# Patient Record
Sex: MALE | Race: WHITE | NOT HISPANIC OR LATINO | Employment: OTHER | URBAN - METROPOLITAN AREA
[De-identification: names, ages, dates, MRNs, and addresses within clinical notes are randomized per-mention and may not be internally consistent; named-entity substitution may affect disease eponyms.]

---

## 2019-02-06 ENCOUNTER — HOSPITAL ENCOUNTER (OUTPATIENT)
Dept: OTHER | Facility: HOSPITAL | Age: 49
Discharge: HOME | End: 2019-02-06

## 2019-02-06 LAB
ALBUMIN SERPL-MCNC: 4.2 G/DL (ref 3.5–5)
ALBUMIN/GLOB SERPL: 1.4 {RATIO} (ref 1.4–2.6)
ALP SERPL-CCNC: 70 U/L (ref 53–128)
ALT SERPL-CCNC: 34 U/L (ref 10–40)
ANION GAP SERPL CALC-SCNC: 18 MMOL/L (ref 8–19)
AST SERPL-CCNC: 22 U/L (ref 15–50)
BASOPHILS # BLD AUTO: 0.03 10*3/UL (ref 0–0.2)
BASOPHILS NFR BLD AUTO: 0.37 % (ref 0–3)
BILIRUB SERPL-MCNC: 0.63 MG/DL (ref 0.2–1.3)
BUN SERPL-MCNC: 11 MG/DL (ref 5–25)
BUN/CREAT SERPL: 10 {RATIO} (ref 6–20)
CALCIUM SERPL-MCNC: 9.5 MG/DL (ref 8.7–10.4)
CHLORIDE SERPL-SCNC: 100 MMOL/L (ref 99–111)
CHOLEST SERPL-MCNC: 139 MG/DL (ref 107–200)
CHOLEST/HDLC SERPL: 3.2 {RATIO} (ref 3–6)
CONV CO2: 23 MMOL/L (ref 22–32)
CONV TOTAL PROTEIN: 7.1 G/DL (ref 6.3–8.2)
CREAT UR-MCNC: 1.08 MG/DL (ref 0.7–1.2)
EOSINOPHIL # BLD AUTO: 0.31 10*3/UL (ref 0–0.7)
EOSINOPHIL # BLD AUTO: 3.85 % (ref 0–7)
ERYTHROCYTE [DISTWIDTH] IN BLOOD BY AUTOMATED COUNT: 12.4 % (ref 11.5–14.5)
EST. AVERAGE GLUCOSE BLD GHB EST-MCNC: 209 MG/DL
GFR SERPLBLD BASED ON 1.73 SQ M-ARVRAT: >60 ML/MIN/{1.73_M2}
GLOBULIN UR ELPH-MCNC: 2.9 G/DL (ref 2–3.5)
GLUCOSE SERPL-MCNC: 172 MG/DL (ref 70–99)
HBA1C MFR BLD: 15.7 G/DL (ref 14–18)
HBA1C MFR BLD: 8.9 % (ref 3.5–5.7)
HCT VFR BLD AUTO: 43.8 % (ref 42–52)
HDLC SERPL-MCNC: 44 MG/DL (ref 40–60)
LDLC SERPL CALC-MCNC: 59 MG/DL (ref 70–100)
LYMPHOCYTES # BLD AUTO: 2.45 10*3/UL (ref 1–5)
MCH RBC QN AUTO: 30.7 PG (ref 27–31)
MCHC RBC AUTO-ENTMCNC: 35.8 G/DL (ref 33–37)
MCV RBC AUTO: 85.8 FL (ref 80–96)
MONOCYTES # BLD AUTO: 0.81 10*3/UL (ref 0.2–1.2)
MONOCYTES NFR BLD AUTO: 10.2 % (ref 3–10)
NEUTROPHILS # BLD AUTO: 4.34 10*3/UL (ref 2–8)
NEUTROPHILS NFR BLD AUTO: 54.7 % (ref 30–85)
NRBC BLD AUTO-RTO: 0 % (ref 0–0.01)
OSMOLALITY SERPL CALC.SUM OF ELEC: 287 MOSM/KG (ref 273–304)
PLATELET # BLD AUTO: 297 10*3/UL (ref 130–400)
PMV BLD AUTO: 8.1 FL (ref 7.4–10.4)
POTASSIUM SERPL-SCNC: 4 MMOL/L (ref 3.5–5.3)
RBC # BLD AUTO: 5.1 10*6/UL (ref 4.7–6.1)
SODIUM SERPL-SCNC: 137 MMOL/L (ref 135–147)
TRIGL SERPL-MCNC: 178 MG/DL (ref 40–150)
VARIANT LYMPHS NFR BLD MANUAL: 30.9 % (ref 20–45)
VLDLC SERPL-MCNC: 36 MG/DL (ref 5–37)
WBC # BLD AUTO: 7.94 10*3/UL (ref 4.8–10.8)

## 2019-02-07 LAB
CONV CREATININE URINE, RANDOM: 52.7 MG/DL (ref 10–300)
CONV MICROALBUM.,U,RANDOM: 14.9 MG/L (ref 0–20)
MICROALBUMIN/CREAT UR: 28.3 MG/G{CRE} (ref 0–25)

## 2020-07-30 ENCOUNTER — CONVERSION ENCOUNTER (OUTPATIENT)
Dept: FAMILY MEDICINE CLINIC | Facility: CLINIC | Age: 50
End: 2020-07-30

## 2020-07-30 ENCOUNTER — TELEPHONE CONVERTED (OUTPATIENT)
Dept: FAMILY MEDICINE CLINIC | Facility: CLINIC | Age: 50
End: 2020-07-30
Attending: FAMILY MEDICINE

## 2020-10-22 ENCOUNTER — TELEPHONE CONVERTED (OUTPATIENT)
Dept: FAMILY MEDICINE CLINIC | Facility: CLINIC | Age: 50
End: 2020-10-22
Attending: FAMILY MEDICINE

## 2021-02-09 ENCOUNTER — CONVERSION ENCOUNTER (OUTPATIENT)
Dept: FAMILY MEDICINE CLINIC | Facility: CLINIC | Age: 51
End: 2021-02-09

## 2021-02-09 ENCOUNTER — HOSPITAL ENCOUNTER (OUTPATIENT)
Dept: GENERAL RADIOLOGY | Facility: HOSPITAL | Age: 51
Discharge: HOME | End: 2021-02-09
Attending: FAMILY MEDICINE

## 2021-02-09 ENCOUNTER — OFFICE VISIT CONVERTED (OUTPATIENT)
Dept: FAMILY MEDICINE CLINIC | Facility: CLINIC | Age: 51
End: 2021-02-09
Attending: FAMILY MEDICINE

## 2021-02-09 LAB
25(OH)D3 SERPL-MCNC: 36.5 NG/ML (ref 30–100)
CHOLEST SERPL-MCNC: 134 MG/DL (ref 107–200)
CHOLEST/HDLC SERPL: 3.5 {RATIO} (ref 3–6)
EST. AVERAGE GLUCOSE BLD GHB EST-MCNC: 223 MG/DL
HBA1C MFR BLD: 9.4 % (ref 3.5–5.7)
HDLC SERPL-MCNC: 38 MG/DL (ref 40–60)
LDLC SERPL CALC-MCNC: 55 MG/DL (ref 70–100)
TRIGL SERPL-MCNC: 203 MG/DL (ref 40–150)
VLDLC SERPL-MCNC: 41 MG/DL (ref 5–37)

## 2021-02-10 LAB
ALBUMIN SERPL-MCNC: 4.3 G/DL (ref 3.5–5)
ALBUMIN/GLOB SERPL: 1.5 {RATIO} (ref 1.4–2.6)
ALP SERPL-CCNC: 122 U/L (ref 53–128)
ALT SERPL-CCNC: 62 U/L (ref 10–40)
ANION GAP SERPL CALC-SCNC: 12 MMOL/L (ref 8–19)
AST SERPL-CCNC: 56 U/L (ref 15–50)
BASOPHILS # BLD AUTO: 0.11 10*3/UL (ref 0–0.2)
BASOPHILS NFR BLD AUTO: 1.1 % (ref 0–3)
BILIRUB SERPL-MCNC: 0.89 MG/DL (ref 0.2–1.3)
BUN SERPL-MCNC: 6 MG/DL (ref 5–25)
BUN/CREAT SERPL: 6 {RATIO} (ref 6–20)
CALCIUM SERPL-MCNC: 9.2 MG/DL (ref 8.7–10.4)
CHLORIDE SERPL-SCNC: 101 MMOL/L (ref 99–111)
CONV ABS IMM GRAN: 0.05 10*3/UL (ref 0–0.2)
CONV CO2: 30 MMOL/L (ref 22–32)
CONV IMMATURE GRAN: 0.5 % (ref 0–1.8)
CONV TOTAL PROTEIN: 7.1 G/DL (ref 6.3–8.2)
CREAT UR-MCNC: 1.07 MG/DL (ref 0.7–1.2)
DEPRECATED RDW RBC AUTO: 42.9 FL (ref 35.1–43.9)
EOSINOPHIL # BLD AUTO: 0.32 10*3/UL (ref 0–0.7)
EOSINOPHIL # BLD AUTO: 3.1 % (ref 0–7)
ERYTHROCYTE [DISTWIDTH] IN BLOOD BY AUTOMATED COUNT: 13.7 % (ref 11.6–14.4)
GFR SERPLBLD BASED ON 1.73 SQ M-ARVRAT: >60 ML/MIN/{1.73_M2}
GLOBULIN UR ELPH-MCNC: 2.8 G/DL (ref 2–3.5)
GLUCOSE SERPL-MCNC: 69 MG/DL (ref 70–99)
HCT VFR BLD AUTO: 46.2 % (ref 42–52)
HGB BLD-MCNC: 15.6 G/DL (ref 14–18)
LYMPHOCYTES # BLD AUTO: 2.77 10*3/UL (ref 1–5)
LYMPHOCYTES NFR BLD AUTO: 26.9 % (ref 20–45)
MCH RBC QN AUTO: 29.3 PG (ref 27–31)
MCHC RBC AUTO-ENTMCNC: 33.8 G/DL (ref 33–37)
MCV RBC AUTO: 86.7 FL (ref 80–96)
MONOCYTES # BLD AUTO: 0.97 10*3/UL (ref 0.2–1.2)
MONOCYTES NFR BLD AUTO: 9.4 % (ref 3–10)
NEUTROPHILS # BLD AUTO: 6.09 10*3/UL (ref 2–8)
NEUTROPHILS NFR BLD AUTO: 59 % (ref 30–85)
NRBC CBCN: 0 % (ref 0–0.7)
OSMOLALITY SERPL CALC.SUM OF ELEC: 286 MOSM/KG (ref 273–304)
PLATELET # BLD AUTO: 308 10*3/UL (ref 130–400)
PMV BLD AUTO: 11.3 FL (ref 9.4–12.4)
POTASSIUM SERPL-SCNC: 3.4 MMOL/L (ref 3.5–5.3)
RBC # BLD AUTO: 5.33 10*6/UL (ref 4.7–6.1)
SODIUM SERPL-SCNC: 140 MMOL/L (ref 135–147)
TESTOST SERPL-MCNC: 285 NG/DL (ref 193–740)
TSH SERPL-ACNC: 5.82 M[IU]/L (ref 0.27–4.2)
VIT B12 SERPL-MCNC: 704 PG/ML (ref 211–911)
WBC # BLD AUTO: 10.31 10*3/UL (ref 4.8–10.8)

## 2021-02-19 ENCOUNTER — HOSPITAL ENCOUNTER (OUTPATIENT)
Dept: GENERAL RADIOLOGY | Facility: HOSPITAL | Age: 51
Discharge: HOME | End: 2021-02-19
Attending: FAMILY MEDICINE

## 2021-02-19 LAB
CONV CREATININE URINE, RANDOM: 19 MG/DL (ref 10–300)
CONV MICROALBUM.,U,RANDOM: 28.9 MG/L (ref 0–20)
MICROALBUMIN/CREAT UR: 152.1 MG/G{CRE} (ref 0–25)

## 2021-03-02 ENCOUNTER — OFFICE VISIT CONVERTED (OUTPATIENT)
Dept: FAMILY MEDICINE CLINIC | Facility: CLINIC | Age: 51
End: 2021-03-02
Attending: FAMILY MEDICINE

## 2021-04-16 ENCOUNTER — OFFICE VISIT CONVERTED (OUTPATIENT)
Dept: FAMILY MEDICINE CLINIC | Facility: CLINIC | Age: 51
End: 2021-04-16
Attending: FAMILY MEDICINE

## 2021-05-10 NOTE — H&P
History and Physical      Patient Name: Ilya Colon   Patient ID: [id number]   Sex: Male   YOB: 1970    Primary Care Provider: Edgard Dickey DO   Referring Provider: Edgard Dickey DO    Visit Date: July 30, 2020    Provider: Edgard Dickey DO   Location: [facility]   Location Address: [address] [place] [address]  [address] [place] [address]  [id number]   Location Phone: [phone]          Chief Complaint  · establish care      History Of Present Illness  TELEHEALTH TELEPHONE VISIT  Chief Complaint: establish care, need med refills   Ilya Colon is a 49 year old /White male who is presenting for evaluation via telehealth telephone visit. Verbal consent obtained before beginning visit.   Provider spent 12 minutes with patient during telehealth visit.   The following staff were present during this visit: Jane Stroud DO, LPN   Past Medical History/Overview of Patient Symptoms  Ilya Colon is a 49 year old /White male who presents for evaluation and treatment of:        Patient presents to establish care or reestablish care from [facility] in [place] in the past charted.  Last note from 7/2018, PMH significant for HLD CVA CAD chronic pain T2DM on insulin osteoarthritis GERD allergic rhinitis depression insomnia.  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     As stated above he has diabetes he takes insulin on Lantus and NovoLog 36 units twice a day of Lantus and 30 units of NovoLog twice a day with meals.  He says his blood sugars are probably well controlled and will sign release records from previous PCP.  Additionally takes Lipitor 10 mg for cholesterol metoprolol 25 mg for CAD.  Denies any chest pain palpitations headache vision changes syncope or loss of consciousness fever fatigue or other.    He has Claritin takes for allergies has a pain  45 mg for sleep breast insomnia and is stable no SI HI.  He was taking Nexium for heartburn but has had better symptom improvement with Protonix if available or able would like to switch has been on before.    He follows with pain management and is on gabapentin 600 mg 4 times a day as well as Norco through pain management filled earlier this month.  Overall he has no concerns other than needing medication refills and advised if he was able to fill out his medical forms and provide his insurance information we could make sure he has his medications so long as he follows up and agrees       Past Medical History  Disease Name Date Onset Notes   Allergic rhinitis --  --    CAD (coronary artery disease) --  --    Depression --  --    GERD (gastroesophageal reflux disease) --  --    HLD (hyperlipidemia) --  --    Hypertension, essential --  --    Insomnia --  --    Insulin dependent type 2 diabetes mellitus --  --    Obesity (BMI 30-39.9) --  --    Pain, Cervical Spine --  --    Pain, Joint --  --    Scoliosis --  --    T2DM (type 2 diabetes mellitus) --  --    Tobacco use disorder 07/10/2014 --          Past Surgical History  Procedure Name Date Notes   Hand surgery, right --  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         Medication List  Name Date Started Instructions   Apidra 100 unit/mL subcutaneous solution  inject by subcutaneous route as per insulin sliding scale protocol   cyclobenzaprine 10 mg oral tablet  take 1 tablet (10 mg) by oral route 3 times per day   Lantus Insulin Subcutaneous 16-18 units  2 times daily   Lyrica 50 mg oral capsule  take 1 capsule (50 mg) by oral route 3 times per day   meloxicam 15 mg oral tablet  take 1 tablet (15 mg) by oral route twice daily   Nexium 20 mg oral capsule,delayed release(DR/EC)  take 1 capsule (20 mg) by oral route once daily at least 1 hour before a meal swallowing whole. Do not crush or chew granules.   tramadol 50 mg oral tablet  take 1 tablet (50  "mg) by oral route every 8 hours as needed         Allergy List  Allergen Name Date Reaction Notes   NO KNOWN DRUG ALLERGIES --  --  --          Social History  Finding Status Start/Stop Quantity Notes   Tobacco Current every day --/-- 1 ppw --          Review of Systems  · Constitutional  o Denies  o : fever, fatigue, weight loss, weight gain  · HENT  o Denies  o : sinus pain, nasal congestion, nasal discharge, postnasal drip  · Cardiovascular  o Denies  o : lower extremity edema, claudication, chest pressure, palpitations  · Respiratory  o Denies  o : shortness of breath, wheezing, cough, hemoptysis, dyspnea on exertion  · Gastrointestinal  o Denies  o : nausea, vomiting, diarrhea, constipation, abdominal pain  · Integument  o Denies  o : rash, itching  · Musculoskeletal  o Denies  o : joint pain, joint swelling, muscle pain, limitation of motion  · Psychiatric  o Denies  o : anxiety, depression      Vitals  Date Time BP Position Site L\R Cuff Size HR RR TEMP (F) WT  HT  BMI kg/m2 BSA m2 O2 Sat HC       07/30/2020 02:56 /80 Sitting    76 - R 16   5'  6\"                     Assessment  · Tobacco abuse counseling       Tobacco abuse counseling     V65.42/Z71.6  · Tobacco use disorder     305.1/F17.200  · Hypertension, essential     401.9/I10  · Depression     296.31  · HLD (hyperlipidemia)     272.4/E78.5  · T2DM (type 2 diabetes mellitus)     250.00/E11.9  · Insulin dependent type 2 diabetes mellitus       Type 2 diabetes mellitus without complications     250.00/E11.9  Long term (current) use of insulin     250.00/Z79.4  · Obesity (BMI 30-39.9)     278.00/E66.9  · Allergic rhinitis     477.9/J30.9  · Insomnia     780.52/G47.00  · CAD (coronary artery disease)     414.00/I25.10         T2DM  Insulin-dependent diabetes  *Stable  We will refill insulin including Lantus and NovoLog, 36 units twice daily and 30 units twice daily respectively with syringes and vials as appropriate  Monitor glucose recheck A1c " request records and release review and update chart appropriately for screenings related to long-term diabetes care including a vision check foot check labs here microalbumin vaccinations    HTN  CAD  HLD  *Stable  Blood pressure controlled continue metoprolol consider lisinopril as appropriate  Additionally continue Lipitor 10 mg daily recheck lipid profile with next labs    GERD  *Continue Protonix will switch to this over Nexium per patient    Insomnia depression  *Controlled  Continue mirtazapine consider different medicine and appropriateness if gaining weight or health risk changes    Will refill medicines for up to 6 months advised to bring in new patient paperwork and follow-up abruptly in agreement with continuing care and optimizing as well.  Will release records order labs as appropriate and follow-up in the next month if not sooner for annual wellness and other needed medical care  *Tobacco cessation advised and will  on continued as appropriate throughout care     **Chronic pain, continue with pain management Rhiannon reviewed will add gabapentin and Norco as prescribed per other specialists to only be prescribed outside clinic     Problems Reconciled  Plan  · Orders  o Smoking cessation counseling, 3-10 minutes [facility] (01804) - V65.42/Z71.6 - 07/30/2020  o ACO-17: Screened for tobacco use AND received tobacco cessation intervention (4004F) - V65.42/Z71.6 - 07/30/2020  o RHIANNON Report (KASPR) - 296.31, 272.4/E78.5, 250.00/E11.9 - 07/30/2020  o ACO-39: Current medications updated and reviewed () - 305.1/F17.200, 272.4/E78.5, 250.00/E11.9 - 07/30/2020  o Physician Telephone Evaluation, 11-20 minutes (61903) - 296.31, 272.4/E78.5, 250.00/E11.9, 250.00/Z79.4 - 07/30/2020  o Diabetes 2 Panel (Urine Microalbumin, CMP, Lipid, A1c, ) [facility] (30886, 73183, 44913, 20268) - 250.00/E11.9 - 07/30/2020   if not in past 3-6 mo  · Instructions  o Tobacco and smoking cessation counseling for more than 3 minutes was  completed.  o Plan Of Care:   o Chronic conditions reviewed and taken into consideration for today's treatment plan.  o Patient instructed to seek medical attention urgently for new or worsening symptoms.  o Call the office with any concerns or questions.  o Risks, benefits, and alternatives were discussed with the patient. The patient is aware of risks associated with:  o Trusted Web sites were provided.  o Bring all medicines with their bottles to each office visit.  · Disposition  o Call or Return if symptoms worsen or persist.  o annual wellness at follow up  o Labs before follow up ordered  o Return Visit Request in/on 1 month +/- 2 days ([id number]).     **medicines updated in chart but not note**             Electronically Signed by: Edgard Dickey, DO -Author on July 30, 2020 03:12:09 PM

## 2021-05-13 NOTE — PROGRESS NOTES
Progress Note      Patient Name: Ilya Colon   Patient ID: [id number]   Sex: Male   YOB: 1970    Primary Care Provider: Edgard Dickey DO   Referring Provider: Edgard Dickey DO    Visit Date: October 22, 2020    Provider: Edgard Dickey DO   Location: [facility]   Location Address: [address] [place] [address]  [id number]   Location Phone: [phone]          Chief Complaint  · f/u and med refills      History Of Present Illness  TELEHEALTH TELEPHONE VISIT  Ilya Colon is a 49 year old /White male who is presenting for evaluation via telehealth telephone visit. Verbal consent obtained before beginning visit.   Provider spent 8 minutes with patient during telehealth visit.   The following staff were present during this visit: Edgard Dickey DO   Past Medical History/Overview of Patient Symptoms  Ilya Colon is a 49 year old /White male who presents for evaluation and treatment of:        Patient calls refills on medications otherwise denies any complaints.  Says blood sugars are doing quite well now low sugars high sugars vision change loss headache confusion chest pain palpitations headache or other       Past Medical History  Disease Name Date Onset Notes   Allergic rhinitis --  --    CAD (coronary artery disease) --  --    Depression --  --    GERD (gastroesophageal reflux disease) --  --    HLD (hyperlipidemia) --  --    Hypertension, essential --  --    Insomnia --  --    Insulin dependent type 2 diabetes mellitus --  --    Obesity (BMI 30-39.9) --  --    Pain, Cervical Spine --  --    Pain, Joint --  --    Scoliosis --  --    T2DM (type 2 diabetes mellitus) --  --    Tobacco use disorder 07/10/2014 --          Past Surgical History  Procedure Name Date Notes   Hand surgery, right --  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         Medication List  Name Date Started Instructions   Accu-Chek Kirsten Plus test  "strp miscellaneous strip 10/22/2020 check blood sugar 2 times daily   Claritin 10 mg oral tablet 07/30/2020 take 1 tablet (10 mg) by oral route once daily for 90 days   Insulin Syringe 0.5 mL 29 gauge x 1/2\" miscellaneous syringe 08/13/2020 use as directed to inject insulin QID QD   Lantus U-100 Insulin 100 unit/mL subcutaneous solution 07/30/2020 inject by subcutaneous route 36 units 2 times daily   Lipitor 10 mg oral tablet 10/22/2020 take 1 tablet (10 mg) by oral route once daily at bedtime for 90 days   metoprolol tartrate 25 mg oral tablet 10/22/2020 take 1 tablet (25 mg) by oral route once daily for 90 days   mirtazapine 45 mg oral tablet 10/22/2020 take 1 tablet (45 mg) by oral route once daily before bedtime for 90 days   Novolog U-100 Insulin aspart 100 unit/mL subcutaneous solution 08/10/2020 inject 30 units of insulin 2 times daily with meals   Protonix 40 mg oral tablet,delayed release (DR/EC) 10/22/2020 take 1 tablet (40 mg) by oral route once daily for 90 days         Allergy List  Allergen Name Date Reaction Notes   NO KNOWN DRUG ALLERGIES --  --  --        Allergies Reconciled  Social History  Finding Status Start/Stop Quantity Notes   Tobacco Current every day --/-- 1 ppw --          Review of Systems  · Constitutional  o Denies  o : fever, fatigue, weight loss, weight gain  · HENT  o Denies  o : sinus pain, nasal congestion, nasal discharge, postnasal drip  · Cardiovascular  o Denies  o : lower extremity edema, claudication, chest pressure, palpitations  · Respiratory  o Denies  o : shortness of breath, wheezing, cough, hemoptysis, dyspnea on exertion  · Gastrointestinal  o Denies  o : nausea, vomiting, diarrhea, constipation, abdominal pain  · Integument  o Denies  o : rash, itching  · Psychiatric  o Denies  o : anxiety, depression          Assessment  · Hypertension, essential     401.9/I10  · HLD (hyperlipidemia)     272.4/E78.5  · T2DM (type 2 diabetes mellitus)     250.00/E11.9  · Insulin " dependent type 2 diabetes mellitus       Type 2 diabetes mellitus without complications     250.00/E11.9  Long term (current) use of insulin     250.00/Z79.4  · Obesity (BMI 30-39.9)     278.00/E66.9         Will refill medicines advised patient needs to have his labs done for next follow-up continue to take Lantus and NovoLog blood sugars at home running 1 20-1 80 per patient  A1c CMP lipid before follow-up ordered today     Problems Reconciled  Plan  · Orders  o ACO-39: Current medications updated and reviewed (1159F, ) - - 10/22/2020  o Physican Telephone evaluation, 5-10 min (37284) - 272.4/E78.5, 250.00/E11.9 - 10/22/2020  o Diabetes 2 Panel (Urine Microalbumin, CMP, Lipid, A1c, ) [facility] (41895, 81085, 89596, 23248) - 272.4/E78.5, 250.00/E11.9 - 10/22/2020  · Medications  o Medications have been Reconciled  · Instructions  o Plan Of Care:   o Call the office with any concerns or questions.  o Risks, benefits, and alternatives were discussed with the patient. The patient is aware of risks associated with:  o Trusted Web sites were provided.  o Bring all medicines with their bottles to each office visit.  · Disposition  o Call or Return if symptoms worsen or persist.  o annual wellness at follow up  o Labs before follow up ordered  o Return Visit Request in/on 3 months +/- 2 days ([id number]).            Electronically Signed by: Edgard Dickey DO -Author on October 22, 2020 04:22:18 PM

## 2021-05-14 VITALS
RESPIRATION RATE: 20 BRPM | DIASTOLIC BLOOD PRESSURE: 78 MMHG | HEIGHT: 66 IN | HEART RATE: 79 BPM | TEMPERATURE: 97.4 F | WEIGHT: 229.25 LBS | BODY MASS INDEX: 36.84 KG/M2 | SYSTOLIC BLOOD PRESSURE: 161 MMHG | OXYGEN SATURATION: 97 %

## 2021-05-14 VITALS
DIASTOLIC BLOOD PRESSURE: 73 MMHG | OXYGEN SATURATION: 97 % | HEIGHT: 66 IN | SYSTOLIC BLOOD PRESSURE: 143 MMHG | BODY MASS INDEX: 36.5 KG/M2 | RESPIRATION RATE: 18 BRPM | TEMPERATURE: 98.4 F | HEART RATE: 81 BPM | WEIGHT: 227.12 LBS

## 2021-05-14 VITALS
WEIGHT: 220.5 LBS | HEIGHT: 66 IN | HEART RATE: 85 BPM | BODY MASS INDEX: 35.44 KG/M2 | OXYGEN SATURATION: 98 % | SYSTOLIC BLOOD PRESSURE: 165 MMHG | TEMPERATURE: 97.8 F | RESPIRATION RATE: 18 BRPM | DIASTOLIC BLOOD PRESSURE: 91 MMHG

## 2021-05-14 NOTE — PROGRESS NOTES
Progress Note      Patient Name: Ilya Colon   Patient ID: [id number]   Sex: Male   YOB: 1970    Primary Care Provider: Edgard Dickey DO   Referring Provider: Edgard Dickey DO    Visit Date: February 9, 2021    Provider: Edgard Dickey DO   Location: [facility]   Location Address: [address] [place] [address]  [id number]   Location Phone: [phone]          Chief Complaint  · Requesting refills on all of his medications including the Xanax.   · Medications go to [employer] except for Mirtazapine and that goes to [facility]  · Have fatigue and wants to do labs to check his testosterone, potassium, zinc and iron      History Of Present Illness  Ilya Colon is a 50 year old /White male who presents for evaluation and treatment of:        Last seen 10/2020, refill his medicines at that appointment it was via telephone he was advised that he needs to have his labs done before next appointment A1c CMP lipid was ordered and blood sugars are to be maintained on Lantus and NovoLog if he is supposed to be taking.    Last labs last in the chart were 7/2018 were done the rest of been done somewhere besides are chronic  CBC 2018 Hb was 16   on 2018 labs  A1c no A1c  LDL 2018+ LDL of 81    Presents today follow-up chronic conditions patient needs to have his labs done we have ordered these repeatedly and not had done or is going somewhere else.  He takes Lantus and NovoLog his several comorbidities prescribe Lipitor to 10 mg and will his metoprolol and mirtazapine for sleep anxiety depression or bipolar BMI greater than 30 advised weight loss and manage diet.    No chest pain palpitations headache or fever    Review of he had an annual on his visit last year depression negative needs a foot not examined also needs colonoscopy prescribe losartan or lisinopril for diabetic protection even though blood pressure is fairly well controlled         Past  "Medical History  Disease Name Date Onset Notes   Allergic rhinitis --  --    CAD (coronary artery disease) --  --    Depression --  --    GERD (gastroesophageal reflux disease) --  --    HLD (hyperlipidemia) --  --    Hypertension, essential --  --    Insomnia --  --    Insulin dependent type 2 diabetes mellitus --  --    Obesity (BMI 30-39.9) --  --    Pain, Cervical Spine --  --    Pain, Joint --  --    Scoliosis --  --    T2DM (type 2 diabetes mellitus) --  --    Tobacco use disorder 07/10/2014 --          Past Surgical History  Procedure Name Date Notes   Hand surgery, right --  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         Medication List  Name Date Started Instructions   Accu-Chek Kirsten Plus test strp miscellaneous strip 02/23/2021 check blood sugar 2 times daily Dx E11.9   hydroxyzine HCl 25 mg oral tablet 02/09/2021 take 1 tablet (25 mg) by oral route 3 times per day as needed for 30 days   Insulin Syringe 0.5 mL 29 gauge x 1/2\" miscellaneous syringe 08/13/2020 use as directed to inject insulin QID QD   Lantus U-100 Insulin 100 unit/mL subcutaneous solution 02/09/2021 inject by subcutaneous route 40 units 2 times daily   Lipitor 10 mg oral tablet 02/19/2021 take 1 tablet (10 mg) by oral route once daily at bedtime for 90 days   lisinopril 20 mg oral tablet 02/25/2021 take 1 tablet (20 mg) by oral route once daily for 90 days   metoprolol tartrate 25 mg oral tablet 02/23/2021 take 1 tablet (25 mg) by oral route once daily for 90 days   mirtazapine 45 mg oral tablet 02/09/2021 take 1 tablet (45 mg) by oral route once daily before bedtime for 90 days   Novolog U-100 Insulin aspart 100 unit/mL subcutaneous solution 02/09/2021 inject 35 units of insulin 2 times daily with meals, max 70 units daily   Protonix 40 mg oral tablet,delayed release (DR/EC) 02/23/2021 take 1 tablet (40 mg) by oral route once daily for 90 days   topiramate 50 mg oral tablet 02/09/2021 take 1 tablet (50 mg) by oral route " "2 times per day for 30 days   True Metrix Air Glucose Meter miscellaneous kit 02/09/2021 use to check glucose up to 5 times daily   Ventolin HFA 90 mcg/actuation inhalation HFA aerosol inhaler 02/19/2021 inhale 1 puff (90 mcg) by inhalation route every 4-6 hours as needed for 1 day   Xanax 0.5 mg oral tablet  take 1 tablet by oral route As needed         Allergy List  Allergen Name Date Reaction Notes   NO KNOWN DRUG ALLERGIES --  --  --        Allergies Reconciled  Social History  Finding Status Start/Stop Quantity Notes   Tobacco Current every day --/-- 1 ppw --          Review of Systems  · Constitutional  o Admits  o : fatigue  o Denies  o : fever, weight loss, weight gain  · HENT  o Denies  o : sinus pain, nasal congestion, nasal discharge, postnasal drip  · Cardiovascular  o Denies  o : lower extremity edema, claudication, chest pressure, palpitations  · Respiratory  o Denies  o : shortness of breath, wheezing, cough, hemoptysis, dyspnea on exertion  · Gastrointestinal  o Denies  o : nausea, vomiting, diarrhea, constipation, abdominal pain  · Integument  o Denies  o : rash, itching  · Neurologic  o Denies  o : altered mental status, muscular weakness  · Musculoskeletal  o Denies  o : joint pain, joint swelling  · Psychiatric  o Admits  o : anxiety  o Denies  o : depression      Vitals  Date Time BP Position Site L\R Cuff Size HR RR TEMP (F) WT  HT  BMI kg/m2 BSA m2 O2 Sat FR L/min FiO2 HC       02/09/2021 01:06 /78 Sitting    79 - R 20 97.4 229lbs 4oz 5'  6\" 37 2.2 97 %  21%    02/09/2021 01:07 /78 Sitting                       Physical Examination  · Constitutional  o Appearance  o : no acute distress, well-nourished  · Head and Face  o Head  o :   § Inspection  § : atraumatic, normocephalic  o Face  o :   § Inspection  § : no facial lesions  · Ears, Nose, Mouth and Throat  o Ears  o :   § External Ears  § : normal  o Nose  o :   § Intranasal Exam  § : nares patent  o Oral Cavity  o :   § Oral " Mucosa  § : moist mucous membranes  · Neck  o Thyroid  o : gland size normal, nontender, no nodules or masses present on palpation, symmetric  · Respiratory  o Respiratory Effort  o : breathing comfortably, symmetric chest rise  o Auscultation of Lungs  o : clear to asculatation bilaterally, no wheezes, rales, or rhonchii  · Cardiovascular  o Heart  o :   § Auscultation of Heart  § : regular rate and rhythm, no murmurs, rubs, or gallops  o Peripheral Vascular System  o :   § Extremities  § : no edema  · Skin and Subcutaneous Tissue  o General Inspection  o : no lesions present, no areas of discoloration, skin turgor normal  · Neurologic  o Mental Status Examination  o :   § Orientation  § : grossly oriented to person, place and time  o Gait and Station  o :   § Gait Screening  § : normal gait  · Psychiatric  o General  o : normal mood and affect          Assessment  · Fatigue     780.79/R53.83  · Hypertension, essential     401.9/I10  · HLD (hyperlipidemia)     272.4/E78.5  · Type 2 diabetes mellitus with left eye affected by severe nonproliferative retinopathy without macular edema, with long-term current use of insulin       Type 2 diabetes mellitus with severe nonproliferative diabetic retinopathy without macular edema, left eye     250.50/E11.3492  Long term (current) use of insulin     250.50/Z79.4  · Insulin dependent type 2 diabetes mellitus       Type 2 diabetes mellitus without complications     250.00/E11.9  Long term (current) use of insulin     250.00/Z79.4  · Obesity (BMI 30-39.9)     278.00/E66.9  · Insomnia     780.52/G47.00  · CAD (coronary artery disease)     414.00/I25.10         IDDM  Dyslipidemia  HTN   Diabetic nephropathy  *Managed  continue with insulin renewed today send certain ones to certain pharmacies mail or other ones go to local  Monitor blood sugars at home no A1c reported patient has been getting his refills and following up on the phone for his comorbidities and risk of Covid we  been trying to keep him out of the clinic but needs to get those done today see eye doctor for dilated eye exam podiatry for feet will get labs goal A1c would be less than 7-8 if uncontrolled will recommend again to go to endocrinology    Patient requests labs for testosterone refills on medicines as well, we will try to order these for him and if appropriate screening before the next follow-up rec awv     Problems Reconciled  Plan  · Orders  o Male Fatigue Panel (CBC, CMP, TSH, B12, Testosterone) [facility] (20811, 30181, 86231, 38392, 05332) - 780.79/R53.83 - 02/09/2021  o Hgb A1c [facility] (11441) - 250.50/E11.3492 - 02/09/2021  o Lipid Panel [facility] (97580) - 272.4/E78.5 - 02/09/2021  o Vitamin D (25-Hydroxy) Level (93857) - 780.79/R53.83 - 02/09/2021  o ACO-39: Current medications updated and reviewed (, 1159F) - 780.52/G47.00, 780.79/R53.83, 250.50/E11.3492, 250.50/Z79.4 - 02/09/2021  o Urine microalbumin (66529) - 250.50/E11.3492, 250.50/Z79.4 - 02/09/2021  · Medications  o Medications have been Reconciled  o Transition of Care or Provider Policy  · Instructions  o Handouts were given to patient:   o Patient is taking medications as prescribed and doing well.   o Take all medications as prescribed/directed.  o Patient was educated/instructed on their diagnosis, treatment and medications prior to discharge from the clinic today.  o Patient instructed to seek medical attention urgently for new or worsening symptoms.  o Trusted Web sites were provided.  o Call the office with any concerns or questions.  o Risks, benefits, and alternatives were discussed with the patient. The patient is aware of risks associated with:  o Chronic conditions reviewed and taken into consideration for today's treatment plan.  o Electronically Identified Patient Education Materials Provided Electronically  · Disposition  o Call or Return if symptoms worsen or persist.  o annual wellness at follow up  o Labs before follow up ordered  o Reviewed  chart labs and imaging prior to and during encounter, updated  o Return Visit Request in/on 3 months +/- 7 days (22708).            Electronically Signed by: Edgard Dickey DO -Author on February 25, 2021 03:41:31 PM

## 2021-05-14 NOTE — PROGRESS NOTES
Progress Note      Patient Name: Ilya Colon   Patient ID: [id number]   Sex: Male   YOB: 1970    Primary Care Provider: Edgard Dickey DO   Referring Provider: Edgard Dickey DO    Visit Date: March 2, 2021    Provider: Edgard Dickey DO   Location: [facility]   Location Address: [address] [place] [address]  [id number]   Location Phone: [phone]          Chief Complaint  · Pain in hands, seems to be getting worse.   · Pain in knees, feet, and ankles  · Wants to discuss anti-inflammatory.   · Wants to start excerising soon  · Hasn't been taking the lisinopril yet, just got it delievered yesterday.      History Of Present Illness  Ilya Colon is a 50 year old /White male who presents for evaluation and treatment of:        Patient presents with multiple complaints when to get his blood sugars under better control was also complaining about his review of meth use when he was a  and would like to really get his teeth better managed.    Is also complaining about swelling in his hands he would like to have something to help treat this with have talked about doing diclofenac gel on his hands or even Celebrex or anti-inflammatory such as Mobic to treat which we will do today       Past Medical History  Disease Name Date Onset Notes   Allergic rhinitis --  --    CAD (coronary artery disease) --  --    Depression --  --    GERD (gastroesophageal reflux disease) --  --    HLD (hyperlipidemia) --  --    Hypertension, essential --  --    Insomnia --  --    Insulin dependent type 2 diabetes mellitus --  --    Obesity (BMI 30-39.9) --  --    Pain, Cervical Spine --  --    Pain, Joint --  --    Scoliosis --  --    T2DM (type 2 diabetes mellitus) --  --    Tobacco use disorder 07/10/2014 --          Past Surgical History  Procedure Name Date Notes   Hand surgery, right --  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         Medication  "List  Name Date Started Instructions   Accu-Chek Kirsten Plus test strp miscellaneous strip 02/23/2021 check blood sugar 2 times daily Dx E11.9   Flomax 0.4 mg oral capsule 03/02/2021 take 1 capsule (0.4 mg) by oral route once daily 1/2 hour following the same meal each day for 30 days   hydroxyzine HCl 25 mg oral tablet 02/09/2021 take 1 tablet (25 mg) by oral route 3 times per day as needed for 30 days   Insulin Syringe 0.5 mL 29 gauge x 1/2\" miscellaneous syringe 08/13/2020 use as directed to inject insulin QID QD   Lantus U-100 Insulin 100 unit/mL subcutaneous solution 02/09/2021 inject by subcutaneous route 40 units 2 times daily   Lipitor 10 mg oral tablet 02/19/2021 take 1 tablet (10 mg) by oral route once daily at bedtime for 90 days   lisinopril 20 mg oral tablet 02/25/2021 take 1 tablet (20 mg) by oral route once daily for 90 days   meloxicam 7.5 mg oral tablet 03/02/2021 take 1 tablet (7.5 mg) by oral route up to 2 times daily with food as needed for arthritis   metoprolol tartrate 25 mg oral tablet 02/23/2021 take 1 tablet (25 mg) by oral route once daily for 90 days   mirtazapine 45 mg oral tablet 02/09/2021 take 1 tablet (45 mg) by oral route once daily before bedtime for 90 days   Novolog U-100 Insulin aspart 100 unit/mL subcutaneous solution 02/09/2021 inject 35 units of insulin 2 times daily with meals, max 70 units daily   Protonix 40 mg oral tablet,delayed release (DR/EC) 02/23/2021 take 1 tablet (40 mg) by oral route once daily for 90 days   topiramate 50 mg oral tablet 02/09/2021 take 1 tablet (50 mg) by oral route 2 times per day for 30 days   True Metrix Air Glucose Meter miscellaneous kit 02/09/2021 use to check glucose up to 5 times daily   Ventolin HFA 90 mcg/actuation inhalation HFA aerosol inhaler 02/19/2021 inhale 1 puff (90 mcg) by inhalation route every 4-6 hours as needed for 1 day   Xanax 0.5 mg oral tablet  take 1 tablet by oral route As needed         Allergy List  Allergen Name Date " "Reaction Notes   NO KNOWN DRUG ALLERGIES --  --  --        Allergies Reconciled  Social History  Finding Status Start/Stop Quantity Notes   Alcohol Never --/-- --  --    Recreational Drug Use Former --/-- --  --    Tobacco Current every day --/-- 1 ppw --          Review of Systems  · Constitutional  o Denies  o : fever, fatigue, weight loss, weight gain  · HENT  o Denies  o : sinus pain, nasal congestion, nasal discharge, postnasal drip  · Cardiovascular  o Denies  o : lower extremity edema, claudication, chest pressure, palpitations  · Respiratory  o Denies  o : shortness of breath, wheezing, cough, hemoptysis, dyspnea on exertion  · Gastrointestinal  o Denies  o : nausea, vomiting, diarrhea, constipation, abdominal pain  · Integument  o Denies  o : rash, itching  · Musculoskeletal  o Admits  o : joint pain, muscle pain  · Psychiatric  o Denies  o : anxiety, depression      Vitals  Date Time BP Position Site L\R Cuff Size HR RR TEMP (F) WT  HT  BMI kg/m2 BSA m2 O2 Sat FR L/min FiO2 HC       03/02/2021 01:34 /91 Sitting    85 - R 18 97.8 220lbs 8oz 5'  6\" 35.59 2.16 98 %  21%    03/02/2021 01:35 /86 Sitting                       Physical Examination  · Constitutional  o Appearance  o : no acute distress, well-nourished  · Head and Face  o Head  o :   § Inspection  § : atraumatic, normocephalic  · Ears, Nose, Mouth and Throat  o Ears  o :   § External Ears  § : normal  o Nose  o :   § Intranasal Exam  § : nares patent  o Oral Cavity  o :   § Oral Mucosa  § : moist mucous membranes  · Respiratory  o Respiratory Effort  o : breathing comfortably, symmetric chest rise  o Auscultation of Lungs  o : clear to asculatation bilaterally, no wheezes, rales, or rhonchii  · Cardiovascular  o Heart  o :   § Auscultation of Heart  § : regular rate and rhythm, no murmurs, rubs, or gallops  o Peripheral Vascular System  o :   § Extremities  § : no edema  · Neurologic  o Mental Status Examination  o : "   § Orientation  § : grossly oriented to person, place and time  o Gait and Station  o :   § Gait Screening  § : normal gait  · Psychiatric  o General  o : normal mood and affect          Assessment  · Hypertension, essential     401.9/I10  · HLD (hyperlipidemia)     272.4/E78.5  · T2DM (type 2 diabetes mellitus)     250.00/E11.9  · Insulin dependent type 2 diabetes mellitus       Type 2 diabetes mellitus without complications     250.00/E11.9  Long term (current) use of insulin     250.00/Z79.4         Hand pain  Osteoarthritis  Take Mobic to use as needed  Can do diclofenac gel as well to help    HTN  *Uncontrolled  We recommend monitoring blood pressure at home and follow-up in a couple weeks to see if improved as well as his hand pain    T2DM  Continue to monitor blood sugars at home take 1 fasting and before bedtime and follow-up need to have goal <140 150 and see potentially endocrinology if continues to have uncontrolled    Poor dentition  Recommend to go to [place] we will give handout on paperwork to be filled out for potential new patient for him to reach out to them    Follow-up 2 to 4 weeks sooner if concerns annual eyes visit if abl>     Problems Reconciled  Plan  · Orders  o ACO-39: Current medications updated and reviewed (1159F, ) - - 03/02/2021  · Medications  o Medications have been Reconciled  o Transition of Care or Provider Policy  · Instructions  o Patient was educated/instructed on their diagnosis, treatment and medications prior to discharge from the clinic today.  o Patient instructed to seek medical attention urgently for new or worsening symptoms.  o Trusted Web sites were provided.  o Call the office with any concerns or questions.  o Bring all medicines with their bottles to each office visit.  o Risks, benefits, and alternatives were discussed with the patient. The patient is aware of risks associated with:  o Chronic conditions reviewed and taken into consideration for today's  treatment plan.  o Electronically Identified Patient Education Materials Provided Electronically  · Disposition  o Call or Return if symptoms worsen or persist.  o annual wellness at follow up  o Return Visit Request in/on 2 weeks +/- 7 days ([id number]).            Electronically Signed by: Edgard Dickey DO -Author on March 14, 2021 04:16:14 PM

## 2021-05-14 NOTE — PROGRESS NOTES
Progress Note      Patient Name: Ilya Colon   Patient ID: [id number]   Sex: Male   YOB: 1970    Primary Care Provider: Edgard Dickey DO   Referring Provider: Edgard Dickey DO    Visit Date: April 16, 2021    Provider: Edgard Dickey DO   Location: [facility]   Location Address: [address] [place] [address]  [id number]   Location Phone: [phone]          Chief Complaint  · arthritis in right hand  · medication adjustments      History Of Present Illness  Ilya Colon is a 50 year old /White male who presents for evaluation and treatment of:      Presents with complaint of pain in his hands has been ongoing no better since last visit which was just about a month and a half ago.  He has significant diabetes on NovoLog 35 units with meals and also 40 units twice daily, denies any history of gout has some significant arthritis or carpal tunnel with neuropathy probably from his longstanding diabetes.    A1c was 9.4 on 2/21 he is interested in improving his blood sugars and trying actively to adjust diet and insulin, he is averaging 223 that number, cholesterol was significant for triglycerides of 203 but his LDL was 55% good urine microalbumin was significantly elevated creatinine ratio was 152 stop taking lisinopril or ACE arm would help protect kidneys may need another medicine to help additionally.  Talked about seeing podiatrist and other specialist to help with long-term management of his diabetes    Worsening now we will just recommend changing his anti-inflammatory from Mobic to diclofenac when needed neuropathy medicine like gabapentin or amitriptyline nortriptyline will consider an EMG studies x-rays if worsens.  Denies any injury or trauma    Also he wanted to be on something different than the mirtazapine he asked someone about that medicine and they told him that that helps people in the nursing home gain weight, advised him that he  "was on that before and do appreciate being diabetic and gaining weight is not going to help us to we will consider increasing his Topamax to help with turning his weight and appetite meanwhile.  He is having trouble sleeping that is why he was on mirtazapine so we will give him trazodone to take at night 100mg    Denies any hypoglycemia vision change or loss falls headache syncope abdominal swelling diarrhea constipation or other       Past Medical History  Disease Name Date Onset Notes   Allergic rhinitis --  --    CAD (coronary artery disease) --  --    Depression --  --    GERD (gastroesophageal reflux disease) --  --    HLD (hyperlipidemia) --  --    Hypertension, essential --  --    Insomnia --  --    Insulin dependent type 2 diabetes mellitus --  --    Obesity (BMI 30-39.9) --  --    Pain, Cervical Spine --  --    Pain, Joint --  --    Scoliosis --  --    T2DM (type 2 diabetes mellitus) --  --    Tobacco use disorder 07/10/2014 --          Past Surgical History  Procedure Name Date Notes   Hand surgery, right --  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         Medication List  Name Date Started Instructions   Accu-Chek Kirstne Plus test strp miscellaneous strip 02/23/2021 check blood sugar 2 times daily Dx E11.9   diclofenac sodium 75 mg oral tablet,delayed release (DR/EC) 04/16/2021 take 1 tablet (75 mg) by oral route 2 times per day as needed with food   Flomax 0.4 mg oral capsule 03/02/2021 take 1 capsule (0.4 mg) by oral route once daily 1/2 hour following the same meal each day for 30 days   hydroxyzine HCl 25 mg oral tablet 02/09/2021 take 1 tablet (25 mg) by oral route 3 times per day as needed for 30 days   Insulin Syringe 0.5 mL 29 gauge x 1/2\" miscellaneous syringe 08/13/2020 use as directed to inject insulin QID QD   Lantus U-100 Insulin 100 unit/mL subcutaneous solution 02/09/2021 inject by subcutaneous route 40 units 2 times daily   Lipitor 10 mg oral tablet 02/19/2021 take 1 tablet " (10 mg) by oral route once daily at bedtime for 90 days   lisinopril 20 mg oral tablet 02/25/2021 take 1 tablet (20 mg) by oral route once daily for 90 days   metoprolol tartrate 25 mg oral tablet 02/23/2021 take 1 tablet (25 mg) by oral route once daily for 90 days   Novolog U-100 Insulin aspart 100 unit/mL subcutaneous solution 02/09/2021 inject 35 units of insulin 2 times daily with meals, max 70 units daily   Protonix 40 mg oral tablet,delayed release (DR/EC) 02/23/2021 take 1 tablet (40 mg) by oral route once daily for 90 days   topiramate 50 mg oral tablet 04/16/2021 take 1 tablet (50 mg) by oral route 2 times per day for 90 days   trazodone 100 mg oral tablet 04/16/2021 take 1 tablet (100 mg) by oral route once daily at bedtime for 30 days   True Metrix Air Glucose Meter miscellaneous kit 02/09/2021 use to check glucose up to 5 times daily   Ventolin HFA 90 mcg/actuation inhalation HFA aerosol inhaler 02/19/2021 inhale 1 puff (90 mcg) by inhalation route every 4-6 hours as needed for 1 day   Xanax 0.5 mg oral tablet  take 1 tablet by oral route As needed         Allergy List  Allergen Name Date Reaction Notes   NO KNOWN DRUG ALLERGIES --  --  --        Allergies Reconciled  Social History  Finding Status Start/Stop Quantity Notes   Alcohol Never --/-- --  --    Recreational Drug Use Former --/-- --  --    Tobacco Current every day --/-- 1 ppw --          Review of Systems  · Constitutional  o * See HPI  · Eyes  o * See HPI  · HENT  o * See HPI  · Breasts  o * See HPI  · Cardiovascular  o * See HPI  · Respiratory  o * See HPI  · Gastrointestinal  o * See HPI  · Genitourinary  o * See HPI  · Integument  o * See HPI  · Neurologic  o * See HPI  · Musculoskeletal  o * See HPI  · Endocrine  o * See HPI  · Psychiatric  o * See HPI  · Heme-Lymph  o * See HPI  · Allergic-Immunologic  o * See HPI      Vitals  Date Time BP Position Site L\R Cuff Size HR RR TEMP (F) WT  HT  BMI kg/m2 BSA m2 O2 Sat FR L/min FiO2 HC      "  04/16/2021 01:44 /73 Sitting    81 - R 18 98.4 227lbs 2oz 5'  6\" 36.66 2.19 97 %  21%          Physical Examination  · Constitutional  o Appearance  o : no acute distress, well-nourished  · Head and Face  o Head  o :   § Inspection  § : atraumatic, normocephalic  · Eyes  o Eyes  o : extraocular movements intact, no scleral icterus, no conjunctival injection  · Ears, Nose, Mouth and Throat  o Ears  o :   § External Ears  § : normal  o Nose  o :   § Intranasal Exam  § : nares patent  o Oral Cavity  o :   § Oral Mucosa  § : moist mucous membranes  · Respiratory  o Respiratory Effort  o : breathing comfortably, symmetric chest rise  o Auscultation of Lungs  o : clear to asculatation bilaterally, no wheezes, rales, or rhonchii  · Cardiovascular  o Heart  o :   § Auscultation of Heart  § : regular rate and rhythm, no murmurs, rubs, or gallops  o Peripheral Vascular System  o :   § Extremities  § : no edema  · Neurologic  o Mental Status Examination  o :   § Orientation  § : grossly oriented to person, place and time  o Gait and Station  o :   § Gait Screening  § : normal gait  · Psychiatric  o General  o : normal mood and affect              Assessment  · Osteoarthritis     715.90/M19.90  · Hypertension, essential     401.9/I10  · Depression     296.31  · HLD (hyperlipidemia)     272.4/E78.5  · T2DM (type 2 diabetes mellitus)     250.00/E11.9  · Insulin dependent type 2 diabetes mellitus       Type 2 diabetes mellitus without complications     250.00/E11.9  Long term (current) use of insulin     250.00/Z79.4  · Obesity (BMI 30-39.9)     278.00/E66.9  · Hand pain     729.5/M79.643         Hand pain, Chronic  Osteoarthritis  **Consider carpal tunnel and other etiologies additionally  Change Mobic to diclofenac, consider wearing a wrist splint and follow-up with orthopedics or therapy  Lowering sugars may help with this if not consider work-up uric acid and x-rays to use as needed  Can also do diclofenac gel to " help    HTN  *Uncontrolled  We recommend monitoring blood pressure at home   Continue lisinopril and consider increasing to twice daily  Goal less than 140/90 is just above that threshold  follow-up in a couple weeks to see if improved as well as his hand pain    T2DM  *Managing, adjusting not at goal  A1c was 9.4, average of 224 goal will be to get down to fasting 150 in the morning he is on quite a bit of insulin using Lantus twice a day as well as short acting with meals up to 2-3 times a day for total of about 150 daily was still in the numbers he had  Consider trial a follow-up with endocrinology or specialist and may need U500 or more potent insulin  **will see if we can get in with paddy or endo help us swtich over to U500 or insulin pump possibly    ****From last apt unsure if he has found anything out on this  Poor dentition: Recommend to go to [place] [facility]    Follow-up In the next couple weeks or months for hand if no improvement then consider specialist, tonyain, possibly uric acid and sent to PT for hand therapy, possibly emg if symptoms more Consistent with carpal tunnel Other neuropathy     Problems Reconciled  Plan  · Orders  o Hgb A1c [facility] ([id number]) - 250.00/E11.9 - 04/16/2021   before f/u   o Lipid Panel [facility] ([id number]) - 272.4/E78.5 - 04/16/2021  o ACO-14: Influenza immunization was not administered for reasons documented [facility] () - - 04/16/2021   pt declined  o ACO-39: Current medications updated and reviewed (1159F, ) - 250.00/E11.9, 250.00/Z79.4, 715.90/M19.90, 729.5/M79.643 - 04/16/2021  · Medications  o Medications have been Reconciled  o Transition of Care or Provider Policy  · Instructions  o Handouts were given to patient:   o Patient is taking medications as prescribed and doing well.   o Take all medications as prescribed/directed.  o Patient was educated/instructed on their diagnosis, treatment and medications prior to discharge from the clinic today.  o Risks,  benefits, and alternatives were discussed with the patient. The patient is aware of risks associated with:  o Chronic conditions reviewed and taken into consideration for today's treatment plan.  o Flu vaccine declined.  o Electronically Identified Patient Education Materials Provided Electronically  · Disposition  o Call or Return if symptoms worsen or persist.  o annual wellness at follow up  o as scheduled  o Reviewed chart labs and imaging prior to and during encounter, updated  o Return Visit Request in/on 1 month +/- 7 days ([id number]).            Electronically Signed by: Edgard Dickey DO -Author on April 26, 2021 03:17:41 AM

## 2021-05-15 VITALS
RESPIRATION RATE: 16 BRPM | DIASTOLIC BLOOD PRESSURE: 80 MMHG | HEIGHT: 66 IN | HEART RATE: 76 BPM | SYSTOLIC BLOOD PRESSURE: 130 MMHG

## 2021-05-25 ENCOUNTER — OFFICE VISIT CONVERTED (OUTPATIENT)
Dept: FAMILY MEDICINE CLINIC | Facility: CLINIC | Age: 51
End: 2021-05-25
Attending: FAMILY MEDICINE

## 2021-05-26 ENCOUNTER — TRANSCRIBE ORDERS (OUTPATIENT)
Dept: ADMINISTRATIVE | Facility: HOSPITAL | Age: 51
End: 2021-05-26

## 2021-05-26 DIAGNOSIS — I73.9 PAD (PERIPHERAL ARTERY DISEASE) (HCC): Primary | ICD-10-CM

## 2021-05-26 DIAGNOSIS — I10 HYPERTENSION, UNSPECIFIED TYPE: ICD-10-CM

## 2021-05-26 DIAGNOSIS — I25.10 CAD IN NATIVE ARTERY: ICD-10-CM

## 2021-06-01 ENCOUNTER — APPOINTMENT (OUTPATIENT)
Dept: CARDIOLOGY | Facility: HOSPITAL | Age: 51
End: 2021-06-01

## 2021-06-05 NOTE — PROGRESS NOTES
Progress Note      Patient Name: Ilya Colon   Patient ID: [id number]   Sex: Male   YOB: 1970    Primary Care Provider: Edgard Dickey DO   Referring Provider: Edgard Dickey DO    Visit Date: May 25, 2021    Provider: Edgard Dickey DO   Location: [facility]   Location Address: [address] [place] [address]  [id number]   Location Phone: [phone]          Chief Complaint  · Feet and ankles swelling.   · Sinus draining waking him up at night, requesting Sudafed.  · Diclofenac not helping.       History Of Present Illness  Ilya Colon is a 50 year old /White male who presents for evaluation and treatment of:      Patient would like to have a vascular study of his legs has a history of a cardiac stent and PAD    He is having trouble sleeping would like to increase his trazodone    He is having a lot of pain and trouble with his hands has severe arthritis would like to change diclofenac to something else    Has severe allergies and rhinitis today history of drug use would like prescription medicine to help with calming down his allergies like Stadol    Complaining of leg swelling and edema we will change his blood pressure medicine or add HCTZ to help       Past Medical History  Disease Name Date Onset Notes   Allergic rhinitis --  --    CAD (coronary artery disease) --  --    Depression --  --    GERD (gastroesophageal reflux disease) --  --    HLD (hyperlipidemia) --  --    Hypertension, essential --  --    Insomnia --  --    Insulin dependent type 2 diabetes mellitus --  --    Obesity (BMI 30-39.9) --  --    Pain, Cervical Spine --  --    Pain, Joint --  --    Scoliosis --  --    T2DM (type 2 diabetes mellitus) --  --    Tobacco use disorder 07/10/2014 --          Past Surgical History  Procedure Name Date Notes   Colonoscopy 06/08/2015 --    Hand surgery, right --  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         Medication  "List  Name Date Started Instructions   Accu-Chek Kirsten Plus test strp miscellaneous strip 02/23/2021 check blood sugar 2 times daily Dx E11.9   diclofenac sodium 75 mg oral tablet,delayed release (DR/EC) 04/16/2021 take 1 tablet (75 mg) by oral route 2 times per day as needed with food   Flomax 0.4 mg oral capsule 03/02/2021 take 1 capsule (0.4 mg) by oral route once daily 1/2 hour following the same meal each day for 30 days   hydroxyzine HCl 25 mg oral tablet 02/09/2021 take 1 tablet (25 mg) by oral route 3 times per day as needed for 30 days   Insulin Syringe 0.5 mL 29 gauge x 1/2\" miscellaneous syringe 08/13/2020 use as directed to inject insulin QID QD   Lantus U-100 Insulin 100 unit/mL subcutaneous solution 02/09/2021 inject by subcutaneous route 40 units 2 times daily   Lipitor 10 mg oral tablet 02/19/2021 take 1 tablet (10 mg) by oral route once daily at bedtime for 90 days   lisinopril 20 mg oral tablet 02/25/2021 take 1 tablet (20 mg) by oral route once daily for 90 days   metoprolol tartrate 25 mg oral tablet 02/23/2021 take 1 tablet (25 mg) by oral route once daily for 90 days   Novolog U-100 Insulin aspart 100 unit/mL subcutaneous solution 02/09/2021 inject 35 units of insulin 2 times daily with meals, max 70 units daily   Protonix 40 mg oral tablet,delayed release (DR/EC) 02/23/2021 take 1 tablet (40 mg) by oral route once daily for 90 days   topiramate 50 mg oral tablet 04/16/2021 take 1 tablet (50 mg) by oral route 2 times per day for 90 days   trazodone 100 mg oral tablet 04/16/2021 take 1 tablet (100 mg) by oral route once daily at bedtime for 30 days   True Metrix Air Glucose Meter miscellaneous kit 02/09/2021 use to check glucose up to 5 times daily   Ventolin HFA 90 mcg/actuation inhalation HFA aerosol inhaler 02/19/2021 inhale 1 puff (90 mcg) by inhalation route every 4-6 hours as needed for 1 day   Xanax 0.5 mg oral tablet  take 1 tablet by oral route As needed         Allergy List  Allergen " "Name Date Reaction Notes   NO KNOWN DRUG ALLERGIES --  --  --        Allergies Reconciled  Social History  Finding Status Start/Stop Quantity Notes   Alcohol Never --/-- --  --    Recreational Drug Use Former --/-- --  --    Tobacco Current every day --/-- 1 ppw --          Review of Systems  · Constitutional  o * See HPI  · Eyes  o * See HPI  · HENT  o * See HPI  · Breasts  o * See HPI  · Cardiovascular  o * See HPI  · Respiratory  o * See HPI  · Gastrointestinal  o * See HPI  · Genitourinary  o * See HPI  · Integument  o * See HPI  · Neurologic  o * See HPI  · Musculoskeletal  o * See HPI  · Endocrine  o * See HPI  · Psychiatric  o * See HPI  · Heme-Lymph  o * See HPI  · Allergic-Immunologic  o * See HPI      Vitals  Date Time BP Position Site L\R Cuff Size HR RR TEMP (F) WT  HT  BMI kg/m2 BSA m2 O2 Sat FR L/min FiO2 HC       05/25/2021 01:55 /76 Sitting    99 - R 18 97.5 214lbs 4oz 5'  6\" 34.58 2.13 97 %  21%          Physical Examination  · Constitutional  o Appearance  o : no acute distress, well-nourished  · Head and Face  o Head  o :   § Inspection  § : atraumatic, normocephalic  · Ears, Nose, Mouth and Throat  o Ears  o :   § External Ears  § : normal  o Nose  o :   § Intranasal Exam  § : nares patent  o Oral Cavity  o :   § Oral Mucosa  § : moist mucous membranes  · Respiratory  o Respiratory Effort  o : breathing comfortably, symmetric chest rise  o Auscultation of Lungs  o : clear to asculatation bilaterally, no wheezes, rales, or rhonchii  · Cardiovascular  o Heart  o :   § Auscultation of Heart  § : regular rate and rhythm, no murmurs, rubs, or gallops  o Peripheral Vascular System  o :   § Extremities  § : mild lower extremity edema present, no cyanosis, no distal hair loss, normal capillary refill  · Neurologic  o Mental Status Examination  o :   § Orientation  § : grossly oriented to person, place and time  o Gait and Station  o :   § Gait Screening  § : normal " gait  · Psychiatric  o General  o : normal mood and affect              Assessment  · Allergic rhinitis due to allergen     477.9/J30.9  · Osteoarthritis     715.90/M19.90  · Screening for depression     V79.0/Z13.89  · Hypertension, essential     401.9/I10  · T2DM (type 2 diabetes mellitus)     250.00/E11.9  · Insulin dependent type 2 diabetes mellitus       Type 2 diabetes mellitus without complications     250.00/E11.9  Long term (current) use of insulin     250.00/Z79.4  · Obesity (BMI 30-39.9)     278.00/E66.9  · CAD (coronary artery disease)     414.00/I25.10  · PAD (peripheral artery disease)     443.9/I73.9  · Bilateral hand pain       Pain in right hand     729.5/M79.641  Pain in left hand     729.5/M79.642         AELE for lower extremity edema and PAD history follow-up with vascular surgery if appropriate    HCTZ for edema and hypertension monitor blood pressures at home    Stadol for allergies Phenergan DM for congestion and trazodone increased to twice a day for sleep    Diclofenac changed to Celebrex for hand pain consider follow-up with orthopedics    Follow-up with above conditions in the next 2 to 4 weeks sooner if no improvement or worse  Continue to monitor blood pressure blood sugars at home and follow-up with endocrinology         Plan  · Orders  o ACO-18: Negative screen for clinical depression using a standardized tool () - V79.0/Z13.89 - 05/25/2021  o ACO-19: Colorectal cancer screening results documented and reviewed (3017F) - - 05/25/2021  o ACO-39: Current medications updated and reviewed (, 1159F) - - 05/25/2021  o AELE Bilateral (Complete) (44908, 63493, 75828) - 443.9/I73.9, 401.9/I10, 414.00/I25.10 - 05/25/2021  o ORTHOPEDIC CONSULTATION (ORTHO) - 729.5/M79.641, 729.5/M79.642, 715.90/M19.90 - 05/25/2021  · Medications  o promethazine-DM 6.25-15 mg/5 mL oral syrup   SIG: take 5 milliliters by oral route every 4-6 hours as needed, not to exceed 30 mL in 24 hours for 5 days    DISP: (150) Milliliter with 1 refills  Prescribed on 05/25/2021     o celecoxib 100 mg oral capsule   SIG: take 1 capsule (100 mg) by oral route 2 times per day for 30 days   DISP: (60) Capsule with 2 refills  Adjusted on 05/25/2021     o Medications have been Reconciled  o Transition of Care or Provider Policy  · Instructions  o Depression Screen completed and scanned into the EMR under the designated folder within the patient's documents.  o Today's PHQ-9 result is _0__  o Handouts were given to patient:   o Patient is taking medications as prescribed and doing well.   o Take all medications as prescribed/directed.  o Patient was educated/instructed on their diagnosis, treatment and medications prior to discharge from the clinic today.  o Risks, benefits, and alternatives were discussed with the patient. The patient is aware of risks associated with:  o Chronic conditions reviewed and taken into consideration for today's treatment plan.  o Electronically Identified Patient Education Materials Provided Electronically  · Disposition  o Call or Return if symptoms worsen or persist.  o Return Visit Request in/on 1 month +/- 7 days ([id number]).            Electronically Signed by: Edgard Dickey, DO -Author on May 25, 2021 02:18:17 PM

## 2021-06-14 ENCOUNTER — TELEPHONE (OUTPATIENT)
Dept: FAMILY MEDICINE CLINIC | Facility: CLINIC | Age: 51
End: 2021-06-14

## 2021-06-14 DIAGNOSIS — L50.9 HIVES: Primary | ICD-10-CM

## 2021-06-14 RX ORDER — MIRTAZAPINE 45 MG/1
TABLET, FILM COATED ORAL
COMMUNITY
End: 2021-08-23 | Stop reason: SDUPTHER

## 2021-06-14 RX ORDER — ATORVASTATIN CALCIUM 10 MG/1
TABLET, FILM COATED ORAL
COMMUNITY
Start: 2021-02-19 | End: 2022-10-10 | Stop reason: SDUPTHER

## 2021-06-14 RX ORDER — HYDROXYZINE HYDROCHLORIDE 25 MG/1
TABLET, FILM COATED ORAL EVERY 8 HOURS SCHEDULED
COMMUNITY
End: 2021-12-22 | Stop reason: SDUPTHER

## 2021-06-14 RX ORDER — CYCLOBENZAPRINE HCL 10 MG
10 TABLET ORAL 3 TIMES DAILY PRN
COMMUNITY
End: 2022-11-09

## 2021-06-14 RX ORDER — INSULIN GLARGINE 100 [IU]/ML
INJECTION, SOLUTION SUBCUTANEOUS
COMMUNITY
Start: 2021-02-09 | End: 2021-12-17

## 2021-06-14 RX ORDER — PANTOPRAZOLE SODIUM 40 MG/1
TABLET, DELAYED RELEASE ORAL
COMMUNITY
Start: 2021-02-23 | End: 2021-06-14

## 2021-06-14 RX ORDER — LORATADINE PSEUDOEPHEDRINE SULFATE 10; 240 MG/1; MG/1
1 TABLET, EXTENDED RELEASE ORAL DAILY
Qty: 30 TABLET | Refills: 2 | Status: SHIPPED | OUTPATIENT
Start: 2021-06-14 | End: 2021-11-15

## 2021-06-14 RX ORDER — TOPIRAMATE 50 MG/1
TABLET, FILM COATED ORAL
COMMUNITY
Start: 2021-04-16 | End: 2021-11-15

## 2021-06-14 RX ORDER — LISINOPRIL 20 MG/1
TABLET ORAL
COMMUNITY
Start: 2021-02-25 | End: 2021-11-15 | Stop reason: DRUGHIGH

## 2021-06-14 RX ORDER — CELECOXIB 100 MG/1
CAPSULE ORAL
COMMUNITY
Start: 2021-05-25 | End: 2021-08-23 | Stop reason: SDUPTHER

## 2021-06-14 RX ORDER — TAMSULOSIN HYDROCHLORIDE 0.4 MG/1
CAPSULE ORAL
COMMUNITY
Start: 2021-06-03 | End: 2021-12-22 | Stop reason: SDUPTHER

## 2021-06-14 RX ORDER — HYDROCHLOROTHIAZIDE 25 MG/1
TABLET ORAL
COMMUNITY
Start: 2021-05-25 | End: 2021-08-23 | Stop reason: SDUPTHER

## 2021-06-14 NOTE — TELEPHONE ENCOUNTER
Caller: TRIPP CARRIE    Relationship: Mother    Best call back number: [phone]     What medication are you requesting: CLARITIN D     What are your current symptoms: COUGHING, SINUS PROBLEM, DRAINAGE     How long have you been experiencing symptoms:  FOR A LONG TIME     Have you had these symptoms before:    [x] Yes  [] No    Have you been treated for these symptoms before:   [x] Yes  [] No    If a prescription is needed, what is your preferred pharmacy and phone number: [facility] [place], [place] - [address]  [phone] [facility] [phone] [facility]     Additional notes: PLEASE CALL AND ADVISE

## 2021-06-15 ENCOUNTER — HOSPITAL ENCOUNTER (OUTPATIENT)
Dept: CARDIOLOGY | Facility: HOSPITAL | Age: 51
Discharge: HOME | End: 2021-06-15
Admitting: FAMILY MEDICINE

## 2021-06-15 DIAGNOSIS — I25.10 CAD IN NATIVE ARTERY: ICD-10-CM

## 2021-06-15 DIAGNOSIS — I10 HYPERTENSION, UNSPECIFIED TYPE: ICD-10-CM

## 2021-06-15 DIAGNOSIS — I73.9 PAD (PERIPHERAL ARTERY DISEASE) (HCC): ICD-10-CM

## 2021-06-15 LAB
BH CV LOWER ARTERIAL LEFT ABI RATIO: 1.17
BH CV LOWER ARTERIAL LEFT CALF RATIO: 1.21
BH CV LOWER ARTERIAL LEFT DORSALIS PEDIS SYS MAX: 125 MMHG
BH CV LOWER ARTERIAL LEFT GREAT TOE SYS MAX: 136 MMHG
BH CV LOWER ARTERIAL LEFT LOW THIGH SYS MAX: 132 MMHG
BH CV LOWER ARTERIAL LEFT POPLITEAL SYS MAX: 149 MMHG
BH CV LOWER ARTERIAL LEFT POST TIBIAL SYS MAX: 144 MMHG
BH CV LOWER ARTERIAL LEFT TBI RATIO: 1.11
BH CV LOWER ARTERIAL RIGHT ABI RATIO: 1.19
BH CV LOWER ARTERIAL RIGHT CALF RATIO: 1.17
BH CV LOWER ARTERIAL RIGHT DORSALIS PEDIS SYS MAX: 148 MMHG
BH CV LOWER ARTERIAL RIGHT GREAT TOE SYS MAX: 126 MMHG
BH CV LOWER ARTERIAL RIGHT LOW THIGH SYS MAX: 132 MMHG
BH CV LOWER ARTERIAL RIGHT POPLITEAL SYS MAX: 144 MMHG
BH CV LOWER ARTERIAL RIGHT POST TIBIAL SYS MAX: 146 MMHG
BH CV LOWER ARTERIAL RIGHT TBI RATIO: 1.02
MAXIMAL PREDICTED HEART RATE: 170 BPM
STRESS TARGET HR: 145 BPM
UPPER ARTERIAL LEFT ARM BRACHIAL SYS MAX: 123 MMHG
UPPER ARTERIAL RIGHT ARM BRACHIAL SYS MAX: 111 MMHG

## 2021-06-15 PROCEDURE — 93923 UPR/LXTR ART STDY 3+ LVLS: CPT

## 2021-06-15 PROCEDURE — 93923 UPR/LXTR ART STDY 3+ LVLS: CPT | Performed by: SURGERY

## 2021-06-18 ENCOUNTER — OFFICE VISIT (OUTPATIENT)
Dept: ORTHOPEDIC SURGERY | Facility: CLINIC | Age: 51
End: 2021-06-18

## 2021-06-18 VITALS — HEART RATE: 72 BPM | HEIGHT: 66 IN | WEIGHT: 209 LBS | OXYGEN SATURATION: 98 % | BODY MASS INDEX: 33.59 KG/M2

## 2021-06-18 DIAGNOSIS — M79.641 RIGHT HAND PAIN: Primary | ICD-10-CM

## 2021-06-18 DIAGNOSIS — M18.0 OSTEOARTHRITIS OF THUMBS, BILATERAL: ICD-10-CM

## 2021-06-18 DIAGNOSIS — M79.642 LEFT HAND PAIN: ICD-10-CM

## 2021-06-18 DIAGNOSIS — R20.2 PARESTHESIA OF BOTH HANDS: ICD-10-CM

## 2021-06-18 PROCEDURE — 20600 DRAIN/INJ JOINT/BURSA W/O US: CPT | Performed by: ORTHOPAEDIC SURGERY

## 2021-06-18 PROCEDURE — 99203 OFFICE O/P NEW LOW 30 MIN: CPT | Performed by: ORTHOPAEDIC SURGERY

## 2021-06-18 RX ORDER — INSULIN GLARGINE 100 [IU]/ML
INJECTION, SOLUTION SUBCUTANEOUS
COMMUNITY
End: 2021-11-15 | Stop reason: SDUPTHER

## 2021-06-18 RX ORDER — TRAZODONE HYDROCHLORIDE 100 MG/1
TABLET ORAL
COMMUNITY
Start: 2021-04-16 | End: 2021-08-23

## 2021-06-18 RX ORDER — NALOXONE HYDROCHLORIDE 4 MG/.1ML
SPRAY NASAL
Status: ON HOLD | COMMUNITY
Start: 2021-04-17 | End: 2023-01-18

## 2021-06-18 RX ORDER — ALPRAZOLAM 0.5 MG/1
TABLET ORAL
COMMUNITY
End: 2021-11-15

## 2021-06-18 RX ORDER — GABAPENTIN 600 MG/1
600 TABLET ORAL EVERY 6 HOURS
COMMUNITY

## 2021-06-18 RX ORDER — HYDROCODONE BITARTRATE AND ACETAMINOPHEN 7.5; 325 MG/1; MG/1
1 TABLET ORAL EVERY 12 HOURS PRN
COMMUNITY

## 2021-06-18 RX ADMIN — METHYLPREDNISOLONE ACETATE 80 MG: 80 INJECTION, SUSPENSION INTRA-ARTICULAR; INTRALESIONAL; INTRAMUSCULAR; SOFT TISSUE at 11:03

## 2021-06-18 RX ADMIN — LIDOCAINE HYDROCHLORIDE 1 ML: 10 INJECTION, SOLUTION INFILTRATION; PERINEURAL at 11:03

## 2021-06-18 NOTE — PROGRESS NOTES
"Chief Complaint  Pain of the Right Hand and Pain of the Left Hand     Subjective      Ilya Colon presents to [facility] for evaluation of his bilateral hand pain. He reports his thumb on his right hand wont straighten. He reports he has had multiple surgeries on his right hand in [place] from a past injury of his hand getting caught in an augur. He reports numbness at times. The patient is here today with his mother. He currently takes celebrex for an antiinflammatory.  He has been on 3 different types of arthritis medications with no relief.     No Known Allergies     Social History     Socioeconomic History   • Marital status:      Spouse name: Not on file   • Number of children: Not on file   • Years of education: Not on file   • Highest education level: Not on file   Tobacco Use   • Smoking status: Current Every Day Smoker     Types: Cigarettes     Start date: 6/18/2015        Review of Systems     Objective   Vital Signs:   Pulse 72   Ht 167.6 cm (66\")   Wt 94.8 kg (209 lb)   SpO2 98%   BMI 33.73 kg/m²       Physical Exam  Constitutional:       Appearance: Normal appearance. He is well-developed and normal weight.   HENT:      Head: Normocephalic.      Right Ear: Hearing and external ear normal.      Left Ear: Hearing and external ear normal.      Nose: Nose normal.   Eyes:      Conjunctiva/sclera: Conjunctivae normal.   Cardiovascular:      Rate and Rhythm: Normal rate.   Pulmonary:      Effort: Pulmonary effort is normal.      Breath sounds: No wheezing or rales.   Abdominal:      Palpations: Abdomen is soft.      Tenderness: There is no abdominal tenderness.   Musculoskeletal:      Cervical back: Normal range of motion.   Skin:     Findings: No rash.   Neurological:      Mental Status: He is alert and oriented to person, place, and time.   Psychiatric:         Mood and Affect: Mood and affect normal.         Judgment: Judgment normal.       Ortho Exam  "     Right hand- flexion deformity of IP joint of right thumb with 20 degree extension lag. Swelling over volar thumb MCP joint. Atrophy to first web space. Scars to dorsum of the hand. Unable to extend 4th and 5th fingers. Unable to make a full fist. chronic decreased sensation to the top of the hand. Negative compression and TInels. Tender to the CMC joint of the thumb. Positive grind testing.     Left hand- Full ROM of the hand and fingers. Negative Tinels and compression testing. Sensation to light touch median, radial, ulnar nerve. Positive AIN, PIN, ulnar nerve. Positive testing. Tender over the CMC and MCP joint. Positive grind testing.     Small Joint Arthrocentesis: R thumb CMC  Consent given by: patient  Site marked: site marked  Timeout: Immediately prior to procedure a time out was called to verify the correct patient, procedure, equipment, support staff and site/side marked as required   Supporting Documentation  Indications: pain   Procedure Details  Location: thumb - R thumb CMC  Preparation: Patient was prepped and draped in the usual sterile fashion  Needle gauge: 23G.  Medications administered: 1 mL lidocaine 1 %; 80 mg methylPREDNISolone acetate 80 MG/ML  Patient tolerance: patient tolerated the procedure well with no immediate complications    Small Joint Arthrocentesis: L thumb CMC  Consent given by: patient  Site marked: site marked  Timeout: Immediately prior to procedure a time out was called to verify the correct patient, procedure, equipment, support staff and site/side marked as required   Supporting Documentation  Indications: pain   Procedure Details  Location: thumb - L thumb CMC  Preparation: Patient was prepped and draped in the usual sterile fashion  Needle gauge: 23G.  Medications administered: 1 mL lidocaine 1 %; 80 mg methylPREDNISolone acetate 80 MG/ML  Patient tolerance: patient tolerated the procedure well with no immediate complications          X-Ray Report:  Bilateral hand  X-Ray  Indication: Evaluation of bilateral hand pain  AP and lateral view(s)  Findings: Right-advanced degenerative changes to the thumb IP joint with bone on bone changes and subchondral cyst to the distal phalanx. Degenerative changes to the MCP and CMC joint of the thumb.   Left-  No acute fracture. Degenerative changes to the thumb at the IP and CMC joint. Small bone island to the distal radius.   Prior studies available for comparison: no         Imaging Results (Most Recent)     None           Result Review :       Doppler Arterial Multi Level Lower Extremity - Bilateral CAR    Result Date: 6/15/2021  Narrative: · Right Conclusion: The right RICKEY is normal. Normal digital pressures. · Left Conclusion: The left RICKEY is normal. Normal digital pressures. · Normal arterial evaluation of both lower extremities. Dampened right thigh PVR is likely artifact.               Assessment and Plan     DX: bilateral thumb CMC osteoarthritis, hand paresthesia     Discussed the treatment plan with the patinet.  Plan for an EMG of bilateral hands. Discussed the risks and benefits of bilateral thumb injections. The patient expressed understanding and wished to proceed. He tolerated the injections well.     Call or return if worsening symptoms.    Follow Up     Follow up after EMG.       Patient was given instructions and counseling regarding his condition or for health maintenance advice. Please see specific information pulled into the AVS if appropriate.     Scribed for Denzel Raymond MD by Aura Ervin.  06/18/21   10:30 EDT    I have personally performed the services described in this document as scribed by the above individual and it is both accurate and complete.  Denzel Raymond MD 06/18/21  10:30 EDT

## 2021-06-20 RX ORDER — METHYLPREDNISOLONE ACETATE 80 MG/ML
80 INJECTION, SUSPENSION INTRA-ARTICULAR; INTRALESIONAL; INTRAMUSCULAR; SOFT TISSUE
Status: COMPLETED | OUTPATIENT
Start: 2021-06-18 | End: 2021-06-18

## 2021-06-20 RX ORDER — LIDOCAINE HYDROCHLORIDE 10 MG/ML
1 INJECTION, SOLUTION INFILTRATION; PERINEURAL
Status: COMPLETED | OUTPATIENT
Start: 2021-06-18 | End: 2021-06-18

## 2021-07-09 ENCOUNTER — HOSPITAL ENCOUNTER (EMERGENCY)
Facility: HOSPITAL | Age: 51
Discharge: HOME | End: 2021-07-09
Attending: EMERGENCY MEDICINE | Admitting: EMERGENCY MEDICINE

## 2021-07-09 VITALS
BODY MASS INDEX: 32.21 KG/M2 | RESPIRATION RATE: 18 BRPM | HEART RATE: 67 BPM | DIASTOLIC BLOOD PRESSURE: 77 MMHG | WEIGHT: 200.4 LBS | SYSTOLIC BLOOD PRESSURE: 138 MMHG | HEIGHT: 66 IN | OXYGEN SATURATION: 99 % | TEMPERATURE: 97.9 F

## 2021-07-09 DIAGNOSIS — E87.1 HYPONATREMIA: Primary | ICD-10-CM

## 2021-07-09 LAB
ALBUMIN SERPL-MCNC: 4.7 G/DL (ref 3.5–5.2)
ALBUMIN/GLOB SERPL: 1.6 G/DL
ALP SERPL-CCNC: 87 U/L (ref 39–117)
ALT SERPL W P-5'-P-CCNC: 36 U/L (ref 1–41)
AMPHET+METHAMPHET UR QL: NEGATIVE
ANION GAP SERPL CALCULATED.3IONS-SCNC: 13.2 MMOL/L (ref 5–15)
AST SERPL-CCNC: 33 U/L (ref 1–40)
BARBITURATES UR QL SCN: NEGATIVE
BASOPHILS # BLD AUTO: 0.06 10*3/MM3 (ref 0–0.2)
BASOPHILS NFR BLD AUTO: 0.6 % (ref 0–1.5)
BENZODIAZ UR QL SCN: NEGATIVE
BILIRUB SERPL-MCNC: 0.8 MG/DL (ref 0–1.2)
BUN SERPL-MCNC: 12 MG/DL (ref 6–20)
BUN/CREAT SERPL: 8.9 (ref 7–25)
CALCIUM SPEC-SCNC: 9.9 MG/DL (ref 8.6–10.5)
CANNABINOIDS SERPL QL: POSITIVE
CHLORIDE SERPL-SCNC: 92 MMOL/L (ref 98–107)
CO2 SERPL-SCNC: 25.8 MMOL/L (ref 22–29)
COCAINE UR QL: NEGATIVE
CREAT SERPL-MCNC: 1.35 MG/DL (ref 0.76–1.27)
DEPRECATED RDW RBC AUTO: 39.6 FL (ref 37–54)
EOSINOPHIL # BLD AUTO: 0.14 10*3/MM3 (ref 0–0.4)
EOSINOPHIL NFR BLD AUTO: 1.4 % (ref 0.3–6.2)
ERYTHROCYTE [DISTWIDTH] IN BLOOD BY AUTOMATED COUNT: 12.9 % (ref 12.3–15.4)
GFR SERPL CREATININE-BSD FRML MDRD: 56 ML/MIN/1.73
GLOBULIN UR ELPH-MCNC: 3 GM/DL
GLUCOSE SERPL-MCNC: 65 MG/DL (ref 65–99)
HCT VFR BLD AUTO: 43.8 % (ref 37.5–51)
HGB BLD-MCNC: 15.4 G/DL (ref 13–17.7)
HOLD SPECIMEN: NORMAL
HOLD SPECIMEN: NORMAL
IMM GRANULOCYTES # BLD AUTO: 0.05 10*3/MM3 (ref 0–0.05)
IMM GRANULOCYTES NFR BLD AUTO: 0.5 % (ref 0–0.5)
LYMPHOCYTES # BLD AUTO: 2.03 10*3/MM3 (ref 0.7–3.1)
LYMPHOCYTES NFR BLD AUTO: 19.9 % (ref 19.6–45.3)
MCH RBC QN AUTO: 29.4 PG (ref 26.6–33)
MCHC RBC AUTO-ENTMCNC: 35.2 G/DL (ref 31.5–35.7)
MCV RBC AUTO: 83.6 FL (ref 79–97)
METHADONE UR QL SCN: NEGATIVE
MONOCYTES # BLD AUTO: 0.88 10*3/MM3 (ref 0.1–0.9)
MONOCYTES NFR BLD AUTO: 8.6 % (ref 5–12)
NEUTROPHILS NFR BLD AUTO: 69 % (ref 42.7–76)
NEUTROPHILS NFR BLD AUTO: 7.06 10*3/MM3 (ref 1.7–7)
NRBC BLD AUTO-RTO: 0 /100 WBC (ref 0–0.2)
OPIATES UR QL: NEGATIVE
OXYCODONE UR QL SCN: NEGATIVE
PLATELET # BLD AUTO: 357 10*3/MM3 (ref 140–450)
PMV BLD AUTO: 10.3 FL (ref 6–12)
POTASSIUM SERPL-SCNC: 3.7 MMOL/L (ref 3.5–5.2)
PROT SERPL-MCNC: 7.7 G/DL (ref 6–8.5)
RBC # BLD AUTO: 5.24 10*6/MM3 (ref 4.14–5.8)
SODIUM SERPL-SCNC: 131 MMOL/L (ref 136–145)
WBC # BLD AUTO: 10.22 10*3/MM3 (ref 3.4–10.8)
WHOLE BLOOD HOLD SPECIMEN: NORMAL

## 2021-07-09 PROCEDURE — 80307 DRUG TEST PRSMV CHEM ANLYZR: CPT | Performed by: EMERGENCY MEDICINE

## 2021-07-09 PROCEDURE — 80053 COMPREHEN METABOLIC PANEL: CPT

## 2021-07-09 PROCEDURE — 85025 COMPLETE CBC W/AUTO DIFF WBC: CPT

## 2021-07-09 PROCEDURE — 99283 EMERGENCY DEPT VISIT LOW MDM: CPT

## 2021-07-09 RX ADMIN — SODIUM CHLORIDE 1000 ML: 9 INJECTION, SOLUTION INTRAVENOUS at 16:20

## 2021-07-09 NOTE — ED PROVIDER NOTES
"  Patient: Ilya Colon   YOB: 1970      Date: 07/09/21  __________________________________________________________________________________    Time: 15:29 EDT, 7/9/2021 (Pt was seen in room upon arrival)  Arrived by: Private vehicle  History provided by: patent and mother  History is limited by: N/A    Chief Complaint:     History of Present Illness:  Patient is a 50 y.o. year old male that presents to the emergency department with high sodium.  Symptoms started today and are present during today's ED Visit. The timing of the symptoms are constant. The severity of the symptoms are moderate when described by the patient.     Pt states that he sodium was high and that he testosterone was low. Pt states that she was feeling weak and ill for the past few days. Pt states that she has been eating normally.     Pt states that he has a history of using meth.    Pt states that he is on a blood pressure medication. Pt states that he is on medication that \"curves his appetite\".       Additional information about the patient description of these symptoms are listed below.         Abnormal Lab  Severity:  Moderate  Onset quality:  Sudden  Timing:  Constant  Progression:  Improving  Relieved by:  Nothing  Worsened by:  Nothing  Associated symptoms: no abdominal pain, no chest pain, no congestion, no cough, no diarrhea, no ear pain, no fatigue, no fever, no loss of consciousness, no nausea, no rash, no shortness of breath, no sore throat, no vomiting and no wheezing            Similar Symptoms Previously: no  Recently seen: no      Patient Care Team  Primary Care Provider: Edgard Dickey DO    Past Medical History:     No Known Allergies  Past Medical History:   Diagnosis Date   • Arthritis    • Diabetes mellitus (CMS/HCC)    • Hypertension    • Myocardial infarction (CMS/HCC)      Past Surgical History:   Procedure Laterality Date   • CARDIAC SURGERY       History reviewed. No pertinent family history.    Home " Medications:  Prior to Admission medications    Medication Sig Start Date End Date Taking? Authorizing Provider   ALPRAZolam (Xanax) 0.5 MG tablet Xanax 0.5 mg oral tablet take 1 tablet by oral route As needed   Active    Sheri Ward MD   atorvastatin (Lipitor) 10 MG tablet Lipitor 10 mg oral tablet take 1 tablet (10 mg) by oral route once daily at bedtime for 90 days 2/19/2021  Active 2/19/21   Sheri Ward MD   celecoxib (CeleBREX) 100 MG capsule  5/25/21   Sheri Ward MD   cyclobenzaprine (FLEXERIL) 10 MG tablet cyclobenzaprine 10 mg oral tablet take 1 tablet (10 mg) by oral route 3 times per day   Suspended    Sheri Ward MD   Diclofenac Sodium (Voltaren) 1 % gel gel Voltaren 1 % topical gel   APPLY 2 GRAMS TO THE AFFECTED AREA(S) BY TOPICAL ROUTE 4 TIMES PER DAY    Sheri Ward MD   esomeprazole (nexIUM) 20 MG capsule Nexium 20 mg oral capsule,delayed release(DR/EC) take 1 capsule (20 mg) by oral route once daily at least 1 hour before a meal swallowing whole. Do not crush or chew granules.   Suspended    Sheri Ward MD   gabapentin (NEURONTIN) 600 MG tablet Every 6 (Six) Hours.    Sheri Ward MD   hydroCHLOROthiazide (HYDRODIURIL) 25 MG tablet hydrochlorothiazide 25 mg oral tablet take 1 tablet (25 mg) by oral route once daily for 30 days 5/25/2021  Active 5/25/21   Sheri Ward MD   HYDROcodone-acetaminophen (NORCO) 7.5-325 MG per tablet Every 12 (Twelve) Hours.    Sheri Ward MD   hydrOXYzine (ATARAX) 25 MG tablet Every 8 (Eight) Hours.    Sheri Ward MD   insulin aspart (NovoLOG) 100 UNIT/ML injection Novolog U-100 Insulin aspart 100 unit/mL subcutaneous solution inject 35 units of insulin 2 times daily with meals, max 70 units daily 2/9/2021  Active 2/9/21   Sheri Ward MD   insulin glargine (Lantus) 100 UNIT/ML injection Lantus U-100 Insulin 100 unit/mL subcutaneous solution inject by subcutaneous  route 40 units 2 times daily 2/9/2021  Active would like to change to newer basaglar or tresiba if covered would like to change to newer basaglar or tresiba if covered would like to change to newer basaglar or tresiba if covered would like to change to newer basaglar or tresiba if covered 2/9/21   Sheri Ward MD   insulin glargine (LANTUS, SEMGLEE) 100 UNIT/ML injection Lantus Insulin Subcutaneous 16-18 units 2 times daily   Suspended    Sheri Ward MD   lisinopril (PRINIVIL,ZESTRIL) 20 MG tablet lisinopril 20 mg oral tablet take 1 tablet (20 mg) by oral route once daily for 90 days 2/25/2021  Active 2/25/21   Sheri Ward MD   loratadine-pseudoephedrine (Claritin-D 24 Hour)  MG per 24 hr tablet Take 1 tablet by mouth Daily for 30 days. 6/14/21 7/14/21  Edgard Dickey,    metoprolol tartrate (LOPRESSOR) 25 MG tablet metoprolol tartrate 25 mg oral tablet take 1 tablet (25 mg) by oral route once daily for 90 days 2/23/2021  Active 2/23/21   Sheri Ward MD   mirtazapine (REMERON) 45 MG tablet mirtazapine 45 mg tablet   Take 1 tablet every day by oral route.    Sheri Ward MD   Narcan 4 MG/0.1ML nasal spray INSTILL 1 SPRAY IN EACH NOSTRIL AS NEEDED 4/17/21   Sheri Ward MD   tamsulosin (FLOMAX) 0.4 MG capsule 24 hr capsule  6/3/21   Sheri Ward MD   topiramate (TOPAMAX) 50 MG tablet topiramate 50 mg oral tablet take 1 tablet (50 mg) by oral route 2 times per day for 90 days 4/16/2021  Active 4/16/21   Sheri Ward MD   traZODone (DESYREL) 100 MG tablet trazodone 100 mg oral tablet take 1 tablet (100 mg) by oral route once daily at bedtime for 30 days 4/16/2021  Active weaning off mirtazapine 4/16/21   Sheri Ward MD        Social History:   PT  reports that he has been smoking cigarettes. He started smoking about 6 years ago. He has been smoking about 1.00 pack per day. He has never used smokeless tobacco. He reports previous  "alcohol use. He reports current drug use. Drug: Marijuana.    Record Review:  I have reviewed the patient's records in [facility].     Review of Systems  Review of Systems   Constitutional: Negative for chills, fatigue and fever.   HENT: Negative for congestion, ear pain, mouth sores, sinus pain and sore throat.    Eyes: Negative for pain and discharge.   Respiratory: Negative for cough, shortness of breath and wheezing.    Cardiovascular: Negative for chest pain.   Gastrointestinal: Negative for abdominal pain, constipation, diarrhea, nausea and vomiting.   Genitourinary: Negative for difficulty urinating, dysuria and frequency.   Musculoskeletal: Negative for back pain and neck pain.   Skin: Negative for rash.   Neurological: Negative for loss of consciousness and weakness.   All other systems reviewed and are negative.       Physical Exam  /77 (BP Location: Left arm, Patient Position: Lying)   Pulse 67   Temp 97.9 °F (36.6 °C) (Oral)   Resp 18   Ht 167.6 cm (66\")   Wt 90.9 kg (200 lb 6.4 oz)   SpO2 99%   BMI 32.35 kg/m²     Physical Exam  Vitals and nursing note reviewed.   Constitutional:       General: He is not in acute distress.     Comments: Pt has an odd affect   HENT:      Head: Normocephalic and atraumatic.      Nose: Nose normal.      Mouth/Throat:      Mouth: Mucous membranes are moist.      Pharynx: Oropharynx is clear. No posterior oropharyngeal erythema.   Eyes:      Extraocular Movements: Extraocular movements intact.      Pupils: Pupils are equal, round, and reactive to light.   Cardiovascular:      Rate and Rhythm: Normal rate and regular rhythm.      Pulses: Normal pulses.      Heart sounds: Normal heart sounds. No murmur heard.     Pulmonary:      Effort: No respiratory distress.      Breath sounds: Normal breath sounds. No stridor. No wheezing, rhonchi or rales.   Abdominal:      General: Bowel sounds are normal. There is no distension.      Palpations: Abdomen is soft. There is no mass. " "     Tenderness: There is no abdominal tenderness.   Musculoskeletal:         General: No swelling or tenderness. Normal range of motion.      Cervical back: Normal range of motion and neck supple. No tenderness.      Right lower leg: No edema.      Left lower leg: No edema.   Skin:     General: Skin is warm.      Coloration: Skin is not pale.      Findings: No erythema or rash.   Neurological:      General: No focal deficit present.      Mental Status: He is alert and oriented to person, place, and time.      Sensory: No sensory deficit.      Motor: No weakness.   Psychiatric:         Mood and Affect: Mood normal.         Behavior: Behavior normal.         Thought Content: Thought content normal.         Judgment: Judgment normal.                  ED Course  /77 (BP Location: Left arm, Patient Position: Lying)   Pulse 67   Temp 97.9 °F (36.6 °C) (Oral)   Resp 18   Ht 167.6 cm (66\")   Wt 90.9 kg (200 lb 6.4 oz)   SpO2 99%   BMI 32.35 kg/m²   Results for orders placed or performed during the hospital encounter of 07/09/21   Comprehensive Metabolic Panel    Specimen: Arm, Right; Blood   Result Value Ref Range    Glucose 65 65 - 99 mg/dL    BUN 12 6 - 20 mg/dL    Creatinine 1.35 (H) 0.76 - 1.27 mg/dL    Sodium 131 (L) 136 - 145 mmol/L    Potassium 3.7 3.5 - 5.2 mmol/L    Chloride 92 (L) 98 - 107 mmol/L    CO2 25.8 22.0 - 29.0 mmol/L    Calcium 9.9 8.6 - 10.5 mg/dL    Total Protein 7.7 6.0 - 8.5 g/dL    Albumin 4.70 3.50 - 5.20 g/dL    ALT (SGPT) 36 1 - 41 U/L    AST (SGOT) 33 1 - 40 U/L    Alkaline Phosphatase 87 39 - 117 U/L    Total Bilirubin 0.8 0.0 - 1.2 mg/dL    eGFR Non African Amer 56 (L) >60 mL/min/1.73    Globulin 3.0 gm/dL    A/G Ratio 1.6 g/dL    BUN/Creatinine Ratio 8.9 7.0 - 25.0    Anion Gap 13.2 5.0 - 15.0 mmol/L   CBC Auto Differential    Specimen: Arm, Right; Blood   Result Value Ref Range    WBC 10.22 3.40 - 10.80 10*3/mm3    RBC 5.24 4.14 - 5.80 10*6/mm3    Hemoglobin 15.4 13.0 - 17.7 " g/dL    Hematocrit 43.8 37.5 - 51.0 %    MCV 83.6 79.0 - 97.0 fL    MCH 29.4 26.6 - 33.0 pg    MCHC 35.2 31.5 - 35.7 g/dL    RDW 12.9 12.3 - 15.4 %    RDW-SD 39.6 37.0 - 54.0 fl    MPV 10.3 6.0 - 12.0 fL    Platelets 357 140 - 450 10*3/mm3    Neutrophil % 69.0 42.7 - 76.0 %    Lymphocyte % 19.9 19.6 - 45.3 %    Monocyte % 8.6 5.0 - 12.0 %    Eosinophil % 1.4 0.3 - 6.2 %    Basophil % 0.6 0.0 - 1.5 %    Immature Grans % 0.5 0.0 - 0.5 %    Neutrophils, Absolute 7.06 (H) 1.70 - 7.00 10*3/mm3    Lymphocytes, Absolute 2.03 0.70 - 3.10 10*3/mm3    Monocytes, Absolute 0.88 0.10 - 0.90 10*3/mm3    Eosinophils, Absolute 0.14 0.00 - 0.40 10*3/mm3    Basophils, Absolute 0.06 0.00 - 0.20 10*3/mm3    Immature Grans, Absolute 0.05 0.00 - 0.05 10*3/mm3    nRBC 0.0 0.0 - 0.2 /100 WBC   Urine Drug Screen - Urine, Clean Catch    Specimen: Urine, Clean Catch   Result Value Ref Range    Amphet/Methamphet, Screen Negative Negative    Barbiturates Screen, Urine Negative Negative    Benzodiazepine Screen, Urine Negative Negative    Cocaine Screen, Urine Negative Negative    Opiate Screen Negative Negative    THC, Screen, Urine Positive (A) Negative    Methadone Screen, Urine Negative Negative    Oxycodone Screen, Urine Negative Negative   Green Top (Gel)   Result Value Ref Range    Extra Tube Hold for add-ons.    Lavender Top   Result Value Ref Range    Extra Tube hold for add-on    Gold Top - SST   Result Value Ref Range    Extra Tube Hold for add-ons.      Medications   sodium chloride 0.9 % bolus 1,000 mL (0 mL Intravenous Stopped 7/9/21 1163)     XR Hand 2 View Left    Result Date: 6/20/2021  Narrative: X-Ray Report: Bilateral hand X-Ray Indication: Evaluation of bilateral hand pain AP and lateral view(s) Findings: Right-advanced degenerative changes to the thumb IP joint with bone on bone changes and subchondral cyst to the distal phalanx. Degenerative changes to the MCP and CMC joint of the thumb. Left-  No acute fracture.  Degenerative changes to the thumb at the IP and CMC joint. Small bone island to the distal radius. Prior studies available for comparison: no      XR Hand 2 View Right    Result Date: 6/20/2021  Narrative: X-Ray Report: Bilateral hand X-Ray Indication: Evaluation of bilateral hand pain AP and lateral view(s) Findings: Right-advanced degenerative changes to the thumb IP joint with bone on bone changes and subchondral cyst to the distal phalanx. Degenerative changes to the MCP and CMC joint of the thumb. Left-  No acute fracture. Degenerative changes to the thumb at the IP and CMC joint. Small bone island to the distal radius. Prior studies available for comparison: no      Doppler Arterial Multi Level Lower Extremity - Bilateral CAR    Result Date: 6/15/2021  Narrative: · Right Conclusion: The right RICKEY is normal. Normal digital pressures. · Left Conclusion: The left RICKEY is normal. Normal digital pressures. · Normal arterial evaluation of both lower extremities. Dampened right thigh PVR is likely artifact.        Procedures/EKGs:  Procedures    Pt appears to been in stable condition and his ready for discharge during reevaluation.           Medical Decision Making:  MDM  Number of Diagnoses or Management Options     Amount and/or Complexity of Data Reviewed  Clinical lab tests: reviewed  Tests in the radiology section of CPT®: reviewed  Decide to obtain previous medical records or to obtain history from someone other than the patient: yes       This is a 50-year-old male who is somewhat of a difficult historian because of psychosocial and past substance abuse issues who has a quite an odd affect.  He initially says that he came in because he was told that on some outpatient blood work he had a high sodium but our blood work reveals that his sodium is actually a little bit low.  Overall he did seem a bit dry.  He was given IV fluids.  He does not have any significant symptoms to warrant the need for admission and a  mildly low sodium.  He was able to tolerate food in the ED.  He will be encouraged to discontinue the recently started medication hydrochlorothiazide until he further discusses this issue with his primary care provider.    Final diagnoses:   Hyponatremia        Disposition:  ED Disposition     ED Disposition Condition Comment    Discharge Stable           Documentation assistance provided by Augusto Joseph acting as scribe for No att. providers found. Information recorded by the scribe was done at my direction and has been verified and validated by me.        Augusto Joseph  07/09/21 1548       Augusto Joseph  07/09/21 1610       Augusto Joseph  07/09/21 1621       Augusto Joseph  07/09/21 1716       Augusto Joseph  07/09/21 [id number]       Denzel Delvalle DO  07/09/21 [id number]

## 2021-07-15 VITALS
HEIGHT: 66 IN | OXYGEN SATURATION: 97 % | RESPIRATION RATE: 18 BRPM | BODY MASS INDEX: 34.43 KG/M2 | TEMPERATURE: 97.5 F | DIASTOLIC BLOOD PRESSURE: 76 MMHG | HEART RATE: 99 BPM | SYSTOLIC BLOOD PRESSURE: 146 MMHG | WEIGHT: 214.25 LBS

## 2021-08-23 RX ORDER — CELECOXIB 200 MG/1
200 CAPSULE ORAL DAILY
Qty: 90 CAPSULE | Refills: 3 | OUTPATIENT
Start: 2021-08-23

## 2021-08-23 RX ORDER — CELECOXIB 200 MG/1
200 CAPSULE ORAL DAILY
Qty: 90 CAPSULE | Refills: 3 | Status: SHIPPED | OUTPATIENT
Start: 2021-08-23 | End: 2022-01-10

## 2021-08-23 RX ORDER — MIRTAZAPINE 45 MG/1
45 TABLET, FILM COATED ORAL NIGHTLY
Qty: 90 TABLET | Refills: 3 | Status: SHIPPED | OUTPATIENT
Start: 2021-08-23 | End: 2021-10-27 | Stop reason: SDUPTHER

## 2021-08-23 RX ORDER — MIRTAZAPINE 45 MG/1
TABLET, FILM COATED ORAL
Qty: 90 TABLET | Refills: 3 | OUTPATIENT
Start: 2021-08-23

## 2021-08-23 RX ORDER — HYDROCHLOROTHIAZIDE 25 MG/1
25 TABLET ORAL DAILY
Qty: 90 TABLET | Refills: 3 | Status: SHIPPED | OUTPATIENT
Start: 2021-08-23 | End: 2021-11-15

## 2021-08-23 RX ORDER — HYDROCHLOROTHIAZIDE 25 MG/1
TABLET ORAL
Qty: 90 TABLET | Refills: 3 | OUTPATIENT
Start: 2021-08-23

## 2021-10-26 RX ORDER — TRAZODONE HYDROCHLORIDE 100 MG/1
TABLET ORAL
COMMUNITY
Start: 2021-09-21 | End: 2021-10-27 | Stop reason: SDUPTHER

## 2021-10-27 RX ORDER — TRAZODONE HYDROCHLORIDE 100 MG/1
TABLET ORAL
Qty: 30 TABLET | Refills: 1 | Status: SHIPPED | OUTPATIENT
Start: 2021-10-27 | End: 2021-12-22 | Stop reason: SDUPTHER

## 2021-11-15 ENCOUNTER — OFFICE VISIT (OUTPATIENT)
Dept: FAMILY MEDICINE CLINIC | Facility: CLINIC | Age: 51
End: 2021-11-15

## 2021-11-15 VITALS
SYSTOLIC BLOOD PRESSURE: 151 MMHG | WEIGHT: 211.6 LBS | TEMPERATURE: 98.2 F | HEART RATE: 77 BPM | HEIGHT: 66 IN | OXYGEN SATURATION: 98 % | DIASTOLIC BLOOD PRESSURE: 67 MMHG | BODY MASS INDEX: 34.01 KG/M2

## 2021-11-15 DIAGNOSIS — I10 HYPERTENSION, ESSENTIAL: Chronic | ICD-10-CM

## 2021-11-15 DIAGNOSIS — E11.9 INSULIN DEPENDENT TYPE 2 DIABETES MELLITUS (HCC): Chronic | ICD-10-CM

## 2021-11-15 DIAGNOSIS — I10 HYPERTENSION, ESSENTIAL: Primary | ICD-10-CM

## 2021-11-15 DIAGNOSIS — E66.9 OBESITY (BMI 30-39.9): Chronic | ICD-10-CM

## 2021-11-15 DIAGNOSIS — E78.2 MIXED HYPERLIPIDEMIA: Chronic | ICD-10-CM

## 2021-11-15 DIAGNOSIS — Z79.4 INSULIN DEPENDENT TYPE 2 DIABETES MELLITUS (HCC): Chronic | ICD-10-CM

## 2021-11-15 DIAGNOSIS — Z00.00 ENCOUNTER FOR SUBSEQUENT ANNUAL WELLNESS VISIT (AWV) IN MEDICARE PATIENT: Primary | ICD-10-CM

## 2021-11-15 PROBLEM — F32.A DEPRESSION: Status: ACTIVE | Noted: 2021-11-15

## 2021-11-15 PROBLEM — E78.5 HLD (HYPERLIPIDEMIA): Status: ACTIVE | Noted: 2021-11-15

## 2021-11-15 PROBLEM — M51.379 DEGENERATION OF LUMBOSACRAL INTERVERTEBRAL DISC: Status: ACTIVE | Noted: 2018-03-07

## 2021-11-15 PROBLEM — M41.9 SCOLIOSIS: Status: ACTIVE | Noted: 2021-11-15

## 2021-11-15 PROBLEM — I25.10 ATHEROSCLEROSIS OF CORONARY ARTERY: Status: ACTIVE | Noted: 2021-11-15

## 2021-11-15 PROBLEM — M51.37 DEGENERATION OF LUMBOSACRAL INTERVERTEBRAL DISC: Status: ACTIVE | Noted: 2018-03-07

## 2021-11-15 PROBLEM — K21.9 GERD (GASTROESOPHAGEAL REFLUX DISEASE): Status: ACTIVE | Noted: 2021-11-15

## 2021-11-15 PROCEDURE — G0439 PPPS, SUBSEQ VISIT: HCPCS | Performed by: FAMILY MEDICINE

## 2021-11-15 PROCEDURE — 96160 PT-FOCUSED HLTH RISK ASSMT: CPT | Performed by: FAMILY MEDICINE

## 2021-11-15 PROCEDURE — 1159F MED LIST DOCD IN RCRD: CPT | Performed by: FAMILY MEDICINE

## 2021-11-15 PROCEDURE — 1170F FXNL STATUS ASSESSED: CPT | Performed by: FAMILY MEDICINE

## 2021-11-15 RX ORDER — TESTOSTERONE CYPIONATE 200 MG/ML
200 INJECTION, SOLUTION INTRAMUSCULAR
Status: ON HOLD | COMMUNITY
Start: 2021-09-07 | End: 2022-10-19

## 2021-11-15 RX ORDER — BLOOD SUGAR DIAGNOSTIC
STRIP MISCELLANEOUS
COMMUNITY
Start: 2021-10-06 | End: 2022-08-01 | Stop reason: SDUPTHER

## 2021-11-15 RX ORDER — LISINOPRIL AND HYDROCHLOROTHIAZIDE 25; 20 MG/1; MG/1
1 TABLET ORAL DAILY
Qty: 90 TABLET | Refills: 3 | Status: SHIPPED | OUTPATIENT
Start: 2021-11-15 | End: 2022-01-10

## 2021-11-15 RX ORDER — CLARITHROMYCIN 500 MG/1
TABLET, COATED ORAL
COMMUNITY
Start: 2021-09-08 | End: 2021-11-15

## 2021-11-15 RX ORDER — TOPIRAMATE 100 MG/1
100 TABLET, FILM COATED ORAL DAILY
Qty: 90 TABLET | Refills: 0 | Status: SHIPPED | OUTPATIENT
Start: 2021-11-15 | End: 2021-12-22 | Stop reason: SDUPTHER

## 2021-11-15 RX ORDER — PANTOPRAZOLE SODIUM 40 MG/1
TABLET, DELAYED RELEASE ORAL
COMMUNITY
Start: 2021-10-06 | End: 2021-12-22 | Stop reason: SDUPTHER

## 2021-11-15 NOTE — PROGRESS NOTES
The ABCs of the Annual Wellness Visit  Subsequent Medicare Wellness Visit    Chief Complaint   Patient presents with   • Medicare Wellness-subsequent   • forgetfullness      Subjective    History of Present Illness:  Ilya Colon is a 51 y.o. male who presents for a Subsequent Medicare Wellness Visit.    Patient presents to follow-up on chronic conditions in anyone's visit blood pressure elevated today complaining of some intermittent memory issues has history of hypertension hyperlipidemia T2DM on insulin last A1c 2/2021 was elevated above 9 has been working on improving this, takes Celebrex for arthritis gabapentin for neuropathy pain HCTZ for blood pressure on 2 insulins and lisinopril as well as metoprolol.  Follows with pain management    No cognitive decline in patient    The following portions of the patient's history were reviewed and   updated as appropriate: allergies, current medications, past family history, past medical history, past social history, past surgical history and problem list.    Compared to one year ago, the patient feels his physical   health is the same.    Compared to one year ago, the patient feels his mental   health is the same.    Recent Hospitalizations:  He was not admitted to the hospital during the last year.       Current Medical Providers:  Patient Care Team:  Edagrd Dickey DO as PCP - General (Family Medicine)    Outpatient Medications Prior to Visit   Medication Sig Dispense Refill   • Accu-Chek Kirsten Plus test strip      • atorvastatin (Lipitor) 10 MG tablet Lipitor 10 mg oral tablet take 1 tablet (10 mg) by oral route once daily at bedtime for 90 days 2/19/2021  Active     • celecoxib (CeleBREX) 200 MG capsule Take 1 capsule by mouth Daily. 90 capsule 3   • cyclobenzaprine (FLEXERIL) 10 MG tablet cyclobenzaprine 10 mg oral tablet take 1 tablet (10 mg) by oral route 3 times per day   Suspended     • Diclofenac Sodium (VOLTAREN) 1 % gel gel APPLY 2 GRAMS TO AFFECTED  AREA 4 TIMES DAILY 300 g 0   • esomeprazole (nexIUM) 20 MG capsule Nexium 20 mg oral capsule,delayed release(DR/EC) take 1 capsule (20 mg) by oral route once daily at least 1 hour before a meal swallowing whole. Do not crush or chew granules.   Suspended     • gabapentin (NEURONTIN) 600 MG tablet Every 6 (Six) Hours.     • HYDROcodone-acetaminophen (NORCO) 7.5-325 MG per tablet Every 12 (Twelve) Hours.     • hydrOXYzine (ATARAX) 25 MG tablet Every 8 (Eight) Hours.     • insulin aspart (NovoLOG) 100 UNIT/ML injection Novolog U-100 Insulin aspart 100 unit/mL subcutaneous solution inject 35 units of insulin 2 times daily with meals, max 70 units daily 2/9/2021  Active     • insulin glargine (Lantus) 100 UNIT/ML injection Lantus U-100 Insulin 100 unit/mL subcutaneous solution inject by subcutaneous route 40 units 2 times daily 2/9/2021  Active would like to change to newer basaglar or tresiba if covered would like to change to newer basaglar or tresiba if covered would like to change to newer basaglar or tresiba if covered would like to change to newer basaglar or tresiba if covered     • metoprolol tartrate (LOPRESSOR) 25 MG tablet metoprolol tartrate 25 mg oral tablet take 1 tablet (25 mg) by oral route once daily for 90 days 2/23/2021  Active     • Narcan 4 MG/0.1ML nasal spray INSTILL 1 SPRAY IN EACH NOSTRIL AS NEEDED     • pantoprazole (PROTONIX) 40 MG EC tablet      • tamsulosin (FLOMAX) 0.4 MG capsule 24 hr capsule      • Testosterone Cypionate (DEPOTESTOTERONE CYPIONATE) 200 MG/ML injection      • traZODone (DESYREL) 100 MG tablet TAKE ONE TABLET BY MOUTH ONCE DAILY AT BEDTIME 30 tablet 1   • hydroCHLOROthiazide (HYDRODIURIL) 25 MG tablet Take 1 tablet by mouth Daily. 90 tablet 3   • lisinopril (PRINIVIL,ZESTRIL) 20 MG tablet lisinopril 20 mg oral tablet take 1 tablet (20 mg) by oral route once daily for 90 days 2/25/2021  Active     • loratadine-pseudoephedrine (Claritin-D 24 Hour)  MG per 24 hr tablet  "Take 1 tablet by mouth Daily for 30 days. 30 tablet 2   • topiramate (TOPAMAX) 50 MG tablet topiramate 50 mg oral tablet take 1 tablet (50 mg) by oral route 2 times per day for 90 days 4/16/2021  Active     • ALPRAZolam (Xanax) 0.5 MG tablet Xanax 0.5 mg oral tablet take 1 tablet by oral route As needed   Active     • clarithromycin (BIAXIN) 500 MG tablet      • insulin glargine (LANTUS, SEMGLEE) 100 UNIT/ML injection Lantus Insulin Subcutaneous 16-18 units 2 times daily   Suspended       No facility-administered medications prior to visit.       Opioid medication/s are on active medication list.  and I have evaluated his active treatment plan and pain score trends (see table).  There were no vitals filed for this visit.  I have reviewed the chart for potential of high risk medication and harmful drug interactions in the elderly.            Aspirin is not on active medication list.  will review and add for patient to take.    Patient Active Problem List   Diagnosis   • Osteoarthritis of thumbs, bilateral   • Left hand pain   • Right hand pain   • Paresthesia of both hands   • Atherosclerosis of coronary artery   • Depression   • Degeneration of lumbosacral intervertebral disc   • GERD (gastroesophageal reflux disease)   • HLD (hyperlipidemia)   • Hypertension, essential   • Insulin dependent type 2 diabetes mellitus (HCC)   • Obesity (BMI 30-39.9)   • Scoliosis   • Encounter for subsequent annual wellness visit (AWV) in Medicare patient     Advance Care Planning  Advance Directive is not on file.  ACP discussion was declined by the patient. Patient does not have an advance directive, information provided.          Objective    Vitals:    11/15/21 1544   BP: 151/67   BP Location: Left arm   Patient Position: Sitting   Cuff Size: Adult   Pulse: 77   Temp: 98.2 °F (36.8 °C)   TempSrc: Temporal   SpO2: 98%   Weight: 96 kg (211 lb 9.6 oz)   Height: 167.6 cm (66\")     BMI Readings from Last 1 Encounters:   11/15/21 34.15 " kg/m²   BMI is above normal parameters. Recommendations include: educational material, exercise counseling and nutrition counseling    Does the patient have evidence of cognitive impairment? No    Physical Exam            HEALTH RISK ASSESSMENT    Smoking Status:  Social History     Tobacco Use   Smoking Status Current Every Day Smoker   • Packs/day: 1.00   • Types: Cigarettes   • Start date: 6/18/2015   Smokeless Tobacco Never Used     Alcohol Consumption:  Social History     Substance and Sexual Activity   Alcohol Use Not Currently     Fall Risk Screen:    [facility].    Depression Screening:  PHQ-2/PHQ-9 Depression Screening 11/15/2021   Little interest or pleasure in doing things 0   Feeling down, depressed, or hopeless 0   Total Score 0       Health Habits and Functional and Cognitive Screening:  Functional & Cognitive Status 11/15/2021   Do you have difficulty preparing food and eating? No   Do you have difficulty bathing yourself, getting dressed or grooming yourself? No   Do you have difficulty using the toilet? No   Do you have difficulty moving around from place to place? No   Do you have trouble with steps or getting out of a bed or a chair? No   Current Diet Well Balanced Diet   Dental Exam Up to date   Eye Exam Up to date   Exercise (times per week) 5 times per week   Current Exercises Include Walking   Do you need help using the phone?  No   Are you deaf or do you have serious difficulty hearing?  No   Do you need help with transportation? No   Do you need help shopping? No   Do you need help preparing meals?  No   Do you need help with housework?  No   Do you need help with laundry? No   Do you need help taking your medications? No   Do you need help managing money? No   Do you ever drive or ride in a car without wearing a seat belt? No   Have you felt unusual stress, anger or loneliness in the last month? No   Who do you live with? Other   If you need help, do  you have trouble finding someone available to you? No   Have you been bothered in the last four weeks by sexual problems? No   Do you have difficulty concentrating, remembering or making decisions? No       Age-appropriate Screening Schedule:  Refer to the list below for future screening recommendations based on patient's age, sex and/or medical conditions. Orders for these recommended tests are listed in the plan section. The patient has been provided with a written plan.    Health Maintenance   Topic Date Due   • DIABETIC FOOT EXAM  Never done   • DIABETIC EYE EXAM  Never done   • HEMOGLOBIN A1C  08/09/2021   • ZOSTER VACCINE (1 of 2) 11/15/2021 (Originally 10/30/2020)   • INFLUENZA VACCINE  11/15/2022 (Originally 8/1/2021)   • TDAP/TD VACCINES (1 - Tdap) 11/15/2022 (Originally 10/30/1989)   • LIPID PANEL  02/09/2022   • URINE MICROALBUMIN  02/19/2022              Assessment/Plan   CMS Preventative Services Quick Reference  Risk Factors Identified During Encounter  Recommend yearly foot and eye exams consider follow-up with endocrinolog  The above risks/problems have been discussed with the patient.  Follow up actions/plans if indicated are seen below in the Assessment/Plan Section.  Pertinent information has been shared with the patient in the After Visit Summary.    Diagnoses and all orders for this visit:    1. Encounter for subsequent annual wellness visit (AWV) in Medicare patient (Primary)    2. Hypertension, essential  -     Comprehensive metabolic panel; Future  -     CBC w AUTO Differential; Future  -     Vitamin B12; Future  -     Folate; Future  -     TSH+Free T4; Future    3. Mixed hyperlipidemia  -     Comprehensive metabolic panel; Future  -     Lipid panel; Future  -     CBC w AUTO Differential; Future  -     Vitamin B12; Future  -     Folate; Future  -     TSH+Free T4; Future    4. Obesity (BMI 30-39.9)  -     Comprehensive metabolic panel; Future  -     CBC w AUTO Differential; Future  -      Vitamin B12; Future  -     Folate; Future  -     TSH+Free T4; Future    5. Insulin dependent type 2 diabetes mellitus (HCC)  -     Hemoglobin A1c; Future  -     Comprehensive metabolic panel; Future  -     CBC w AUTO Differential; Future  -     Vitamin B12; Future  -     Folate; Future  -     TSH+Free T4; Future    Other orders  -     topiramate (Topamax) 100 MG tablet; Take 1 tablet by mouth Daily.  Dispense: 90 tablet; Refill: 0    Advised patient to have foot and eye exam annually for diabetes can follow-up with podiatry and ophthalmology, labs ordered today to recheck A1c and other chronic conditions such as HLD HTN with a CBC CMP B12 TSH T4 given diabetes follow-up at least every 3 months for chronic conditions    Reviewed blood pressure with patient advised to monitor daily take sent apparel/HCTZ daily if above goal 140/90 increase to twice a day    Increase his Topamax 200 mg daily for weight loss BMI goal less than 30 continue to calorie restrict diet to 2000 yunier or less 150 minutes of exercise max a week goal    Advised to exercise is mind with puzzles and continued to be socially engaged and follow-up if worsening signs or symptoms or concerns with memory issues most described today were normal, does have many risk factors during drug abuse in the past that could cause some memory decline and cognitive deficits as well as elevated blood pressure for a long time, however did pass his screening test able to recall all 3 objects and deferred clock testing because patient states has intelligence issues     Follow Up:   Return in about 4 weeks (around 12/13/2021), or if symptoms worsen or fail to improve, for Next scheduled follow up, Labs before, BP & Log.     Sooner follow-up if indicated based on if A1c is elevated or not to follow-up with specialist    An After Visit Summary and PPPS were made available to the patient.

## 2021-12-17 RX ORDER — INSULIN GLARGINE 100 [IU]/ML
INJECTION, SOLUTION SUBCUTANEOUS
Qty: 70 ML | Refills: 2 | Status: SHIPPED | OUTPATIENT
Start: 2021-12-17 | End: 2021-12-22 | Stop reason: SDUPTHER

## 2021-12-21 ENCOUNTER — TELEPHONE (OUTPATIENT)
Dept: FAMILY MEDICINE CLINIC | Facility: CLINIC | Age: 51
End: 2021-12-21

## 2021-12-21 DIAGNOSIS — I10 HYPERTENSION, ESSENTIAL: ICD-10-CM

## 2021-12-21 RX ORDER — CYCLOBENZAPRINE HCL 10 MG
10 TABLET ORAL 3 TIMES DAILY PRN
Qty: 30 TABLET | Refills: 1 | Status: CANCELLED | OUTPATIENT
Start: 2021-12-21

## 2021-12-21 RX ORDER — INSULIN GLARGINE 100 [IU]/ML
INJECTION, SOLUTION SUBCUTANEOUS
Qty: 70 ML | Refills: 2 | Status: CANCELLED | OUTPATIENT
Start: 2021-12-21

## 2021-12-21 RX ORDER — TESTOSTERONE CYPIONATE 200 MG/ML
200 INJECTION, SOLUTION INTRAMUSCULAR
Status: CANCELLED | OUTPATIENT
Start: 2021-12-21

## 2021-12-21 RX ORDER — HYDROCODONE BITARTRATE AND ACETAMINOPHEN 7.5; 325 MG/1; MG/1
1 TABLET ORAL EVERY 12 HOURS
Status: CANCELLED | OUTPATIENT
Start: 2021-12-21

## 2021-12-21 RX ORDER — LISINOPRIL AND HYDROCHLOROTHIAZIDE 25; 20 MG/1; MG/1
1 TABLET ORAL DAILY
Qty: 90 TABLET | Refills: 3 | Status: CANCELLED | OUTPATIENT
Start: 2021-12-21 | End: 2022-12-21

## 2021-12-21 RX ORDER — PANTOPRAZOLE SODIUM 40 MG/1
40 TABLET, DELAYED RELEASE ORAL DAILY
Status: CANCELLED | OUTPATIENT
Start: 2021-12-21

## 2021-12-21 RX ORDER — HYDROXYZINE HYDROCHLORIDE 25 MG/1
25 TABLET, FILM COATED ORAL EVERY 8 HOURS SCHEDULED
Status: CANCELLED | OUTPATIENT
Start: 2021-12-21

## 2021-12-21 RX ORDER — TRAZODONE HYDROCHLORIDE 100 MG/1
100 TABLET ORAL
Qty: 30 TABLET | Refills: 1 | Status: CANCELLED | OUTPATIENT
Start: 2021-12-21

## 2021-12-21 RX ORDER — TOPIRAMATE 100 MG/1
100 TABLET, FILM COATED ORAL DAILY
Qty: 90 TABLET | Refills: 0 | Status: CANCELLED | OUTPATIENT
Start: 2021-12-21

## 2021-12-21 RX ORDER — CELECOXIB 200 MG/1
200 CAPSULE ORAL DAILY
Qty: 90 CAPSULE | Refills: 3 | Status: CANCELLED | OUTPATIENT
Start: 2021-12-21

## 2021-12-21 RX ORDER — ATORVASTATIN CALCIUM 10 MG/1
10 TABLET, FILM COATED ORAL NIGHTLY
Qty: 90 TABLET | Refills: 1 | Status: CANCELLED | OUTPATIENT
Start: 2021-12-21

## 2021-12-21 RX ORDER — TAMSULOSIN HYDROCHLORIDE 0.4 MG/1
1 CAPSULE ORAL DAILY
Qty: 30 CAPSULE | Status: CANCELLED | OUTPATIENT
Start: 2021-12-21

## 2021-12-21 RX ORDER — BLOOD SUGAR DIAGNOSTIC
STRIP MISCELLANEOUS
Qty: 200 EACH | Refills: 2 | Status: CANCELLED | OUTPATIENT
Start: 2021-12-21

## 2021-12-21 RX ORDER — GABAPENTIN 600 MG/1
600 TABLET ORAL EVERY 6 HOURS SCHEDULED
Status: CANCELLED | OUTPATIENT
Start: 2021-12-21

## 2021-12-21 NOTE — TELEPHONE ENCOUNTER
Patient last seen 11/2021        [facility]    trazadone 100mg  topamax 100mg  Hydroxyzine 25mg        [facility]    lantus  novolog  protonix 40mg  flomax 0.4mg

## 2021-12-21 NOTE — TELEPHONE ENCOUNTER
Patient was last seen on 11/15/2021    Patient came into office today and asked for all of his medications to be sent to [facility]  Pharmacy please.

## 2021-12-22 RX ORDER — TAMSULOSIN HYDROCHLORIDE 0.4 MG/1
1 CAPSULE ORAL DAILY
Qty: 90 CAPSULE | Refills: 3 | Status: SHIPPED | OUTPATIENT
Start: 2021-12-22 | End: 2022-12-23

## 2021-12-22 RX ORDER — TOPIRAMATE 100 MG/1
100 TABLET, FILM COATED ORAL DAILY
Qty: 90 TABLET | Refills: 3 | Status: ON HOLD | OUTPATIENT
Start: 2021-12-22 | End: 2022-10-19

## 2021-12-22 RX ORDER — PANTOPRAZOLE SODIUM 40 MG/1
40 TABLET, DELAYED RELEASE ORAL
Qty: 90 TABLET | Refills: 3 | Status: SHIPPED | OUTPATIENT
Start: 2021-12-22 | End: 2022-12-22

## 2021-12-22 RX ORDER — TRAZODONE HYDROCHLORIDE 100 MG/1
100 TABLET ORAL
Qty: 90 TABLET | Refills: 3 | Status: SHIPPED | OUTPATIENT
Start: 2021-12-22 | End: 2022-12-22

## 2021-12-22 RX ORDER — HYDROXYZINE HYDROCHLORIDE 25 MG/1
25 TABLET, FILM COATED ORAL EVERY 8 HOURS SCHEDULED
Qty: 270 TABLET | Refills: 3 | Status: SHIPPED | OUTPATIENT
Start: 2021-12-22

## 2021-12-22 RX ORDER — INSULIN GLARGINE 100 [IU]/ML
INJECTION, SOLUTION SUBCUTANEOUS
Qty: 80 ML | Refills: 3 | Status: SHIPPED | OUTPATIENT
Start: 2021-12-22 | End: 2022-06-15

## 2021-12-22 NOTE — TELEPHONE ENCOUNTER
Tried calling patient to let him know we sent in medications and put lab orders. Patients phone said it was unavailable

## 2021-12-22 NOTE — TELEPHONE ENCOUNTER
Sent medicines to [facility] like requested, 90 days if they'll do it.     He has labs ordered he can get done before I see him back on 1/10/2022.

## 2021-12-28 ENCOUNTER — TELEPHONE (OUTPATIENT)
Dept: FAMILY MEDICINE CLINIC | Facility: CLINIC | Age: 51
End: 2021-12-28

## 2021-12-28 RX ORDER — SYRINGE-NEEDLE,INSULIN,0.5 ML 27GX1/2"
SYRINGE, EMPTY DISPOSABLE MISCELLANEOUS
COMMUNITY
End: 2021-12-28 | Stop reason: SDUPTHER

## 2021-12-28 RX ORDER — SYRINGE-NEEDLE,INSULIN,0.5 ML 27GX1/2"
0.5 SYRINGE, EMPTY DISPOSABLE MISCELLANEOUS DAILY PRN
Qty: 100 EACH | Refills: 3 | Status: ON HOLD | OUTPATIENT
Start: 2021-12-28 | End: 2022-10-12

## 2021-12-28 NOTE — TELEPHONE ENCOUNTER
Called and talked to patient to see what gauge etc his syringe is , sent to [facility] pharmacy

## 2022-01-10 ENCOUNTER — OFFICE VISIT (OUTPATIENT)
Dept: FAMILY MEDICINE CLINIC | Facility: CLINIC | Age: 52
End: 2022-01-10

## 2022-01-10 ENCOUNTER — LAB (OUTPATIENT)
Dept: LAB | Facility: HOSPITAL | Age: 52
End: 2022-01-10

## 2022-01-10 VITALS
SYSTOLIC BLOOD PRESSURE: 162 MMHG | OXYGEN SATURATION: 100 % | WEIGHT: 208.9 LBS | HEART RATE: 92 BPM | BODY MASS INDEX: 33.57 KG/M2 | DIASTOLIC BLOOD PRESSURE: 77 MMHG | TEMPERATURE: 98 F | HEIGHT: 66 IN

## 2022-01-10 DIAGNOSIS — E66.9 OBESITY (BMI 30-39.9): ICD-10-CM

## 2022-01-10 DIAGNOSIS — E11.9 INSULIN DEPENDENT TYPE 2 DIABETES MELLITUS: ICD-10-CM

## 2022-01-10 DIAGNOSIS — Z79.4 INSULIN DEPENDENT TYPE 2 DIABETES MELLITUS: ICD-10-CM

## 2022-01-10 DIAGNOSIS — I10 HYPERTENSION, ESSENTIAL: ICD-10-CM

## 2022-01-10 DIAGNOSIS — E78.2 MIXED HYPERLIPIDEMIA: Chronic | ICD-10-CM

## 2022-01-10 DIAGNOSIS — I10 HYPERTENSION, ESSENTIAL: Chronic | ICD-10-CM

## 2022-01-10 DIAGNOSIS — Z79.4 TYPE 2 DIABETES MELLITUS WITH HYPERGLYCEMIA, WITH LONG-TERM CURRENT USE OF INSULIN: Primary | Chronic | ICD-10-CM

## 2022-01-10 DIAGNOSIS — E66.09 CLASS 1 OBESITY DUE TO EXCESS CALORIES WITH SERIOUS COMORBIDITY AND BODY MASS INDEX (BMI) OF 30.0 TO 30.9 IN ADULT: Chronic | ICD-10-CM

## 2022-01-10 DIAGNOSIS — E11.65 TYPE 2 DIABETES MELLITUS WITH HYPERGLYCEMIA, WITH LONG-TERM CURRENT USE OF INSULIN: Primary | Chronic | ICD-10-CM

## 2022-01-10 DIAGNOSIS — E78.2 MIXED HYPERLIPIDEMIA: ICD-10-CM

## 2022-01-10 PROBLEM — E66.811 CLASS 1 OBESITY DUE TO EXCESS CALORIES WITH SERIOUS COMORBIDITY AND BODY MASS INDEX (BMI) OF 30.0 TO 30.9 IN ADULT: Chronic | Status: ACTIVE | Noted: 2022-01-10

## 2022-01-10 PROBLEM — E66.811 CLASS 1 OBESITY DUE TO EXCESS CALORIES WITH SERIOUS COMORBIDITY AND BODY MASS INDEX (BMI) OF 30.0 TO 30.9 IN ADULT: Status: ACTIVE | Noted: 2022-01-10

## 2022-01-10 PROBLEM — J30.9 ALLERGIC RHINITIS: Status: ACTIVE | Noted: 2022-01-10

## 2022-01-10 PROBLEM — G47.00 INSOMNIA: Status: ACTIVE | Noted: 2022-01-10

## 2022-01-10 LAB
ALBUMIN SERPL-MCNC: 5 G/DL (ref 3.5–5.2)
ALBUMIN/GLOB SERPL: 2 G/DL
ALP SERPL-CCNC: 82 U/L (ref 39–117)
ALT SERPL W P-5'-P-CCNC: 30 U/L (ref 1–41)
ANION GAP SERPL CALCULATED.3IONS-SCNC: 9.4 MMOL/L (ref 5–15)
AST SERPL-CCNC: 21 U/L (ref 1–40)
BASOPHILS # BLD AUTO: 0.09 10*3/MM3 (ref 0–0.2)
BASOPHILS NFR BLD AUTO: 1 % (ref 0–1.5)
BILIRUB SERPL-MCNC: 0.7 MG/DL (ref 0–1.2)
BUN SERPL-MCNC: 15 MG/DL (ref 6–20)
BUN/CREAT SERPL: 8.7 (ref 7–25)
CALCIUM SPEC-SCNC: 10.2 MG/DL (ref 8.6–10.5)
CHLORIDE SERPL-SCNC: 104 MMOL/L (ref 98–107)
CHOLEST SERPL-MCNC: 186 MG/DL (ref 0–200)
CO2 SERPL-SCNC: 21.6 MMOL/L (ref 22–29)
CREAT SERPL-MCNC: 1.73 MG/DL (ref 0.76–1.27)
DEPRECATED RDW RBC AUTO: 45.8 FL (ref 37–54)
EOSINOPHIL # BLD AUTO: 0.36 10*3/MM3 (ref 0–0.4)
EOSINOPHIL NFR BLD AUTO: 3.8 % (ref 0.3–6.2)
ERYTHROCYTE [DISTWIDTH] IN BLOOD BY AUTOMATED COUNT: 14.4 % (ref 12.3–15.4)
FOLATE SERPL-MCNC: 10.8 NG/ML (ref 4.78–24.2)
GFR SERPL CREATININE-BSD FRML MDRD: 42 ML/MIN/1.73
GLOBULIN UR ELPH-MCNC: 2.5 GM/DL
GLUCOSE SERPL-MCNC: 142 MG/DL (ref 65–99)
HBA1C MFR BLD: 9.22 % (ref 4.8–5.6)
HCT VFR BLD AUTO: 44 % (ref 37.5–51)
HDLC SERPL-MCNC: 39 MG/DL (ref 40–60)
HGB BLD-MCNC: 15.3 G/DL (ref 13–17.7)
IMM GRANULOCYTES # BLD AUTO: 0.04 10*3/MM3 (ref 0–0.05)
IMM GRANULOCYTES NFR BLD AUTO: 0.4 % (ref 0–0.5)
LDLC SERPL CALC-MCNC: 116 MG/DL (ref 0–100)
LDLC/HDLC SERPL: 2.87 {RATIO}
LYMPHOCYTES # BLD AUTO: 2.62 10*3/MM3 (ref 0.7–3.1)
LYMPHOCYTES NFR BLD AUTO: 27.9 % (ref 19.6–45.3)
MCH RBC QN AUTO: 30.5 PG (ref 26.6–33)
MCHC RBC AUTO-ENTMCNC: 34.8 G/DL (ref 31.5–35.7)
MCV RBC AUTO: 87.6 FL (ref 79–97)
MONOCYTES # BLD AUTO: 0.89 10*3/MM3 (ref 0.1–0.9)
MONOCYTES NFR BLD AUTO: 9.5 % (ref 5–12)
NEUTROPHILS NFR BLD AUTO: 5.4 10*3/MM3 (ref 1.7–7)
NEUTROPHILS NFR BLD AUTO: 57.4 % (ref 42.7–76)
NRBC BLD AUTO-RTO: 0 /100 WBC (ref 0–0.2)
PLATELET # BLD AUTO: 316 10*3/MM3 (ref 140–450)
PMV BLD AUTO: 11.7 FL (ref 6–12)
POTASSIUM SERPL-SCNC: 3.9 MMOL/L (ref 3.5–5.2)
PROT SERPL-MCNC: 7.5 G/DL (ref 6–8.5)
RBC # BLD AUTO: 5.02 10*6/MM3 (ref 4.14–5.8)
SODIUM SERPL-SCNC: 135 MMOL/L (ref 136–145)
T4 FREE SERPL-MCNC: 1.07 NG/DL (ref 0.93–1.7)
TRIGL SERPL-MCNC: 176 MG/DL (ref 0–150)
TSH SERPL DL<=0.05 MIU/L-ACNC: 3.15 UIU/ML (ref 0.27–4.2)
VIT B12 BLD-MCNC: 599 PG/ML (ref 211–946)
VLDLC SERPL-MCNC: 31 MG/DL (ref 5–40)
WBC NRBC COR # BLD: 9.4 10*3/MM3 (ref 3.4–10.8)

## 2022-01-10 PROCEDURE — 99214 OFFICE O/P EST MOD 30 MIN: CPT | Performed by: FAMILY MEDICINE

## 2022-01-10 PROCEDURE — 36415 COLL VENOUS BLD VENIPUNCTURE: CPT

## 2022-01-10 PROCEDURE — 82607 VITAMIN B-12: CPT

## 2022-01-10 PROCEDURE — 83036 HEMOGLOBIN GLYCOSYLATED A1C: CPT

## 2022-01-10 PROCEDURE — 85025 COMPLETE CBC W/AUTO DIFF WBC: CPT

## 2022-01-10 PROCEDURE — 80061 LIPID PANEL: CPT

## 2022-01-10 PROCEDURE — 80053 COMPREHEN METABOLIC PANEL: CPT

## 2022-01-10 PROCEDURE — 82746 ASSAY OF FOLIC ACID SERUM: CPT

## 2022-01-10 PROCEDURE — 84443 ASSAY THYROID STIM HORMONE: CPT

## 2022-01-10 PROCEDURE — 84439 ASSAY OF FREE THYROXINE: CPT

## 2022-01-10 RX ORDER — LOSARTAN POTASSIUM AND HYDROCHLOROTHIAZIDE 25; 100 MG/1; MG/1
1 TABLET ORAL DAILY
Qty: 90 TABLET | Refills: 3 | Status: ON HOLD | OUTPATIENT
Start: 2022-01-10 | End: 2022-10-12 | Stop reason: SDUPTHER

## 2022-01-10 RX ORDER — LISINOPRIL 20 MG/1
TABLET ORAL
COMMUNITY
Start: 2021-12-18 | End: 2022-01-10 | Stop reason: SDUPTHER

## 2022-01-10 RX ORDER — CELECOXIB 200 MG/1
200 CAPSULE ORAL 2 TIMES DAILY
Qty: 180 CAPSULE | Refills: 3 | Status: SHIPPED | OUTPATIENT
Start: 2022-01-10 | End: 2022-08-01

## 2022-01-10 NOTE — PROGRESS NOTES
"Chief Complaint  Follow-up (blood pressure and labs)    Subjective          Ilya Colon presents to [facility]  History of Present Illness    Patient complains of dizziness elevated blood pressure have advised on changing his medicine or increasing in the past has not been seen since last year labs ordered already for his chronic conditions including diabetes he says his blood sugars are well controlled now    Has issues with remembering some of his family's names at times and whether or not he taken his medicine or not but in a short mini cog patient is without significant signs or symptoms of early onset dementia or MCI    Objective   Vital Signs:   /77   Pulse 92   Temp 98 °F (36.7 °C) (Temporal)   Ht 167.6 cm (66\")   Wt 94.8 kg (208 lb 14.4 oz)   SpO2 100%   BMI 33.72 kg/m²     Physical Exam  Vitals reviewed.   Constitutional:       Appearance: Normal appearance. He is well-developed.   HENT:      Head: Normocephalic and atraumatic.      Right Ear: External ear normal.      Left Ear: External ear normal.      Mouth/Throat:      Pharynx: No oropharyngeal exudate.   Eyes:      Conjunctiva/sclera: Conjunctivae normal.      Pupils: Pupils are equal, round, and reactive to light.   Cardiovascular:      Rate and Rhythm: Normal rate.   Pulmonary:      Effort: Pulmonary effort is normal.   Abdominal:      General: Abdomen is flat. There is no distension.      Palpations: Abdomen is soft.   Skin:     General: Skin is warm and dry.   Neurological:      General: No focal deficit present.      Mental Status: He is alert and oriented to person, place, and time.   Psychiatric:         Mood and Affect: Mood and affect normal.         Behavior: Behavior normal.         Thought Content: Thought content normal.         Judgment: Judgment normal.        Result Review :   The following data was reviewed by: Edgard Dickey DO on 01/10/2022:  Common labs    Common Labsle 2/9/21 " 2/9/21 2/9/21 7/9/21 7/9/21    1359 1359 1359 1423 1423   Glucose   69 (A)  65   BUN   6  12   Creatinine   1.07  1.35 (A)   eGFR Non African Am     56 (A)   Sodium   140  131 (A)   Potassium   3.4 (A)  3.7   Chloride   101  92 (A)   Calcium   9.2  9.9   Albumin   4.3  4.70   Total Bilirubin   0.89  0.8   Alkaline Phosphatase   122  87   AST (SGOT)   56 (A)  33   ALT (SGPT)   62 (A)  36   WBC   10.31 10.22    Hemoglobin   15.6 15.4    Hematocrit   46.2 43.8    Platelets   308 357    Total Cholesterol  134      Triglycerides  203 (A)      HDL Cholesterol  38 (A)      LDL Cholesterol   55 (A)      Hemoglobin A1C 9.4 (A)       (A) Abnormal value       Comments are available for some flowsheets but are not being displayed.                     Assessment and Plan    Diagnoses and all orders for this visit:    1. Type 2 diabetes mellitus with hyperglycemia, with long-term current use of insulin (HCC) (Primary)    2. Mixed hyperlipidemia    3. Hypertension, essential  -     losartan-hydrochlorothiazide (HYZAAR) 100-25 MG per tablet; Take 1 tablet by mouth Daily.  Dispense: 90 tablet; Refill: 3    4. Class 1 obesity due to excess calories with serious comorbidity and body mass index (BMI) of 30.0 to 30.9 in adult      Check labs today, A1c others already ordered since last year    monitor blood pressure at home goal less than 140/90 change to losartan HCTZ    Patient complaining of spraying his [place] with pesticides [place] had a similar complaint from other patients with symptoms that are vague will review      Follow Up   Return in about 4 weeks (around 2/7/2022), or if symptoms worsen or fail to improve, for Labs before, BP & Log.  Patient was given instructions and counseling regarding his condition or for health maintenance advice. Please see specific information pulled into the AVS if appropriate.

## 2022-01-17 NOTE — PROGRESS NOTES
Your a1c was up to 9.4, your kidney function has gone down and bad cholesterol has shot up above 100 so hopefully we can get your insulin and sugar back on track.    Let me know if you would like to see the specialist in [place] and maybe go over your diet and see if anything is spiking it up. Please get your eyes and feet checked this year and if your having trouble increasing your insulin to bring down your sugar or changing your diet please follow up.     Let me know your blood pressure is going down too or follow up with the log of pressure like we discussed.

## 2022-01-18 ENCOUNTER — TELEPHONE (OUTPATIENT)
Dept: FAMILY MEDICINE CLINIC | Facility: CLINIC | Age: 52
End: 2022-01-18

## 2022-01-18 NOTE — TELEPHONE ENCOUNTER
----- Message from Edgard Dickey DO sent at 1/17/2022  9:29 AM EST -----  Your a1c was up to 9.4, your kidney function has gone down and bad cholesterol has shot up above 100 so hopefully we can get your insulin and sugar back on track.    Let me know if you would like to see the specialist in [facility] and maybe go over your diet and see if anything is spiking it up. Please get your eyes and feet checked this year and if your having trouble increasing your insulin to bring down your sugar or changing your diet please follow up.     Let me know your blood pressure is going down too or follow up with the log of pressure like we discussed.

## 2022-02-17 ENCOUNTER — OFFICE VISIT (OUTPATIENT)
Dept: FAMILY MEDICINE CLINIC | Facility: CLINIC | Age: 52
End: 2022-02-17

## 2022-02-17 VITALS
WEIGHT: 209.5 LBS | DIASTOLIC BLOOD PRESSURE: 72 MMHG | SYSTOLIC BLOOD PRESSURE: 125 MMHG | OXYGEN SATURATION: 100 % | HEART RATE: 74 BPM | HEIGHT: 66 IN | BODY MASS INDEX: 33.67 KG/M2

## 2022-02-17 DIAGNOSIS — Z79.4 TYPE 2 DIABETES MELLITUS WITH HYPERGLYCEMIA, WITH LONG-TERM CURRENT USE OF INSULIN: Primary | Chronic | ICD-10-CM

## 2022-02-17 DIAGNOSIS — E66.9 OBESITY (BMI 30-39.9): ICD-10-CM

## 2022-02-17 DIAGNOSIS — E78.2 MIXED HYPERLIPIDEMIA: ICD-10-CM

## 2022-02-17 DIAGNOSIS — I10 HYPERTENSION, ESSENTIAL: ICD-10-CM

## 2022-02-17 DIAGNOSIS — E11.65 TYPE 2 DIABETES MELLITUS WITH HYPERGLYCEMIA, WITH LONG-TERM CURRENT USE OF INSULIN: Primary | Chronic | ICD-10-CM

## 2022-02-17 PROCEDURE — 99213 OFFICE O/P EST LOW 20 MIN: CPT | Performed by: FAMILY MEDICINE

## 2022-02-17 NOTE — PROGRESS NOTES
"Chief Complaint  Follow-up (1 month follow up on BP)    Subjective          Ilya Colon presents to [facility]  History of Present Illness    Patient presents to follow-up on blood pressure is 125/72 needs refills on his medications he is doing well with his blood sugars he reports    Objective   Vital Signs:   /72   Pulse 74   Ht 167.6 cm (66\")   Wt 95 kg (209 lb 8 oz)   SpO2 100%   BMI 33.81 kg/m²     Physical Exam  Vitals reviewed.   Constitutional:       Appearance: Normal appearance. He is well-developed.   HENT:      Head: Normocephalic and atraumatic.      Right Ear: External ear normal.      Left Ear: External ear normal.      Mouth/Throat:      Pharynx: No oropharyngeal exudate.   Eyes:      Conjunctiva/sclera: Conjunctivae normal.      Pupils: Pupils are equal, round, and reactive to light.   Cardiovascular:      Rate and Rhythm: Normal rate.   Pulmonary:      Effort: Pulmonary effort is normal.   Abdominal:      General: Abdomen is flat. There is no distension.      Palpations: Abdomen is soft.   Skin:     General: Skin is warm and dry.   Neurological:      General: No focal deficit present.      Mental Status: He is alert and oriented to person, place, and time.   Psychiatric:         Mood and Affect: Mood and affect normal.         Behavior: Behavior normal.         Thought Content: Thought content normal.         Judgment: Judgment normal.        Result Review :                 Assessment and Plan    Diagnoses and all orders for this visit:    1. Type 2 diabetes mellitus with hyperglycemia, with long-term current use of insulin (HCC) (Primary)  -     CBC (No Diff); Future  -     Vitamin B12; Future  -     Folate; Future  -     Comprehensive metabolic panel; Future  -     Hemoglobin A1c; Future  -     Microalbumin / Creatinine Urine Ratio - Urine, Clean Catch; Future    2. Mixed hyperlipidemia  -     CBC (No Diff); Future  -     Vitamin B12; Future  -   "   Folate; Future  -     Comprehensive metabolic panel; Future  -     Lipid panel; Future    3. Hypertension, essential  -     CBC (No Diff); Future  -     Vitamin B12; Future  -     Folate; Future  -     Comprehensive metabolic panel; Future    4. Obesity (BMI 30-39.9)  -     CBC (No Diff); Future  -     Vitamin B12; Future  -     Folate; Future  -     Comprehensive metabolic panel; Future      Recheck labs recommended before follow-up see back [facility] every 3 to 6 months for chronic conditions continue to monitor blood pressure at home        Follow Up   Return in about 3 months (around 5/17/2022) for Labs before.  Patient was given instructions and counseling regarding his condition or for health maintenance advice. Please see specific information pulled into the AVS if appropriate.

## 2022-03-17 ENCOUNTER — TELEPHONE (OUTPATIENT)
Dept: CASE MANAGEMENT | Facility: OTHER | Age: 52
End: 2022-03-17

## 2022-03-17 NOTE — TELEPHONE ENCOUNTER
Attempted to outreach for CCM or HRCM services to offer due to HgbA1c greater than 9%, last lab on 1/10/22 was 9.22%, unable to reach patient over 3 attempts now. Unable to leave a message on any number.

## 2022-05-31 RX ORDER — DICLOFENAC SODIUM 75 MG/1
TABLET, DELAYED RELEASE ORAL
Qty: 60 TABLET | Refills: 4 | Status: SHIPPED | OUTPATIENT
Start: 2022-05-31 | End: 2022-10-24 | Stop reason: HOSPADM

## 2022-06-02 ENCOUNTER — TELEPHONE (OUTPATIENT)
Dept: FAMILY MEDICINE CLINIC | Facility: CLINIC | Age: 52
End: 2022-06-02

## 2022-06-02 DIAGNOSIS — Z79.4 TYPE 2 DIABETES MELLITUS WITH HYPERGLYCEMIA, WITH LONG-TERM CURRENT USE OF INSULIN: Primary | ICD-10-CM

## 2022-06-02 DIAGNOSIS — E11.65 TYPE 2 DIABETES MELLITUS WITH HYPERGLYCEMIA, WITH LONG-TERM CURRENT USE OF INSULIN: Primary | ICD-10-CM

## 2022-06-02 RX ORDER — DULAGLUTIDE 0.75 MG/.5ML
1 INJECTION, SOLUTION SUBCUTANEOUS WEEKLY
Qty: 2 ML | Refills: 0 | Status: SHIPPED | OUTPATIENT
Start: 2022-06-02 | End: 2022-06-15 | Stop reason: SINTOL

## 2022-06-02 NOTE — TELEPHONE ENCOUNTER
Caller: Ilya Colon    Relationship: Self    Best call back number: [phone]    Who are you requesting to speak with (clinical staff, provider,  specific staff member): MEDICAL STAFF    What was the call regarding: PATIENT JUST SPOKE WITH THE OFFICE AND MADE A MISTAKE ON WHICH PHARMACY THE NEW INSULIN SHOULD BE SENT TO. THE INSULIN WILL NEED TO BE SENT TO:    [facility] - [facility] [address] - [phone] [facility] [phone] FX    HE WOULD LIKE IT DONE AS SOON AS POSSIBLE SINCE HE HAS A FOLLOW UP ON 6/15/22.

## 2022-06-07 ENCOUNTER — TRANSCRIBE ORDERS (OUTPATIENT)
Dept: GENERAL RADIOLOGY | Facility: HOSPITAL | Age: 52
End: 2022-06-07

## 2022-06-07 ENCOUNTER — HOSPITAL ENCOUNTER (OUTPATIENT)
Dept: GENERAL RADIOLOGY | Facility: HOSPITAL | Age: 52
Discharge: HOME | End: 2022-06-07
Admitting: NURSE PRACTITIONER

## 2022-06-07 DIAGNOSIS — M25.562 LEFT KNEE PAIN, UNSPECIFIED CHRONICITY: Primary | ICD-10-CM

## 2022-06-07 DIAGNOSIS — M25.562 LEFT KNEE PAIN, UNSPECIFIED CHRONICITY: ICD-10-CM

## 2022-06-07 PROCEDURE — 73562 X-RAY EXAM OF KNEE 3: CPT

## 2022-06-15 ENCOUNTER — OFFICE VISIT (OUTPATIENT)
Dept: FAMILY MEDICINE CLINIC | Facility: CLINIC | Age: 52
End: 2022-06-15

## 2022-06-15 VITALS
DIASTOLIC BLOOD PRESSURE: 70 MMHG | HEART RATE: 66 BPM | TEMPERATURE: 97 F | HEIGHT: 66 IN | SYSTOLIC BLOOD PRESSURE: 132 MMHG | OXYGEN SATURATION: 97 % | WEIGHT: 213.4 LBS | RESPIRATION RATE: 18 BRPM | BODY MASS INDEX: 34.3 KG/M2

## 2022-06-15 DIAGNOSIS — Z79.4 TYPE 2 DIABETES MELLITUS WITH HYPERGLYCEMIA, WITH LONG-TERM CURRENT USE OF INSULIN: Primary | Chronic | ICD-10-CM

## 2022-06-15 DIAGNOSIS — E11.65 TYPE 2 DIABETES MELLITUS WITH HYPERGLYCEMIA, WITH LONG-TERM CURRENT USE OF INSULIN: Primary | Chronic | ICD-10-CM

## 2022-06-15 DIAGNOSIS — E66.9 OBESITY (BMI 30-39.9): ICD-10-CM

## 2022-06-15 DIAGNOSIS — I10 HYPERTENSION, ESSENTIAL: Chronic | ICD-10-CM

## 2022-06-15 DIAGNOSIS — E78.2 MIXED HYPERLIPIDEMIA: Chronic | ICD-10-CM

## 2022-06-15 PROCEDURE — 99214 OFFICE O/P EST MOD 30 MIN: CPT | Performed by: FAMILY MEDICINE

## 2022-06-15 RX ORDER — INSULIN DEGLUDEC INJECTION 100 U/ML
INJECTION, SOLUTION SUBCUTANEOUS
Qty: 10 ML | Refills: 2 | Status: SHIPPED | OUTPATIENT
Start: 2022-06-15 | End: 2022-06-21 | Stop reason: SDUPTHER

## 2022-06-15 RX ORDER — AMITRIPTYLINE HYDROCHLORIDE 25 MG/1
25 TABLET, FILM COATED ORAL NIGHTLY
COMMUNITY

## 2022-06-15 RX ORDER — INSULIN ASPART 100 [IU]/ML
INJECTION, SOLUTION INTRAVENOUS; SUBCUTANEOUS
Status: ON HOLD | COMMUNITY
End: 2022-10-12

## 2022-06-15 NOTE — PROGRESS NOTES
Chief Complaint  Annual Exam (Wants to discuss medications.)    Subjective        Ilya oClon presents to [facility]  History of Present Illness    The patient is a 51-year-old male who returns for follow-up on diabetes.    He is on insulin for diabetes. His A1c has not been controlled. He follows with pain management. The patient is prescribed Norco and Narcan is in the chart as well. He takes losartan-hydrochlorothiazide for blood pressure. His patient's blood pressure is 132/70 mmHg today, which is at goal, less than 140/90 mmHg. He takes Lipitor for high cholesterol and gabapentin for neuropathy. The patient has labs pending to be done. His last labs were completed on 01/20/2022. His thyroid levels were good. His A1c went up to 9.4 at that time, kidney function had declined, and bad cholesterol was above 100, which is why we made adjustments to his medications. The patient started on Lipitor and I advised him to adjust the insulin. We will recheck today.     He reports that he is checking his blood glucose at home, and it has been increasing. He states it has been high at night and extremely high when he wakes up in the morning, approximately between 365 to 385 mg/dL. He reports that he utilizes a log to record his blood glucose levels. The patient notes that in the past, he was taking another insulin that worked well for him for approximately 4 to 5 years, but states that he had to switch to a different insulin because it was no longer working for him. He states that he was taking Lantus 40 units twice daily, but he is now taking Lantus 45 units, twice daily. He notes that he has increased his dosage up to 47 units, twice daily; however, it has not been helpful. The patient is concerned that he will not be able to take Trulicity that was prescribed because he is currently taking gabapentin and a low dose aspirin. He has requested insulin vials. Additionally, the patient  "reports that he has been a diabetic since age 34.     The patient reports having diarrhea frequently.    The patient states having difficulty with technology.     Objective   Vital Signs:  /70 (BP Location: Left arm, Patient Position: Sitting)   Pulse 66   Temp 97 °F (36.1 °C) (Infrared)   Resp 18   Ht 167.6 cm (66\")   Wt 96.8 kg (213 lb 6.4 oz)   SpO2 97%   BMI 34.44 kg/m²   Estimated body mass index is 34.44 kg/m² as calculated from the following:    Height as of this encounter: 167.6 cm (66\").    Weight as of this encounter: 96.8 kg (213 lb 6.4 oz).    BMI is >= 30 and <35. (Class 1 Obesity). The following options were offered after discussion;: weight loss educational material (shared in after visit summary) and exercise counseling/recommendations      Physical Exam  Vitals reviewed.   Constitutional:       Appearance: Normal appearance. He is well-developed.   HENT:      Head: Normocephalic and atraumatic.      Right Ear: External ear normal.      Left Ear: External ear normal.      Nose: Nose normal.   Eyes:      Conjunctiva/sclera: Conjunctivae normal.      Pupils: Pupils are equal, round, and reactive to light.   Cardiovascular:      Rate and Rhythm: Normal rate.   Pulmonary:      Effort: Pulmonary effort is normal.      Breath sounds: Normal breath sounds.   Abdominal:      General: There is no distension.   Skin:     General: Skin is warm and dry.   Neurological:      General: No focal deficit present.      Mental Status: He is alert and oriented to person, place, and time.   Psychiatric:         Mood and Affect: Mood and affect normal.         Behavior: Behavior normal.         Thought Content: Thought content normal.         Judgment: Judgment normal.        Result Review :  The following data was reviewed by: Edgard Dickey DO on 06/15/2022:  Common labs    Common Labsle 7/9/21 7/9/21 1/10/22 1/10/22 1/10/22 1/10/22    1423 1423 1550 1550 1550 1550   Glucose  65    142 (A)   BUN  12    15 "   Creatinine  1.35 (A)    1.73 (A)   eGFR Non  Am  56 (A)    42 (A)   Sodium  131 (A)    135 (A)   Potassium  3.7    3.9   Chloride  92 (A)    104   Calcium  9.9    10.2   Albumin  4.70    5.00   Total Bilirubin  0.8    0.7   Alkaline Phosphatase  87    82   AST (SGOT)  33    21   ALT (SGPT)  36    30   WBC 10.22  9.40      Hemoglobin 15.4  15.3      Hematocrit 43.8  44.0      Platelets 357  316      Total Cholesterol     186    Triglycerides     176 (A)    HDL Cholesterol     39 (A)    LDL Cholesterol      116 (A)    Hemoglobin A1C    9.22 (A)     (A) Abnormal value       Comments are available for some flowsheets but are not being displayed.                     Assessment and Plan   Diagnoses and all orders for this visit:    1. Type 2 diabetes mellitus with hyperglycemia, with long-term current use of insulin (HCC) (Primary)  -     Discontinue: Insulin Degludec (Tresiba) 100 UNIT/ML solution injection; Inject 40 units subcutaneous daily in the morning for T2DM, insulin dependent, E11.9, titrate up dose every 3 days 5 units until under sugar readings fasting is <200 then follow up  Dispense: 10 mL; Refill: 2    2. Mixed hyperlipidemia    3. Obesity (BMI 30-39.9)    4. Hypertension, essential      1. Type 2 diabetes mellitus with hyperglycemia, with long-term current use of insulin (HCC)  We are switching his insulin to Tresiba. He can use 40 units daily and titrate up every 3 days or twice weekly, 3 to 5 units until fasting blood sugars are under 200, then he will follow up. He will continue with NovoLog as prescribed for meals. We will recheck his A1c in 1 to 2 months, which will likely be improved and better managed.     His blood pressure is well controlled at less than 140/90 mmHg. He will continue medications as prescribed.     He is on chronic pain medication, controlled by pain management. RHIANNON reviewed.     He is doing testosterone therapy, which is managed and he is doing well.     He is on  atorvastatin and we need to recheck his cholesterol when he returns to the clinic.     We will see him back in 1 to 2 months with a blood glucose log to review. We will try to get him a glucose sensor if we are able to as he is not really keen on technology, but we can try.         Follow Up   Return in about 4 weeks (around 7/13/2022), or if symptoms worsen or fail to improve, for Recheck glucose, Recheck, Labs before.  Patient was given instructions and counseling regarding his condition or for health maintenance advice. Please see specific information pulled into the AVS if appropriate.       Transcribed from ambient dictation for Edgard Dickey DO by Razia Brunner.  06/15/22   17:23 EDT    Patient verbalized consent to the visit recording.

## 2022-06-15 NOTE — PATIENT INSTRUCTIONS
Inject 40 units subcutaneous daily in the morning for T2DM, insulin dependent, E11.9,     Titrate up dose every 3 days 5 units until under sugar readings fasting is <200 then follow up

## 2022-06-21 DIAGNOSIS — E11.65 TYPE 2 DIABETES MELLITUS WITH HYPERGLYCEMIA, WITH LONG-TERM CURRENT USE OF INSULIN: ICD-10-CM

## 2022-06-21 DIAGNOSIS — Z79.4 TYPE 2 DIABETES MELLITUS WITH HYPERGLYCEMIA, WITH LONG-TERM CURRENT USE OF INSULIN: ICD-10-CM

## 2022-06-21 RX ORDER — INSULIN DEGLUDEC INJECTION 100 U/ML
INJECTION, SOLUTION SUBCUTANEOUS
Qty: 10 ML | Refills: 2 | Status: SHIPPED | OUTPATIENT
Start: 2022-06-21 | End: 2022-07-29

## 2022-07-29 DIAGNOSIS — E11.65 TYPE 2 DIABETES MELLITUS WITH HYPERGLYCEMIA, WITH LONG-TERM CURRENT USE OF INSULIN: ICD-10-CM

## 2022-07-29 DIAGNOSIS — Z79.4 TYPE 2 DIABETES MELLITUS WITH HYPERGLYCEMIA, WITH LONG-TERM CURRENT USE OF INSULIN: ICD-10-CM

## 2022-07-29 RX ORDER — INSULIN DEGLUDEC INJECTION 100 U/ML
INJECTION, SOLUTION SUBCUTANEOUS
Qty: 30 ML | Refills: 2 | Status: SHIPPED | OUTPATIENT
Start: 2022-07-29 | End: 2022-08-01 | Stop reason: SDUPTHER

## 2022-08-01 ENCOUNTER — OFFICE VISIT (OUTPATIENT)
Dept: FAMILY MEDICINE CLINIC | Facility: CLINIC | Age: 52
End: 2022-08-01

## 2022-08-01 VITALS
HEART RATE: 67 BPM | OXYGEN SATURATION: 98 % | DIASTOLIC BLOOD PRESSURE: 61 MMHG | WEIGHT: 213 LBS | RESPIRATION RATE: 18 BRPM | HEIGHT: 66 IN | BODY MASS INDEX: 34.23 KG/M2 | TEMPERATURE: 97.8 F | SYSTOLIC BLOOD PRESSURE: 138 MMHG

## 2022-08-01 DIAGNOSIS — R25.2 MUSCLE CRAMPS: ICD-10-CM

## 2022-08-01 DIAGNOSIS — E87.6 HYPOKALEMIA: ICD-10-CM

## 2022-08-01 DIAGNOSIS — Z79.4 TYPE 2 DIABETES MELLITUS WITH HYPERGLYCEMIA, WITH LONG-TERM CURRENT USE OF INSULIN: Primary | Chronic | ICD-10-CM

## 2022-08-01 DIAGNOSIS — Z79.4 INSULIN DEPENDENT TYPE 2 DIABETES MELLITUS: Chronic | ICD-10-CM

## 2022-08-01 DIAGNOSIS — I10 HYPERTENSION, ESSENTIAL: Chronic | ICD-10-CM

## 2022-08-01 DIAGNOSIS — F17.211 CIGARETTE NICOTINE DEPENDENCE IN REMISSION: Chronic | ICD-10-CM

## 2022-08-01 DIAGNOSIS — E78.2 MIXED HYPERLIPIDEMIA: Chronic | ICD-10-CM

## 2022-08-01 DIAGNOSIS — R79.9 ABNORMAL FINDING OF BLOOD CHEMISTRY, UNSPECIFIED: ICD-10-CM

## 2022-08-01 DIAGNOSIS — E11.65 TYPE 2 DIABETES MELLITUS WITH HYPERGLYCEMIA, WITH LONG-TERM CURRENT USE OF INSULIN: Primary | Chronic | ICD-10-CM

## 2022-08-01 DIAGNOSIS — E11.9 INSULIN DEPENDENT TYPE 2 DIABETES MELLITUS: Chronic | ICD-10-CM

## 2022-08-01 DIAGNOSIS — R53.83 FATIGUE, UNSPECIFIED TYPE: ICD-10-CM

## 2022-08-01 PROCEDURE — 99214 OFFICE O/P EST MOD 30 MIN: CPT | Performed by: FAMILY MEDICINE

## 2022-08-01 RX ORDER — BLOOD SUGAR DIAGNOSTIC
STRIP MISCELLANEOUS
Qty: 500 EACH | Refills: 3 | Status: ON HOLD | OUTPATIENT
Start: 2022-08-01 | End: 2022-10-12

## 2022-08-01 RX ORDER — INSULIN DEGLUDEC INJECTION 100 U/ML
INJECTION, SOLUTION SUBCUTANEOUS
Qty: 30 ML | Refills: 2 | Status: SHIPPED | OUTPATIENT
Start: 2022-08-01 | End: 2022-09-30 | Stop reason: SDUPTHER

## 2022-08-01 RX ORDER — POTASSIUM CHLORIDE 20 MEQ/1
20 TABLET, EXTENDED RELEASE ORAL 2 TIMES DAILY
Qty: 60 TABLET | Refills: 5 | Status: SHIPPED | OUTPATIENT
Start: 2022-08-01 | End: 2023-01-24 | Stop reason: HOSPADM

## 2022-08-01 RX ORDER — NICOTINE 10 MG/ML
SPRAY, NON-AEROSOL (ML) NASAL
Qty: 10 ML | Refills: 11 | Status: ON HOLD | OUTPATIENT
Start: 2022-08-01 | End: 2022-10-19

## 2022-08-01 NOTE — PATIENT INSTRUCTIONS
Cholesterol Content in Foods  Cholesterol is a waxy, fat-like substance that helps to carry fat in the blood. The body needs cholesterol in small amounts, but too much cholesterol can cause damage to the arteries and heart.  Most people should eat less than 200 milligrams (mg) of cholesterol a day.  Foods with cholesterol    Cholesterol is found in animal-based foods, such as meat, seafood, and dairy. Generally, low-fat dairy and lean meats have less cholesterol than full-fat dairy and fatty meats. The milligrams of cholesterol per serving (mg per serving) of common cholesterol-containing foods are listed below.  Meat and other proteins  Egg -- one large whole egg has 186 mg.  Veal shank -- 4 oz has 141 mg.  Lean ground turkey (93% lean) -- 4 oz has 118 mg.  Fat-trimmed lamb loin -- 4 oz has 106 mg.  Lean ground beef (90% lean) -- 4 oz has 100 mg.  Lobster -- 3.5 oz has 90 mg.  Pork loin chops -- 4 oz has 86 mg.  Canned salmon -- 3.5 oz has 83 mg.  Fat-trimmed beef top loin -- 4 oz has 78 mg.  Frankfurter -- 1 taryn (3.5 oz) has 77 mg.  Crab -- 3.5 oz has 71 mg.  Roasted chicken without skin, white meat -- 4 oz has 66 mg.  Light bologna -- 2 oz has 45 mg.  Deli-cut turkey -- 2 oz has 31 mg.  Canned tuna -- 3.5 oz has 31 mg.  Linder -- 1 oz has 29 mg.  Oysters and mussels (raw) -- 3.5 oz has 25 mg.  Mackerel -- 1 oz has 22 mg.  Trout -- 1 oz has 20 mg.  Pork sausage -- 1 link (1 oz) has 17 mg.  Jarrell -- 1 oz has 16 mg.  Tilapia -- 1 oz has 14 mg.  Dairy  Soft-serve ice cream -- ½ cup (4 oz) has 103 mg.  Whole-milk yogurt -- 1 cup (8 oz) has 29 mg.  Cheddar cheese -- 1 oz has 28 mg.  American cheese -- 1 oz has 28 mg.  Whole milk -- 1 cup (8 oz) has 23 mg.  2% milk -- 1 cup (8 oz) has 18 mg.  Cream cheese -- 1 tablespoon (Tbsp) has 15 mg.  Cottage cheese -- ½ cup (4 oz) has 14 mg.  Low-fat (1%) milk -- 1 cup (8 oz) has 10 mg.  Sour cream -- 1 Tbsp has 8.5 mg.  Low-fat yogurt -- 1 cup (8 oz) has 8 mg.  Nonfat Greek  yogurt -- 1 cup (8 oz) has 7 mg.  Half-and-half cream -- 1 Tbsp has 5 mg.  Fats and oils  Cod liver oil -- 1 tablespoon (Tbsp) has 82 mg.  Butter -- 1 Tbsp has 15 mg.  Lard -- 1 Tbsp has 14 mg.  Linder grease -- 1 Tbsp has 14 mg.  Mayonnaise -- 1 Tbsp has 5-10 mg.  Margarine -- 1 Tbsp has 3-10 mg.  Exact amounts of cholesterol in these foods may vary depending on specific ingredients and brands.  Foods without cholesterol  Most plant-based foods do not have cholesterol unless you combine them with a food that has cholesterol. Foods without cholesterol include:  Grains and cereals.  Vegetables.  Fruits.  Vegetable oils, such as olive, canola, and sunflower oil.  Legumes, such as peas, beans, and lentils.  Nuts and seeds.  Egg whites.  Summary  The body needs cholesterol in small amounts, but too much cholesterol can cause damage to the arteries and heart.  Most people should eat less than 200 milligrams (mg) of cholesterol a day.  This information is not intended to replace advice given to you by your health care provider. Make sure you discuss any questions you have with your health care provider.  Document Revised: 05/10/2021 Document Reviewed: 05/10/2021  [employer] Patient Education © 2021 [employer]  Diabetes Mellitus Action Plan  Following a diabetes action plan is a way for you to manage your diabetes (diabetes mellitus) symptoms. The plan is color-coded to help you understand what actions you need to take based on any symptoms you are having.  If you have symptoms in the red zone, you need medical care right away.  If you have symptoms in the yellow zone, you are having problems.  If you have symptoms in the green zone, you are doing well.  Learning about and understanding diabetes can take time. Follow the plan that you develop with your health care provider. Know the target range for your blood sugar (glucose) level, and review your treatment plan with your health care provider at each visit.  The target  range for my blood sugar level is __________________________ mg/dL.  Red zone  Get medical help right away if you have any of the following symptoms:  A blood sugar test result that is below 54 mg/dL (3 mmol/L).  A blood sugar test result that is at or above 240 mg/dL (13.3 mmol/L) for 2 days in a row.  Confusion or trouble thinking clearly.  Difficulty breathing.  Sickness or a fever for 2 or more days that is not getting better.  Moderate or large ketone levels in your urine.  Feeling tired or having no energy.  If you have any red zone symptoms, do not wait to see if the symptoms will go away. Get medical help right away. Call your local emergency services (911 in the [place] Do not drive yourself to the hospital.  If you have severely low blood sugar (severe hypoglycemia) and you cannot eat or drink, you may need glucagon. Make sure a family member or close friend knows how to check your blood sugar and how to give you glucagon. You may need to be treated in a hospital for this condition.  Yellow zone  If you have any of the following symptoms, your diabetes is not under control and you may need to make some changes:  A blood sugar test result that is at or above 240 mg/dL (13.3 mmol/L) for 2 days in a row.  Blood sugar test results that are below 70 mg/dL (3.9 mmol/L).  Other symptoms of hypoglycemia, such as:  Shaking or feeling light-headed.  Confusion or irritability.  Feeling hungry.  Having a fast heartbeat.  If you have any yellow zone symptoms:  Treat your hypoglycemia by eating or drinking 15 grams of a rapid-acting carbohydrate. Follow the 15:15 rule:  Take 15 grams of a rapid-acting carbohydrate, such as:  1 tube of glucose gel.  4 glucose pills.  4 oz (120 mL) of fruit juice.  4 oz (120 mL) of regular (not diet) soda.  Check your blood sugar 15 minutes after you take the carbohydrate.  If the repeat blood sugar test is still at or below 70 mg/dL (3.9 mmol/L), take 15 grams of a carbohydrate again.  If  your blood sugar does not increase above 70 mg/dL (3.9 mmol/L) after 3 tries, get medical help right away.  After your blood sugar returns to normal, eat a meal or a snack within 1 hour.  Keep taking your daily medicines as told by your health care provider.  Check your blood sugar more often than you normally would.  Write down your results.  Call your health care provider if you have trouble keeping your blood sugar in your target range.    Green zone  These signs mean you are doing well and you can continue what you are doing to manage your diabetes:  Your blood sugar is within your personal target range. For most people, a blood sugar level before a meal (preprandial) should be  mg/dL (4.4-7.2 mmol/L).  You feel well, and you are able to do daily activities.  If you are in the green zone, continue to manage your diabetes as told by your health care provider. To do this:  Eat a healthy diet.  Exercise regularly.  Check your blood sugar as told by your health care provider.  Take your medicines as told by your health care provider.    Where to find more information  American Diabetes Association (ADA): diabetes.org  Association of Diabetes Care & Education Specialists (ADCES): diabeteseducator.org  Summary  Following a diabetes action plan is a way for you to manage your diabetes symptoms. The plan is color-coded to help you understand what actions you need to take based on any symptoms you are having.  Follow the plan that you develop with your health care provider. Make sure you know your personal target blood sugar level.  Review your treatment plan with your health care provider at each visit.  This information is not intended to replace advice given to you by your health care provider. Make sure you discuss any questions you have with your health care provider.  Document Revised: 06/24/2021 Document Reviewed: 06/24/2021  [employer] Patient Education © 2021 [employer]  Daily Diabetes Mellitus  Record    Check your blood glucose (BG) as told by your health care provider. Use this form to record your BG results as well as any diabetes medicines that you take, including insulin.  A record of your BG results and a list of your current medicines are very helpful in managing your diabetes. Your BG numbers help your health care provider know whether your diabetes management plan needs to be changed.  Patient name: ____________________________________ Week of ____________________  Daily BG results and diabetes medicines  Date: _________  Breakfast - BG / Medicines: ________________ / __________________________________________________________  Lunch - BG / Medicines: ___________________ / __________________________________________________________  Dinner - BG / Medicines: __________________ / __________________________________________________________  Bedtime - BG / Medicines: ________________ / ___________________________________________________________  Date: _________  Breakfast - BG / Medicines: ________________ / __________________________________________________________  Lunch - BG / Medicines: ___________________ / __________________________________________________________  Dinner - BG / Medicines: __________________ / __________________________________________________________  Bedtime - BG / Medicines: ________________ / ___________________________________________________________  Date: _________  Breakfast - BG / Medicines: ________________ / __________________________________________________________  Lunch - BG / Medicines: ___________________ / __________________________________________________________  Dinner - BG / Medicines: __________________ / __________________________________________________________  Bedtime - BG / Medicines: ________________ / ___________________________________________________________  Date: _________  Breakfast - BG / Medicines: ________________ /  __________________________________________________________  Lunch - BG / Medicines: ___________________ / __________________________________________________________  Dinner - BG / Medicines: __________________ / __________________________________________________________  Bedtime - BG / Medicines: ________________ / ___________________________________________________________  Date: _________  Breakfast - BG / Medicines: ________________ / __________________________________________________________  Lunch - BG / Medicines: ___________________ / __________________________________________________________  Dinner - BG / Medicines: __________________ / __________________________________________________________  Bedtime - BG / Medicines: ________________ / ___________________________________________________________  Date: _________  Breakfast - BG / Medicines: ________________ / __________________________________________________________  Lunch - BG / Medicines: ___________________ / __________________________________________________________  Dinner - BG / Medicines: __________________ / __________________________________________________________  Bedtime - BG / Medicines: ________________ / ___________________________________________________________  Date: _________  Breakfast - BG / Medicines: ________________ / __________________________________________________________  Lunch - BG / Medicines: ___________________ / __________________________________________________________  Dinner - BG / Medicines: __________________ / __________________________________________________________  Bedtime - BG / Medicines: ________________ / ___________________________________________________________  Notes: ______________________________________________________________________________________________________________________  This information is not intended to replace advice given to you by your health care provider. Make sure you discuss  any questions you have with your health care provider.  Document Revised: 06/24/2021 Document Reviewed: 06/24/2021  [employer] Patient Education © 2021 [employer]  Correction Insulin  Correction insulin, also called a supplemental dose, is a small amount of insulin that can be used to lower your blood sugar (glucose) if it is too high. This dose brings your glucose level back to the target range. You will be instructed to check your glucose at certain times of the day and to use correction insulin as needed to lower your blood glucose.  Correction insulin is primarily used as part of diabetes management. It may also be prescribed for people who do not have diabetes.  What is a correction scale?  A correction scale, also called a sliding scale, is prescribed by your health care provider to help you determine when you need correction insulin. Your correction scale is based on your individual treatment goals, and it has two parts:  Ranges of blood glucose levels.  How much correction insulin to give yourself if your blood sugar falls within a certain range.  If your blood glucose is in your desired range, you will not need correction insulin.  What type of insulin do I need?  You may be prescribed rapid-acting or short-acting insulin as correction insulin. Talk with your health care provider or pharmacist about which type of correction insulin to take and when to take it.  Rapid-acting insulin  This insulin:  Starts working in the body (onset) in as little as 15 minutes.  Is at its highest strength (peak) in 1.5-2 hours.  Lasts (duration) for 4-5 hours.  Short-acting insulin  This insulin:  Starts working in the body (onset) in about 30 minutes.  Is at its highest strength (peak) in 2-3 hours.  Lasts (duration) for 3-6 hours.  How do I manage my blood glucose with correction insulin?  Giving a correction dose    Check your blood glucose as directed by your health care provider.  Use your correction scale to find the  range that your blood glucose is in.  Identify the units of insulin that match your blood glucose range.  Give yourself the dose of correction insulin that your health care provider has prescribed in your correction scale. Always make sure you are using the right type of insulin.    Keeping a blood glucose log    Write down your blood glucose test results and the amount of insulin that you give yourself. Do this every time you check blood glucose or take insulin. Bring this log with you to your medical visits. This information will help your health care provider manage your medicines.  Note anything that may affect your blood glucose, such as:  Changes in normal exercise or activity.  Changes in your normal schedule, such as changes in your sleep routine, going on vacation, changing your diet, or holidays.  New over-the-counter or prescription medicines.  Illness, stress, or anxiety.  Changes in the time that you took your medicine or insulin.  Changes in your meals, such as skipping a meal, having a late meal, or dining out.  Eating things that may affect blood glucose, such as snacks, meal portions that are larger than normal, drinks that contain sugar, or eating less than usual.    Why do I need correction insulin if I do not have diabetes?  If you do not have diabetes, your health care provider may prescribe insulin because:  Keeping your blood glucose in the target range is important for your overall health.  You are taking medicines that cause your blood glucose to be higher than normal.  Contact a health care provider if:  You have high blood glucose that you are not able to correct with correction insulin.  You develop a low blood glucose that you are not able to treat yourself.  Your blood glucose is often too low.  Get help right away if:  You become unresponsive. If this happens, someone else should call emergency services (911 in the [place] right away.  Your blood glucose is lower than 54 mg/dL (3.0  mmol/L).  You become confused or you have trouble thinking clearly.  You have difficulty breathing.  Summary  Correction insulin is primarily used in diabetes management. It can also be prescribed for people who do not have diabetes.  Correction insulin is a small amount of insulin that can be used to lower your blood glucose if it is too high. It brings your glucose level to the target range.  You will be instructed to check your blood glucose at certain times of the day and to use correction insulin as needed. Always keep a log of your blood glucose values and the amount of insulin you take.  Talk with your health care provider or pharmacist about the type of correction insulin to take and when to take it.  This information is not intended to replace advice given to you by your health care provider. Make sure you discuss any questions you have with your health care provider.  Document Revised: 10/19/2020 Document Reviewed: 10/19/2020  [employer] Patient Education © 2021 [employer]  Blood Glucose Monitoring, Adult  Monitoring your blood sugar (glucose) is an important part of managing your diabetes. Blood glucose monitoring involves checking your blood glucose as often as directed and keeping a log or record of your results over time.  Checking your blood glucose regularly and keeping a blood glucose log can:  Help you and your health care provider adjust your diabetes management plan as needed, including your medicines or insulin.  Help you understand how food, exercise, illnesses, and medicines affect your blood glucose.  Let you know what your blood glucose is at any time. You can quickly find out if you have low blood glucose (hypoglycemia) or high blood glucose (hyperglycemia).  Your health care provider will set individualized treatment goals for you. Your goals will be based on your age, other medical conditions you have, and how you respond to diabetes treatment. Generally, the goal of treatment is to  maintain the following blood glucose levels:  Before meals (preprandial):  mg/dL (4.4-7.2 mmol/L).  After meals (postprandial): below 180 mg/dL (10 mmol/L).  A1C level: less than 7%.  Supplies needed:  Blood glucose meter.  Test strips for your meter. Each meter has its own strips. You must use the strips that came with your meter.  A needle to prick your finger (lancet). Do not use a lancet more than one time.  A device that holds the lancet (lancing device).  A journal or log book to write down your results.  How to check your blood glucose  Checking your blood glucose    Wash your hands for at least 20 seconds with soap and water.  Prick the side of your finger (not the tip) with the lancet. Do not use the same finger consecutively.  Gently rub the finger until a small drop of blood appears.  Follow instructions that come with your meter for inserting the test strip, applying blood to the strip, and using your blood glucose meter.  Write down your result and any notes in your log.    Using alternative sites  Some meters allow you to use areas of your body other than your finger (alternative sites) to test your blood. The most common alternative sites are the forearm, the thigh, and the palm of your hand.  Alternative sites may not be as accurate as the fingers because blood flow is slower in those areas. This means that the result you get may be delayed, and it may be different from the result that you would get from your finger.  Use the finger only, and do not use alternative sites, if:  You think you have hypoglycemia.  You sometimes do not know that your blood glucose is getting low (hypoglycemia unawareness).  General tips and recommendations  Blood glucose log    Every time you check your blood glucose, write down your result. Also write down any notes about things that may be affecting your blood glucose, such as your diet and exercise for the day. This information can help you and your health care  provider:  Look for patterns in your blood glucose over time.  Adjust your diabetes management plan as needed.  Check if your meter allows you to download your records to a computer or if there is an ted for the meter. Most glucose meters store a record of glucose readings in the meter.    If you have type 1 diabetes:  Check your blood glucose 4 or more times a day if you are on intensive insulin therapy with multiple daily injections (MDI) or if you are using an insulin pump. Check your blood glucose:  Before every meal and snack.  Before bedtime.  Also check your blood glucose:  If you have symptoms of hypoglycemia.  After treating low blood glucose.  Before doing activities that create a risk for injury, like driving or using machinery.  Before and after exercise.  Two hours after a meal.  Occasionally between 2:00 a.m. and 3:00 a.m., as directed.  You may need to check your blood glucose more often, 6-10 times per day, if:  You have diabetes that is not well controlled.  You are ill.  You have a history of severe hypoglycemia.  You have hypoglycemia unawareness.  If you have type 2 diabetes:  Check your blood glucose 2 or more times a day if you take insulin or other diabetes medicines.  Check your blood glucose 4 or more times a day if you are on intensive insulin therapy. Occasionally, you may also need to check your glucose between 2:00 a.m. and 3:00 a.m., as directed.  Also check your blood glucose:  Before and after exercise.  Before doing activities that create a risk for injury, like driving or using machinery.  You may need to check your blood glucose more often if:  Your medicine is being adjusted.  Your diabetes is not well controlled.  You are ill.  General tips  Make sure you always have your supplies with you.  After you use a few boxes of test strips, adjust (calibrate) your blood glucose meter by following instructions that came with your meter.  If you have questions or need help, all blood  glucose meters have a 24-hour hotline phone number available that you can call. Also contact your health care provider with questions or concerns you may have.  Where to find more information  The American Diabetes Association: www.diabetes.org  The Association of Diabetes Care & Education Specialists: www.diabeteseducator.org  Contact a health care provider if:  Your blood glucose is at or above 240 mg/dL (13.3 mmol/L) for 2 days in a row.  You have been sick or have had a fever for 2 days or longer, and you are not getting better.  You have any of the following problems for more than 6 hours:  You cannot eat or drink.  You have nausea or vomiting.  You have diarrhea.  Get help right away if:  Your blood glucose is lower than 54 mg/dL (3 mmol/L).  You become confused, or you have trouble thinking clearly.  You have difficulty breathing.  You have moderate or large ketone levels in your urine.  These symptoms may represent a serious problem that is an emergency. Do not wait to see if the symptoms will go away. Get medical help right away. Call your local emergency services (911 in the [place] Do not drive yourself to the hospital.  Summary  Monitoring your blood glucose is an important part of managing your diabetes.  Blood glucose monitoring involves checking your blood glucose as often as directed and keeping a log or record of your results over time.  Your health care provider will set individualized treatment goals for you. Your goals will be based on your age, other medical conditions you have, and how you respond to diabetes treatment.  Every time you check your blood glucose, write down your result. Also, write down any notes about things that may be affecting your blood glucose, such as your diet and exercise for the day.  This information is not intended to replace advice given to you by your health care provider. Make sure you discuss any questions you have with your health care provider.  Document Revised:  09/15/2021 Document Reviewed: 09/15/2021  [employer] Patient Education © 2021 [employer]

## 2022-08-01 NOTE — PROGRESS NOTES
Chief Complaint  Fatigue and Spasms    Subjective        Ilya Colon presents to [facility]     Ilya Colon is a 51-year-old male who presents for follow-up on medications. He needs blood work additionally. He has several conditions including diabetes, hyperlipidemia, chronic pain, neuropathy, heartburn, GERD and takes Protonix. He takes Flomax for benign prostatic hyperplasia. The patient takes testosterone therapy and additionally, sees a specialist. He takes trazodone for insomnia, gabapentin for chronic pain and neuropathy, and Celebrex additionally. From his last labs on 01/10/2022, his blood glucose A1c was 9.22 percent; goal is at least less than 9 percent and approaching 7 to 7.5 percent. He is on insulin NovoLog daily as well as Tresiba. I have tried to manage him and getting a specialist to help with managing his blood sugar better, which he is monitoring at home. We discussed getting a sensor for patient additionally. He had a GFR of 42 at last check on 01/10/2022 additionally creatinine was 1.73, which was more elevated than previous. We are monitoring his kidney function. His LDL was 116, which was previously 55, 1 year ago. He is supposed to be taking Lipitor 10 mg if not more to lower at goal less than 70 to 100. His B12 was normal when he rechecked last recently and his TSH T4 in 01/2022 was additionally normal. He is having some muscle cramps and leg issues. We will check magnesium and iron for patient additionally. His symptoms could be related to Lipitor or other medications. We will review.    The patient reports experiencing muscle cramps that wake him up at night. He adds that in the morning, he is unable to get out of bed due to the discomfort. He notes that his mother gave him 20 mg of potassium pills twice daily, which provided significant symptom relief. He states that eating bananas causes heartburn.         Objective   Vital Signs:  /61  PATIENT CANCELLED HER APPOINTMENT FOR THIS EVENING. SHE WAS IN THE OFFICE FOR THE SAME THING  (SINUS INFECTION) 2 WEEKS AGO.   SHE WOULD LIKE TO KNOW IF A DIFFERENT PRESCRIPTION CAN BE CALLED IN FOR HER SINCE THE FIRST ROUND OF MEDS HASN'T WORKED.    "(BP Location: Left arm, Patient Position: Sitting)   Pulse 67   Temp 97.8 °F (36.6 °C) (Infrared)   Resp 18   Ht 167.6 cm (66\")   Wt 96.6 kg (213 lb)   SpO2 98%   BMI 34.38 kg/m²   Estimated body mass index is 34.38 kg/m² as calculated from the following:    Height as of this encounter: 167.6 cm (66\").    Weight as of this encounter: 96.6 kg (213 lb).    BMI is >= 30 and <35. (Class 1 Obesity). The following options were offered after discussion;: weight loss educational material (shared in after visit summary)      Physical Exam  Vitals reviewed.   Constitutional:       Appearance: Normal appearance. He is well-developed.   HENT:      Head: Normocephalic and atraumatic.      Right Ear: External ear normal.      Left Ear: External ear normal.      Nose: Nose normal.   Eyes:      Conjunctiva/sclera: Conjunctivae normal.      Pupils: Pupils are equal, round, and reactive to light.   Cardiovascular:      Rate and Rhythm: Normal rate.   Pulmonary:      Effort: Pulmonary effort is normal.      Breath sounds: Normal breath sounds.   Abdominal:      General: There is no distension.   Skin:     General: Skin is warm and dry.   Neurological:      General: No focal deficit present.      Mental Status: He is alert and oriented to person, place, and time.   Psychiatric:         Mood and Affect: Mood and affect normal.         Behavior: Behavior normal.         Thought Content: Thought content normal.         Judgment: Judgment normal.        Result Review :  The following data was reviewed by: Edgard Dickey DO on 08/01/2022:  Common labs    Common Labsle 1/10/22 1/10/22 1/10/22 1/10/22    1550 1550 1550 1550   Glucose    142 (A)   BUN    15   Creatinine    1.73 (A)   eGFR Non African Am    42 (A)   Sodium    135 (A)   Potassium    3.9   Chloride    104   Calcium    10.2   Albumin    5.00   Total Bilirubin    0.7   Alkaline Phosphatase    82   AST (SGOT)    21   ALT (SGPT)    30   WBC 9.40      Hemoglobin 15.3    "   Hematocrit 44.0      Platelets 316      Total Cholesterol   186    Triglycerides   176 (A)    HDL Cholesterol   39 (A)    LDL Cholesterol    116 (A)    Hemoglobin A1C  9.22 (A)     (A) Abnormal value                      Assessment and Plan   Diagnoses and all orders for this visit:    1. Type 2 diabetes mellitus with hyperglycemia, with long-term current use of insulin (HCC) (Primary)  -     Insulin Degludec (Tresiba) 100 UNIT/ML solution injection; INJECT 40 UNITS IN THE MORNING, TITRATE DOSE UP BY 5 UNITS EVERY 3 DAYS UNTIL FASTING SUGAR READING IS LESS THAN 200  Dispense: 30 mL; Refill: 2  -     Accu-Chek Kirsten Plus test strip; Use to check glucose before breakfast and at bedtime, as well as before meals if needed, for T2DM insulin dependent E11.9  Dispense: 500 each; Refill: 3    2. Fatigue, unspecified type  -     Magnesium; Future  -     Iron and TIBC; Future  -     potassium chloride (K-DUR,KLOR-CON) 20 MEQ CR tablet; Take 1 tablet by mouth 2 (Two) Times a Day.  Dispense: 60 tablet; Refill: 5    3. Muscle cramps  -     Magnesium; Future  -     Iron and TIBC; Future  -     potassium chloride (K-DUR,KLOR-CON) 20 MEQ CR tablet; Take 1 tablet by mouth 2 (Two) Times a Day.  Dispense: 60 tablet; Refill: 5    4. Abnormal finding of blood chemistry, unspecified   -     Iron and TIBC; Future    5. Hypertension, essential    6. Mixed hyperlipidemia    7. Insulin dependent type 2 diabetes mellitus (HCC)    8. Hypokalemia  -     potassium chloride (K-DUR,KLOR-CON) 20 MEQ CR tablet; Take 1 tablet by mouth 2 (Two) Times a Day.  Dispense: 60 tablet; Refill: 5    9. Cigarette nicotine dependence in remission  -     nicotine (Nicotrol NS) 10 MG/ML solution nasal solution; Spray into nose every 1 hour as needed for nicotine cravings, nicotine dependence treatment in remission F17.21  Indications: Nicotine Addiction  Dispense: 10 mL; Refill: 11      Patient needs refills on diabetic supplies. We recheck A1c and iron for leg  cramps with labs this week. A1c goal will be closer to 7. He will monitor and continue insulin as prescribed. We will consider seeing specialists,     We will place an order for nicotine replacement therapy, if able. I recommended spray or inhaler. We discussed and reviewed today. He has been on medication and nicotine patches before, but they were not helpful. He has issues with esophagus and teeth from  as he reports poor hygiene and not taking care of himself as well as diabetes, was uncontrolled. Otherwise, he will return to the clinic in 1 month, if not sooner. We will be monitoring blood sugars and work on getting patient to quit smoking. His legs cramps could be related to Lipitor or other medications. We will work up and treat as appropriate or it could be neuropathy. He is on gabapentin and follows with pain management.         Follow Up   Return in about 3 months (around 11/1/2022), or if symptoms worsen or fail to improve, for Labs before.  Patient was given instructions and counseling regarding his condition or for health maintenance advice. Please see specific information pulled into the AVS if appropriate.     Transcribed from ambient dictation for Edgard Dickey DO by Razia Brunner.  08/01/22   17:39 EDT    Patient verbalized consent to the visit recording.

## 2022-09-12 ENCOUNTER — TELEPHONE (OUTPATIENT)
Dept: FAMILY MEDICINE CLINIC | Facility: CLINIC | Age: 52
End: 2022-09-12

## 2022-09-12 DIAGNOSIS — I25.118 CORONARY ARTERY DISEASE OF NATIVE HEART WITH STABLE ANGINA PECTORIS, UNSPECIFIED VESSEL OR LESION TYPE: Primary | ICD-10-CM

## 2022-09-12 NOTE — TELEPHONE ENCOUNTER
Patient would like referral to cardiology, has had heart attack in the past, about 6-7 years ago, had stent placed. Patient gets chest pain when lifting things. Per patient's mother, does not have constant chest pain. Let them know if pain got worse to go to hospital due to history      Pended referral

## 2022-09-28 ENCOUNTER — TELEPHONE (OUTPATIENT)
Dept: FAMILY MEDICINE CLINIC | Facility: CLINIC | Age: 52
End: 2022-09-28

## 2022-09-28 DIAGNOSIS — Z79.4 TYPE 2 DIABETES MELLITUS WITH HYPERGLYCEMIA, WITH LONG-TERM CURRENT USE OF INSULIN: Chronic | ICD-10-CM

## 2022-09-28 DIAGNOSIS — E11.65 TYPE 2 DIABETES MELLITUS WITH HYPERGLYCEMIA, WITH LONG-TERM CURRENT USE OF INSULIN: Chronic | ICD-10-CM

## 2022-09-28 RX ORDER — INSULIN DEGLUDEC INJECTION 100 U/ML
47 INJECTION, SOLUTION SUBCUTANEOUS 2 TIMES DAILY
Qty: 28.2 ML | Refills: 2 | Status: SHIPPED | OUTPATIENT
Start: 2022-09-28 | End: 2022-09-30 | Stop reason: SDUPTHER

## 2022-09-28 RX ORDER — INSULIN DEGLUDEC INJECTION 100 U/ML
47 INJECTION, SOLUTION SUBCUTANEOUS 2 TIMES DAILY
Qty: 28.2 ML | Refills: 2 | Status: SHIPPED | OUTPATIENT
Start: 2022-09-28 | End: 2022-09-28 | Stop reason: SDUPTHER

## 2022-09-29 DIAGNOSIS — E11.65 TYPE 2 DIABETES MELLITUS WITH HYPERGLYCEMIA, WITH LONG-TERM CURRENT USE OF INSULIN: Chronic | ICD-10-CM

## 2022-09-29 DIAGNOSIS — Z79.4 TYPE 2 DIABETES MELLITUS WITH HYPERGLYCEMIA, WITH LONG-TERM CURRENT USE OF INSULIN: Chronic | ICD-10-CM

## 2022-09-29 RX ORDER — INSULIN DEGLUDEC INJECTION 100 U/ML
INJECTION, SOLUTION SUBCUTANEOUS
Qty: 30 ML | Refills: 2 | Status: CANCELLED | OUTPATIENT
Start: 2022-09-29

## 2022-09-30 RX ORDER — INSULIN DEGLUDEC INJECTION 100 U/ML
INJECTION, SOLUTION SUBCUTANEOUS
Qty: 30 ML | Refills: 2 | Status: SHIPPED | OUTPATIENT
Start: 2022-09-30 | End: 2022-10-06 | Stop reason: SDUPTHER

## 2022-09-30 RX ORDER — INSULIN DEGLUDEC INJECTION 100 U/ML
47 INJECTION, SOLUTION SUBCUTANEOUS 2 TIMES DAILY
Qty: 85 ML | Refills: 1 | Status: SHIPPED | OUTPATIENT
Start: 2022-09-30 | End: 2022-09-30 | Stop reason: SDUPTHER

## 2022-10-06 ENCOUNTER — TELEPHONE (OUTPATIENT)
Dept: FAMILY MEDICINE CLINIC | Facility: CLINIC | Age: 52
End: 2022-10-06

## 2022-10-06 DIAGNOSIS — E11.65 TYPE 2 DIABETES MELLITUS WITH HYPERGLYCEMIA, WITH LONG-TERM CURRENT USE OF INSULIN: Chronic | ICD-10-CM

## 2022-10-06 DIAGNOSIS — Z79.4 TYPE 2 DIABETES MELLITUS WITH HYPERGLYCEMIA, WITH LONG-TERM CURRENT USE OF INSULIN: Chronic | ICD-10-CM

## 2022-10-06 RX ORDER — INSULIN DEGLUDEC INJECTION 100 U/ML
INJECTION, SOLUTION SUBCUTANEOUS
Qty: 30 ML | Refills: 2 | Status: ON HOLD | OUTPATIENT
Start: 2022-10-06 | End: 2022-10-24 | Stop reason: SDUPTHER

## 2022-10-10 ENCOUNTER — PREP FOR SURGERY (OUTPATIENT)
Dept: OTHER | Facility: HOSPITAL | Age: 52
End: 2022-10-10

## 2022-10-10 ENCOUNTER — OFFICE VISIT (OUTPATIENT)
Dept: CARDIOLOGY | Facility: CLINIC | Age: 52
End: 2022-10-10

## 2022-10-10 VITALS
HEIGHT: 66 IN | WEIGHT: 214 LBS | SYSTOLIC BLOOD PRESSURE: 123 MMHG | DIASTOLIC BLOOD PRESSURE: 79 MMHG | HEART RATE: 83 BPM | BODY MASS INDEX: 34.39 KG/M2

## 2022-10-10 DIAGNOSIS — I20.0 UNSTABLE ANGINA: Primary | ICD-10-CM

## 2022-10-10 DIAGNOSIS — E78.2 HYPERLIPEMIA, MIXED: ICD-10-CM

## 2022-10-10 DIAGNOSIS — F17.200 SMOKER: ICD-10-CM

## 2022-10-10 PROCEDURE — 93000 ELECTROCARDIOGRAM COMPLETE: CPT | Performed by: INTERNAL MEDICINE

## 2022-10-10 PROCEDURE — 99204 OFFICE O/P NEW MOD 45 MIN: CPT | Performed by: INTERNAL MEDICINE

## 2022-10-10 RX ORDER — NITROGLYCERIN 0.4 MG/1
0.4 TABLET SUBLINGUAL
Qty: 25 TABLET | Refills: 1 | Status: SHIPPED | OUTPATIENT
Start: 2022-10-10 | End: 2022-10-24 | Stop reason: HOSPADM

## 2022-10-10 RX ORDER — ASPIRIN 81 MG/1
81 TABLET ORAL DAILY
COMMUNITY

## 2022-10-10 RX ORDER — LANOLIN ALCOHOL/MO/W.PET/CERES
1000 CREAM (GRAM) TOPICAL DAILY
Status: ON HOLD | COMMUNITY
End: 2023-01-18

## 2022-10-10 RX ORDER — ATORVASTATIN CALCIUM 40 MG/1
40 TABLET, FILM COATED ORAL DAILY
Qty: 90 TABLET | Refills: 3 | Status: SHIPPED | OUTPATIENT
Start: 2022-10-10 | End: 2022-10-10

## 2022-10-10 RX ORDER — ATORVASTATIN CALCIUM 40 MG/1
40 TABLET, FILM COATED ORAL DAILY
Qty: 90 TABLET | Refills: 3 | Status: SHIPPED | OUTPATIENT
Start: 2022-10-10 | End: 2023-02-06 | Stop reason: SDUPTHER

## 2022-10-10 RX ORDER — CHLORPHENIRAMINE MALEATE 4 MG/1
4 TABLET ORAL EVERY 6 HOURS PRN
Status: ON HOLD | COMMUNITY
End: 2023-01-18

## 2022-10-10 RX ORDER — NITROGLYCERIN 0.4 MG/1
0.4 TABLET SUBLINGUAL
Qty: 25 TABLET | Refills: 1 | Status: SHIPPED | OUTPATIENT
Start: 2022-10-10 | End: 2022-10-10

## 2022-10-10 RX ORDER — CELECOXIB 200 MG/1
200 CAPSULE ORAL 2 TIMES DAILY
COMMUNITY
Start: 2022-08-23 | End: 2022-10-24 | Stop reason: HOSPADM

## 2022-10-10 RX ORDER — FAMOTIDINE 20 MG/1
20 TABLET, FILM COATED ORAL 2 TIMES DAILY
Status: ON HOLD | COMMUNITY
End: 2022-10-19

## 2022-10-10 NOTE — H&P (VIEW-ONLY)
Chief Complaint  Coronary Artery Disease and Chest Pain    Subjective            Ilya Colon presents to [facility]  History of Present Illness    51-year-old white male.  He has known coronary artery disease, he had PCI to LAD which had 80% / 100% tandem lesions back in 2015.  At that time he had a 70% marginal ostium reported as well as a 50% proximal RCA.  Since then he has not had any follow-up.  He has continued to smoke.  He has not been on statin therapy in many months.  He reports intermittent chest pains, feels similar to his previous angina, sometimes causing left arm aching, worse with walking or any physical exertion.  It seems to be getting worse over the last several months.    PMH  Past Medical History:   Diagnosis Date   • Arthritis    • Diabetes mellitus (HCC)    • Hypertension    • Myocardial infarction (HCC)          SURGICALHX  Past Surgical History:   Procedure Laterality Date   • CARDIAC SURGERY          SOC  Social History     Socioeconomic History   • Marital status:    Tobacco Use   • Smoking status: Every Day     Packs/day: 1.00     Types: Cigarettes     Start date: 6/18/2015   • Smokeless tobacco: Never   Vaping Use   • Vaping Use: Never used   Substance and Sexual Activity   • Alcohol use: Not Currently   • Drug use: Yes     Types: Marijuana   • Sexual activity: Defer         FAMHX  History reviewed. No pertinent family history.       ALLERGY  No Known Allergies     MEDSCURRENT    Current Outpatient Medications:   •  Accu-Chek Kirsten Plus test strip, Use to check glucose before breakfast and at bedtime, as well as before meals if needed, for T2DM insulin dependent E11.9, Disp: 500 each, Rfl: 3  •  aspirin 81 MG EC tablet, Take 1 tablet by mouth Daily., Disp: , Rfl:   •  atorvastatin (LIPITOR) 40 MG tablet, Take 1 tablet by mouth Daily., Disp: 90 tablet, Rfl: 3  •  celecoxib (CeleBREX) 200 MG capsule, 2 (Two) Times a Day., Disp: , Rfl:   •   "chlorpheniramine (CHLOR-TRIMETON) 4 MG tablet, Take 1 tablet by mouth Every 6 (Six) Hours As Needed for Allergies., Disp: , Rfl:   •  famotidine (PEPCID) 20 MG tablet, Take 1 tablet by mouth 2 (Two) Times a Day., Disp: , Rfl:   •  gabapentin (NEURONTIN) 600 MG tablet, Every 6 (Six) Hours., Disp: , Rfl:   •  HYDROcodone-acetaminophen (NORCO) 7.5-325 MG per tablet, Every 12 (Twelve) Hours., Disp: , Rfl:   •  hydrOXYzine (ATARAX) 25 MG tablet, Take 1 tablet by mouth Every 8 (Eight) Hours., Disp: 270 tablet, Rfl: 3  •  insulin aspart (NovoLOG) 100 UNIT/ML injection, Inject 35 units with meals 2 Times Daily, max 70 units daily for Type 2 Diabetes mellitus E11.9  Indications: Type 2 Diabetes, Disp: 70 mL, Rfl: 3  •  Insulin Degludec (Tresiba) 100 UNIT/ML solution injection, INJECT 47 UNITS IN THE MORNING and INJECT 47 UNITS IN THE afternoon, Disp: 30 mL, Rfl: 2  •  Insulin Syringe-Needle U-100 30G X 5/16\" 0.5 ML misc, 0.5 mL Daily As Needed (as needed)., Disp: 100 each, Rfl: 3  •  losartan-hydrochlorothiazide (HYZAAR) 100-25 MG per tablet, Take 1 tablet by mouth Daily., Disp: 90 tablet, Rfl: 3  •  MAGNESIUM PO, Take  by mouth., Disp: , Rfl:   •  metoprolol tartrate (LOPRESSOR) 25 MG tablet, TAKE 1 TABLET EVERY DAY, Disp: 90 tablet, Rfl: 1  •  Narcan 4 MG/0.1ML nasal spray, INSTILL 1 SPRAY IN EACH NOSTRIL AS NEEDED, Disp: , Rfl:   •  nitroglycerin (NITROSTAT) 0.4 MG SL tablet, Place 1 tablet under the tongue Every 5 (Five) Minutes As Needed for Chest Pain (Do not take more than 3 at one time. Go to ER or call 911 if chest pain persists). Take no more than 3 doses in 15 minutes., Disp: 25 tablet, Rfl: 1  •  pantoprazole (PROTONIX) 40 MG EC tablet, Take 1 tablet by mouth Every Morning Before Breakfast., Disp: 90 tablet, Rfl: 3  •  potassium chloride (K-DUR,KLOR-CON) 20 MEQ CR tablet, Take 1 tablet by mouth 2 (Two) Times a Day., Disp: 60 tablet, Rfl: 5  •  tamsulosin (FLOMAX) 0.4 MG capsule 24 hr capsule, Take 1 capsule by " "mouth Daily., Disp: 90 capsule, Rfl: 3  •  topiramate (Topamax) 100 MG tablet, Take 1 tablet by mouth Daily., Disp: 90 tablet, Rfl: 3  •  traZODone (DESYREL) 100 MG tablet, Take 1 tablet by mouth Daily With Breakfast., Disp: 90 tablet, Rfl: 3  •  vitamin B-12 (CYANOCOBALAMIN) 1000 MCG tablet, Take 1 tablet by mouth Daily., Disp: , Rfl:   •  vitamin D3 125 MCG (5000 UT) capsule capsule, Take 1 capsule by mouth Daily., Disp: , Rfl:   •  amitriptyline (ELAVIL) 25 MG tablet, amitriptyline 25 mg tablet  Take 1 tablet every day by oral route at bedtime for 30 days., Disp: , Rfl:   •  cyclobenzaprine (FLEXERIL) 10 MG tablet, cyclobenzaprine 10 mg oral tablet take 1 tablet (10 mg) by oral route 3 times per day   Suspended, Disp: , Rfl:   •  diclofenac (VOLTAREN) 75 MG EC tablet, TAKE ONE TABLET BY MOUTH TWICE A DAY WITH FOOD AS NEEDED, Disp: 60 tablet, Rfl: 4  •  Diclofenac Sodium (VOLTAREN) 1 % gel gel, APPLY 2 GRAMS TO AFFECTED AREA 4 TIMES DAILY, Disp: 300 g, Rfl: 0  •  Insulin Aspart (novoLOG) 100 UNIT/ML injection, Novolog U-100 Insulin aspart 100 unit/mL subcutaneous solution, Disp: , Rfl:   •  nicotine (Nicotrol NS) 10 MG/ML solution nasal solution, Spray into nose every 1 hour as needed for nicotine cravings, nicotine dependence treatment in remission F17.21  Indications: Nicotine Addiction, Disp: 10 mL, Rfl: 11  •  Testosterone Cypionate (DEPOTESTOTERONE CYPIONATE) 200 MG/ML injection, , Disp: , Rfl:       Review of Systems   Constitutional: Positive for malaise/fatigue.   HENT: Negative.    Eyes: Negative.    Cardiovascular: Positive for chest pain and dyspnea on exertion.   Respiratory: Positive for shortness of breath.    Endocrine: Negative.    Hematologic/Lymphatic: Negative.    Skin: Negative.    Musculoskeletal: Negative.    Gastrointestinal: Negative.    Genitourinary: Negative.    Neurological: Negative.    Psychiatric/Behavioral: Negative.         Objective     /79   Pulse 83   Ht 167.6 cm (66\")  "  Wt 97.1 kg (214 lb)   BMI 34.54 kg/m²       General Appearance:   · well developed  · well nourished  HENT:   · oropharynx moist  · lips not cyanotic  Neck:  · thyroid not enlarged  · supple  Respiratory:  · no respiratory distress  · normal breath sounds  · no rales  Cardiovascular:  · no jugular venous distention  · regular rhythm  · apical impulse normal  · S1 normal, S2 normal  · no S3, no S4   · no murmur  · no rub, no thrill  · carotid pulses normal; no bruit  · pedal pulses normal  · lower extremity edema: none    Musculoskeletal:  · no clubbing of fingers.   · normocephalic, head atraumatic  Skin:   · warm, dry  Psychiatric:  · judgement and insight appropriate  · normal mood and affect      Result Review :     The following data was reviewed by: True Chawla MD on 10/10/2022:    CMP    CMP 1/10/22   Glucose 142 (A)   BUN 15   Creatinine 1.73 (A)   eGFR Non African Am 42 (A)   Sodium 135 (A)   Potassium 3.9   Chloride 104   Calcium 10.2   Albumin 5.00   Total Bilirubin 0.7   Alkaline Phosphatase 82   AST (SGOT) 21   ALT (SGPT) 30   (A) Abnormal value            CBC    CBC 1/10/22   WBC 9.40   RBC 5.02   Hemoglobin 15.3   Hematocrit 44.0   MCV 87.6   MCH 30.5   MCHC 34.8   RDW 14.4   Platelets 316           Lipid Panel    Lipid Panel 1/10/22   Total Cholesterol 186   Triglycerides 176 (A)   HDL Cholesterol 39 (A)   VLDL Cholesterol 31   LDL Cholesterol  116 (A)   LDL/HDL Ratio 2.87   (A) Abnormal value            TSH    TSH 1/10/22   TSH 3.150             Data reviewed: Previous cardiology records reviewed       ECG 12 Lead    Date/Time: 10/10/2022 11:56 AM  Performed by: GRAEME Chawla MD  Authorized by: GRAEME Chawla MD   Previous ECG: no previous ECG available  Rhythm: sinus rhythm  Conduction: conduction normal  ST Segments: ST segments normal  T Waves: T waves normal  QRS axis: normal  Other: no other findings    Clinical impression: normal ECG              Ilya Colon  reports  that he has been smoking cigarettes. He started smoking about 7 years ago. He has been smoking an average of 1 pack per day. He has never used smokeless tobacco.. I have educated him on the risk of diseases from using tobacco products such as cancer, COPD and heart disease.     I advised him to quit and he is willing to quit. We have discussed the following method/s for tobacco cessation:  Counseling.  Together we have set a quit date for When he weans down to 0 cigarettes.  He will follow up with me in several weeks or sooner to check on his progress.    I spent 3  minutes counseling the patient.                Assessment and Plan        ASSESSMENT:  Encounter Diagnoses   Name Primary?   • Unstable angina (HCC) Yes   • Hyperlipemia, mixed    • Smoker          PLAN:    1.  Mr. Colon has known coronary artery disease, he has had accelerating/unstable angina recently.  He is already on aspirin therapy.  I am resuming Lipitor 40 mg daily.  He will continue beta-blocker and nitrate therapy.  Coronary angiography is recommended for definitive assessment.  I discussed the risks and benefits with the patient he is agreeable to proceed.  He will possibly need medical therapy adjustments, PCI, or even possibly CABG.  Due to scheduling, and relative urgency of the procedure I have discussed the case with Dr. Batista who has agreed to do the procedure this coming Wednesday.  The patient is agreeable with this plan.  2.  Mixed hyperlipidemia, resume statin therapy recent LDL over 100  3.  Smoking cessation counseling    Follow-up to be determined after angiography          Patient was given instructions and counseling regarding his condition or for health maintenance advice. Please see specific information pulled into the AVS if appropriate.             GRAEME Chawla MD  10/10/2022    11:54 EDT

## 2022-10-10 NOTE — PROGRESS NOTES
Chief Complaint  Coronary Artery Disease and Chest Pain    Subjective            Ilya Colon presents to [facility] CARDIOLOGY  History of Present Illness    51-year-old white male.  He has known coronary artery disease, he had PCI to LAD which had 80% / 100% tandem lesions back in 2015.  At that time he had a 70% marginal ostium reported as well as a 50% proximal RCA.  Since then he has not had any follow-up.  He has continued to smoke.  He has not been on statin therapy in many months.  He reports intermittent chest pains, feels similar to his previous angina, sometimes causing left arm aching, worse with walking or any physical exertion.  It seems to be getting worse over the last several months.    PMH  Past Medical History:   Diagnosis Date   • Arthritis    • Diabetes mellitus (HCC)    • Hypertension    • Myocardial infarction (HCC)          SURGICALHX  Past Surgical History:   Procedure Laterality Date   • CARDIAC SURGERY          SOC  Social History     Socioeconomic History   • Marital status:    Tobacco Use   • Smoking status: Every Day     Packs/day: 1.00     Types: Cigarettes     Start date: 6/18/2015   • Smokeless tobacco: Never   Vaping Use   • Vaping Use: Never used   Substance and Sexual Activity   • Alcohol use: Not Currently   • Drug use: Yes     Types: Marijuana   • Sexual activity: Defer         FAMHX  History reviewed. No pertinent family history.       ALLERGY  No Known Allergies     MEDSCURRENT    Current Outpatient Medications:   •  Accu-Chek Kirsten Plus test strip, Use to check glucose before breakfast and at bedtime, as well as before meals if needed, for T2DM insulin dependent E11.9, Disp: 500 each, Rfl: 3  •  aspirin 81 MG EC tablet, Take 1 tablet by mouth Daily., Disp: , Rfl:   •  atorvastatin (LIPITOR) 40 MG tablet, Take 1 tablet by mouth Daily., Disp: 90 tablet, Rfl: 3  •  celecoxib (CeleBREX) 200 MG capsule, 2 (Two) Times a Day., Disp: , Rfl:   •   "chlorpheniramine (CHLOR-TRIMETON) 4 MG tablet, Take 1 tablet by mouth Every 6 (Six) Hours As Needed for Allergies., Disp: , Rfl:   •  famotidine (PEPCID) 20 MG tablet, Take 1 tablet by mouth 2 (Two) Times a Day., Disp: , Rfl:   •  gabapentin (NEURONTIN) 600 MG tablet, Every 6 (Six) Hours., Disp: , Rfl:   •  HYDROcodone-acetaminophen (NORCO) 7.5-325 MG per tablet, Every 12 (Twelve) Hours., Disp: , Rfl:   •  hydrOXYzine (ATARAX) 25 MG tablet, Take 1 tablet by mouth Every 8 (Eight) Hours., Disp: 270 tablet, Rfl: 3  •  insulin aspart (NovoLOG) 100 UNIT/ML injection, Inject 35 units with meals 2 Times Daily, max 70 units daily for Type 2 Diabetes mellitus E11.9  Indications: Type 2 Diabetes, Disp: 70 mL, Rfl: 3  •  Insulin Degludec (Tresiba) 100 UNIT/ML solution injection, INJECT 47 UNITS IN THE MORNING and INJECT 47 UNITS IN THE afternoon, Disp: 30 mL, Rfl: 2  •  Insulin Syringe-Needle U-100 30G X 5/16\" 0.5 ML misc, 0.5 mL Daily As Needed (as needed)., Disp: 100 each, Rfl: 3  •  losartan-hydrochlorothiazide (HYZAAR) 100-25 MG per tablet, Take 1 tablet by mouth Daily., Disp: 90 tablet, Rfl: 3  •  MAGNESIUM PO, Take  by mouth., Disp: , Rfl:   •  metoprolol tartrate (LOPRESSOR) 25 MG tablet, TAKE 1 TABLET EVERY DAY, Disp: 90 tablet, Rfl: 1  •  Narcan 4 MG/0.1ML nasal spray, INSTILL 1 SPRAY IN EACH NOSTRIL AS NEEDED, Disp: , Rfl:   •  nitroglycerin (NITROSTAT) 0.4 MG SL tablet, Place 1 tablet under the tongue Every 5 (Five) Minutes As Needed for Chest Pain (Do not take more than 3 at one time. Go to ER or call 911 if chest pain persists). Take no more than 3 doses in 15 minutes., Disp: 25 tablet, Rfl: 1  •  pantoprazole (PROTONIX) 40 MG EC tablet, Take 1 tablet by mouth Every Morning Before Breakfast., Disp: 90 tablet, Rfl: 3  •  potassium chloride (K-DUR,KLOR-CON) 20 MEQ CR tablet, Take 1 tablet by mouth 2 (Two) Times a Day., Disp: 60 tablet, Rfl: 5  •  tamsulosin (FLOMAX) 0.4 MG capsule 24 hr capsule, Take 1 capsule by " "mouth Daily., Disp: 90 capsule, Rfl: 3  •  topiramate (Topamax) 100 MG tablet, Take 1 tablet by mouth Daily., Disp: 90 tablet, Rfl: 3  •  traZODone (DESYREL) 100 MG tablet, Take 1 tablet by mouth Daily With Breakfast., Disp: 90 tablet, Rfl: 3  •  vitamin B-12 (CYANOCOBALAMIN) 1000 MCG tablet, Take 1 tablet by mouth Daily., Disp: , Rfl:   •  vitamin D3 125 MCG (5000 UT) capsule capsule, Take 1 capsule by mouth Daily., Disp: , Rfl:   •  amitriptyline (ELAVIL) 25 MG tablet, amitriptyline 25 mg tablet  Take 1 tablet every day by oral route at bedtime for 30 days., Disp: , Rfl:   •  cyclobenzaprine (FLEXERIL) 10 MG tablet, cyclobenzaprine 10 mg oral tablet take 1 tablet (10 mg) by oral route 3 times per day   Suspended, Disp: , Rfl:   •  diclofenac (VOLTAREN) 75 MG EC tablet, TAKE ONE TABLET BY MOUTH TWICE A DAY WITH FOOD AS NEEDED, Disp: 60 tablet, Rfl: 4  •  Diclofenac Sodium (VOLTAREN) 1 % gel gel, APPLY 2 GRAMS TO AFFECTED AREA 4 TIMES DAILY, Disp: 300 g, Rfl: 0  •  Insulin Aspart (novoLOG) 100 UNIT/ML injection, Novolog U-100 Insulin aspart 100 unit/mL subcutaneous solution, Disp: , Rfl:   •  nicotine (Nicotrol NS) 10 MG/ML solution nasal solution, Spray into nose every 1 hour as needed for nicotine cravings, nicotine dependence treatment in remission F17.21  Indications: Nicotine Addiction, Disp: 10 mL, Rfl: 11  •  Testosterone Cypionate (DEPOTESTOTERONE CYPIONATE) 200 MG/ML injection, , Disp: , Rfl:       Review of Systems   Constitutional: Positive for malaise/fatigue.   HENT: Negative.    Eyes: Negative.    Cardiovascular: Positive for chest pain and dyspnea on exertion.   Respiratory: Positive for shortness of breath.    Endocrine: Negative.    Hematologic/Lymphatic: Negative.    Skin: Negative.    Musculoskeletal: Negative.    Gastrointestinal: Negative.    Genitourinary: Negative.    Neurological: Negative.    Psychiatric/Behavioral: Negative.         Objective     /79   Pulse 83   Ht 167.6 cm (66\")  "  Wt 97.1 kg (214 lb)   BMI 34.54 kg/m²       General Appearance:   · well developed  · well nourished  HENT:   · oropharynx moist  · lips not cyanotic  Neck:  · thyroid not enlarged  · supple  Respiratory:  · no respiratory distress  · normal breath sounds  · no rales  Cardiovascular:  · no jugular venous distention  · regular rhythm  · apical impulse normal  · S1 normal, S2 normal  · no S3, no S4   · no murmur  · no rub, no thrill  · carotid pulses normal; no bruit  · pedal pulses normal  · lower extremity edema: none    Musculoskeletal:  · no clubbing of fingers.   · normocephalic, head atraumatic  Skin:   · warm, dry  Psychiatric:  · judgement and insight appropriate  · normal mood and affect      Result Review :     The following data was reviewed by: True Chawla MD on 10/10/2022:    CMP    CMP 1/10/22   Glucose 142 (A)   BUN 15   Creatinine 1.73 (A)   eGFR Non African Am 42 (A)   Sodium 135 (A)   Potassium 3.9   Chloride 104   Calcium 10.2   Albumin 5.00   Total Bilirubin 0.7   Alkaline Phosphatase 82   AST (SGOT) 21   ALT (SGPT) 30   (A) Abnormal value            CBC    CBC 1/10/22   WBC 9.40   RBC 5.02   Hemoglobin 15.3   Hematocrit 44.0   MCV 87.6   MCH 30.5   MCHC 34.8   RDW 14.4   Platelets 316           Lipid Panel    Lipid Panel 1/10/22   Total Cholesterol 186   Triglycerides 176 (A)   HDL Cholesterol 39 (A)   VLDL Cholesterol 31   LDL Cholesterol  116 (A)   LDL/HDL Ratio 2.87   (A) Abnormal value            TSH    TSH 1/10/22   TSH 3.150             Data reviewed: Previous cardiology records reviewed       ECG 12 Lead    Date/Time: 10/10/2022 11:56 AM  Performed by: GRAEME Chawla MD  Authorized by: GRAEME Chawla MD   Previous ECG: no previous ECG available  Rhythm: sinus rhythm  Conduction: conduction normal  ST Segments: ST segments normal  T Waves: T waves normal  QRS axis: normal  Other: no other findings    Clinical impression: normal ECG              Ilya Colon  reports  that he has been smoking cigarettes. He started smoking about 7 years ago. He has been smoking an average of 1 pack per day. He has never used smokeless tobacco.. I have educated him on the risk of diseases from using tobacco products such as cancer, COPD and heart disease.     I advised him to quit and he is willing to quit. We have discussed the following method/s for tobacco cessation:  Counseling.  Together we have set a quit date for When he weans down to 0 cigarettes.  He will follow up with me in several weeks or sooner to check on his progress.    I spent 3  minutes counseling the patient.                Assessment and Plan        ASSESSMENT:  Encounter Diagnoses   Name Primary?   • Unstable angina (HCC) Yes   • Hyperlipemia, mixed    • Smoker          PLAN:    1.  Mr. Colon has known coronary artery disease, he has had accelerating/unstable angina recently.  He is already on aspirin therapy.  I am resuming Lipitor 40 mg daily.  He will continue beta-blocker and nitrate therapy.  Coronary angiography is recommended for definitive assessment.  I discussed the risks and benefits with the patient he is agreeable to proceed.  He will possibly need medical therapy adjustments, PCI, or even possibly CABG.  Due to scheduling, and relative urgency of the procedure I have discussed the case with Dr. Batista who has agreed to do the procedure this coming Wednesday.  The patient is agreeable with this plan.  2.  Mixed hyperlipidemia, resume statin therapy recent LDL over 100  3.  Smoking cessation counseling    Follow-up to be determined after angiography          Patient was given instructions and counseling regarding his condition or for health maintenance advice. Please see specific information pulled into the AVS if appropriate.             GRAEME Chawla MD  10/10/2022    11:54 EDT

## 2022-10-12 ENCOUNTER — HOSPITAL ENCOUNTER (OUTPATIENT)
Facility: HOSPITAL | Age: 52
Setting detail: HOSPITAL OUTPATIENT SURGERY
Discharge: HOME | End: 2022-10-12
Attending: INTERNAL MEDICINE | Admitting: INTERNAL MEDICINE

## 2022-10-12 VITALS
RESPIRATION RATE: 18 BRPM | BODY MASS INDEX: 34.07 KG/M2 | OXYGEN SATURATION: 98 % | HEART RATE: 78 BPM | TEMPERATURE: 98 F | SYSTOLIC BLOOD PRESSURE: 138 MMHG | DIASTOLIC BLOOD PRESSURE: 79 MMHG | WEIGHT: 212 LBS | HEIGHT: 66 IN

## 2022-10-12 DIAGNOSIS — I10 HYPERTENSION, ESSENTIAL: Chronic | ICD-10-CM

## 2022-10-12 DIAGNOSIS — I20.0 UNSTABLE ANGINA: ICD-10-CM

## 2022-10-12 LAB
ANION GAP SERPL CALCULATED.3IONS-SCNC: 8.8 MMOL/L (ref 5–15)
BUN SERPL-MCNC: 20 MG/DL (ref 6–20)
BUN/CREAT SERPL: 13.8 (ref 7–25)
CALCIUM SPEC-SCNC: 9.3 MG/DL (ref 8.6–10.5)
CHLORIDE SERPL-SCNC: 98 MMOL/L (ref 98–107)
CO2 SERPL-SCNC: 25.2 MMOL/L (ref 22–29)
CREAT SERPL-MCNC: 1.45 MG/DL (ref 0.76–1.27)
DEPRECATED RDW RBC AUTO: 39.1 FL (ref 37–54)
DEPRECATED RDW RBC AUTO: NORMAL FL
EGFRCR SERPLBLD CKD-EPI 2021: 58.3 ML/MIN/1.73
ERYTHROCYTE [DISTWIDTH] IN BLOOD BY AUTOMATED COUNT: 12.8 % (ref 12.3–15.4)
ERYTHROCYTE [DISTWIDTH] IN BLOOD BY AUTOMATED COUNT: NORMAL %
GLUCOSE SERPL-MCNC: 311 MG/DL (ref 65–99)
HCT VFR BLD AUTO: 38 % (ref 37.5–51)
HCT VFR BLD AUTO: NORMAL %
HGB BLD-MCNC: 13.3 G/DL (ref 13–17.7)
HGB BLD-MCNC: NORMAL G/DL
INR PPP: 0.89 (ref 0.86–1.15)
MCH RBC QN AUTO: 29.7 PG (ref 26.6–33)
MCH RBC QN AUTO: NORMAL PG
MCHC RBC AUTO-ENTMCNC: 35 G/DL (ref 31.5–35.7)
MCHC RBC AUTO-ENTMCNC: NORMAL G/DL
MCV RBC AUTO: 84.8 FL (ref 79–97)
MCV RBC AUTO: NORMAL FL
PLATELET # BLD AUTO: 278 10*3/MM3 (ref 140–450)
PLATELET # BLD AUTO: NORMAL 10*3/UL
PMV BLD AUTO: 10.8 FL (ref 6–12)
PMV BLD AUTO: NORMAL FL
POTASSIUM SERPL-SCNC: 4.5 MMOL/L (ref 3.5–5.2)
PROTHROMBIN TIME: 12.1 SECONDS (ref 11.8–14.9)
QT INTERVAL: 360 MS
QT INTERVAL: 396 MS
RBC # BLD AUTO: 4.48 10*6/MM3 (ref 4.14–5.8)
RBC # BLD AUTO: NORMAL 10*6/UL
SODIUM SERPL-SCNC: 132 MMOL/L (ref 136–145)
WBC NRBC COR # BLD: 6.89 10*3/MM3 (ref 3.4–10.8)
WBC NRBC COR # BLD: NORMAL 10*3/UL

## 2022-10-12 PROCEDURE — 99152 MOD SED SAME PHYS/QHP 5/>YRS: CPT | Performed by: INTERNAL MEDICINE

## 2022-10-12 PROCEDURE — 80048 BASIC METABOLIC PNL TOTAL CA: CPT | Performed by: INTERNAL MEDICINE

## 2022-10-12 PROCEDURE — 93005 ELECTROCARDIOGRAM TRACING: CPT | Performed by: INTERNAL MEDICINE

## 2022-10-12 PROCEDURE — 25010000002 FENTANYL CITRATE (PF) 50 MCG/ML SOLUTION: Performed by: INTERNAL MEDICINE

## 2022-10-12 PROCEDURE — 85027 COMPLETE CBC AUTOMATED: CPT | Performed by: INTERNAL MEDICINE

## 2022-10-12 PROCEDURE — 25010000002 MIDAZOLAM PER 1 MG: Performed by: INTERNAL MEDICINE

## 2022-10-12 PROCEDURE — 93458 L HRT ARTERY/VENTRICLE ANGIO: CPT | Performed by: INTERNAL MEDICINE

## 2022-10-12 PROCEDURE — 85610 PROTHROMBIN TIME: CPT | Performed by: INTERNAL MEDICINE

## 2022-10-12 PROCEDURE — C1769 GUIDE WIRE: HCPCS | Performed by: INTERNAL MEDICINE

## 2022-10-12 PROCEDURE — C1894 INTRO/SHEATH, NON-LASER: HCPCS | Performed by: INTERNAL MEDICINE

## 2022-10-12 PROCEDURE — 93010 ELECTROCARDIOGRAM REPORT: CPT | Performed by: SPECIALIST

## 2022-10-12 PROCEDURE — 25010000002 HEPARIN (PORCINE) PER 1000 UNITS: Performed by: INTERNAL MEDICINE

## 2022-10-12 PROCEDURE — 0 IOPAMIDOL PER 1 ML: Performed by: INTERNAL MEDICINE

## 2022-10-12 RX ORDER — LOSARTAN POTASSIUM AND HYDROCHLOROTHIAZIDE 25; 100 MG/1; MG/1
0.5 TABLET ORAL DAILY
Qty: 90 TABLET | Refills: 3 | Status: SHIPPED | OUTPATIENT
Start: 2022-10-12 | End: 2022-10-24 | Stop reason: HOSPADM

## 2022-10-12 RX ORDER — MIDAZOLAM HYDROCHLORIDE 1 MG/ML
INJECTION INTRAMUSCULAR; INTRAVENOUS
Status: DISCONTINUED | OUTPATIENT
Start: 2022-10-12 | End: 2022-10-12 | Stop reason: HOSPADM

## 2022-10-12 RX ORDER — HEPARIN SODIUM 1000 [USP'U]/ML
INJECTION, SOLUTION INTRAVENOUS; SUBCUTANEOUS
Status: DISCONTINUED | OUTPATIENT
Start: 2022-10-12 | End: 2022-10-12 | Stop reason: HOSPADM

## 2022-10-12 RX ORDER — ISOSORBIDE MONONITRATE 30 MG/1
30 TABLET, EXTENDED RELEASE ORAL DAILY
Qty: 30 TABLET | Refills: 11 | Status: SHIPPED | OUTPATIENT
Start: 2022-10-12 | End: 2022-10-24 | Stop reason: HOSPADM

## 2022-10-12 RX ORDER — LIDOCAINE HYDROCHLORIDE 20 MG/ML
INJECTION, SOLUTION INFILTRATION; PERINEURAL
Status: DISCONTINUED | OUTPATIENT
Start: 2022-10-12 | End: 2022-10-12 | Stop reason: HOSPADM

## 2022-10-12 RX ORDER — VERAPAMIL HYDROCHLORIDE 2.5 MG/ML
INJECTION, SOLUTION INTRAVENOUS
Status: DISCONTINUED | OUTPATIENT
Start: 2022-10-12 | End: 2022-10-12 | Stop reason: HOSPADM

## 2022-10-12 RX ORDER — SODIUM CHLORIDE 9 MG/ML
100 INJECTION, SOLUTION INTRAVENOUS CONTINUOUS
Status: ACTIVE | OUTPATIENT
Start: 2022-10-12 | End: 2022-10-12

## 2022-10-12 RX ORDER — MAGNESIUM OXIDE 400 MG/1
400 TABLET ORAL DAILY
COMMUNITY

## 2022-10-12 RX ORDER — ONDANSETRON 4 MG/1
4 TABLET, FILM COATED ORAL EVERY 6 HOURS PRN
Status: DISCONTINUED | OUTPATIENT
Start: 2022-10-12 | End: 2022-10-12 | Stop reason: HOSPADM

## 2022-10-12 RX ORDER — FENTANYL CITRATE 50 UG/ML
INJECTION, SOLUTION INTRAMUSCULAR; INTRAVENOUS
Status: DISCONTINUED | OUTPATIENT
Start: 2022-10-12 | End: 2022-10-12 | Stop reason: HOSPADM

## 2022-10-12 RX ORDER — ONDANSETRON 2 MG/ML
4 INJECTION INTRAMUSCULAR; INTRAVENOUS EVERY 6 HOURS PRN
Status: DISCONTINUED | OUTPATIENT
Start: 2022-10-12 | End: 2022-10-12 | Stop reason: HOSPADM

## 2022-10-12 RX ORDER — ACETAMINOPHEN 325 MG/1
650 TABLET ORAL EVERY 4 HOURS PRN
Status: DISCONTINUED | OUTPATIENT
Start: 2022-10-12 | End: 2022-10-12 | Stop reason: HOSPADM

## 2022-10-12 RX ADMIN — SODIUM CHLORIDE 100 ML/HR: 9 INJECTION, SOLUTION INTRAVENOUS at 11:10

## 2022-10-13 ENCOUNTER — HOSPITAL ENCOUNTER (OUTPATIENT)
Facility: HOSPITAL | Age: 52
Setting detail: SURGERY ADMIT
End: 2022-10-13
Attending: THORACIC SURGERY (CARDIOTHORACIC VASCULAR SURGERY) | Admitting: THORACIC SURGERY (CARDIOTHORACIC VASCULAR SURGERY)

## 2022-10-13 ENCOUNTER — PREP FOR SURGERY (OUTPATIENT)
Dept: OTHER | Facility: HOSPITAL | Age: 52
End: 2022-10-13

## 2022-10-13 DIAGNOSIS — I25.118 CORONARY ARTERY DISEASE OF NATIVE HEART WITH STABLE ANGINA PECTORIS, UNSPECIFIED VESSEL OR LESION TYPE: Primary | ICD-10-CM

## 2022-10-17 ENCOUNTER — ANESTHESIA EVENT (OUTPATIENT)
Dept: PERIOP | Facility: HOSPITAL | Age: 52
End: 2022-10-17

## 2022-10-19 ENCOUNTER — HOSPITAL ENCOUNTER (INPATIENT)
Facility: HOSPITAL | Age: 52
LOS: 5 days | Discharge: HOME HEALTH | End: 2022-10-24
Attending: THORACIC SURGERY (CARDIOTHORACIC VASCULAR SURGERY) | Admitting: THORACIC SURGERY (CARDIOTHORACIC VASCULAR SURGERY)

## 2022-10-19 ENCOUNTER — APPOINTMENT (OUTPATIENT)
Dept: CARDIOLOGY | Facility: HOSPITAL | Age: 52
End: 2022-10-19

## 2022-10-19 ENCOUNTER — APPOINTMENT (OUTPATIENT)
Dept: GENERAL RADIOLOGY | Facility: HOSPITAL | Age: 52
End: 2022-10-19

## 2022-10-19 DIAGNOSIS — I25.5 ISCHEMIC CARDIOMYOPATHY: ICD-10-CM

## 2022-10-19 DIAGNOSIS — E11.65 TYPE 2 DIABETES MELLITUS WITH HYPERGLYCEMIA, WITH LONG-TERM CURRENT USE OF INSULIN: Chronic | ICD-10-CM

## 2022-10-19 DIAGNOSIS — Z95.1 S/P CABG (CORONARY ARTERY BYPASS GRAFT): Primary | ICD-10-CM

## 2022-10-19 DIAGNOSIS — I50.22 CHRONIC SYSTOLIC (CONGESTIVE) HEART FAILURE: ICD-10-CM

## 2022-10-19 DIAGNOSIS — Z79.4 TYPE 2 DIABETES MELLITUS WITH HYPERGLYCEMIA, WITH LONG-TERM CURRENT USE OF INSULIN: Chronic | ICD-10-CM

## 2022-10-19 PROBLEM — I25.10 CAD (CORONARY ARTERY DISEASE): Status: ACTIVE | Noted: 2022-10-19

## 2022-10-19 LAB
ABO GROUP BLD: NORMAL
ABO GROUP BLD: NORMAL
ALBUMIN SERPL-MCNC: 4 G/DL (ref 3.5–5.2)
ALBUMIN/GLOB SERPL: 1.8 G/DL
ALP SERPL-CCNC: 101 U/L (ref 39–117)
ALT SERPL W P-5'-P-CCNC: 39 U/L (ref 1–41)
ANION GAP SERPL CALCULATED.3IONS-SCNC: 11 MMOL/L (ref 5–15)
AORTIC DIMENSIONLESS INDEX: 0.8 (DI)
APTT PPP: 38.7 SECONDS (ref 22.7–35.4)
AST SERPL-CCNC: 22 U/L (ref 1–40)
BASOPHILS # BLD AUTO: 0.07 10*3/MM3 (ref 0–0.2)
BASOPHILS NFR BLD AUTO: 0.8 % (ref 0–1.5)
BH CV ECHO MEAS - AO MAX PG: 8 MMHG
BH CV ECHO MEAS - AO MEAN PG: 4.6 MMHG
BH CV ECHO MEAS - AO ROOT DIAM: 3.2 CM
BH CV ECHO MEAS - AO V2 MAX: 141.2 CM/SEC
BH CV ECHO MEAS - AO V2 VTI: 34.3 CM
BH CV ECHO MEAS - AVA(I,D): 2.39 CM2
BH CV ECHO MEAS - EDV(CUBED): 157.3 ML
BH CV ECHO MEAS - EDV(MOD-SP4): 104 ML
BH CV ECHO MEAS - EF(MOD-SP4): 60.6 %
BH CV ECHO MEAS - ESV(CUBED): 58.2 ML
BH CV ECHO MEAS - ESV(MOD-SP4): 41 ML
BH CV ECHO MEAS - FS: 28.2 %
BH CV ECHO MEAS - IVS/LVPW: 0.93 CM
BH CV ECHO MEAS - IVSD: 0.95 CM
BH CV ECHO MEAS - LAT PEAK E' VEL: 13.4 CM/SEC
BH CV ECHO MEAS - LV DIASTOLIC VOL/BSA (35-75): 50.7 CM2
BH CV ECHO MEAS - LV MASS(C)D: 204.2 GRAMS
BH CV ECHO MEAS - LV MAX PG: 5.1 MMHG
BH CV ECHO MEAS - LV MEAN PG: 2.6 MMHG
BH CV ECHO MEAS - LV SYSTOLIC VOL/BSA (12-30): 20 CM2
BH CV ECHO MEAS - LV V1 MAX: 113 CM/SEC
BH CV ECHO MEAS - LV V1 VTI: 26.4 CM
BH CV ECHO MEAS - LVIDD: 5.4 CM
BH CV ECHO MEAS - LVIDS: 3.9 CM
BH CV ECHO MEAS - LVOT AREA: 3.1 CM2
BH CV ECHO MEAS - LVOT DIAM: 1.99 CM
BH CV ECHO MEAS - LVPWD: 1.03 CM
BH CV ECHO MEAS - MED PEAK E' VEL: 7.1 CM/SEC
BH CV ECHO MEAS - MR MAX PG: 29.7 MMHG
BH CV ECHO MEAS - MR MAX VEL: 272.3 CM/SEC
BH CV ECHO MEAS - MV A DUR: 0.09 SEC
BH CV ECHO MEAS - MV A MAX VEL: 56.3 CM/SEC
BH CV ECHO MEAS - MV DEC SLOPE: 526.4 CM/SEC2
BH CV ECHO MEAS - MV DEC TIME: 183 MSEC
BH CV ECHO MEAS - MV E MAX VEL: 105 CM/SEC
BH CV ECHO MEAS - MV E/A: 1.87
BH CV ECHO MEAS - MV MAX PG: 5 MMHG
BH CV ECHO MEAS - MV MEAN PG: 1.43 MMHG
BH CV ECHO MEAS - MV P1/2T: 60.4 MSEC
BH CV ECHO MEAS - MV V2 VTI: 33.9 CM
BH CV ECHO MEAS - MVA(P1/2T): 3.6 CM2
BH CV ECHO MEAS - MVA(VTI): 2.42 CM2
BH CV ECHO MEAS - PA ACC TIME: 0.18 SEC
BH CV ECHO MEAS - PA PR(ACCEL): -1.81 MMHG
BH CV ECHO MEAS - PA V2 MAX: 116.4 CM/SEC
BH CV ECHO MEAS - PULM A REVS DUR: 0.09 SEC
BH CV ECHO MEAS - PULM A REVS VEL: 25.7 CM/SEC
BH CV ECHO MEAS - PULM DIAS VEL: 62.6 CM/SEC
BH CV ECHO MEAS - PULM S/D: 0.92
BH CV ECHO MEAS - PULM SYS VEL: 57.8 CM/SEC
BH CV ECHO MEAS - RAP SYSTOLE: 3 MMHG
BH CV ECHO MEAS - RV MAX PG: 3.4 MMHG
BH CV ECHO MEAS - RV V1 MAX: 91.7 CM/SEC
BH CV ECHO MEAS - RV V1 VTI: 22 CM
BH CV ECHO MEAS - RVSP: 23.4 MMHG
BH CV ECHO MEAS - SI(MOD-SP4): 30.7 ML/M2
BH CV ECHO MEAS - SV(LVOT): 82.1 ML
BH CV ECHO MEAS - SV(MOD-SP4): 63 ML
BH CV ECHO MEAS - TAPSE (>1.6): 2 CM
BH CV ECHO MEAS - TR MAX PG: 20.4 MMHG
BH CV ECHO MEAS - TR MAX VEL: 225.9 CM/SEC
BH CV ECHO MEASUREMENTS AVERAGE E/E' RATIO: 10.24
BH CV XLRA - RV BASE: 3.2 CM
BH CV XLRA - RV LENGTH: 8.1 CM
BH CV XLRA - RV MID: 3.5 CM
BH CV XLRA - TDI S': 9.8 CM/SEC
BILIRUB SERPL-MCNC: 0.6 MG/DL (ref 0–1.2)
BILIRUB UR QL STRIP: NEGATIVE
BLD GP AB SCN SERPL QL: NEGATIVE
BUN SERPL-MCNC: 17 MG/DL (ref 6–20)
BUN/CREAT SERPL: 10.4 (ref 7–25)
CALCIUM SPEC-SCNC: 9.1 MG/DL (ref 8.6–10.5)
CHLORIDE SERPL-SCNC: 103 MMOL/L (ref 98–107)
CHOLEST SERPL-MCNC: 97 MG/DL (ref 0–200)
CLARITY UR: CLEAR
CLOSE TME COLL+ADP + EPINEP PNL BLD: 80 % (ref 86–100)
CO2 SERPL-SCNC: 23 MMOL/L (ref 22–29)
COLOR UR: YELLOW
CREAT SERPL-MCNC: 1.64 MG/DL (ref 0.76–1.27)
DEPRECATED RDW RBC AUTO: 42.9 FL (ref 37–54)
EGFRCR SERPLBLD CKD-EPI 2021: 50.3 ML/MIN/1.73
EOSINOPHIL # BLD AUTO: 0.25 10*3/MM3 (ref 0–0.4)
EOSINOPHIL NFR BLD AUTO: 3 % (ref 0.3–6.2)
ERYTHROCYTE [DISTWIDTH] IN BLOOD BY AUTOMATED COUNT: 13.2 % (ref 12.3–15.4)
GLOBULIN UR ELPH-MCNC: 2.2 GM/DL
GLUCOSE BLDC GLUCOMTR-MCNC: 112 MG/DL (ref 70–130)
GLUCOSE BLDC GLUCOMTR-MCNC: 160 MG/DL (ref 70–130)
GLUCOSE BLDC GLUCOMTR-MCNC: 191 MG/DL (ref 70–130)
GLUCOSE SERPL-MCNC: 119 MG/DL (ref 65–99)
GLUCOSE UR STRIP-MCNC: ABNORMAL MG/DL
HBA1C MFR BLD: 8.1 % (ref 4.8–5.6)
HCT VFR BLD AUTO: 36.2 % (ref 37.5–51)
HDLC SERPL-MCNC: 36 MG/DL (ref 40–60)
HGB BLD-MCNC: 12.3 G/DL (ref 13–17.7)
HGB UR QL STRIP.AUTO: NEGATIVE
IMM GRANULOCYTES # BLD AUTO: 0.04 10*3/MM3 (ref 0–0.05)
IMM GRANULOCYTES NFR BLD AUTO: 0.5 % (ref 0–0.5)
INR PPP: 0.94 (ref 0.9–1.1)
KETONES UR QL STRIP: NEGATIVE
LDLC SERPL CALC-MCNC: 29 MG/DL (ref 0–100)
LDLC/HDLC SERPL: 0.56 {RATIO}
LEFT ATRIUM VOLUME INDEX: 20.5 ML/M2
LEUKOCYTE ESTERASE UR QL STRIP.AUTO: NEGATIVE
LYMPHOCYTES # BLD AUTO: 2.16 10*3/MM3 (ref 0.7–3.1)
LYMPHOCYTES NFR BLD AUTO: 25.7 % (ref 19.6–45.3)
MAGNESIUM SERPL-MCNC: 1.9 MG/DL (ref 1.6–2.6)
MAXIMAL PREDICTED HEART RATE: 169 BPM
MCH RBC QN AUTO: 30 PG (ref 26.6–33)
MCHC RBC AUTO-ENTMCNC: 34 G/DL (ref 31.5–35.7)
MCV RBC AUTO: 88.3 FL (ref 79–97)
MONOCYTES # BLD AUTO: 0.74 10*3/MM3 (ref 0.1–0.9)
MONOCYTES NFR BLD AUTO: 8.8 % (ref 5–12)
NEUTROPHILS NFR BLD AUTO: 5.16 10*3/MM3 (ref 1.7–7)
NEUTROPHILS NFR BLD AUTO: 61.2 % (ref 42.7–76)
NITRITE UR QL STRIP: NEGATIVE
NRBC BLD AUTO-RTO: 0 /100 WBC (ref 0–0.2)
NT-PROBNP SERPL-MCNC: 137 PG/ML (ref 0–900)
PH UR STRIP.AUTO: 6 [PH] (ref 5–8)
PLATELET # BLD AUTO: 301 10*3/MM3 (ref 140–450)
PMV BLD AUTO: 10.7 FL (ref 6–12)
POTASSIUM SERPL-SCNC: 4.4 MMOL/L (ref 3.5–5.2)
PROT SERPL-MCNC: 6.2 G/DL (ref 6–8.5)
PROT UR QL STRIP: NEGATIVE
PROTHROMBIN TIME: 12.7 SECONDS (ref 11.7–14.2)
RBC # BLD AUTO: 4.1 10*6/MM3 (ref 4.14–5.8)
RH BLD: POSITIVE
RH BLD: POSITIVE
SARS-COV-2 RNA RESP QL NAA+PROBE: NOT DETECTED
SODIUM SERPL-SCNC: 137 MMOL/L (ref 136–145)
SP GR UR STRIP: <=1.005 (ref 1–1.03)
STRESS TARGET HR: 144 BPM
T&S EXPIRATION DATE: NORMAL
TRIGL SERPL-MCNC: 204 MG/DL (ref 0–150)
UROBILINOGEN UR QL STRIP: ABNORMAL
VLDLC SERPL-MCNC: 32 MG/DL (ref 5–40)
WBC NRBC COR # BLD: 8.42 10*3/MM3 (ref 3.4–10.8)

## 2022-10-19 PROCEDURE — 93970 EXTREMITY STUDY: CPT

## 2022-10-19 PROCEDURE — U0003 INFECTIOUS AGENT DETECTION BY NUCLEIC ACID (DNA OR RNA); SEVERE ACUTE RESPIRATORY SYNDROME CORONAVIRUS 2 (SARS-COV-2) (CORONAVIRUS DISEASE [COVID-19]), AMPLIFIED PROBE TECHNIQUE, MAKING USE OF HIGH THROUGHPUT TECHNOLOGIES AS DESCRIBED BY CMS-2020-01-R: HCPCS | Performed by: THORACIC SURGERY (CARDIOTHORACIC VASCULAR SURGERY)

## 2022-10-19 PROCEDURE — 93306 TTE W/DOPPLER COMPLETE: CPT

## 2022-10-19 PROCEDURE — 81003 URINALYSIS AUTO W/O SCOPE: CPT | Performed by: NURSE PRACTITIONER

## 2022-10-19 PROCEDURE — 93880 EXTRACRANIAL BILAT STUDY: CPT

## 2022-10-19 PROCEDURE — 83735 ASSAY OF MAGNESIUM: CPT | Performed by: NURSE PRACTITIONER

## 2022-10-19 PROCEDURE — 93306 TTE W/DOPPLER COMPLETE: CPT | Performed by: INTERNAL MEDICINE

## 2022-10-19 PROCEDURE — 86901 BLOOD TYPING SEROLOGIC RH(D): CPT | Performed by: NURSE PRACTITIONER

## 2022-10-19 PROCEDURE — 82962 GLUCOSE BLOOD TEST: CPT

## 2022-10-19 PROCEDURE — 80061 LIPID PANEL: CPT | Performed by: NURSE PRACTITIONER

## 2022-10-19 PROCEDURE — 83880 ASSAY OF NATRIURETIC PEPTIDE: CPT | Performed by: NURSE PRACTITIONER

## 2022-10-19 PROCEDURE — 83036 HEMOGLOBIN GLYCOSYLATED A1C: CPT | Performed by: NURSE PRACTITIONER

## 2022-10-19 PROCEDURE — 85576 BLOOD PLATELET AGGREGATION: CPT | Performed by: NURSE PRACTITIONER

## 2022-10-19 PROCEDURE — 86923 COMPATIBILITY TEST ELECTRIC: CPT

## 2022-10-19 PROCEDURE — 86900 BLOOD TYPING SEROLOGIC ABO: CPT

## 2022-10-19 PROCEDURE — 86850 RBC ANTIBODY SCREEN: CPT | Performed by: NURSE PRACTITIONER

## 2022-10-19 PROCEDURE — 80053 COMPREHEN METABOLIC PANEL: CPT | Performed by: NURSE PRACTITIONER

## 2022-10-19 PROCEDURE — 85610 PROTHROMBIN TIME: CPT | Performed by: NURSE PRACTITIONER

## 2022-10-19 PROCEDURE — 71046 X-RAY EXAM CHEST 2 VIEWS: CPT

## 2022-10-19 PROCEDURE — 85025 COMPLETE CBC W/AUTO DIFF WBC: CPT | Performed by: NURSE PRACTITIONER

## 2022-10-19 PROCEDURE — 86901 BLOOD TYPING SEROLOGIC RH(D): CPT

## 2022-10-19 PROCEDURE — 86900 BLOOD TYPING SEROLOGIC ABO: CPT | Performed by: NURSE PRACTITIONER

## 2022-10-19 PROCEDURE — 85730 THROMBOPLASTIN TIME PARTIAL: CPT | Performed by: NURSE PRACTITIONER

## 2022-10-19 RX ORDER — DEXTROSE MONOHYDRATE 25 G/50ML
10-50 INJECTION, SOLUTION INTRAVENOUS
Status: DISCONTINUED | OUTPATIENT
Start: 2022-10-19 | End: 2022-10-19 | Stop reason: SDUPTHER

## 2022-10-19 RX ORDER — FAMOTIDINE 20 MG/1
20 TABLET, FILM COATED ORAL 2 TIMES DAILY
Status: DISCONTINUED | OUTPATIENT
Start: 2022-10-19 | End: 2022-10-20

## 2022-10-19 RX ORDER — NITROGLYCERIN 0.4 MG/1
0.4 TABLET SUBLINGUAL
Status: DISCONTINUED | OUTPATIENT
Start: 2022-10-19 | End: 2022-10-20

## 2022-10-19 RX ORDER — ACETAMINOPHEN 325 MG/1
650 TABLET ORAL EVERY 4 HOURS PRN
Status: DISCONTINUED | OUTPATIENT
Start: 2022-10-19 | End: 2022-10-20

## 2022-10-19 RX ORDER — TAMSULOSIN HYDROCHLORIDE 0.4 MG/1
0.4 CAPSULE ORAL DAILY
Status: DISCONTINUED | OUTPATIENT
Start: 2022-10-19 | End: 2022-10-20

## 2022-10-19 RX ORDER — CEFAZOLIN SODIUM 2 G/100ML
2 INJECTION, SOLUTION INTRAVENOUS ONCE
Status: DISCONTINUED | OUTPATIENT
Start: 2022-10-19 | End: 2022-10-19

## 2022-10-19 RX ORDER — TRAZODONE HYDROCHLORIDE 100 MG/1
100 TABLET ORAL
Status: DISCONTINUED | OUTPATIENT
Start: 2022-10-20 | End: 2022-10-20

## 2022-10-19 RX ORDER — ALPRAZOLAM 0.25 MG/1
0.25 TABLET ORAL EVERY 8 HOURS PRN
Status: DISCONTINUED | OUTPATIENT
Start: 2022-10-19 | End: 2022-10-20

## 2022-10-19 RX ORDER — TEMAZEPAM 15 MG/1
15 CAPSULE ORAL NIGHTLY PRN
Status: DISCONTINUED | OUTPATIENT
Start: 2022-10-19 | End: 2022-10-20

## 2022-10-19 RX ORDER — HYDROXYZINE HYDROCHLORIDE 25 MG/1
25 TABLET, FILM COATED ORAL EVERY 8 HOURS SCHEDULED
Status: DISCONTINUED | OUTPATIENT
Start: 2022-10-19 | End: 2022-10-20

## 2022-10-19 RX ORDER — TOPIRAMATE 100 MG/1
100 TABLET, FILM COATED ORAL DAILY
Status: DISCONTINUED | OUTPATIENT
Start: 2022-10-19 | End: 2022-10-20

## 2022-10-19 RX ORDER — NICOTINE POLACRILEX 4 MG
15 LOZENGE BUCCAL
Status: DISCONTINUED | OUTPATIENT
Start: 2022-10-19 | End: 2022-10-20

## 2022-10-19 RX ORDER — CHLORHEXIDINE GLUCONATE 500 MG/1
1 CLOTH TOPICAL EVERY 12 HOURS
Status: COMPLETED | OUTPATIENT
Start: 2022-10-19 | End: 2022-10-20

## 2022-10-19 RX ORDER — CYCLOBENZAPRINE HCL 10 MG
10 TABLET ORAL 3 TIMES DAILY PRN
Status: DISCONTINUED | OUTPATIENT
Start: 2022-10-19 | End: 2022-10-20

## 2022-10-19 RX ORDER — ASPIRIN 81 MG/1
81 TABLET ORAL DAILY
Status: DISCONTINUED | OUTPATIENT
Start: 2022-10-19 | End: 2022-10-20

## 2022-10-19 RX ORDER — CALCIUM CARBONATE 200(500)MG
2 TABLET,CHEWABLE ORAL 3 TIMES DAILY PRN
Status: DISCONTINUED | OUTPATIENT
Start: 2022-10-19 | End: 2022-10-20

## 2022-10-19 RX ORDER — SODIUM CHLORIDE 0.9 % (FLUSH) 0.9 %
10 SYRINGE (ML) INJECTION AS NEEDED
Status: DISCONTINUED | OUTPATIENT
Start: 2022-10-19 | End: 2022-10-20 | Stop reason: HOSPADM

## 2022-10-19 RX ORDER — CHOLECALCIFEROL (VITAMIN D3) 125 MCG
10 CAPSULE ORAL NIGHTLY
COMMUNITY

## 2022-10-19 RX ORDER — SODIUM CHLORIDE 9 MG/ML
30 INJECTION, SOLUTION INTRAVENOUS CONTINUOUS PRN
Status: DISCONTINUED | OUTPATIENT
Start: 2022-10-19 | End: 2022-10-20

## 2022-10-19 RX ORDER — DEXTROSE MONOHYDRATE 25 G/50ML
25 INJECTION, SOLUTION INTRAVENOUS
Status: DISCONTINUED | OUTPATIENT
Start: 2022-10-19 | End: 2022-10-20

## 2022-10-19 RX ORDER — CEFAZOLIN SODIUM 2 G/100ML
2 INJECTION, SOLUTION INTRAVENOUS
Status: COMPLETED | OUTPATIENT
Start: 2022-10-20 | End: 2022-10-20

## 2022-10-19 RX ORDER — SODIUM CHLORIDE 0.9 % (FLUSH) 0.9 %
10 SYRINGE (ML) INJECTION EVERY 12 HOURS SCHEDULED
Status: DISCONTINUED | OUTPATIENT
Start: 2022-10-19 | End: 2022-10-20 | Stop reason: HOSPADM

## 2022-10-19 RX ORDER — NICOTINE POLACRILEX 4 MG
15 LOZENGE BUCCAL
Status: DISCONTINUED | OUTPATIENT
Start: 2022-10-19 | End: 2022-10-19 | Stop reason: SDUPTHER

## 2022-10-19 RX ORDER — AMITRIPTYLINE HYDROCHLORIDE 25 MG/1
25 TABLET, FILM COATED ORAL NIGHTLY
Status: DISCONTINUED | OUTPATIENT
Start: 2022-10-19 | End: 2022-10-20

## 2022-10-19 RX ORDER — ATORVASTATIN CALCIUM 20 MG/1
40 TABLET, FILM COATED ORAL DAILY
Status: DISCONTINUED | OUTPATIENT
Start: 2022-10-19 | End: 2022-10-20

## 2022-10-19 RX ORDER — ISOSORBIDE MONONITRATE 30 MG/1
30 TABLET, EXTENDED RELEASE ORAL DAILY
Status: DISCONTINUED | OUTPATIENT
Start: 2022-10-19 | End: 2022-10-20

## 2022-10-19 RX ORDER — CHLORHEXIDINE GLUCONATE 0.12 MG/ML
15 RINSE ORAL EVERY 12 HOURS SCHEDULED
Status: COMPLETED | OUTPATIENT
Start: 2022-10-19 | End: 2022-10-20

## 2022-10-19 RX ORDER — GABAPENTIN 300 MG/1
600 CAPSULE ORAL EVERY 8 HOURS PRN
Status: DISCONTINUED | OUTPATIENT
Start: 2022-10-19 | End: 2022-10-20

## 2022-10-19 RX ORDER — HYDROCODONE BITARTRATE AND ACETAMINOPHEN 7.5; 325 MG/1; MG/1
1 TABLET ORAL EVERY 12 HOURS PRN
Status: DISCONTINUED | OUTPATIENT
Start: 2022-10-19 | End: 2022-10-20

## 2022-10-19 RX ORDER — SODIUM CHLORIDE 0.9 % (FLUSH) 0.9 %
30 SYRINGE (ML) INJECTION ONCE AS NEEDED
Status: DISCONTINUED | OUTPATIENT
Start: 2022-10-19 | End: 2022-10-20 | Stop reason: HOSPADM

## 2022-10-19 RX ORDER — INSULIN LISPRO 100 [IU]/ML
0-24 INJECTION, SOLUTION INTRAVENOUS; SUBCUTANEOUS
Status: DISCONTINUED | OUTPATIENT
Start: 2022-10-20 | End: 2022-10-20

## 2022-10-19 RX ADMIN — Medication 10 ML: at 21:20

## 2022-10-19 RX ADMIN — TEMAZEPAM 15 MG: 15 CAPSULE ORAL at 21:19

## 2022-10-19 RX ADMIN — AMITRIPTYLINE HYDROCHLORIDE 25 MG: 25 TABLET, FILM COATED ORAL at 22:44

## 2022-10-19 RX ADMIN — HYDROXYZINE HYDROCHLORIDE 25 MG: 25 TABLET ORAL at 21:19

## 2022-10-19 RX ADMIN — CHLORHEXIDINE GLUCONATE 15 ML: 1.2 SOLUTION ORAL at 21:19

## 2022-10-19 RX ADMIN — MUPIROCIN 1 APPLICATION: 20 OINTMENT TOPICAL at 21:19

## 2022-10-19 RX ADMIN — CHLORHEXIDINE GLUCONATE 1 APPLICATION: 500 CLOTH TOPICAL at 21:20

## 2022-10-20 ENCOUNTER — ANESTHESIA (OUTPATIENT)
Dept: PERIOP | Facility: HOSPITAL | Age: 52
End: 2022-10-20

## 2022-10-20 ENCOUNTER — ANCILLARY PROCEDURE (OUTPATIENT)
Dept: PERIOP | Facility: HOSPITAL | Age: 52
End: 2022-10-20

## 2022-10-20 ENCOUNTER — APPOINTMENT (OUTPATIENT)
Dept: GENERAL RADIOLOGY | Facility: HOSPITAL | Age: 52
End: 2022-10-20

## 2022-10-20 LAB
ACT BLD: 520 SECONDS (ref 82–152)
ALBUMIN SERPL-MCNC: 4.5 G/DL (ref 3.5–5.2)
ALBUMIN SERPL-MCNC: 4.6 G/DL (ref 3.5–5.2)
ANION GAP SERPL CALCULATED.3IONS-SCNC: 10 MMOL/L (ref 5–15)
ANION GAP SERPL CALCULATED.3IONS-SCNC: 10 MMOL/L (ref 5–15)
ANION GAP SERPL CALCULATED.3IONS-SCNC: 13.4 MMOL/L (ref 5–15)
APTT PPP: 37.6 SECONDS (ref 22.7–35.4)
ARTERIAL PATENCY WRIST A: ABNORMAL
ARTERIAL PATENCY WRIST A: ABNORMAL
ATMOSPHERIC PRESS: 751.2 MMHG
ATMOSPHERIC PRESS: 751.5 MMHG
BASE EXCESS BLDA CALC-SCNC: -2 MMOL/L (ref 0–2)
BASE EXCESS BLDA CALC-SCNC: -5.6 MMOL/L (ref 0–2)
BASOPHILS # BLD AUTO: 0.06 10*3/MM3 (ref 0–0.2)
BASOPHILS NFR BLD AUTO: 0.4 % (ref 0–1.5)
BDY SITE: ABNORMAL
BDY SITE: ABNORMAL
BH CV XLRA MEAS - DIST GSV CALF DIST LEFT: 0.24 CM
BH CV XLRA MEAS - DIST GSV CALF DIST RIGHT: 0.33 CM
BH CV XLRA MEAS - DIST GSV THIGH DIST LEFT: 0.38 CM
BH CV XLRA MEAS - DIST GSV THIGH DIST RIGHT: 0.39 CM
BH CV XLRA MEAS - DIST LSV CALF DIST LEFT: 0.34 CM
BH CV XLRA MEAS - DIST LSV CALF DIST RIGHT: 0.2 CM
BH CV XLRA MEAS - GSV ANKLE DIST LEFT: 0.27 CM
BH CV XLRA MEAS - GSV ANKLE DIST RIGHT: 0.31 CM
BH CV XLRA MEAS - GSV KNEE DIST LEFT: 0.43 CM
BH CV XLRA MEAS - GSV KNEE DIST RIGHT: 0.22 CM
BH CV XLRA MEAS - GSV ORIGIN DIST LEFT: 0.96 CM
BH CV XLRA MEAS - GSV ORIGIN DIST RIGHT: 1.21 CM
BH CV XLRA MEAS - MID GSV CALF LEFT: 0.32 CM
BH CV XLRA MEAS - MID GSV CALF RIGHT: 0.14 CM
BH CV XLRA MEAS - MID GSV THIGH  LEFT: 0.43 CM
BH CV XLRA MEAS - MID GSV THIGH  RIGHT: 0.41 CM
BH CV XLRA MEAS - MID LSV CALF DIST LEFT: 0.25 CM
BH CV XLRA MEAS - MID LSV CALF DIST RIGHT: 0.14 CM
BH CV XLRA MEAS - PROX GSV CALF DIST LEFT: 0.32 CM
BH CV XLRA MEAS - PROX GSV CALF DIST RIGHT: 0.17 CM
BH CV XLRA MEAS - PROX GSV THIGH  LEFT: 0.43 CM
BH CV XLRA MEAS - PROX GSV THIGH  RIGHT: 0.46 CM
BH CV XLRA MEAS - PROX LSV CALF DIST LEFT: 0.2 CM
BH CV XLRA MEAS - PROX LSV CALF DIST RIGHT: 0.25 CM
BH CV XLRA MEAS LEFT DIST CCA EDV: -12.8 CM/SEC
BH CV XLRA MEAS LEFT DIST CCA PSV: -73.2 CM/SEC
BH CV XLRA MEAS LEFT DIST ICA EDV: 2.18 CM/SEC
BH CV XLRA MEAS LEFT DIST ICA PSV: 7.9 CM/SEC
BH CV XLRA MEAS LEFT ICA/CCA RATIO: 1.07
BH CV XLRA MEAS LEFT MID ICA EDV: 16.6 CM/SEC
BH CV XLRA MEAS LEFT MID ICA PSV: 27.9 CM/SEC
BH CV XLRA MEAS LEFT PROX CCA EDV: 11.9 CM/SEC
BH CV XLRA MEAS LEFT PROX CCA PSV: 112.1 CM/SEC
BH CV XLRA MEAS LEFT PROX ECA EDV: -4.9 CM/SEC
BH CV XLRA MEAS LEFT PROX ECA PSV: -68.3 CM/SEC
BH CV XLRA MEAS LEFT PROX ICA EDV: 49.7 CM/SEC
BH CV XLRA MEAS LEFT PROX ICA PSV: 78.3 CM/SEC
BH CV XLRA MEAS LEFT PROX SCLA PSV: 101 CM/SEC
BH CV XLRA MEAS LEFT VERTEBRAL A EDV: -8.2 CM/SEC
BH CV XLRA MEAS LEFT VERTEBRAL A PSV: -33.5 CM/SEC
BH CV XLRA MEAS RIGHT DIST CCA EDV: -17.6 CM/SEC
BH CV XLRA MEAS RIGHT DIST CCA PSV: -88.9 CM/SEC
BH CV XLRA MEAS RIGHT DIST ICA EDV: -24.1 CM/SEC
BH CV XLRA MEAS RIGHT DIST ICA PSV: -65 CM/SEC
BH CV XLRA MEAS RIGHT ICA/CCA RATIO: 1
BH CV XLRA MEAS RIGHT MID ICA EDV: -29.5 CM/SEC
BH CV XLRA MEAS RIGHT MID ICA PSV: -89 CM/SEC
BH CV XLRA MEAS RIGHT PROX CCA EDV: 12.1 CM/SEC
BH CV XLRA MEAS RIGHT PROX CCA PSV: 96 CM/SEC
BH CV XLRA MEAS RIGHT PROX ECA EDV: -6.6 CM/SEC
BH CV XLRA MEAS RIGHT PROX ECA PSV: -98.8 CM/SEC
BH CV XLRA MEAS RIGHT PROX ICA EDV: -19.6 CM/SEC
BH CV XLRA MEAS RIGHT PROX ICA PSV: -63.5 CM/SEC
BH CV XLRA MEAS RIGHT PROX SCLA PSV: 175.6 CM/SEC
BH CV XLRA MEAS RIGHT VERTEBRAL A EDV: -7.6 CM/SEC
BH CV XLRA MEAS RIGHT VERTEBRAL A PSV: -28.7 CM/SEC
BUN SERPL-MCNC: 16 MG/DL (ref 6–20)
BUN SERPL-MCNC: 16 MG/DL (ref 6–20)
BUN SERPL-MCNC: 18 MG/DL (ref 6–20)
BUN/CREAT SERPL: 10.8 (ref 7–25)
BUN/CREAT SERPL: 10.9 (ref 7–25)
BUN/CREAT SERPL: 12.2 (ref 7–25)
CA-I BLD-MCNC: 5.4 MG/DL (ref 4.6–5.4)
CA-I SERPL ISE-MCNC: 1.36 MMOL/L (ref 1.15–1.35)
CALCIUM SPEC-SCNC: 9.3 MG/DL (ref 8.6–10.5)
CALCIUM SPEC-SCNC: 9.4 MG/DL (ref 8.6–10.5)
CALCIUM SPEC-SCNC: 9.5 MG/DL (ref 8.6–10.5)
CHLORIDE SERPL-SCNC: 103 MMOL/L (ref 98–107)
CHLORIDE SERPL-SCNC: 105 MMOL/L (ref 98–107)
CHLORIDE SERPL-SCNC: 107 MMOL/L (ref 98–107)
CO2 SERPL-SCNC: 20 MMOL/L (ref 22–29)
CO2 SERPL-SCNC: 21.6 MMOL/L (ref 22–29)
CO2 SERPL-SCNC: 24 MMOL/L (ref 22–29)
CREAT SERPL-MCNC: 1.47 MG/DL (ref 0.76–1.27)
CREAT SERPL-MCNC: 1.48 MG/DL (ref 0.76–1.27)
CREAT SERPL-MCNC: 1.48 MG/DL (ref 0.76–1.27)
DEPRECATED RDW RBC AUTO: 41.7 FL (ref 37–54)
EGFRCR SERPLBLD CKD-EPI 2021: 56.9 ML/MIN/1.73
EGFRCR SERPLBLD CKD-EPI 2021: 56.9 ML/MIN/1.73
EGFRCR SERPLBLD CKD-EPI 2021: 57.4 ML/MIN/1.73
EOSINOPHIL # BLD AUTO: 0.2 10*3/MM3 (ref 0–0.4)
EOSINOPHIL NFR BLD AUTO: 1.3 % (ref 0.3–6.2)
ERYTHROCYTE [DISTWIDTH] IN BLOOD BY AUTOMATED COUNT: 13 % (ref 12.3–15.4)
FIBRINOGEN PPP-MCNC: 240 MG/DL (ref 219–464)
GLUCOSE BLDC GLUCOMTR-MCNC: 118 MG/DL (ref 70–130)
GLUCOSE BLDC GLUCOMTR-MCNC: 125 MG/DL (ref 70–130)
GLUCOSE BLDC GLUCOMTR-MCNC: 159 MG/DL (ref 70–130)
GLUCOSE BLDC GLUCOMTR-MCNC: 167 MG/DL (ref 70–130)
GLUCOSE SERPL-MCNC: 122 MG/DL (ref 65–99)
GLUCOSE SERPL-MCNC: 129 MG/DL (ref 65–99)
GLUCOSE SERPL-MCNC: 201 MG/DL (ref 65–99)
HCO3 BLDA-SCNC: 21 MMOL/L (ref 22–28)
HCO3 BLDA-SCNC: 22.4 MMOL/L (ref 22–28)
HCT VFR BLD AUTO: 31.7 % (ref 37.5–51)
HGB BLD-MCNC: 11.3 G/DL (ref 13–17.7)
IMM GRANULOCYTES # BLD AUTO: 0.1 10*3/MM3 (ref 0–0.05)
IMM GRANULOCYTES NFR BLD AUTO: 0.6 % (ref 0–0.5)
INHALED O2 CONCENTRATION: 100 %
INHALED O2 CONCENTRATION: 40 %
INR PPP: 1.32 (ref 0.9–1.1)
LEFT ARM BP: NORMAL MMHG
LYMPHOCYTES # BLD AUTO: 1.5 10*3/MM3 (ref 0.7–3.1)
LYMPHOCYTES NFR BLD AUTO: 9.4 % (ref 19.6–45.3)
MAGNESIUM SERPL-MCNC: 1.9 MG/DL (ref 1.6–2.6)
MAGNESIUM SERPL-MCNC: 2.2 MG/DL (ref 1.6–2.6)
MAXIMAL PREDICTED HEART RATE: 169 BPM
MAXIMAL PREDICTED HEART RATE: 169 BPM
MCH RBC QN AUTO: 31.1 PG (ref 26.6–33)
MCHC RBC AUTO-ENTMCNC: 35.6 G/DL (ref 31.5–35.7)
MCV RBC AUTO: 87.3 FL (ref 79–97)
MODALITY: ABNORMAL
MODALITY: ABNORMAL
MONOCYTES # BLD AUTO: 1.23 10*3/MM3 (ref 0.1–0.9)
MONOCYTES NFR BLD AUTO: 7.7 % (ref 5–12)
NEUTROPHILS NFR BLD AUTO: 12.82 10*3/MM3 (ref 1.7–7)
NEUTROPHILS NFR BLD AUTO: 80.6 % (ref 42.7–76)
NRBC BLD AUTO-RTO: 0 /100 WBC (ref 0–0.2)
O2 A-A PPRESDIFF RESPIRATORY: 0.3 MMHG
O2 A-A PPRESDIFF RESPIRATORY: 0.7 MMHG
PCO2 BLDA: 36.1 MM HG (ref 35–45)
PCO2 BLDA: 44.7 MM HG (ref 35–45)
PEEP RESPIRATORY: 7.5 CM[H2O]
PEEP RESPIRATORY: 7.5 CM[H2O]
PH BLDA: 7.28 PH UNITS (ref 7.35–7.45)
PH BLDA: 7.4 PH UNITS (ref 7.35–7.45)
PHOSPHATE SERPL-MCNC: 1.3 MG/DL (ref 2.5–4.5)
PHOSPHATE SERPL-MCNC: 2 MG/DL (ref 2.5–4.5)
PLATELET # BLD AUTO: 213 10*3/MM3 (ref 140–450)
PMV BLD AUTO: 11 FL (ref 6–12)
PO2 BLDA: 171 MM HG (ref 80–100)
PO2 BLDA: 178.2 MM HG (ref 80–100)
POTASSIUM SERPL-SCNC: 3.7 MMOL/L (ref 3.5–5.2)
POTASSIUM SERPL-SCNC: 3.8 MMOL/L (ref 3.5–5.2)
POTASSIUM SERPL-SCNC: 4.4 MMOL/L (ref 3.5–5.2)
PROTHROMBIN TIME: 16.5 SECONDS (ref 11.7–14.2)
PSV: 6 CMH2O
QT INTERVAL: 422 MS
RBC # BLD AUTO: 3.63 10*6/MM3 (ref 4.14–5.8)
RIGHT ARM BP: NORMAL MMHG
SAO2 % BLDCOA: 99.3 % (ref 92–99)
SAO2 % BLDCOA: 99.6 % (ref 92–99)
SET MECH RESP RATE: 15
SODIUM SERPL-SCNC: 137 MMOL/L (ref 136–145)
SODIUM SERPL-SCNC: 137 MMOL/L (ref 136–145)
SODIUM SERPL-SCNC: 140 MMOL/L (ref 136–145)
STRESS TARGET HR: 144 BPM
STRESS TARGET HR: 144 BPM
TOTAL RATE: 14 BREATHS/MINUTE
TOTAL RATE: 15 BREATHS/MINUTE
VENTILATOR MODE: ABNORMAL
VENTILATOR MODE: ABNORMAL
VT ON VENT VENT: 494 ML
VT ON VENT VENT: 650 ML
WBC NRBC COR # BLD: 15.91 10*3/MM3 (ref 3.4–10.8)

## 2022-10-20 PROCEDURE — 33534 CABG ARTERIAL TWO: CPT | Performed by: PHYSICIAN ASSISTANT

## 2022-10-20 PROCEDURE — 25010000002 HEPARIN (PORCINE) PER 1000 UNITS: Performed by: ANESTHESIOLOGY

## 2022-10-20 PROCEDURE — 0W9D0ZZ DRAINAGE OF PERICARDIAL CAVITY, OPEN APPROACH: ICD-10-PCS | Performed by: THORACIC SURGERY (CARDIOTHORACIC VASCULAR SURGERY)

## 2022-10-20 PROCEDURE — 82947 ASSAY GLUCOSE BLOOD QUANT: CPT

## 2022-10-20 PROCEDURE — 0 POTASSIUM CHLORIDE PER 2 MEQ: Performed by: NURSE PRACTITIONER

## 2022-10-20 PROCEDURE — 25010000002 FENTANYL CITRATE (PF) 50 MCG/ML SOLUTION: Performed by: ANESTHESIOLOGY

## 2022-10-20 PROCEDURE — 82962 GLUCOSE BLOOD TEST: CPT

## 2022-10-20 PROCEDURE — 25010000002 PAPAVERINE PER 60 MG: Performed by: THORACIC SURGERY (CARDIOTHORACIC VASCULAR SURGERY)

## 2022-10-20 PROCEDURE — 25010000002 ONDANSETRON PER 1 MG: Performed by: ANESTHESIOLOGY

## 2022-10-20 PROCEDURE — 33518 CABG ARTERY-VEIN TWO: CPT | Performed by: THORACIC SURGERY (CARDIOTHORACIC VASCULAR SURGERY)

## 2022-10-20 PROCEDURE — C1751 CATH, INF, PER/CENT/MIDLINE: HCPCS | Performed by: ANESTHESIOLOGY

## 2022-10-20 PROCEDURE — P9041 ALBUMIN (HUMAN),5%, 50ML: HCPCS | Performed by: NURSE PRACTITIONER

## 2022-10-20 PROCEDURE — 33534 CABG ARTERIAL TWO: CPT | Performed by: THORACIC SURGERY (CARDIOTHORACIC VASCULAR SURGERY)

## 2022-10-20 PROCEDURE — 33508 ENDOSCOPIC VEIN HARVEST: CPT | Performed by: PHYSICIAN ASSISTANT

## 2022-10-20 PROCEDURE — 3E080GC INTRODUCTION OF OTHER THERAPEUTIC SUBSTANCE INTO HEART, OPEN APPROACH: ICD-10-PCS | Performed by: THORACIC SURGERY (CARDIOTHORACIC VASCULAR SURGERY)

## 2022-10-20 PROCEDURE — 25010000002 VANCOMYCIN 1 G RECONSTITUTED SOLUTION

## 2022-10-20 PROCEDURE — 25010000002 FENTANYL CITRATE (PF) 100 MCG/2ML SOLUTION

## 2022-10-20 PROCEDURE — 82330 ASSAY OF CALCIUM: CPT | Performed by: NURSE PRACTITIONER

## 2022-10-20 PROCEDURE — 63710000001 INSULIN REGULAR HUMAN PER 5 UNITS: Performed by: ANESTHESIOLOGY

## 2022-10-20 PROCEDURE — 021109W BYPASS CORONARY ARTERY, TWO ARTERIES FROM AORTA WITH AUTOLOGOUS VENOUS TISSUE, OPEN APPROACH: ICD-10-PCS | Performed by: THORACIC SURGERY (CARDIOTHORACIC VASCULAR SURGERY)

## 2022-10-20 PROCEDURE — 25010000002 PROPOFOL 10 MG/ML EMULSION: Performed by: ANESTHESIOLOGY

## 2022-10-20 PROCEDURE — 80048 BASIC METABOLIC PNL TOTAL CA: CPT | Performed by: THORACIC SURGERY (CARDIOTHORACIC VASCULAR SURGERY)

## 2022-10-20 PROCEDURE — 94760 N-INVAS EAR/PLS OXIMETRY 1: CPT

## 2022-10-20 PROCEDURE — 33518 CABG ARTERY-VEIN TWO: CPT | Performed by: PHYSICIAN ASSISTANT

## 2022-10-20 PROCEDURE — 25010000002 MIDAZOLAM PER 1 MG: Performed by: ANESTHESIOLOGY

## 2022-10-20 PROCEDURE — 85610 PROTHROMBIN TIME: CPT | Performed by: NURSE PRACTITIONER

## 2022-10-20 PROCEDURE — 85347 COAGULATION TIME ACTIVATED: CPT

## 2022-10-20 PROCEDURE — 06BQ4ZZ EXCISION OF LEFT SAPHENOUS VEIN, PERCUTANEOUS ENDOSCOPIC APPROACH: ICD-10-PCS | Performed by: THORACIC SURGERY (CARDIOTHORACIC VASCULAR SURGERY)

## 2022-10-20 PROCEDURE — 25010000002 ALBUMIN HUMAN 5% PER 50 ML: Performed by: NURSE PRACTITIONER

## 2022-10-20 PROCEDURE — 83735 ASSAY OF MAGNESIUM: CPT | Performed by: NURSE PRACTITIONER

## 2022-10-20 PROCEDURE — 02110Z9 BYPASS CORONARY ARTERY, TWO ARTERIES FROM LEFT INTERNAL MAMMARY, OPEN APPROACH: ICD-10-PCS | Performed by: THORACIC SURGERY (CARDIOTHORACIC VASCULAR SURGERY)

## 2022-10-20 PROCEDURE — 25010000002 HEPARIN (PORCINE) PER 1000 UNITS: Performed by: THORACIC SURGERY (CARDIOTHORACIC VASCULAR SURGERY)

## 2022-10-20 PROCEDURE — B24BZZ4 ULTRASONOGRAPHY OF HEART WITH AORTA, TRANSESOPHAGEAL: ICD-10-PCS | Performed by: THORACIC SURGERY (CARDIOTHORACIC VASCULAR SURGERY)

## 2022-10-20 PROCEDURE — C1713 ANCHOR/SCREW BN/BN,TIS/BN: HCPCS | Performed by: THORACIC SURGERY (CARDIOTHORACIC VASCULAR SURGERY)

## 2022-10-20 PROCEDURE — 85025 COMPLETE CBC W/AUTO DIFF WBC: CPT | Performed by: NURSE PRACTITIONER

## 2022-10-20 PROCEDURE — 94799 UNLISTED PULMONARY SVC/PX: CPT

## 2022-10-20 PROCEDURE — 93005 ELECTROCARDIOGRAM TRACING: CPT | Performed by: NURSE PRACTITIONER

## 2022-10-20 PROCEDURE — 25010000002 MORPHINE PER 10 MG: Performed by: NURSE PRACTITIONER

## 2022-10-20 PROCEDURE — 85018 HEMOGLOBIN: CPT

## 2022-10-20 PROCEDURE — 25010000002 CEFAZOLIN IN DEXTROSE 2-4 GM/100ML-% SOLUTION: Performed by: NURSE PRACTITIONER

## 2022-10-20 PROCEDURE — 5A1221Z PERFORMANCE OF CARDIAC OUTPUT, CONTINUOUS: ICD-10-PCS | Performed by: THORACIC SURGERY (CARDIOTHORACIC VASCULAR SURGERY)

## 2022-10-20 PROCEDURE — 85730 THROMBOPLASTIN TIME PARTIAL: CPT | Performed by: NURSE PRACTITIONER

## 2022-10-20 PROCEDURE — 94002 VENT MGMT INPAT INIT DAY: CPT

## 2022-10-20 PROCEDURE — 82803 BLOOD GASES ANY COMBINATION: CPT

## 2022-10-20 PROCEDURE — 25010000002 ALBUMIN HUMAN 25% PER 50 ML

## 2022-10-20 PROCEDURE — 93318 ECHO TRANSESOPHAGEAL INTRAOP: CPT | Performed by: ANESTHESIOLOGY

## 2022-10-20 PROCEDURE — 71045 X-RAY EXAM CHEST 1 VIEW: CPT

## 2022-10-20 PROCEDURE — 25010000002 MAGNESIUM SULFATE IN D5W 1G/100ML (PREMIX) 1-5 GM/100ML-% SOLUTION: Performed by: NURSE PRACTITIONER

## 2022-10-20 PROCEDURE — 25010000002 HEPARIN (PORCINE) PER 1000 UNITS

## 2022-10-20 PROCEDURE — A4648 IMPLANTABLE TISSUE MARKER: HCPCS | Performed by: THORACIC SURGERY (CARDIOTHORACIC VASCULAR SURGERY)

## 2022-10-20 PROCEDURE — 85384 FIBRINOGEN ACTIVITY: CPT | Performed by: NURSE PRACTITIONER

## 2022-10-20 PROCEDURE — 25010000002 METOCLOPRAMIDE PER 10 MG: Performed by: NURSE PRACTITIONER

## 2022-10-20 PROCEDURE — 25010000002 PHENYLEPHRINE 10 MG/ML SOLUTION 5 ML VIAL: Performed by: ANESTHESIOLOGY

## 2022-10-20 PROCEDURE — 33508 ENDOSCOPIC VEIN HARVEST: CPT | Performed by: THORACIC SURGERY (CARDIOTHORACIC VASCULAR SURGERY)

## 2022-10-20 PROCEDURE — 85014 HEMATOCRIT: CPT

## 2022-10-20 PROCEDURE — 94761 N-INVAS EAR/PLS OXIMETRY MLT: CPT

## 2022-10-20 PROCEDURE — 80069 RENAL FUNCTION PANEL: CPT | Performed by: NURSE PRACTITIONER

## 2022-10-20 PROCEDURE — P9047 ALBUMIN (HUMAN), 25%, 50ML: HCPCS

## 2022-10-20 DEVICE — SS SUTURE, 3 PER SLEEVE
Type: IMPLANTABLE DEVICE | Site: CHEST WALL | Status: FUNCTIONAL
Brand: MYO/WIRE II

## 2022-10-20 DEVICE — ABSORBABLE HEMOSTAT (OXIDIZED REGENERATED CELLULOSE, U.S.P.)
Type: IMPLANTABLE DEVICE | Site: HEART | Status: FUNCTIONAL
Brand: SURGICEL

## 2022-10-20 RX ORDER — DOPAMINE HYDROCHLORIDE 160 MG/100ML
2-20 INJECTION, SOLUTION INTRAVENOUS CONTINUOUS PRN
Status: DISCONTINUED | OUTPATIENT
Start: 2022-10-20 | End: 2022-10-23

## 2022-10-20 RX ORDER — SODIUM CHLORIDE 0.9 % (FLUSH) 0.9 %
3-10 SYRINGE (ML) INJECTION AS NEEDED
Status: DISCONTINUED | OUTPATIENT
Start: 2022-10-20 | End: 2022-10-20 | Stop reason: HOSPADM

## 2022-10-20 RX ORDER — METOCLOPRAMIDE HYDROCHLORIDE 5 MG/ML
10 INJECTION INTRAMUSCULAR; INTRAVENOUS EVERY 6 HOURS
Status: COMPLETED | OUTPATIENT
Start: 2022-10-20 | End: 2022-10-21

## 2022-10-20 RX ORDER — ENOXAPARIN SODIUM 100 MG/ML
30 INJECTION SUBCUTANEOUS DAILY
Status: DISCONTINUED | OUTPATIENT
Start: 2022-10-21 | End: 2022-10-24 | Stop reason: HOSPADM

## 2022-10-20 RX ORDER — NALOXONE HCL 0.4 MG/ML
0.4 VIAL (ML) INJECTION
Status: DISCONTINUED | OUTPATIENT
Start: 2022-10-20 | End: 2022-10-24 | Stop reason: HOSPADM

## 2022-10-20 RX ORDER — CYCLOBENZAPRINE HCL 10 MG
10 TABLET ORAL EVERY 8 HOURS PRN
Status: DISCONTINUED | OUTPATIENT
Start: 2022-10-21 | End: 2022-10-24 | Stop reason: HOSPADM

## 2022-10-20 RX ORDER — OXYCODONE HYDROCHLORIDE 5 MG/1
10 TABLET ORAL EVERY 4 HOURS PRN
Status: DISCONTINUED | OUTPATIENT
Start: 2022-10-20 | End: 2022-10-21

## 2022-10-20 RX ORDER — CHLORHEXIDINE GLUCONATE 0.12 MG/ML
15 RINSE ORAL EVERY 12 HOURS
Status: DISCONTINUED | OUTPATIENT
Start: 2022-10-20 | End: 2022-10-24 | Stop reason: HOSPADM

## 2022-10-20 RX ORDER — AMINOCAPROIC ACID 250 MG/ML
INJECTION, SOLUTION INTRAVENOUS AS NEEDED
Status: DISCONTINUED | OUTPATIENT
Start: 2022-10-20 | End: 2022-10-20 | Stop reason: SURG

## 2022-10-20 RX ORDER — ONDANSETRON 2 MG/ML
INJECTION INTRAMUSCULAR; INTRAVENOUS AS NEEDED
Status: DISCONTINUED | OUTPATIENT
Start: 2022-10-20 | End: 2022-10-20 | Stop reason: SURG

## 2022-10-20 RX ORDER — BISACODYL 5 MG/1
10 TABLET, DELAYED RELEASE ORAL DAILY PRN
Status: DISCONTINUED | OUTPATIENT
Start: 2022-10-20 | End: 2022-10-24 | Stop reason: HOSPADM

## 2022-10-20 RX ORDER — POTASSIUM CHLORIDE 1.5 G/1.77G
40 POWDER, FOR SOLUTION ORAL AS NEEDED
Status: DISCONTINUED | OUTPATIENT
Start: 2022-10-20 | End: 2022-10-24 | Stop reason: HOSPADM

## 2022-10-20 RX ORDER — POTASSIUM CHLORIDE 29.8 MG/ML
20 INJECTION INTRAVENOUS
Status: DISCONTINUED | OUTPATIENT
Start: 2022-10-20 | End: 2022-10-24 | Stop reason: HOSPADM

## 2022-10-20 RX ORDER — MORPHINE SULFATE 2 MG/ML
4 INJECTION, SOLUTION INTRAMUSCULAR; INTRAVENOUS
Status: DISCONTINUED | OUTPATIENT
Start: 2022-10-20 | End: 2022-10-23

## 2022-10-20 RX ORDER — HEPARIN SODIUM 1000 [USP'U]/ML
INJECTION, SOLUTION INTRAVENOUS; SUBCUTANEOUS AS NEEDED
Status: DISCONTINUED | OUTPATIENT
Start: 2022-10-20 | End: 2022-10-20 | Stop reason: SURG

## 2022-10-20 RX ORDER — CEFAZOLIN SODIUM 2 G/100ML
2 INJECTION, SOLUTION INTRAVENOUS EVERY 8 HOURS
Status: COMPLETED | OUTPATIENT
Start: 2022-10-20 | End: 2022-10-22

## 2022-10-20 RX ORDER — FENTANYL CITRATE 50 UG/ML
INJECTION, SOLUTION INTRAMUSCULAR; INTRAVENOUS AS NEEDED
Status: COMPLETED | OUTPATIENT
Start: 2022-10-20 | End: 2022-10-20

## 2022-10-20 RX ORDER — MILRINONE LACTATE 0.2 MG/ML
.25-.375 INJECTION, SOLUTION INTRAVENOUS CONTINUOUS PRN
Status: DISCONTINUED | OUTPATIENT
Start: 2022-10-20 | End: 2022-10-23

## 2022-10-20 RX ORDER — ALBUMIN, HUMAN INJ 5% 5 %
1500 SOLUTION INTRAVENOUS AS NEEDED
Status: DISPENSED | OUTPATIENT
Start: 2022-10-20 | End: 2022-10-21

## 2022-10-20 RX ORDER — ACETAMINOPHEN 10 MG/ML
1000 INJECTION, SOLUTION INTRAVENOUS EVERY 8 HOURS
Status: COMPLETED | OUTPATIENT
Start: 2022-10-20 | End: 2022-10-21

## 2022-10-20 RX ORDER — MIDAZOLAM HYDROCHLORIDE 1 MG/ML
INJECTION INTRAMUSCULAR; INTRAVENOUS AS NEEDED
Status: COMPLETED | OUTPATIENT
Start: 2022-10-20 | End: 2022-10-20

## 2022-10-20 RX ORDER — MIDAZOLAM HYDROCHLORIDE 1 MG/ML
INJECTION INTRAMUSCULAR; INTRAVENOUS AS NEEDED
Status: DISCONTINUED | OUTPATIENT
Start: 2022-10-20 | End: 2022-10-20 | Stop reason: SURG

## 2022-10-20 RX ORDER — NITROGLYCERIN 20 MG/100ML
5-200 INJECTION INTRAVENOUS
Status: DISCONTINUED | OUTPATIENT
Start: 2022-10-20 | End: 2022-10-23

## 2022-10-20 RX ORDER — SODIUM CHLORIDE, SODIUM LACTATE, POTASSIUM CHLORIDE, CALCIUM CHLORIDE 600; 310; 30; 20 MG/100ML; MG/100ML; MG/100ML; MG/100ML
9 INJECTION, SOLUTION INTRAVENOUS CONTINUOUS
Status: DISCONTINUED | OUTPATIENT
Start: 2022-10-20 | End: 2022-10-20

## 2022-10-20 RX ORDER — PANTOPRAZOLE SODIUM 40 MG/10ML
40 INJECTION, POWDER, LYOPHILIZED, FOR SOLUTION INTRAVENOUS ONCE
Status: COMPLETED | OUTPATIENT
Start: 2022-10-20 | End: 2022-10-20

## 2022-10-20 RX ORDER — ACETAMINOPHEN 650 MG/1
650 SUPPOSITORY RECTAL EVERY 4 HOURS PRN
Status: DISCONTINUED | OUTPATIENT
Start: 2022-10-21 | End: 2022-10-24 | Stop reason: HOSPADM

## 2022-10-20 RX ORDER — ALPRAZOLAM 0.25 MG/1
0.25 TABLET ORAL EVERY 8 HOURS PRN
Status: DISCONTINUED | OUTPATIENT
Start: 2022-10-20 | End: 2022-10-24 | Stop reason: HOSPADM

## 2022-10-20 RX ORDER — SODIUM CHLORIDE 9 MG/ML
9 INJECTION, SOLUTION INTRAVENOUS CONTINUOUS
Status: DISCONTINUED | OUTPATIENT
Start: 2022-10-20 | End: 2022-10-20

## 2022-10-20 RX ORDER — ACETAMINOPHEN 325 MG/1
650 TABLET ORAL EVERY 4 HOURS
Status: ACTIVE | OUTPATIENT
Start: 2022-10-20 | End: 2022-10-21

## 2022-10-20 RX ORDER — PROPOFOL 10 MG/ML
VIAL (ML) INTRAVENOUS AS NEEDED
Status: DISCONTINUED | OUTPATIENT
Start: 2022-10-20 | End: 2022-10-20 | Stop reason: SURG

## 2022-10-20 RX ORDER — POTASSIUM CHLORIDE 7.45 MG/ML
10 INJECTION INTRAVENOUS
Status: DISCONTINUED | OUTPATIENT
Start: 2022-10-20 | End: 2022-10-24 | Stop reason: HOSPADM

## 2022-10-20 RX ORDER — NOREPINEPHRINE BIT/0.9 % NACL 8 MG/250ML
.02-.2 INFUSION BOTTLE (ML) INTRAVENOUS CONTINUOUS PRN
Status: DISCONTINUED | OUTPATIENT
Start: 2022-10-20 | End: 2022-10-23

## 2022-10-20 RX ORDER — SODIUM CHLORIDE 9 MG/ML
30 INJECTION, SOLUTION INTRAVENOUS CONTINUOUS
Status: DISCONTINUED | OUTPATIENT
Start: 2022-10-20 | End: 2022-10-24 | Stop reason: HOSPADM

## 2022-10-20 RX ORDER — NICOTINE POLACRILEX 4 MG
15 LOZENGE BUCCAL
Status: DISCONTINUED | OUTPATIENT
Start: 2022-10-20 | End: 2022-10-21

## 2022-10-20 RX ORDER — PAPAVERINE HYDROCHLORIDE 30 MG/ML
INJECTION INTRAMUSCULAR; INTRAVENOUS AS NEEDED
Status: DISCONTINUED | OUTPATIENT
Start: 2022-10-20 | End: 2022-10-20 | Stop reason: HOSPADM

## 2022-10-20 RX ORDER — POTASSIUM CHLORIDE 750 MG/1
40 TABLET, FILM COATED, EXTENDED RELEASE ORAL AS NEEDED
Status: DISCONTINUED | OUTPATIENT
Start: 2022-10-20 | End: 2022-10-24 | Stop reason: HOSPADM

## 2022-10-20 RX ORDER — ONDANSETRON 2 MG/ML
4 INJECTION INTRAMUSCULAR; INTRAVENOUS EVERY 6 HOURS PRN
Status: DISCONTINUED | OUTPATIENT
Start: 2022-10-20 | End: 2022-10-24 | Stop reason: HOSPADM

## 2022-10-20 RX ORDER — LIDOCAINE HYDROCHLORIDE 10 MG/ML
0.5 INJECTION, SOLUTION EPIDURAL; INFILTRATION; INTRACAUDAL; PERINEURAL ONCE AS NEEDED
Status: DISCONTINUED | OUTPATIENT
Start: 2022-10-20 | End: 2022-10-20 | Stop reason: HOSPADM

## 2022-10-20 RX ORDER — SODIUM CHLORIDE 0.9 % (FLUSH) 0.9 %
3 SYRINGE (ML) INJECTION EVERY 12 HOURS SCHEDULED
Status: DISCONTINUED | OUTPATIENT
Start: 2022-10-20 | End: 2022-10-20 | Stop reason: HOSPADM

## 2022-10-20 RX ORDER — POLYETHYLENE GLYCOL 3350 17 G/17G
17 POWDER, FOR SOLUTION ORAL DAILY PRN
Status: DISCONTINUED | OUTPATIENT
Start: 2022-10-20 | End: 2022-10-24 | Stop reason: HOSPADM

## 2022-10-20 RX ORDER — PHENYLEPHRINE HCL IN 0.9% NACL 0.5 MG/5ML
.2-2 SYRINGE (ML) INTRAVENOUS CONTINUOUS PRN
Status: DISCONTINUED | OUTPATIENT
Start: 2022-10-20 | End: 2022-10-23

## 2022-10-20 RX ORDER — HYDROCODONE BITARTRATE AND ACETAMINOPHEN 5; 325 MG/1; MG/1
2 TABLET ORAL EVERY 4 HOURS PRN
Status: DISCONTINUED | OUTPATIENT
Start: 2022-10-20 | End: 2022-10-24 | Stop reason: HOSPADM

## 2022-10-20 RX ORDER — ASPIRIN 81 MG/1
81 TABLET ORAL DAILY
Status: DISCONTINUED | OUTPATIENT
Start: 2022-10-21 | End: 2022-10-24 | Stop reason: HOSPADM

## 2022-10-20 RX ORDER — ATORVASTATIN CALCIUM 20 MG/1
40 TABLET, FILM COATED ORAL NIGHTLY
Status: DISCONTINUED | OUTPATIENT
Start: 2022-10-20 | End: 2022-10-24 | Stop reason: HOSPADM

## 2022-10-20 RX ORDER — ACETAMINOPHEN 160 MG/5ML
650 SOLUTION ORAL EVERY 4 HOURS
Status: ACTIVE | OUTPATIENT
Start: 2022-10-20 | End: 2022-10-21

## 2022-10-20 RX ORDER — PANTOPRAZOLE SODIUM 40 MG/1
40 TABLET, DELAYED RELEASE ORAL EVERY MORNING
Status: DISCONTINUED | OUTPATIENT
Start: 2022-10-21 | End: 2022-10-24 | Stop reason: HOSPADM

## 2022-10-20 RX ORDER — ACETAMINOPHEN 650 MG/1
650 SUPPOSITORY RECTAL EVERY 4 HOURS
Status: ACTIVE | OUTPATIENT
Start: 2022-10-20 | End: 2022-10-21

## 2022-10-20 RX ORDER — DEXTROSE MONOHYDRATE 25 G/50ML
10-50 INJECTION, SOLUTION INTRAVENOUS
Status: DISCONTINUED | OUTPATIENT
Start: 2022-10-20 | End: 2022-10-21

## 2022-10-20 RX ORDER — MORPHINE SULFATE 2 MG/ML
1 INJECTION, SOLUTION INTRAMUSCULAR; INTRAVENOUS EVERY 4 HOURS PRN
Status: DISCONTINUED | OUTPATIENT
Start: 2022-10-20 | End: 2022-10-24 | Stop reason: HOSPADM

## 2022-10-20 RX ORDER — SODIUM CHLORIDE 9 MG/ML
INJECTION, SOLUTION INTRAVENOUS CONTINUOUS PRN
Status: DISCONTINUED | OUTPATIENT
Start: 2022-10-20 | End: 2022-10-20 | Stop reason: SURG

## 2022-10-20 RX ORDER — POTASSIUM CHLORIDE 29.8 MG/ML
20 INJECTION INTRAVENOUS
Status: COMPLETED | OUTPATIENT
Start: 2022-10-20 | End: 2022-10-20

## 2022-10-20 RX ORDER — ACETAMINOPHEN 160 MG/5ML
650 SOLUTION ORAL EVERY 4 HOURS PRN
Status: DISCONTINUED | OUTPATIENT
Start: 2022-10-21 | End: 2022-10-24 | Stop reason: HOSPADM

## 2022-10-20 RX ORDER — DEXMEDETOMIDINE HYDROCHLORIDE 4 UG/ML
.2-1.5 INJECTION, SOLUTION INTRAVENOUS
Status: DISCONTINUED | OUTPATIENT
Start: 2022-10-20 | End: 2022-10-23

## 2022-10-20 RX ORDER — HEPARIN SODIUM 5000 [USP'U]/ML
INJECTION, SOLUTION INTRAVENOUS; SUBCUTANEOUS AS NEEDED
Status: DISCONTINUED | OUTPATIENT
Start: 2022-10-20 | End: 2022-10-20 | Stop reason: HOSPADM

## 2022-10-20 RX ORDER — MAGNESIUM SULFATE 1 G/100ML
1 INJECTION INTRAVENOUS EVERY 8 HOURS
Status: COMPLETED | OUTPATIENT
Start: 2022-10-20 | End: 2022-10-21

## 2022-10-20 RX ORDER — MIDAZOLAM HYDROCHLORIDE 1 MG/ML
2 INJECTION INTRAMUSCULAR; INTRAVENOUS
Status: DISCONTINUED | OUTPATIENT
Start: 2022-10-20 | End: 2022-10-23

## 2022-10-20 RX ORDER — AMOXICILLIN 250 MG
2 CAPSULE ORAL NIGHTLY
Status: DISCONTINUED | OUTPATIENT
Start: 2022-10-21 | End: 2022-10-24 | Stop reason: HOSPADM

## 2022-10-20 RX ORDER — MEPERIDINE HYDROCHLORIDE 25 MG/ML
25 INJECTION INTRAMUSCULAR; INTRAVENOUS; SUBCUTANEOUS EVERY 4 HOURS PRN
Status: ACTIVE | OUTPATIENT
Start: 2022-10-20 | End: 2022-10-21

## 2022-10-20 RX ORDER — DEXMEDETOMIDINE HYDROCHLORIDE 4 UG/ML
INJECTION, SOLUTION INTRAVENOUS CONTINUOUS PRN
Status: DISCONTINUED | OUTPATIENT
Start: 2022-10-20 | End: 2022-10-20 | Stop reason: SURG

## 2022-10-20 RX ORDER — BISACODYL 10 MG
10 SUPPOSITORY, RECTAL RECTAL DAILY PRN
Status: DISCONTINUED | OUTPATIENT
Start: 2022-10-21 | End: 2022-10-24 | Stop reason: HOSPADM

## 2022-10-20 RX ORDER — ROCURONIUM BROMIDE 10 MG/ML
INJECTION, SOLUTION INTRAVENOUS AS NEEDED
Status: DISCONTINUED | OUTPATIENT
Start: 2022-10-20 | End: 2022-10-20 | Stop reason: SURG

## 2022-10-20 RX ORDER — ACETAMINOPHEN 325 MG/1
650 TABLET ORAL EVERY 4 HOURS PRN
Status: DISCONTINUED | OUTPATIENT
Start: 2022-10-21 | End: 2022-10-24 | Stop reason: HOSPADM

## 2022-10-20 RX ORDER — MIDAZOLAM HYDROCHLORIDE 1 MG/ML
1 INJECTION INTRAMUSCULAR; INTRAVENOUS
Status: DISCONTINUED | OUTPATIENT
Start: 2022-10-20 | End: 2022-10-20 | Stop reason: HOSPADM

## 2022-10-20 RX ORDER — FENTANYL CITRATE 50 UG/ML
50 INJECTION, SOLUTION INTRAMUSCULAR; INTRAVENOUS
Status: COMPLETED | OUTPATIENT
Start: 2022-10-20 | End: 2022-10-20

## 2022-10-20 RX ADMIN — FENTANYL CITRATE 100 MCG: 50 INJECTION INTRAMUSCULAR; INTRAVENOUS at 13:33

## 2022-10-20 RX ADMIN — PROPOFOL 50 MCG/KG/MIN: 10 INJECTION, EMULSION INTRAVENOUS at 13:23

## 2022-10-20 RX ADMIN — ROCURONIUM BROMIDE 10 MG: 10 INJECTION, SOLUTION INTRAVENOUS at 14:29

## 2022-10-20 RX ADMIN — METOCLOPRAMIDE 10 MG: 5 INJECTION, SOLUTION INTRAMUSCULAR; INTRAVENOUS at 16:30

## 2022-10-20 RX ADMIN — MIDAZOLAM 2 MG: 1 INJECTION INTRAMUSCULAR; INTRAVENOUS at 12:12

## 2022-10-20 RX ADMIN — ALBUMIN (HUMAN) 250 ML: 12.5 INJECTION, SOLUTION INTRAVENOUS at 18:14

## 2022-10-20 RX ADMIN — CHLORHEXIDINE GLUCONATE 15 ML: 1.2 SOLUTION ORAL at 07:55

## 2022-10-20 RX ADMIN — NICARDIPINE HYDROCHLORIDE 5 MG/HR: 2.5 INJECTION, SOLUTION INTRAVENOUS at 14:49

## 2022-10-20 RX ADMIN — HEPARIN SODIUM 30000 UNITS: 1000 INJECTION INTRAVENOUS; SUBCUTANEOUS at 13:22

## 2022-10-20 RX ADMIN — PANTOPRAZOLE SODIUM 40 MG: 40 INJECTION, POWDER, FOR SOLUTION INTRAVENOUS at 16:30

## 2022-10-20 RX ADMIN — METOPROLOL TARTRATE 12.5 MG: 25 TABLET, FILM COATED ORAL at 07:55

## 2022-10-20 RX ADMIN — DEXMEDETOMIDINE HYDROCHLORIDE 0.5 MCG/KG/HR: 4 INJECTION, SOLUTION INTRAVENOUS at 18:29

## 2022-10-20 RX ADMIN — AMINOCAPROIC ACID 10 G: 250 INJECTION, SOLUTION INTRAVENOUS at 14:58

## 2022-10-20 RX ADMIN — MIDAZOLAM 2 MG: 1 INJECTION INTRAMUSCULAR; INTRAVENOUS at 14:29

## 2022-10-20 RX ADMIN — PROPOFOL 150 MG: 10 INJECTION, EMULSION INTRAVENOUS at 12:15

## 2022-10-20 RX ADMIN — CEFAZOLIN SODIUM 2 G: 2 INJECTION, SOLUTION INTRAVENOUS at 14:48

## 2022-10-20 RX ADMIN — FENTANYL CITRATE 50 MCG: 50 INJECTION INTRAMUSCULAR; INTRAVENOUS at 12:42

## 2022-10-20 RX ADMIN — CEFAZOLIN SODIUM 2 G: 2 INJECTION, SOLUTION INTRAVENOUS at 12:15

## 2022-10-20 RX ADMIN — METOCLOPRAMIDE 10 MG: 5 INJECTION, SOLUTION INTRAMUSCULAR; INTRAVENOUS at 23:10

## 2022-10-20 RX ADMIN — FAMOTIDINE 20 MG: 20 TABLET ORAL at 07:55

## 2022-10-20 RX ADMIN — FENTANYL CITRATE 100 MCG: 50 INJECTION INTRAMUSCULAR; INTRAVENOUS at 12:15

## 2022-10-20 RX ADMIN — SODIUM BICARBONATE 100 MEQ: 84 INJECTION INTRAVENOUS at 23:50

## 2022-10-20 RX ADMIN — DEXMEDETOMIDINE HYDROCHLORIDE 0.5 MCG/KG/HR: 4 INJECTION, SOLUTION INTRAVENOUS at 13:24

## 2022-10-20 RX ADMIN — MIDAZOLAM 1 MG: 1 INJECTION INTRAMUSCULAR; INTRAVENOUS at 12:42

## 2022-10-20 RX ADMIN — MORPHINE SULFATE 4 MG: 2 INJECTION, SOLUTION INTRAMUSCULAR; INTRAVENOUS at 18:36

## 2022-10-20 RX ADMIN — MORPHINE SULFATE 4 MG: 2 INJECTION, SOLUTION INTRAMUSCULAR; INTRAVENOUS at 19:43

## 2022-10-20 RX ADMIN — FENTANYL CITRATE 50 MCG: 50 INJECTION INTRAMUSCULAR; INTRAVENOUS at 14:47

## 2022-10-20 RX ADMIN — ALBUMIN (HUMAN) 250 ML: 12.5 INJECTION, SOLUTION INTRAVENOUS at 22:55

## 2022-10-20 RX ADMIN — MUPIROCIN 1 APPLICATION: 20 OINTMENT TOPICAL at 20:13

## 2022-10-20 RX ADMIN — AMINOCAPROIC ACID 10 G: 250 INJECTION, SOLUTION INTRAVENOUS at 12:37

## 2022-10-20 RX ADMIN — FENTANYL CITRATE 100 MCG: 50 INJECTION INTRAMUSCULAR; INTRAVENOUS at 14:50

## 2022-10-20 RX ADMIN — ROCURONIUM BROMIDE 50 MG: 10 INJECTION, SOLUTION INTRAVENOUS at 12:15

## 2022-10-20 RX ADMIN — SODIUM CHLORIDE: 9 INJECTION, SOLUTION INTRAVENOUS at 12:07

## 2022-10-20 RX ADMIN — FENTANYL CITRATE 50 MCG: 50 INJECTION INTRAMUSCULAR; INTRAVENOUS at 12:13

## 2022-10-20 RX ADMIN — PHENYLEPHRINE HYDROCHLORIDE 0.5 MCG/KG/MIN: 10 INJECTION INTRAVENOUS at 12:15

## 2022-10-20 RX ADMIN — MAGNESIUM SULFATE IN DEXTROSE 1 G: 10 INJECTION, SOLUTION INTRAVENOUS at 16:54

## 2022-10-20 RX ADMIN — ALBUMIN (HUMAN) 250 ML: 12.5 INJECTION, SOLUTION INTRAVENOUS at 20:13

## 2022-10-20 RX ADMIN — SODIUM CHLORIDE 2.6 UNITS/HR: 900 INJECTION, SOLUTION INTRAVENOUS at 14:37

## 2022-10-20 RX ADMIN — DEXMEDETOMIDINE HYDROCHLORIDE 1 MCG/KG/HR: 4 INJECTION, SOLUTION INTRAVENOUS at 12:15

## 2022-10-20 RX ADMIN — POTASSIUM CHLORIDE 20 MEQ: 29.8 INJECTION, SOLUTION INTRAVENOUS at 17:25

## 2022-10-20 RX ADMIN — ALBUMIN (HUMAN) 250 ML: 12.5 INJECTION, SOLUTION INTRAVENOUS at 17:18

## 2022-10-20 RX ADMIN — FENTANYL CITRATE 50 MCG: 50 INJECTION INTRAMUSCULAR; INTRAVENOUS at 12:29

## 2022-10-20 RX ADMIN — CHLORHEXIDINE GLUCONATE 1 APPLICATION: 500 CLOTH TOPICAL at 07:56

## 2022-10-20 RX ADMIN — ACETAMINOPHEN 1000 MG: 10 INJECTION, SOLUTION INTRAVENOUS at 20:26

## 2022-10-20 RX ADMIN — FENTANYL CITRATE 100 MCG: 50 INJECTION INTRAMUSCULAR; INTRAVENOUS at 14:08

## 2022-10-20 RX ADMIN — SODIUM CHLORIDE 9 ML/HR: 9 INJECTION, SOLUTION INTRAVENOUS at 09:32

## 2022-10-20 RX ADMIN — CEFAZOLIN SODIUM 2 G: 2 INJECTION, SOLUTION INTRAVENOUS at 23:10

## 2022-10-20 RX ADMIN — MUPIROCIN 1 APPLICATION: 20 OINTMENT TOPICAL at 07:55

## 2022-10-20 RX ADMIN — ONDANSETRON 4 MG: 2 INJECTION INTRAMUSCULAR; INTRAVENOUS at 15:11

## 2022-10-20 RX ADMIN — MORPHINE SULFATE 4 MG: 2 INJECTION, SOLUTION INTRAMUSCULAR; INTRAVENOUS at 23:59

## 2022-10-20 NOTE — ANESTHESIA PROCEDURE NOTES
Diagnostic IntraOp Anesthesia Logan    Procedure Performed: Diagnostic IntraOp Anesthesia [place]       Start Time:        End Time:      Preanesthesia Checklist:  Patient identified, IV assessed, risks and benefits discussed, monitors and equipment assessed, procedure being performed at surgeon's request and anesthesia consent obtained.    General Procedure Information  LOGAN Placed for monitoring purposes only -- This is not a diagnostic LOGAN        Anesthesia Information      Echocardiogram Comments:       LOGAN placed easily x 1 attempt  DMP

## 2022-10-20 NOTE — ANESTHESIA PREPROCEDURE EVALUATION
Anesthesia Evaluation     Patient summary reviewed and Nursing notes reviewed                Airway   Mallampati: II  TM distance: >3 FB  Dental      Pulmonary    (+) a smoker Current, COPD,   Cardiovascular     ECG reviewed  PT is on anticoagulation therapy  Patient on routine beta blocker and Beta blocker given within 24 hours of surgery  Rhythm: regular  Rate: normal    (+) hypertension, past MI , CAD, angina, hyperlipidemia,       Neuro/Psych  (+) psychiatric history Depression,    GI/Hepatic/Renal/Endo    (+) obesity,  GERD,  diabetes mellitus type 2 using insulin,     Musculoskeletal     Abdominal    Substance History - negative use     OB/GYN negative ob/gyn ROS         Other   arthritis,                      Anesthesia Plan    ASA 4     general, [place] and CVL     (I have reviewed the patient's history with the patient and the chart, including all pertinent laboratory results and imaging. I have explained the risks of anesthesia including but not limited to dental damage, corneal abrasion, nerve injury, MI, stroke, and death. Questions asked and answered. Anesthetic plan discussed with patient and team as indicated. Patient expressed understanding of the above.  )  intravenous induction     Anesthetic plan, risks, benefits, and alternatives have been provided, discussed and informed consent has been obtained with: patient.        CODE STATUS:

## 2022-10-20 NOTE — ANESTHESIA PROCEDURE NOTES
Central Line    Pre-sedation assessment completed: 10/20/2022 10:22 AM    Patient reassessed immediately prior to procedure    Patient location during procedure: holding area  Start time: 10/20/2022 10:22 AM  Stop Time:10/20/2022 10:32 AM  Indications: vascular access and MD/Surgeon request  Staff  Anesthesiologist: Pasha Swann MD  Preanesthetic Checklist  Completed: patient identified, IV checked, site marked, risks and benefits discussed, surgical consent, monitors and equipment checked, pre-op evaluation and timeout performed  Central Line Prep  Sterile Tech:cap, gloves, gown, mask and sterile barriers  Prep: Betadine  Patient monitoring: blood pressure monitoring, continuous pulse oximetry and EKG  Central Line Procedure  Location:internal jugular  Catheter Type:[place]-Chandan  Guidance:ultrasound guided and landmark technique  PROCEDURE NOTE/ULTRASOUND INTERPRETATION.  Using ultrasound guidance the potential vascular sites for insertion of the catheter were visualized to determine the patency of the vessel to be used for vascular access.  After selecting the appropriate site for insertion, the needle was visualized under ultrasound being inserted into the internal jugular vein, followed by ultrasound confirmation of wire and catheter placement. There were no abnormalities seen on ultrasound; an image was taken; and the patient tolerated the procedure with no complications.   Assessment  Post procedure:biopatch applied, line sutured, occlusive dressing applied and secured with tape  Assessement:blood return through all ports, free fluid flow and chest x-ray ordered  Complications:no  Patient Tolerance:patient tolerated the procedure well with no apparent complications

## 2022-10-20 NOTE — ANESTHESIA POSTPROCEDURE EVALUATION
Patient: Ilya Colon    Procedure Summary     Date: 10/20/22 Room / Location: [address] CARDIOVASCULAR OPERATING ROOM    Anesthesia Start: 1202 Anesthesia Stop: 1613    Procedure: INTRAOP NANCI, STERNOTOMY, CORONARY ARTERY BYPASS GRAFTS X 4 USING LEFT ENDOSCOPICALLY HARVESTED GREATER SAPHENOUS VEIN AND LEFT RADHA, PRP (Chest) Diagnosis:       Coronary artery disease of native heart with stable angina pectoris, unspecified vessel or lesion type (HCC)      (Coronary artery disease of native heart with stable angina pectoris, unspecified vessel or lesion type (HCC) [I25.118])    Surgeons: Jr Tutu Blackmon MD Provider: Adelfo Lane MD    Anesthesia Type: general, [place], CVL ASA Status: 4          Anesthesia Type: general, [place], CVL    Vitals  Vitals Value Taken Time   /58 10/20/22 1701   Temp 35.7 °C (96.26 °F) 10/20/22 1755   Pulse 80 10/20/22 1755   Resp 14 10/20/22 1610   SpO2 98 % 10/20/22 1755   Vitals shown include unvalidated device data.        Post Anesthesia Care and Evaluation    Patient location during evaluation: ICU  Patient participation: waiting for patient participation    Airway patency: patent    Cardiovascular status: acceptable  Respiratory status: acceptable, ETT, intubated and ventilator  Hydration status: acceptable

## 2022-10-20 NOTE — ANESTHESIA PROCEDURE NOTES
Central Line    Pre-sedation assessment completed: 10/20/2022 10:00 AM    Patient reassessed immediately prior to procedure    Patient location during procedure: holding area  Start time: 10/20/2022 10:00 AM  Stop Time:10/20/2022 10:22 AM  Indications: vascular access and MD/Surgeon request  Staff  Anesthesiologist: Pasha Swann MD  Preanesthetic Checklist  Completed: patient identified, IV checked, site marked, risks and benefits discussed, surgical consent, monitors and equipment checked, pre-op evaluation and timeout performed  Central Line Prep  Sterile Tech:cap, gloves, gown, mask and sterile barriers  Prep: Betadine  Patient monitoring: blood pressure monitoring, continuous pulse oximetry and EKG  Central Line Procedure  Laterality:right  Location:internal jugular  Catheter Type:Cordis and double lumen  Catheter Size:9 Fr  Guidance:ultrasound guided and landmark technique  PROCEDURE NOTE/ULTRASOUND INTERPRETATION.  Using ultrasound guidance the potential vascular sites for insertion of the catheter were visualized to determine the patency of the vessel to be used for vascular access.  After selecting the appropriate site for insertion, the needle was visualized under ultrasound being inserted into the internal jugular vein, followed by ultrasound confirmation of wire and catheter placement. There were no abnormalities seen on ultrasound; an image was taken; and the patient tolerated the procedure with no complications.   Assessment  Post procedure:biopatch applied, line sutured and occlusive dressing applied  Assessement:blood return through all ports, free fluid flow and chest x-ray ordered  Complications:no  Patient Tolerance:patient tolerated the procedure well with no apparent complications

## 2022-10-20 NOTE — ANESTHESIA PROCEDURE NOTES
Airway  Urgency: elective      General Information and Staff    Patient location during procedure: OR  Anesthesiologist: Adelfo Lane MD    Indications and Patient Condition    Preoxygenated: yes      Final Airway Details  Final airway type: endotracheal airway      Successful airway: ETT  Cuffed: yes   Successful intubation technique: direct laryngoscopy  Endotracheal tube insertion site: oral  Blade: Juan Ramon  Blade size: 4  ETT size (mm): 8.0  Cormack-Lehane Classification: grade IIa - partial view of glottis  Placement verified by: chest auscultation   Number of attempts at approach: 1

## 2022-10-20 NOTE — ANESTHESIA PROCEDURE NOTES
Arterial Line    Pre-sedation assessment completed: 10/20/2022 9:50 AM    Patient reassessed immediately prior to procedure    Patient location during procedure: holding area  Start time: 10/20/2022 9:50 AM  Stop Time:10/20/2022 10:00 AM       Line placed for hemodynamic monitoring.  Performed By   Anesthesiologist: Pasha Swann MD   Preanesthetic Checklist  Completed: patient identified, IV checked, risks and benefits discussed, surgical consent, monitors and equipment checked, pre-op evaluation and timeout performed  Arterial Line Prep    Sterile Tech: cap, gloves, gown, mask and sterile barriers  Prep: ChloraPrep  Patient monitoring: blood pressure monitoring, continuous pulse oximetry and EKG  Arterial Line Procedure   Laterality:left  Location:  radial artery  Catheter size: 20 G   Guidance: ultrasound guided  PROCEDURE NOTE/ULTRASOUND INTERPRETATION.  Using ultrasound guidance the potential vascular sites for insertion of the catheter were visualized to determine the patency of the vessel to be used for vascular access.  After selecting the appropriate site for insertion, the needle was visualized under ultrasound being inserted into the radial artery, followed by ultrasound confirmation of wire and catheter placement. There were no abnormalities seen on ultrasound; an image was taken; and the patient tolerated the procedure with no complications.   Number of attempts: 1  Successful placement: yes   Post Assessment   Dressing Type: occlusive dressing applied, secured with tape and wrist guard applied.   Complications no  Circ/Move/Sens Assessment: normal.   Patient Tolerance: patient tolerated the procedure well with no apparent complications  Additional Notes  Using ultrasound guidance, the potential vascular sites for insertion of the catheter were visualized to determine the patency of the vessel to be used for vascular access.  After selecting the appropriate site for insertion, the needle was  visualized under ultrasound being inserted into the artery, followed by ultrasound confirmation of wire and catheter placement.  There were no abnormalities seen on ultrasound; an image was taken and the patient tolerated the procedure with no apparent complications.

## 2022-10-21 ENCOUNTER — APPOINTMENT (OUTPATIENT)
Dept: GENERAL RADIOLOGY | Facility: HOSPITAL | Age: 52
End: 2022-10-21

## 2022-10-21 LAB
ACT BLD: 109 SECONDS (ref 82–152)
ACT BLD: 115 SECONDS (ref 82–152)
ACT BLD: 126 SECONDS (ref 82–152)
ACT BLD: 335 SECONDS (ref 82–152)
ACT BLD: 410 SECONDS (ref 82–152)
ACT BLD: 549 SECONDS (ref 82–152)
ALBUMIN SERPL-MCNC: 5 G/DL (ref 3.5–5.2)
ANION GAP SERPL CALCULATED.3IONS-SCNC: 14.1 MMOL/L (ref 5–15)
ARTERIAL PATENCY WRIST A: ABNORMAL
ARTERIAL PATENCY WRIST A: ABNORMAL
ATMOSPHERIC PRESS: 751.2 MMHG
ATMOSPHERIC PRESS: 751.4 MMHG
BASE EXCESS BLDA CALC-SCNC: -1 MMOL/L (ref -5–5)
BASE EXCESS BLDA CALC-SCNC: -1 MMOL/L (ref -5–5)
BASE EXCESS BLDA CALC-SCNC: -2.3 MMOL/L (ref 0–2)
BASE EXCESS BLDA CALC-SCNC: -3 MMOL/L (ref -5–5)
BASE EXCESS BLDA CALC-SCNC: -4.3 MMOL/L (ref 0–2)
BASE EXCESS BLDA CALC-SCNC: 1 MMOL/L (ref -5–5)
BASE EXCESS BLDA CALC-SCNC: 2 MMOL/L (ref -5–5)
BASE EXCESS BLDA CALC-SCNC: 3 MMOL/L (ref -5–5)
BASOPHILS # BLD AUTO: 0.08 10*3/MM3 (ref 0–0.2)
BASOPHILS NFR BLD AUTO: 0.7 % (ref 0–1.5)
BDY SITE: ABNORMAL
BDY SITE: ABNORMAL
BUN SERPL-MCNC: 18 MG/DL (ref 6–20)
BUN/CREAT SERPL: 12.2 (ref 7–25)
CA-I BLD-MCNC: 5.1 MG/DL (ref 4.6–5.4)
CA-I BLDA-SCNC: ABNORMAL MMOL/L
CA-I SERPL ISE-MCNC: 1.27 MMOL/L (ref 1.15–1.35)
CALCIUM SPEC-SCNC: 9.5 MG/DL (ref 8.6–10.5)
CHLORIDE SERPL-SCNC: 107 MMOL/L (ref 98–107)
CO2 BLDA-SCNC: 25 MMOL/L (ref 24–29)
CO2 BLDA-SCNC: 26 MMOL/L (ref 24–29)
CO2 BLDA-SCNC: 26 MMOL/L (ref 24–29)
CO2 BLDA-SCNC: 27 MMOL/L (ref 24–29)
CO2 BLDA-SCNC: 28 MMOL/L (ref 24–29)
CO2 BLDA-SCNC: 29 MMOL/L (ref 24–29)
CO2 SERPL-SCNC: 20.9 MMOL/L (ref 22–29)
CREAT SERPL-MCNC: 1.47 MG/DL (ref 0.76–1.27)
DEPRECATED RDW RBC AUTO: 40.8 FL (ref 37–54)
DEPRECATED RDW RBC AUTO: 42.1 FL (ref 37–54)
EGFRCR SERPLBLD CKD-EPI 2021: 57.4 ML/MIN/1.73
EOSINOPHIL # BLD AUTO: 0.07 10*3/MM3 (ref 0–0.4)
EOSINOPHIL NFR BLD AUTO: 0.6 % (ref 0.3–6.2)
ERYTHROCYTE [DISTWIDTH] IN BLOOD BY AUTOMATED COUNT: 13 % (ref 12.3–15.4)
ERYTHROCYTE [DISTWIDTH] IN BLOOD BY AUTOMATED COUNT: 13.1 % (ref 12.3–15.4)
GLUCOSE BLDC GLUCOMTR-MCNC: 108 MG/DL (ref 70–130)
GLUCOSE BLDC GLUCOMTR-MCNC: 110 MG/DL (ref 70–130)
GLUCOSE BLDC GLUCOMTR-MCNC: 121 MG/DL (ref 70–130)
GLUCOSE BLDC GLUCOMTR-MCNC: 145 MG/DL (ref 70–130)
GLUCOSE BLDC GLUCOMTR-MCNC: 147 MG/DL (ref 70–130)
GLUCOSE BLDC GLUCOMTR-MCNC: 169 MG/DL (ref 70–130)
GLUCOSE BLDC GLUCOMTR-MCNC: 175 MG/DL (ref 70–130)
GLUCOSE BLDC GLUCOMTR-MCNC: 183 MG/DL (ref 70–130)
GLUCOSE BLDC GLUCOMTR-MCNC: 219 MG/DL (ref 70–130)
GLUCOSE BLDC GLUCOMTR-MCNC: 233 MG/DL (ref 70–130)
GLUCOSE BLDC GLUCOMTR-MCNC: 322 MG/DL (ref 70–130)
GLUCOSE BLDC GLUCOMTR-MCNC: 97 MG/DL (ref 70–130)
GLUCOSE BLDC GLUCOMTR-MCNC: 98 MG/DL (ref 70–130)
GLUCOSE SERPL-MCNC: 189 MG/DL (ref 65–99)
HCO3 BLDA-SCNC: 22 MMOL/L (ref 22–28)
HCO3 BLDA-SCNC: 23.3 MMOL/L (ref 22–26)
HCO3 BLDA-SCNC: 23.4 MMOL/L (ref 22–28)
HCO3 BLDA-SCNC: 24.3 MMOL/L (ref 22–26)
HCO3 BLDA-SCNC: 24.5 MMOL/L (ref 22–26)
HCO3 BLDA-SCNC: 25.8 MMOL/L (ref 22–26)
HCO3 BLDA-SCNC: 26.5 MMOL/L (ref 22–26)
HCO3 BLDA-SCNC: 27.5 MMOL/L (ref 22–26)
HCT VFR BLD AUTO: 30.3 % (ref 37.5–51)
HCT VFR BLD AUTO: 31.1 % (ref 37.5–51)
HCT VFR BLDA CALC: 26 % (ref 38–51)
HCT VFR BLDA CALC: 27 % (ref 38–51)
HCT VFR BLDA CALC: 27 % (ref 38–51)
HCT VFR BLDA CALC: 28 % (ref 38–51)
HCT VFR BLDA CALC: 33 % (ref 38–51)
HCT VFR BLDA CALC: 38 % (ref 38–51)
HGB BLD-MCNC: 10.6 G/DL (ref 13–17.7)
HGB BLD-MCNC: 10.9 G/DL (ref 13–17.7)
HGB BLDA-MCNC: 11.2 G/DL (ref 12–17)
HGB BLDA-MCNC: 12.9 G/DL (ref 12–17)
HGB BLDA-MCNC: 8.8 G/DL (ref 12–17)
HGB BLDA-MCNC: 9.2 G/DL (ref 12–17)
HGB BLDA-MCNC: 9.2 G/DL (ref 12–17)
HGB BLDA-MCNC: 9.5 G/DL (ref 12–17)
IMM GRANULOCYTES # BLD AUTO: 0.04 10*3/MM3 (ref 0–0.05)
IMM GRANULOCYTES NFR BLD AUTO: 0.3 % (ref 0–0.5)
INHALED O2 CONCENTRATION: 40 %
INHALED O2 CONCENTRATION: 40 %
INR PPP: 1.17 (ref 0.9–1.1)
LYMPHOCYTES # BLD AUTO: 0.94 10*3/MM3 (ref 0.7–3.1)
LYMPHOCYTES NFR BLD AUTO: 8 % (ref 19.6–45.3)
MCH RBC QN AUTO: 30.3 PG (ref 26.6–33)
MCH RBC QN AUTO: 30.8 PG (ref 26.6–33)
MCHC RBC AUTO-ENTMCNC: 35 G/DL (ref 31.5–35.7)
MCHC RBC AUTO-ENTMCNC: 35 G/DL (ref 31.5–35.7)
MCV RBC AUTO: 86.6 FL (ref 79–97)
MCV RBC AUTO: 87.9 FL (ref 79–97)
MODALITY: ABNORMAL
MODALITY: ABNORMAL
MONOCYTES # BLD AUTO: 1.05 10*3/MM3 (ref 0.1–0.9)
MONOCYTES NFR BLD AUTO: 9 % (ref 5–12)
NEUTROPHILS NFR BLD AUTO: 81.4 % (ref 42.7–76)
NEUTROPHILS NFR BLD AUTO: 9.54 10*3/MM3 (ref 1.7–7)
NRBC BLD AUTO-RTO: 0 /100 WBC (ref 0–0.2)
O2 A-A PPRESDIFF RESPIRATORY: 0.7 MMHG
O2 A-A PPRESDIFF RESPIRATORY: 0.7 MMHG
PCO2 BLDA: 41.3 MM HG (ref 35–45)
PCO2 BLDA: 41.7 MM HG (ref 35–45)
PCO2 BLDA: 42.9 MM HG (ref 35–45)
PCO2 BLDA: 43.1 MM HG (ref 35–45)
PCO2 BLDA: 43.2 MM HG (ref 35–45)
PCO2 BLDA: 44.8 MM HG (ref 35–45)
PCO2 BLDA: 44.8 MM HG (ref 35–45)
PCO2 BLDA: 46.8 MM HG (ref 35–45)
PEEP RESPIRATORY: 7.5 CM[H2O]
PEEP RESPIRATORY: 7.5 CM[H2O]
PH BLDA: 7.3 PH UNITS (ref 7.35–7.45)
PH BLDA: 7.31 PH UNITS (ref 7.35–7.6)
PH BLDA: 7.34 PH UNITS (ref 7.35–7.45)
PH BLDA: 7.38 PH UNITS (ref 7.35–7.6)
PH BLDA: 7.39 PH UNITS (ref 7.35–7.6)
PH BLDA: 7.4 PH UNITS (ref 7.35–7.6)
PH BLDA: 7.41 PH UNITS (ref 7.35–7.6)
PH BLDA: 7.41 PH UNITS (ref 7.35–7.6)
PHOSPHATE SERPL-MCNC: 2.6 MG/DL (ref 2.5–4.5)
PLATELET # BLD AUTO: 182 10*3/MM3 (ref 140–450)
PLATELET # BLD AUTO: 183 10*3/MM3 (ref 140–450)
PMV BLD AUTO: 10.7 FL (ref 6–12)
PMV BLD AUTO: 11.3 FL (ref 6–12)
PO2 BLDA: 164.6 MM HG (ref 80–100)
PO2 BLDA: 168.7 MM HG (ref 80–100)
PO2 BLDA: 341 MMHG (ref 80–105)
PO2 BLDA: 386 MMHG (ref 80–105)
PO2 BLDA: 39 MMHG (ref 80–105)
PO2 BLDA: 404 MMHG (ref 80–105)
PO2 BLDA: 41 MMHG (ref 80–105)
PO2 BLDA: 451 MMHG (ref 80–105)
POTASSIUM BLDA-SCNC: 3.5 MMOL/L (ref 3.5–4.9)
POTASSIUM BLDA-SCNC: 3.5 MMOL/L (ref 3.5–4.9)
POTASSIUM BLDA-SCNC: 3.8 MMOL/L (ref 3.5–4.9)
POTASSIUM BLDA-SCNC: 4 MMOL/L (ref 3.5–4.9)
POTASSIUM BLDA-SCNC: 4.1 MMOL/L (ref 3.5–4.9)
POTASSIUM BLDA-SCNC: 4.7 MMOL/L (ref 3.5–4.9)
POTASSIUM SERPL-SCNC: 4.2 MMOL/L (ref 3.5–5.2)
PROTHROMBIN TIME: 15 SECONDS (ref 11.7–14.2)
PSV: 6 CMH2O
PSV: 6 CMH2O
QT INTERVAL: 378 MS
RBC # BLD AUTO: 3.5 10*6/MM3 (ref 4.14–5.8)
RBC # BLD AUTO: 3.54 10*6/MM3 (ref 4.14–5.8)
SAO2 % BLDA: 100 % (ref 95–98)
SAO2 % BLDA: 68 % (ref 95–98)
SAO2 % BLDA: 75 % (ref 95–98)
SAO2 % BLDCOA: 99.3 % (ref 92–99)
SAO2 % BLDCOA: 99.4 % (ref 92–99)
SODIUM SERPL-SCNC: 142 MMOL/L (ref 136–145)
TOTAL RATE: 12 BREATHS/MINUTE
TOTAL RATE: 14 BREATHS/MINUTE
VENTILATOR MODE: ABNORMAL
VENTILATOR MODE: ABNORMAL
VT ON VENT VENT: 492 ML
VT ON VENT VENT: 527 ML
WBC NRBC COR # BLD: 11.69 10*3/MM3 (ref 3.4–10.8)
WBC NRBC COR # BLD: 11.72 10*3/MM3 (ref 3.4–10.8)

## 2022-10-21 PROCEDURE — 82962 GLUCOSE BLOOD TEST: CPT

## 2022-10-21 PROCEDURE — 71045 X-RAY EXAM CHEST 1 VIEW: CPT

## 2022-10-21 PROCEDURE — 85025 COMPLETE CBC W/AUTO DIFF WBC: CPT | Performed by: NURSE PRACTITIONER

## 2022-10-21 PROCEDURE — 94799 UNLISTED PULMONARY SVC/PX: CPT

## 2022-10-21 PROCEDURE — 25010000002 ONDANSETRON PER 1 MG: Performed by: NURSE PRACTITIONER

## 2022-10-21 PROCEDURE — 25010000002 FUROSEMIDE PER 20 MG: Performed by: NURSE PRACTITIONER

## 2022-10-21 PROCEDURE — 25010000002 MAGNESIUM SULFATE IN D5W 1G/100ML (PREMIX) 1-5 GM/100ML-% SOLUTION: Performed by: NURSE PRACTITIONER

## 2022-10-21 PROCEDURE — 25010000002 METOCLOPRAMIDE PER 10 MG: Performed by: NURSE PRACTITIONER

## 2022-10-21 PROCEDURE — 85610 PROTHROMBIN TIME: CPT | Performed by: NURSE PRACTITIONER

## 2022-10-21 PROCEDURE — 93010 ELECTROCARDIOGRAM REPORT: CPT | Performed by: INTERNAL MEDICINE

## 2022-10-21 PROCEDURE — P9041 ALBUMIN (HUMAN),5%, 50ML: HCPCS | Performed by: NURSE PRACTITIONER

## 2022-10-21 PROCEDURE — 82803 BLOOD GASES ANY COMBINATION: CPT

## 2022-10-21 PROCEDURE — 25010000002 HYDRALAZINE PER 20 MG: Performed by: NURSE PRACTITIONER

## 2022-10-21 PROCEDURE — 93005 ELECTROCARDIOGRAM TRACING: CPT | Performed by: NURSE PRACTITIONER

## 2022-10-21 PROCEDURE — 25010000002 ALBUMIN HUMAN 5% PER 50 ML: Performed by: NURSE PRACTITIONER

## 2022-10-21 PROCEDURE — 25010000002 ENOXAPARIN PER 10 MG: Performed by: NURSE PRACTITIONER

## 2022-10-21 PROCEDURE — 63710000001 INSULIN LISPRO (HUMAN) PER 5 UNITS: Performed by: NURSE PRACTITIONER

## 2022-10-21 PROCEDURE — 82330 ASSAY OF CALCIUM: CPT | Performed by: NURSE PRACTITIONER

## 2022-10-21 PROCEDURE — 25010000002 CEFAZOLIN IN DEXTROSE 2-4 GM/100ML-% SOLUTION: Performed by: NURSE PRACTITIONER

## 2022-10-21 PROCEDURE — 97162 PT EVAL MOD COMPLEX 30 MIN: CPT

## 2022-10-21 PROCEDURE — 80069 RENAL FUNCTION PANEL: CPT | Performed by: THORACIC SURGERY (CARDIOTHORACIC VASCULAR SURGERY)

## 2022-10-21 PROCEDURE — 97110 THERAPEUTIC EXERCISES: CPT

## 2022-10-21 PROCEDURE — 85027 COMPLETE CBC AUTOMATED: CPT | Performed by: NURSE PRACTITIONER

## 2022-10-21 PROCEDURE — 94003 VENT MGMT INPAT SUBQ DAY: CPT

## 2022-10-21 RX ORDER — TAMSULOSIN HYDROCHLORIDE 0.4 MG/1
0.4 CAPSULE ORAL DAILY
Status: DISCONTINUED | OUTPATIENT
Start: 2022-10-21 | End: 2022-10-24 | Stop reason: HOSPADM

## 2022-10-21 RX ORDER — TRAZODONE HYDROCHLORIDE 100 MG/1
100 TABLET ORAL
Status: DISCONTINUED | OUTPATIENT
Start: 2022-10-21 | End: 2022-10-23

## 2022-10-21 RX ORDER — FUROSEMIDE 10 MG/ML
40 INJECTION INTRAMUSCULAR; INTRAVENOUS ONCE
Status: COMPLETED | OUTPATIENT
Start: 2022-10-21 | End: 2022-10-21

## 2022-10-21 RX ORDER — HYDROXYZINE HYDROCHLORIDE 25 MG/1
25 TABLET, FILM COATED ORAL EVERY 6 HOURS PRN
Status: DISCONTINUED | OUTPATIENT
Start: 2022-10-21 | End: 2022-10-24 | Stop reason: HOSPADM

## 2022-10-21 RX ORDER — INSULIN LISPRO 100 [IU]/ML
0-9 INJECTION, SOLUTION INTRAVENOUS; SUBCUTANEOUS
Status: DISCONTINUED | OUTPATIENT
Start: 2022-10-21 | End: 2022-10-23

## 2022-10-21 RX ORDER — GABAPENTIN 300 MG/1
600 CAPSULE ORAL 3 TIMES DAILY
Status: DISCONTINUED | OUTPATIENT
Start: 2022-10-21 | End: 2022-10-24 | Stop reason: HOSPADM

## 2022-10-21 RX ORDER — AMITRIPTYLINE HYDROCHLORIDE 25 MG/1
25 TABLET, FILM COATED ORAL NIGHTLY
Status: DISCONTINUED | OUTPATIENT
Start: 2022-10-21 | End: 2022-10-24 | Stop reason: HOSPADM

## 2022-10-21 RX ORDER — NICOTINE POLACRILEX 4 MG
15 LOZENGE BUCCAL
Status: DISCONTINUED | OUTPATIENT
Start: 2022-10-21 | End: 2022-10-24 | Stop reason: HOSPADM

## 2022-10-21 RX ORDER — HYDRALAZINE HYDROCHLORIDE 20 MG/ML
20 INJECTION INTRAMUSCULAR; INTRAVENOUS EVERY 6 HOURS PRN
Status: DISCONTINUED | OUTPATIENT
Start: 2022-10-21 | End: 2022-10-24 | Stop reason: HOSPADM

## 2022-10-21 RX ORDER — DEXTROSE MONOHYDRATE 25 G/50ML
25 INJECTION, SOLUTION INTRAVENOUS
Status: DISCONTINUED | OUTPATIENT
Start: 2022-10-21 | End: 2022-10-24 | Stop reason: HOSPADM

## 2022-10-21 RX ORDER — POTASSIUM CHLORIDE 750 MG/1
20 TABLET, FILM COATED, EXTENDED RELEASE ORAL ONCE
Status: DISCONTINUED | OUTPATIENT
Start: 2022-10-21 | End: 2022-10-24 | Stop reason: HOSPADM

## 2022-10-21 RX ORDER — LIDOCAINE 50 MG/G
1 PATCH TOPICAL
Status: DISCONTINUED | OUTPATIENT
Start: 2022-10-21 | End: 2022-10-24 | Stop reason: HOSPADM

## 2022-10-21 RX ORDER — OXYCODONE HYDROCHLORIDE 15 MG/1
15 TABLET ORAL EVERY 4 HOURS PRN
Status: DISCONTINUED | OUTPATIENT
Start: 2022-10-21 | End: 2022-10-24 | Stop reason: HOSPADM

## 2022-10-21 RX ADMIN — CYCLOBENZAPRINE 10 MG: 10 TABLET, FILM COATED ORAL at 07:44

## 2022-10-21 RX ADMIN — ASPIRIN 81 MG: 81 TABLET, COATED ORAL at 08:01

## 2022-10-21 RX ADMIN — GABAPENTIN 600 MG: 300 CAPSULE ORAL at 08:01

## 2022-10-21 RX ADMIN — METOPROLOL TARTRATE 25 MG: 25 TABLET, FILM COATED ORAL at 20:01

## 2022-10-21 RX ADMIN — CHLORHEXIDINE GLUCONATE 15 ML: 1.2 SOLUTION ORAL at 16:44

## 2022-10-21 RX ADMIN — MAGNESIUM SULFATE IN DEXTROSE 1 G: 10 INJECTION, SOLUTION INTRAVENOUS at 00:53

## 2022-10-21 RX ADMIN — FUROSEMIDE 40 MG: 10 INJECTION, SOLUTION INTRAMUSCULAR; INTRAVENOUS at 09:13

## 2022-10-21 RX ADMIN — HYDROCODONE BITARTRATE AND ACETAMINOPHEN 2 TABLET: 5; 325 TABLET ORAL at 20:58

## 2022-10-21 RX ADMIN — ACETAMINOPHEN 1000 MG: 10 INJECTION, SOLUTION INTRAVENOUS at 02:17

## 2022-10-21 RX ADMIN — LIDOCAINE 5% 1 PATCH: 700 PATCH TOPICAL at 10:02

## 2022-10-21 RX ADMIN — CYCLOBENZAPRINE 10 MG: 10 TABLET, FILM COATED ORAL at 23:15

## 2022-10-21 RX ADMIN — CEFAZOLIN SODIUM 2 G: 2 INJECTION, SOLUTION INTRAVENOUS at 07:44

## 2022-10-21 RX ADMIN — ALPRAZOLAM 0.25 MG: 0.25 TABLET ORAL at 23:15

## 2022-10-21 RX ADMIN — MAGNESIUM SULFATE IN DEXTROSE 1 G: 10 INJECTION, SOLUTION INTRAVENOUS at 08:02

## 2022-10-21 RX ADMIN — TAMSULOSIN HYDROCHLORIDE 0.4 MG: 0.4 CAPSULE ORAL at 10:02

## 2022-10-21 RX ADMIN — AMITRIPTYLINE HYDROCHLORIDE 25 MG: 25 TABLET, FILM COATED ORAL at 20:01

## 2022-10-21 RX ADMIN — INSULIN LISPRO 6 UNITS: 100 INJECTION, SOLUTION INTRAVENOUS; SUBCUTANEOUS at 13:25

## 2022-10-21 RX ADMIN — INSULIN LISPRO 4 UNITS: 100 INJECTION, SOLUTION INTRAVENOUS; SUBCUTANEOUS at 16:44

## 2022-10-21 RX ADMIN — GABAPENTIN 600 MG: 300 CAPSULE ORAL at 17:58

## 2022-10-21 RX ADMIN — METOCLOPRAMIDE 10 MG: 5 INJECTION, SOLUTION INTRAMUSCULAR; INTRAVENOUS at 04:30

## 2022-10-21 RX ADMIN — CEFAZOLIN SODIUM 2 G: 2 INJECTION, SOLUTION INTRAVENOUS at 22:23

## 2022-10-21 RX ADMIN — METOPROLOL TARTRATE 25 MG: 25 TABLET, FILM COATED ORAL at 08:02

## 2022-10-21 RX ADMIN — CEFAZOLIN SODIUM 2 G: 2 INJECTION, SOLUTION INTRAVENOUS at 14:50

## 2022-10-21 RX ADMIN — OXYCODONE HYDROCHLORIDE 15 MG: 15 TABLET ORAL at 10:02

## 2022-10-21 RX ADMIN — OXYCODONE HYDROCHLORIDE 15 MG: 15 TABLET ORAL at 14:07

## 2022-10-21 RX ADMIN — PANTOPRAZOLE SODIUM 40 MG: 40 TABLET, DELAYED RELEASE ORAL at 07:44

## 2022-10-21 RX ADMIN — CHLORHEXIDINE GLUCONATE 15 ML: 1.2 SOLUTION ORAL at 04:30

## 2022-10-21 RX ADMIN — METOCLOPRAMIDE 10 MG: 5 INJECTION, SOLUTION INTRAMUSCULAR; INTRAVENOUS at 10:03

## 2022-10-21 RX ADMIN — DEXMEDETOMIDINE HYDROCHLORIDE 0.3 MCG/KG/HR: 4 INJECTION, SOLUTION INTRAVENOUS at 00:59

## 2022-10-21 RX ADMIN — TRAZODONE HYDROCHLORIDE 100 MG: 100 TABLET ORAL at 10:02

## 2022-10-21 RX ADMIN — HYDROCODONE BITARTRATE AND ACETAMINOPHEN 2 TABLET: 5; 325 TABLET ORAL at 16:44

## 2022-10-21 RX ADMIN — HYDRALAZINE HYDROCHLORIDE 20 MG: 20 INJECTION INTRAMUSCULAR; INTRAVENOUS at 08:09

## 2022-10-21 RX ADMIN — GABAPENTIN 600 MG: 300 CAPSULE ORAL at 20:01

## 2022-10-21 RX ADMIN — MUPIROCIN 1 APPLICATION: 20 OINTMENT TOPICAL at 20:00

## 2022-10-21 RX ADMIN — OXYCODONE HYDROCHLORIDE 10 MG: 5 TABLET ORAL at 04:42

## 2022-10-21 RX ADMIN — ACETAMINOPHEN 1000 MG: 10 INJECTION, SOLUTION INTRAVENOUS at 08:01

## 2022-10-21 RX ADMIN — ATORVASTATIN CALCIUM 40 MG: 20 TABLET, FILM COATED ORAL at 20:01

## 2022-10-21 RX ADMIN — ONDANSETRON 4 MG: 2 INJECTION INTRAMUSCULAR; INTRAVENOUS at 09:09

## 2022-10-21 RX ADMIN — DOCUSATE SODIUM 50MG AND SENNOSIDES 8.6MG 2 TABLET: 8.6; 5 TABLET, FILM COATED ORAL at 20:01

## 2022-10-21 RX ADMIN — MUPIROCIN: 20 OINTMENT TOPICAL at 08:02

## 2022-10-21 RX ADMIN — ALBUMIN (HUMAN) 250 ML: 12.5 INJECTION, SOLUTION INTRAVENOUS at 04:34

## 2022-10-21 RX ADMIN — ENOXAPARIN SODIUM 30 MG: 30 INJECTION SUBCUTANEOUS at 17:58

## 2022-10-22 ENCOUNTER — APPOINTMENT (OUTPATIENT)
Dept: GENERAL RADIOLOGY | Facility: HOSPITAL | Age: 52
End: 2022-10-22

## 2022-10-22 LAB
ANION GAP SERPL CALCULATED.3IONS-SCNC: 13.9 MMOL/L (ref 5–15)
BUN SERPL-MCNC: 22 MG/DL (ref 6–20)
BUN/CREAT SERPL: 15.6 (ref 7–25)
CALCIUM SPEC-SCNC: 8.9 MG/DL (ref 8.6–10.5)
CHLORIDE SERPL-SCNC: 98 MMOL/L (ref 98–107)
CO2 SERPL-SCNC: 23.1 MMOL/L (ref 22–29)
CREAT SERPL-MCNC: 1.41 MG/DL (ref 0.76–1.27)
DEPRECATED RDW RBC AUTO: 40 FL (ref 37–54)
EGFRCR SERPLBLD CKD-EPI 2021: 60.3 ML/MIN/1.73
ERYTHROCYTE [DISTWIDTH] IN BLOOD BY AUTOMATED COUNT: 12.6 % (ref 12.3–15.4)
GLUCOSE BLDC GLUCOMTR-MCNC: 188 MG/DL (ref 70–130)
GLUCOSE BLDC GLUCOMTR-MCNC: 252 MG/DL (ref 70–130)
GLUCOSE BLDC GLUCOMTR-MCNC: 279 MG/DL (ref 70–130)
GLUCOSE BLDC GLUCOMTR-MCNC: 320 MG/DL (ref 70–130)
GLUCOSE SERPL-MCNC: 194 MG/DL (ref 65–99)
HCT VFR BLD AUTO: 27.5 % (ref 37.5–51)
HGB BLD-MCNC: 9.6 G/DL (ref 13–17.7)
MCH RBC QN AUTO: 30.1 PG (ref 26.6–33)
MCHC RBC AUTO-ENTMCNC: 34.9 G/DL (ref 31.5–35.7)
MCV RBC AUTO: 86.2 FL (ref 79–97)
PLATELET # BLD AUTO: 174 10*3/MM3 (ref 140–450)
PMV BLD AUTO: 11.3 FL (ref 6–12)
POTASSIUM SERPL-SCNC: 4 MMOL/L (ref 3.5–5.2)
QT INTERVAL: 353 MS
RBC # BLD AUTO: 3.19 10*6/MM3 (ref 4.14–5.8)
SODIUM SERPL-SCNC: 135 MMOL/L (ref 136–145)
WBC NRBC COR # BLD: 11.76 10*3/MM3 (ref 3.4–10.8)

## 2022-10-22 PROCEDURE — 93010 ELECTROCARDIOGRAM REPORT: CPT | Performed by: STUDENT IN AN ORGANIZED HEALTH CARE EDUCATION/TRAINING PROGRAM

## 2022-10-22 PROCEDURE — 82962 GLUCOSE BLOOD TEST: CPT

## 2022-10-22 PROCEDURE — 25010000002 ENOXAPARIN PER 10 MG: Performed by: NURSE PRACTITIONER

## 2022-10-22 PROCEDURE — 85027 COMPLETE CBC AUTOMATED: CPT | Performed by: NURSE PRACTITIONER

## 2022-10-22 PROCEDURE — 25010000002 FUROSEMIDE PER 20 MG: Performed by: NURSE PRACTITIONER

## 2022-10-22 PROCEDURE — 93005 ELECTROCARDIOGRAM TRACING: CPT | Performed by: NURSE PRACTITIONER

## 2022-10-22 PROCEDURE — 63710000001 INSULIN LISPRO (HUMAN) PER 5 UNITS: Performed by: NURSE PRACTITIONER

## 2022-10-22 PROCEDURE — 71045 X-RAY EXAM CHEST 1 VIEW: CPT

## 2022-10-22 PROCEDURE — 25010000002 CEFAZOLIN IN DEXTROSE 2-4 GM/100ML-% SOLUTION: Performed by: NURSE PRACTITIONER

## 2022-10-22 PROCEDURE — 80048 BASIC METABOLIC PNL TOTAL CA: CPT | Performed by: NURSE PRACTITIONER

## 2022-10-22 RX ORDER — POTASSIUM CHLORIDE 750 MG/1
20 TABLET, FILM COATED, EXTENDED RELEASE ORAL ONCE
Status: COMPLETED | OUTPATIENT
Start: 2022-10-22 | End: 2022-10-22

## 2022-10-22 RX ORDER — METOPROLOL TARTRATE 50 MG/1
50 TABLET, FILM COATED ORAL EVERY 12 HOURS SCHEDULED
Status: DISCONTINUED | OUTPATIENT
Start: 2022-10-22 | End: 2022-10-24

## 2022-10-22 RX ORDER — FUROSEMIDE 10 MG/ML
40 INJECTION INTRAMUSCULAR; INTRAVENOUS ONCE
Status: COMPLETED | OUTPATIENT
Start: 2022-10-22 | End: 2022-10-22

## 2022-10-22 RX ADMIN — LIDOCAINE 5% 1 PATCH: 700 PATCH TOPICAL at 08:56

## 2022-10-22 RX ADMIN — CEFAZOLIN SODIUM 2 G: 2 INJECTION, SOLUTION INTRAVENOUS at 06:02

## 2022-10-22 RX ADMIN — PANTOPRAZOLE SODIUM 40 MG: 40 TABLET, DELAYED RELEASE ORAL at 06:01

## 2022-10-22 RX ADMIN — FUROSEMIDE 40 MG: 10 INJECTION, SOLUTION INTRAMUSCULAR; INTRAVENOUS at 10:53

## 2022-10-22 RX ADMIN — INSULIN LISPRO 7 UNITS: 100 INJECTION, SOLUTION INTRAVENOUS; SUBCUTANEOUS at 21:35

## 2022-10-22 RX ADMIN — POTASSIUM CHLORIDE 20 MEQ: 750 TABLET, EXTENDED RELEASE ORAL at 10:53

## 2022-10-22 RX ADMIN — GABAPENTIN 600 MG: 300 CAPSULE ORAL at 20:16

## 2022-10-22 RX ADMIN — MUPIROCIN 1 APPLICATION: 20 OINTMENT TOPICAL at 08:51

## 2022-10-22 RX ADMIN — TAMSULOSIN HYDROCHLORIDE 0.4 MG: 0.4 CAPSULE ORAL at 08:51

## 2022-10-22 RX ADMIN — HYDROCODONE BITARTRATE AND ACETAMINOPHEN 2 TABLET: 5; 325 TABLET ORAL at 21:35

## 2022-10-22 RX ADMIN — METOPROLOL TARTRATE 25 MG: 25 TABLET, FILM COATED ORAL at 08:51

## 2022-10-22 RX ADMIN — TRAZODONE HYDROCHLORIDE 100 MG: 100 TABLET ORAL at 08:51

## 2022-10-22 RX ADMIN — HYDROCODONE BITARTRATE AND ACETAMINOPHEN 2 TABLET: 5; 325 TABLET ORAL at 10:53

## 2022-10-22 RX ADMIN — INSULIN LISPRO 2 UNITS: 100 INJECTION, SOLUTION INTRAVENOUS; SUBCUTANEOUS at 07:42

## 2022-10-22 RX ADMIN — CHLORHEXIDINE GLUCONATE 15 ML: 1.2 SOLUTION ORAL at 16:57

## 2022-10-22 RX ADMIN — HYDROCODONE BITARTRATE AND ACETAMINOPHEN 2 TABLET: 5; 325 TABLET ORAL at 06:02

## 2022-10-22 RX ADMIN — AMITRIPTYLINE HYDROCHLORIDE 25 MG: 25 TABLET, FILM COATED ORAL at 20:15

## 2022-10-22 RX ADMIN — MUPIROCIN 1 APPLICATION: 20 OINTMENT TOPICAL at 20:16

## 2022-10-22 RX ADMIN — HYDROCODONE BITARTRATE AND ACETAMINOPHEN 2 TABLET: 5; 325 TABLET ORAL at 17:42

## 2022-10-22 RX ADMIN — INSULIN LISPRO 6 UNITS: 100 INJECTION, SOLUTION INTRAVENOUS; SUBCUTANEOUS at 16:57

## 2022-10-22 RX ADMIN — CHLORHEXIDINE GLUCONATE 15 ML: 1.2 SOLUTION ORAL at 06:31

## 2022-10-22 RX ADMIN — GABAPENTIN 600 MG: 300 CAPSULE ORAL at 16:57

## 2022-10-22 RX ADMIN — ASPIRIN 81 MG: 81 TABLET, COATED ORAL at 08:51

## 2022-10-22 RX ADMIN — METOPROLOL TARTRATE 50 MG: 50 TABLET, FILM COATED ORAL at 20:15

## 2022-10-22 RX ADMIN — DOCUSATE SODIUM 50MG AND SENNOSIDES 8.6MG 2 TABLET: 8.6; 5 TABLET, FILM COATED ORAL at 20:15

## 2022-10-22 RX ADMIN — ENOXAPARIN SODIUM 30 MG: 30 INJECTION SUBCUTANEOUS at 08:51

## 2022-10-22 RX ADMIN — GABAPENTIN 600 MG: 300 CAPSULE ORAL at 08:51

## 2022-10-22 RX ADMIN — HYDROCODONE BITARTRATE AND ACETAMINOPHEN 2 TABLET: 5; 325 TABLET ORAL at 02:06

## 2022-10-22 RX ADMIN — ATORVASTATIN CALCIUM 40 MG: 20 TABLET, FILM COATED ORAL at 20:16

## 2022-10-22 RX ADMIN — INSULIN LISPRO 6 UNITS: 100 INJECTION, SOLUTION INTRAVENOUS; SUBCUTANEOUS at 12:00

## 2022-10-23 ENCOUNTER — APPOINTMENT (OUTPATIENT)
Dept: GENERAL RADIOLOGY | Facility: HOSPITAL | Age: 52
End: 2022-10-23

## 2022-10-23 LAB
ANION GAP SERPL CALCULATED.3IONS-SCNC: 9.2 MMOL/L (ref 5–15)
BH BB BLOOD EXPIRATION DATE: NORMAL
BH BB BLOOD EXPIRATION DATE: NORMAL
BH BB BLOOD TYPE BARCODE: 6200
BH BB BLOOD TYPE BARCODE: 6200
BH BB DISPENSE STATUS: NORMAL
BH BB DISPENSE STATUS: NORMAL
BH BB PRODUCT CODE: NORMAL
BH BB PRODUCT CODE: NORMAL
BH BB UNIT NUMBER: NORMAL
BH BB UNIT NUMBER: NORMAL
BUN SERPL-MCNC: 28 MG/DL (ref 6–20)
BUN/CREAT SERPL: 17.9 (ref 7–25)
CALCIUM SPEC-SCNC: 9.2 MG/DL (ref 8.6–10.5)
CHLORIDE SERPL-SCNC: 99 MMOL/L (ref 98–107)
CO2 SERPL-SCNC: 27.8 MMOL/L (ref 22–29)
CREAT SERPL-MCNC: 1.56 MG/DL (ref 0.76–1.27)
CROSSMATCH INTERPRETATION: NORMAL
CROSSMATCH INTERPRETATION: NORMAL
DEPRECATED RDW RBC AUTO: 42.7 FL (ref 37–54)
EGFRCR SERPLBLD CKD-EPI 2021: 53.4 ML/MIN/1.73
ERYTHROCYTE [DISTWIDTH] IN BLOOD BY AUTOMATED COUNT: 12.8 % (ref 12.3–15.4)
GLUCOSE BLDC GLUCOMTR-MCNC: 133 MG/DL (ref 70–130)
GLUCOSE BLDC GLUCOMTR-MCNC: 188 MG/DL (ref 70–130)
GLUCOSE BLDC GLUCOMTR-MCNC: 208 MG/DL (ref 70–130)
GLUCOSE BLDC GLUCOMTR-MCNC: 237 MG/DL (ref 70–130)
GLUCOSE SERPL-MCNC: 237 MG/DL (ref 65–99)
HCT VFR BLD AUTO: 28.6 % (ref 37.5–51)
HGB BLD-MCNC: 9.4 G/DL (ref 13–17.7)
MCH RBC QN AUTO: 30 PG (ref 26.6–33)
MCHC RBC AUTO-ENTMCNC: 32.9 G/DL (ref 31.5–35.7)
MCV RBC AUTO: 91.4 FL (ref 79–97)
PLATELET # BLD AUTO: 202 10*3/MM3 (ref 140–450)
PMV BLD AUTO: 11.8 FL (ref 6–12)
POTASSIUM SERPL-SCNC: 4.3 MMOL/L (ref 3.5–5.2)
RBC # BLD AUTO: 3.13 10*6/MM3 (ref 4.14–5.8)
SODIUM SERPL-SCNC: 136 MMOL/L (ref 136–145)
UNIT  ABO: NORMAL
UNIT  ABO: NORMAL
UNIT  RH: NORMAL
UNIT  RH: NORMAL
WBC NRBC COR # BLD: 11.41 10*3/MM3 (ref 3.4–10.8)

## 2022-10-23 PROCEDURE — 71045 X-RAY EXAM CHEST 1 VIEW: CPT

## 2022-10-23 PROCEDURE — 80048 BASIC METABOLIC PNL TOTAL CA: CPT | Performed by: NURSE PRACTITIONER

## 2022-10-23 PROCEDURE — 82962 GLUCOSE BLOOD TEST: CPT

## 2022-10-23 PROCEDURE — 25010000002 ENOXAPARIN PER 10 MG: Performed by: NURSE PRACTITIONER

## 2022-10-23 PROCEDURE — 85027 COMPLETE CBC AUTOMATED: CPT | Performed by: NURSE PRACTITIONER

## 2022-10-23 PROCEDURE — 97530 THERAPEUTIC ACTIVITIES: CPT

## 2022-10-23 PROCEDURE — 63710000001 INSULIN LISPRO (HUMAN) PER 5 UNITS: Performed by: NURSE PRACTITIONER

## 2022-10-23 PROCEDURE — 71046 X-RAY EXAM CHEST 2 VIEWS: CPT

## 2022-10-23 RX ORDER — TRAZODONE HYDROCHLORIDE 100 MG/1
100 TABLET ORAL NIGHTLY
Status: DISCONTINUED | OUTPATIENT
Start: 2022-10-23 | End: 2022-10-24 | Stop reason: HOSPADM

## 2022-10-23 RX ORDER — INSULIN LISPRO 100 [IU]/ML
0-14 INJECTION, SOLUTION INTRAVENOUS; SUBCUTANEOUS
Status: DISCONTINUED | OUTPATIENT
Start: 2022-10-23 | End: 2022-10-24 | Stop reason: HOSPADM

## 2022-10-23 RX ORDER — POLYETHYLENE GLYCOL 3350 17 G/17G
17 POWDER, FOR SOLUTION ORAL 2 TIMES DAILY
Status: DISCONTINUED | OUTPATIENT
Start: 2022-10-23 | End: 2022-10-24 | Stop reason: HOSPADM

## 2022-10-23 RX ORDER — AMIODARONE HYDROCHLORIDE 200 MG/1
300 TABLET ORAL EVERY 12 HOURS SCHEDULED
Status: DISCONTINUED | OUTPATIENT
Start: 2022-10-23 | End: 2022-10-24 | Stop reason: HOSPADM

## 2022-10-23 RX ORDER — FUROSEMIDE 40 MG/1
40 TABLET ORAL DAILY
Status: DISCONTINUED | OUTPATIENT
Start: 2022-10-23 | End: 2022-10-24 | Stop reason: HOSPADM

## 2022-10-23 RX ORDER — DOCUSATE SODIUM 100 MG/1
100 CAPSULE, LIQUID FILLED ORAL 2 TIMES DAILY
Status: DISCONTINUED | OUTPATIENT
Start: 2022-10-23 | End: 2022-10-24 | Stop reason: HOSPADM

## 2022-10-23 RX ORDER — INSULIN LISPRO 100 [IU]/ML
5 INJECTION, SOLUTION INTRAVENOUS; SUBCUTANEOUS
Status: DISCONTINUED | OUTPATIENT
Start: 2022-10-23 | End: 2022-10-24 | Stop reason: HOSPADM

## 2022-10-23 RX ORDER — POTASSIUM CHLORIDE 750 MG/1
20 TABLET, FILM COATED, EXTENDED RELEASE ORAL DAILY
Status: DISCONTINUED | OUTPATIENT
Start: 2022-10-23 | End: 2022-10-24 | Stop reason: HOSPADM

## 2022-10-23 RX ADMIN — DOCUSATE SODIUM 100 MG: 100 CAPSULE, LIQUID FILLED ORAL at 11:22

## 2022-10-23 RX ADMIN — POTASSIUM CHLORIDE 20 MEQ: 750 TABLET, EXTENDED RELEASE ORAL at 08:42

## 2022-10-23 RX ADMIN — TRAZODONE HYDROCHLORIDE 100 MG: 100 TABLET ORAL at 21:05

## 2022-10-23 RX ADMIN — ASPIRIN 81 MG: 81 TABLET, COATED ORAL at 08:41

## 2022-10-23 RX ADMIN — AMIODARONE HYDROCHLORIDE 300 MG: 200 TABLET ORAL at 21:03

## 2022-10-23 RX ADMIN — HYDROCODONE BITARTRATE AND ACETAMINOPHEN 2 TABLET: 5; 325 TABLET ORAL at 01:31

## 2022-10-23 RX ADMIN — PANTOPRAZOLE SODIUM 40 MG: 40 TABLET, DELAYED RELEASE ORAL at 06:43

## 2022-10-23 RX ADMIN — INSULIN GLARGINE-YFGN 25 UNITS: 100 INJECTION, SOLUTION SUBCUTANEOUS at 08:41

## 2022-10-23 RX ADMIN — HYDROCODONE BITARTRATE AND ACETAMINOPHEN 2 TABLET: 5; 325 TABLET ORAL at 05:28

## 2022-10-23 RX ADMIN — GABAPENTIN 600 MG: 300 CAPSULE ORAL at 08:42

## 2022-10-23 RX ADMIN — METOPROLOL TARTRATE 50 MG: 50 TABLET, FILM COATED ORAL at 08:41

## 2022-10-23 RX ADMIN — INSULIN GLARGINE-YFGN 25 UNITS: 100 INJECTION, SOLUTION SUBCUTANEOUS at 21:26

## 2022-10-23 RX ADMIN — ENOXAPARIN SODIUM 30 MG: 30 INJECTION SUBCUTANEOUS at 08:41

## 2022-10-23 RX ADMIN — HYDROCODONE BITARTRATE AND ACETAMINOPHEN 2 TABLET: 5; 325 TABLET ORAL at 11:22

## 2022-10-23 RX ADMIN — ALPRAZOLAM 0.25 MG: 0.25 TABLET ORAL at 03:23

## 2022-10-23 RX ADMIN — METOPROLOL TARTRATE 50 MG: 50 TABLET, FILM COATED ORAL at 21:08

## 2022-10-23 RX ADMIN — GABAPENTIN 600 MG: 300 CAPSULE ORAL at 18:07

## 2022-10-23 RX ADMIN — INSULIN LISPRO 5 UNITS: 100 INJECTION, SOLUTION INTRAVENOUS; SUBCUTANEOUS at 11:22

## 2022-10-23 RX ADMIN — INSULIN LISPRO 5 UNITS: 100 INJECTION, SOLUTION INTRAVENOUS; SUBCUTANEOUS at 18:08

## 2022-10-23 RX ADMIN — CHLORHEXIDINE GLUCONATE 15 ML: 1.2 SOLUTION ORAL at 18:09

## 2022-10-23 RX ADMIN — AMIODARONE HYDROCHLORIDE 300 MG: 200 TABLET ORAL at 11:21

## 2022-10-23 RX ADMIN — CYCLOBENZAPRINE 10 MG: 10 TABLET, FILM COATED ORAL at 03:23

## 2022-10-23 RX ADMIN — EMPAGLIFLOZIN 25 MG: 25 TABLET, FILM COATED ORAL at 12:31

## 2022-10-23 RX ADMIN — MUPIROCIN 1 APPLICATION: 20 OINTMENT TOPICAL at 08:44

## 2022-10-23 RX ADMIN — LIDOCAINE 5% 1 PATCH: 700 PATCH TOPICAL at 08:42

## 2022-10-23 RX ADMIN — MUPIROCIN: 20 OINTMENT TOPICAL at 21:10

## 2022-10-23 RX ADMIN — TAMSULOSIN HYDROCHLORIDE 0.4 MG: 0.4 CAPSULE ORAL at 08:41

## 2022-10-23 RX ADMIN — ATORVASTATIN CALCIUM 40 MG: 20 TABLET, FILM COATED ORAL at 21:02

## 2022-10-23 RX ADMIN — HYDROCODONE BITARTRATE AND ACETAMINOPHEN 2 TABLET: 5; 325 TABLET ORAL at 18:07

## 2022-10-23 RX ADMIN — AMITRIPTYLINE HYDROCHLORIDE 25 MG: 25 TABLET, FILM COATED ORAL at 21:04

## 2022-10-23 RX ADMIN — FUROSEMIDE 40 MG: 40 TABLET ORAL at 08:41

## 2022-10-23 RX ADMIN — CHLORHEXIDINE GLUCONATE 15 ML: 1.2 SOLUTION ORAL at 06:43

## 2022-10-23 RX ADMIN — GABAPENTIN 600 MG: 300 CAPSULE ORAL at 21:03

## 2022-10-23 RX ADMIN — INSULIN LISPRO 4 UNITS: 100 INJECTION, SOLUTION INTRAVENOUS; SUBCUTANEOUS at 06:43

## 2022-10-23 RX ADMIN — POLYETHYLENE GLYCOL 3350 17 G: 17 POWDER, FOR SOLUTION ORAL at 11:22

## 2022-10-23 RX ADMIN — INSULIN LISPRO 3 UNITS: 100 INJECTION, SOLUTION INTRAVENOUS; SUBCUTANEOUS at 18:08

## 2022-10-24 ENCOUNTER — READMISSION MANAGEMENT (OUTPATIENT)
Dept: CALL CENTER | Facility: HOSPITAL | Age: 52
End: 2022-10-24

## 2022-10-24 VITALS
BODY MASS INDEX: 34.22 KG/M2 | OXYGEN SATURATION: 96 % | SYSTOLIC BLOOD PRESSURE: 143 MMHG | HEIGHT: 66 IN | DIASTOLIC BLOOD PRESSURE: 76 MMHG | HEART RATE: 93 BPM | WEIGHT: 212.9 LBS | TEMPERATURE: 98.7 F | RESPIRATION RATE: 18 BRPM

## 2022-10-24 LAB
ANION GAP SERPL CALCULATED.3IONS-SCNC: 12.5 MMOL/L (ref 5–15)
BUN SERPL-MCNC: 26 MG/DL (ref 6–20)
BUN/CREAT SERPL: 19.4 (ref 7–25)
CALCIUM SPEC-SCNC: 9.3 MG/DL (ref 8.6–10.5)
CHLORIDE SERPL-SCNC: 97 MMOL/L (ref 98–107)
CO2 SERPL-SCNC: 27.5 MMOL/L (ref 22–29)
CREAT SERPL-MCNC: 1.34 MG/DL (ref 0.76–1.27)
DEPRECATED RDW RBC AUTO: 41.6 FL (ref 37–54)
EGFRCR SERPLBLD CKD-EPI 2021: 64.1 ML/MIN/1.73
ERYTHROCYTE [DISTWIDTH] IN BLOOD BY AUTOMATED COUNT: 12.6 % (ref 12.3–15.4)
GLUCOSE BLDC GLUCOMTR-MCNC: 129 MG/DL (ref 70–130)
GLUCOSE BLDC GLUCOMTR-MCNC: 80 MG/DL (ref 70–130)
GLUCOSE SERPL-MCNC: 91 MG/DL (ref 65–99)
HCT VFR BLD AUTO: 27.4 % (ref 37.5–51)
HGB BLD-MCNC: 9.3 G/DL (ref 13–17.7)
MCH RBC QN AUTO: 30.1 PG (ref 26.6–33)
MCHC RBC AUTO-ENTMCNC: 33.9 G/DL (ref 31.5–35.7)
MCV RBC AUTO: 88.7 FL (ref 79–97)
PLATELET # BLD AUTO: 248 10*3/MM3 (ref 140–450)
PMV BLD AUTO: 11.3 FL (ref 6–12)
POTASSIUM SERPL-SCNC: 3.8 MMOL/L (ref 3.5–5.2)
RBC # BLD AUTO: 3.09 10*6/MM3 (ref 4.14–5.8)
SODIUM SERPL-SCNC: 137 MMOL/L (ref 136–145)
WBC NRBC COR # BLD: 14.57 10*3/MM3 (ref 3.4–10.8)

## 2022-10-24 PROCEDURE — 85027 COMPLETE CBC AUTOMATED: CPT | Performed by: NURSE PRACTITIONER

## 2022-10-24 PROCEDURE — 80048 BASIC METABOLIC PNL TOTAL CA: CPT | Performed by: NURSE PRACTITIONER

## 2022-10-24 PROCEDURE — 99024 POSTOP FOLLOW-UP VISIT: CPT | Performed by: NURSE PRACTITIONER

## 2022-10-24 PROCEDURE — 25010000002 ONDANSETRON PER 1 MG: Performed by: NURSE PRACTITIONER

## 2022-10-24 PROCEDURE — 94618 PULMONARY STRESS TESTING: CPT

## 2022-10-24 PROCEDURE — 82962 GLUCOSE BLOOD TEST: CPT

## 2022-10-24 PROCEDURE — 25010000002 ENOXAPARIN PER 10 MG: Performed by: NURSE PRACTITIONER

## 2022-10-24 PROCEDURE — 63710000001 INSULIN LISPRO (HUMAN) PER 5 UNITS: Performed by: NURSE PRACTITIONER

## 2022-10-24 RX ORDER — AMIODARONE HYDROCHLORIDE 200 MG/1
200 TABLET ORAL DAILY
Qty: 30 TABLET | Refills: 0 | Status: SHIPPED | OUTPATIENT
Start: 2022-10-24 | End: 2022-11-09

## 2022-10-24 RX ORDER — FUROSEMIDE 40 MG/1
40 TABLET ORAL DAILY
Qty: 30 TABLET | Refills: 0 | Status: SHIPPED | OUTPATIENT
Start: 2022-10-25 | End: 2022-11-09

## 2022-10-24 RX ORDER — INSULIN ASPART 100 [IU]/ML
5 INJECTION, SOLUTION INTRAVENOUS; SUBCUTANEOUS
Refills: 12 | Status: ON HOLD
Start: 2022-10-24 | End: 2023-01-18

## 2022-10-24 RX ORDER — METOPROLOL SUCCINATE 50 MG/1
50 TABLET, EXTENDED RELEASE ORAL
Qty: 30 TABLET | Refills: 2 | Status: SHIPPED | OUTPATIENT
Start: 2022-10-24 | End: 2022-11-09 | Stop reason: SDUPTHER

## 2022-10-24 RX ORDER — DOXYCYCLINE 100 MG/1
100 CAPSULE ORAL EVERY 12 HOURS SCHEDULED
Status: DISCONTINUED | OUTPATIENT
Start: 2022-10-24 | End: 2022-10-24 | Stop reason: HOSPADM

## 2022-10-24 RX ORDER — DOXYCYCLINE 100 MG/1
100 CAPSULE ORAL EVERY 12 HOURS SCHEDULED
Qty: 19 CAPSULE | Refills: 0 | Status: SHIPPED | OUTPATIENT
Start: 2022-10-24 | End: 2022-11-03

## 2022-10-24 RX ORDER — INSULIN DEGLUDEC INJECTION 100 U/ML
INJECTION, SOLUTION SUBCUTANEOUS
Qty: 30 ML | Refills: 2
Start: 2022-10-24 | End: 2022-12-23

## 2022-10-24 RX ORDER — METOPROLOL SUCCINATE 50 MG/1
50 TABLET, EXTENDED RELEASE ORAL
Status: DISCONTINUED | OUTPATIENT
Start: 2022-10-24 | End: 2022-10-24 | Stop reason: HOSPADM

## 2022-10-24 RX ADMIN — INSULIN GLARGINE-YFGN 25 UNITS: 100 INJECTION, SOLUTION SUBCUTANEOUS at 08:02

## 2022-10-24 RX ADMIN — MUPIROCIN: 20 OINTMENT TOPICAL at 08:15

## 2022-10-24 RX ADMIN — LIDOCAINE 5% 1 PATCH: 700 PATCH TOPICAL at 08:04

## 2022-10-24 RX ADMIN — FUROSEMIDE 40 MG: 40 TABLET ORAL at 08:15

## 2022-10-24 RX ADMIN — POLYETHYLENE GLYCOL 3350 17 G: 17 POWDER, FOR SOLUTION ORAL at 08:05

## 2022-10-24 RX ADMIN — ASPIRIN 81 MG: 81 TABLET, COATED ORAL at 08:04

## 2022-10-24 RX ADMIN — DOCUSATE SODIUM 100 MG: 100 CAPSULE, LIQUID FILLED ORAL at 08:04

## 2022-10-24 RX ADMIN — HYDROCODONE BITARTRATE AND ACETAMINOPHEN 2 TABLET: 5; 325 TABLET ORAL at 06:02

## 2022-10-24 RX ADMIN — GABAPENTIN 600 MG: 300 CAPSULE ORAL at 15:57

## 2022-10-24 RX ADMIN — TAMSULOSIN HYDROCHLORIDE 0.4 MG: 0.4 CAPSULE ORAL at 08:03

## 2022-10-24 RX ADMIN — INSULIN LISPRO 5 UNITS: 100 INJECTION, SOLUTION INTRAVENOUS; SUBCUTANEOUS at 08:03

## 2022-10-24 RX ADMIN — POTASSIUM CHLORIDE 20 MEQ: 750 TABLET, EXTENDED RELEASE ORAL at 08:03

## 2022-10-24 RX ADMIN — METOPROLOL TARTRATE 50 MG: 50 TABLET, FILM COATED ORAL at 08:03

## 2022-10-24 RX ADMIN — HYDROCODONE BITARTRATE AND ACETAMINOPHEN 2 TABLET: 5; 325 TABLET ORAL at 01:31

## 2022-10-24 RX ADMIN — HYDROCODONE BITARTRATE AND ACETAMINOPHEN 2 TABLET: 5; 325 TABLET ORAL at 13:56

## 2022-10-24 RX ADMIN — ENOXAPARIN SODIUM 30 MG: 30 INJECTION SUBCUTANEOUS at 08:04

## 2022-10-24 RX ADMIN — GABAPENTIN 600 MG: 300 CAPSULE ORAL at 08:04

## 2022-10-24 RX ADMIN — EMPAGLIFLOZIN 25 MG: 25 TABLET, FILM COATED ORAL at 08:03

## 2022-10-24 RX ADMIN — INSULIN LISPRO 5 UNITS: 100 INJECTION, SOLUTION INTRAVENOUS; SUBCUTANEOUS at 11:28

## 2022-10-24 RX ADMIN — DOXYCYCLINE 100 MG: 100 CAPSULE ORAL at 11:56

## 2022-10-24 RX ADMIN — AMIODARONE HYDROCHLORIDE 300 MG: 200 TABLET ORAL at 08:03

## 2022-10-24 RX ADMIN — PANTOPRAZOLE SODIUM 40 MG: 40 TABLET, DELAYED RELEASE ORAL at 06:02

## 2022-10-24 RX ADMIN — ONDANSETRON 4 MG: 2 INJECTION INTRAMUSCULAR; INTRAVENOUS at 08:04

## 2022-10-25 ENCOUNTER — TRANSITIONAL CARE MANAGEMENT TELEPHONE ENCOUNTER (OUTPATIENT)
Dept: CALL CENTER | Facility: HOSPITAL | Age: 52
End: 2022-10-25

## 2022-10-25 NOTE — OUTREACH NOTE
"Call Center TCM Note    Flowsheet Row Responses   [facility] patient discharged from? [facility]   Does the patient have one of the following disease processes/diagnoses(primary or secondary)? Cardiothoracic surgery   TCM attempt successful? Yes  [verbal release for Lisette Sommerskinsmother]   Call start time 1229   Call end time 1244   Discharge diagnosis heart cath, CAD, CABG x 4 using left leg harvest, CKD, T2DM   Person spoke with today (if not patient) and relationship mother Mcfadden   Meds reviewed with patient/caregiver? Yes   Is the patient having any side effects they believe may be caused by any medication additions or changes? No   Does the patient have all medications related to this admission filled (includes all antibiotics, pain medications, cardiac medications, etc.) Yes   Is the patient taking all medications as directed (includes completed medication regime)? Yes   Does the patient have an appointment with their PCP within 7 days of discharge? No   Nursing Interventions Patient desires to follow up with specialty only   What is the Home health agency?  [facility]   Has home health visited the patient within 72 hours of discharge? Yes   What DME was ordered? O2 from [facility]   Has all DME been delivered? Yes   DME comments O2 at 2 lpm    Psychosocial issues? No   Did the patient receive a copy of their discharge instructions? Yes   Nursing interventions Reviewed instructions with patient  [with mother]   What is the patient's perception of their health status since discharge? Improving  [Mother reports pt had a\"rough night\" but is better today, HH has visited and assisted with care and review of medications.  Reports some incisional chest pain and anxiety--but has medications for pain ordered.]   Nursing interventions Nurse provided patient education  [Routine post op care reviewed--mother has questions about WU hose and how long he is expected to wear, should he wear all night as he was in " hospitals.  Call to  nurse who will follow back up with mother]   Is the patient /caregiver able to teach back basic post-op care? No tub bath, swimming, or hot tub until instructed by MD, Lifting as instructed by MD in discharge instructions, Keep incision areas clean, dry and protected, Practice 'cough and deep breath'   Is the patient/caregiver able to teach back signs and symptoms of incisional infection? Increased redness, swelling or pain at the incisonal site, Increased drainage or bleeding, Incisional warmth, Pus or odor from incision, Fever   Is the patient/caregiver able to teach back steps to recovery at home? Rest and rebuild strength, gradually increase activity   Is the patient/caregiver able to teach back the hierarchy of who to call/visit for symptoms/problems? PCP, Specialist, Home health nurse, Urgent Care, ED, 911 Yes   Additional teach back comments Mother reports /62 and .   TCM call completed? Yes   Call end time [id number]          Marlena Degroot RN    10/25/2022, 12:58 EDT

## 2022-10-25 NOTE — OUTREACH NOTE
Prep Survey    Flowsheet Row Responses   [facility] discharged [facility]? [facility]   Is LACE score < 7 ? No   Emergency Room discharge w/ pulse ox? No   Eligibility [facility]   Date of Admission 10/19/22   Date of Discharge 10/24/22   Discharge Disposition Home-Health Care Svc   Discharge diagnosis heart cath, CAD, CABG x 4 using left leg harvest, CKD, T2DM   Does the patient have one of the following disease processes/diagnoses(primary or secondary)? Cardiothoracic surgery   Does the patient have Home health ordered? Yes   What is the Home health agency?  [facility]   Is there a DME ordered? Yes   What DME was ordered? O2 from [facility]   Prep survey completed? Yes          SAVAGE THORNTON - Registered Nurse

## 2022-10-31 ENCOUNTER — TELEPHONE (OUTPATIENT)
Dept: CARDIAC SURGERY | Facility: CLINIC | Age: 52
End: 2022-10-31

## 2022-10-31 RX ORDER — CEPHALEXIN 500 MG/1
500 CAPSULE ORAL 3 TIMES DAILY
Qty: 30 CAPSULE | Refills: 0 | Status: SHIPPED | OUTPATIENT
Start: 2022-10-31 | End: 2022-11-09 | Stop reason: SDUPTHER

## 2022-10-31 NOTE — TELEPHONE ENCOUNTER
Pt's mom called to report that pt is having redness and iritation at his incision site. Pt's mother sent picture that was reviewed by BELEM Paulino. Pt is currently on a 10 day course of doxycycline. Lora recommends pt clean with antibacterial soap and betadine twice a day and send follow up picture Thursday 10/3. Initially ordered Keflex, but with doxycycline, Lora would like to hold off on further antibiotics. Pt's mother verbalized understanding and agreed to plan of care.

## 2022-11-02 ENCOUNTER — READMISSION MANAGEMENT (OUTPATIENT)
Dept: CALL CENTER | Facility: HOSPITAL | Age: 52
End: 2022-11-02

## 2022-11-02 NOTE — OUTREACH NOTE
CT Surgery Week 2 Survey    Flowsheet Row Responses   [facility] patient discharged from? [place]   Does the patient have one of the following disease processes/diagnoses(primary or secondary)? Cardiothoracic surgery   Week 2 attempt successful? Yes   Call start time 1132   Unsuccessful attempts Attempt 1   Call end time 1148   Discharge diagnosis heart cath, CAD, CABG x 4 using left leg harvest, CKD, T2DM   Person spoke with today (if not patient) and relationship Lisette, mother   Meds reviewed with patient/caregiver? Yes   Is the patient taking all medications as directed (includes completed medication regime)? Yes   Medication comments New ABX has been started r/t redness around one of the pt's incisions   Does the patient have a primary care provider?  Yes   Does the patient have an appointment scheduled with their C/T surgeon? Yes  [11/9/22]   Has the patient kept scheduled appointments due by today? N/A   What is the Home health agency?  [facility]    Has home health visited the patient within 72 hours of discharge? Yes   What DME was ordered? O2 from [place]   Has all DME been delivered? Yes   DME comments wears 2LO2 prn   Psychosocial issues? No   What is the patient's perception of their health status since discharge? Improving  [incision has some redness at the bottom, has started a new ABX]   Nursing interventions Nurse provided patient education   Is the patient /caregiver able to teach back basic post-op care? Lifting as instructed by MD in discharge instructions, Keep incision areas clean, dry and protected, No tub bath, swimming, or hot tub until instructed by MD, Take showers only when approved by MD-sponge bathe until then   Is the patient/caregiver able to teach back signs and symptoms of incisional infection? Increased redness, swelling or pain at the incisonal site, Increased drainage or bleeding   Is the patient/caregiver able to teach back steps to recovery at home? Set small, achievable  goals for return to baseline health   Is the patient/caregiver able to teach back the hierarchy of who to call/visit for symptoms/problems? PCP, Specialist, Home health nurse, Urgent Care, ED, 911 Yes   Week 2 call completed? Yes          ROCKY Segovia Registered Nurse

## 2022-11-08 ENCOUNTER — TELEPHONE (OUTPATIENT)
Dept: FAMILY MEDICINE CLINIC | Facility: CLINIC | Age: 52
End: 2022-11-08

## 2022-11-08 DIAGNOSIS — N18.30 CKD STAGE 3 DUE TO TYPE 2 DIABETES MELLITUS: Primary | ICD-10-CM

## 2022-11-08 DIAGNOSIS — E11.22 CKD STAGE 3 DUE TO TYPE 2 DIABETES MELLITUS: Primary | ICD-10-CM

## 2022-11-09 ENCOUNTER — OFFICE VISIT (OUTPATIENT)
Dept: CARDIAC SURGERY | Facility: CLINIC | Age: 52
End: 2022-11-09

## 2022-11-09 VITALS
BODY MASS INDEX: 33.52 KG/M2 | HEART RATE: 67 BPM | TEMPERATURE: 97.7 F | HEIGHT: 66 IN | RESPIRATION RATE: 20 BRPM | WEIGHT: 208.6 LBS | SYSTOLIC BLOOD PRESSURE: 141 MMHG | DIASTOLIC BLOOD PRESSURE: 82 MMHG | OXYGEN SATURATION: 97 %

## 2022-11-09 DIAGNOSIS — Z95.1 S/P CABG X 4: Primary | ICD-10-CM

## 2022-11-09 PROCEDURE — 99024 POSTOP FOLLOW-UP VISIT: CPT | Performed by: NURSE PRACTITIONER

## 2022-11-09 RX ORDER — LOSARTAN POTASSIUM AND HYDROCHLOROTHIAZIDE 25; 100 MG/1; MG/1
1 TABLET ORAL DAILY
COMMUNITY
End: 2022-11-09 | Stop reason: SDUPTHER

## 2022-11-09 RX ORDER — METOPROLOL SUCCINATE 50 MG/1
50 TABLET, EXTENDED RELEASE ORAL
Qty: 30 TABLET | Refills: 2 | Status: SHIPPED | OUTPATIENT
Start: 2022-11-09 | End: 2023-02-06 | Stop reason: SDUPTHER

## 2022-11-09 RX ORDER — LOSARTAN POTASSIUM AND HYDROCHLOROTHIAZIDE 25; 100 MG/1; MG/1
1 TABLET ORAL DAILY
Qty: 30 TABLET | Refills: 2 | Status: SHIPPED | OUTPATIENT
Start: 2022-11-09

## 2022-11-10 PROBLEM — Z95.1 S/P CABG X 4: Status: ACTIVE | Noted: 2022-11-10

## 2022-11-10 NOTE — PROGRESS NOTES
"CARDIOVASCULAR SURGERY FOLLOW-UP PROGRESS NOTE  Chief Complaint: Postop follow-up        HPI:   Dear Edgard Mendoza DO and colleagues:    It was nice to see Ilya Colon in follow up 3 weeks after surgery.  As you know, he is a 52 y.o. male with CAD, ischemic cardiomyopathy EF 35%, chronic pain syndrome, CKD 3 (baseline creatinine 1.3-1.6), DM2 who underwent CABG x4 with LIMA on 10/20/2022 at [facility] with Dr. Blackmon. He was sent home on doxycycline at discharge for some erythema along the incision line, that has completely resolved during exam today. He comes in today complaining of nothing.  His activity level has been good.  From a surgical standpoint, the sternal incision is well approximated without erythema, edema, or drainage.  The sternum is stable to palpation, and the patient denies any popping or clicking with deep inspiration or coughing.  His blood pressure is elevated in the office today, he appears euvolemic, will discontinue his Lasix and reinitiate losartan/hydrochlorothiazide 100/25, patient to initiate with half tab daily and monitor blood pressure to review with Dr. Chawla at his appointment.    Physical Exam:         /82 (BP Location: Left arm, Patient Position: Sitting, Cuff Size: Adult)   Pulse 67   Temp 97.7 °F (36.5 °C)   Resp 20   Ht 167.6 cm (66\")   Wt 94.6 kg (208 lb 9.6 oz)   SpO2 97%   BMI 33.67 kg/m²   Heart:  regular rate and rhythm, S1, S2 normal, no murmur, click, rub or gallop  Lungs:  clear to auscultation bilaterally  Extremities:  no edema  Incision(s):  mid chest healing well, left leg healing well, sternum stable    Assessment/Plan:     S/P CABG. Overall, he is doing well.    No significant post-op complications    No heavy lifting > 10 pounds for 3 more weeks  Keep incisions clean and dry  Follow-up as scheduled with cardiology  Follow-up with CT surgery prn    Continue lifting restriction of 10 lbs until 6 weeks and 50 lbs until 12 " weeks from the date of surgery, no excessive jarring motions or twisting motions until 12 weeks from the date of surgery    Return to clinic if any signs or symptoms of infection or sternal instability develop       Thank you for allowing me to participate in the care of your patient.    Regards,  BELEM Mccarthy    Current outpatient and discharge medications have been reconciled for the patient.  Reviewed by: BELEM Mccarthy

## 2022-11-11 ENCOUNTER — READMISSION MANAGEMENT (OUTPATIENT)
Dept: CALL CENTER | Facility: HOSPITAL | Age: 52
End: 2022-11-11

## 2022-11-11 NOTE — OUTREACH NOTE
CT Surgery Week 3 Survey    Flowsheet Row Responses   [facility] patient discharged from? [place]   Does the patient have one of the following disease processes/diagnoses(primary or secondary)? Cardiothoracic surgery   Week 3 attempt successful? Yes   Call start time 1507   Call end time 1512   Meds reviewed with patient/caregiver? Yes   Is the patient having any side effects they believe may be caused by any medication additions or changes? No   Does the patient have all medications related to this admission filled (includes all antibiotics, pain medications, cardiac medications, etc.) Yes   Is the patient taking all medications as directed (includes completed medication regime)? Yes   Does the patient have a primary care provider?  Yes   Does the patient have an appointment scheduled with their C/T surgeon? Yes   Has the patient kept scheduled appointments due by today? Yes   Has home health visited the patient within 72 hours of discharge? Yes   Has all DME been delivered? Yes   DME comments States is no longer using home O2. O2 sats at 98-99% on RA.   Psychosocial issues? No   What is the patient's perception of their health status since discharge? Improving   Nursing interventions Nurse provided patient education   Is the patient/caregiver able to teach back signs and symptoms of incisional infection? Increased redness, swelling or pain at the incisonal site, Incisional warmth, Fever, Increased drainage or bleeding, Pus or odor from incision   Is the patient /caregiver able to teach back the importance of cardiac rehab? Yes   Nursing interventions Provided education on importance of cardiac rehab  [States was told that he was told rehab not needed.]   If the patient is a current smoker, are they able to teach back resources for cessation? Not a smoker  [Has not smoked since discharge.]   Is the patient/caregiver able to teach back the hierarchy of who to call/visit for symptoms/problems? PCP,  "Specialist, Home health nurse, Urgent Care, ED, 911 Yes   Week 3 call completed? Yes   Wrap up additional comments States is improving. States incision \"is looking better\". Denies any needs today.          CORDELIA PUCKETT - Registered Nurse  "

## 2022-11-14 ENCOUNTER — TELEPHONE (OUTPATIENT)
Dept: FAMILY MEDICINE CLINIC | Facility: CLINIC | Age: 52
End: 2022-11-14

## 2022-11-14 NOTE — TELEPHONE ENCOUNTER
Caller: DOLLY    Relationship: OCCUPATIONAL THERAPIST    Best call back number: [phone]    What is the best time to reach you: ANY    Who are you requesting to speak with (clinical staff, provider,  specific staff member): CLINICAL STAFF    What was the call regarding: DOLLY, A OCCUPATIONAL THERAPIST FROM [facility] CALLED STATING THAT SHE WOULD LIKE TO SPEAK TO A NURSE ABOUT GETTING NEW ORDERS FOR THE PATIENT.    HUB UNABLE TO WARM TRANSFER FOR ASSISTANCE.    Do you require a callback: YES

## 2022-11-21 ENCOUNTER — READMISSION MANAGEMENT (OUTPATIENT)
Dept: CALL CENTER | Facility: HOSPITAL | Age: 52
End: 2022-11-21

## 2022-11-21 NOTE — TELEPHONE ENCOUNTER
I put in referral to Dr. Shell hes an affiliate, not sure if he comes down to see patients from [place] or if he has to go up there    Still no follow up

## 2022-11-21 NOTE — OUTREACH NOTE
CT Surgery Week 4 Survey    Flowsheet Row Responses   [facility] patient discharged from? [place]   Does the patient have one of the following disease processes/diagnoses(primary or secondary)? Cardiothoracic surgery   Week 4 attempt successful? Yes   Call start time 1443   Call end time 1447   Discharge diagnosis heart cath, CAD, CABG x 4 using left leg harvest, CKD, T2DM   Meds reviewed with patient/caregiver? Yes   Is the patient having any side effects they believe may be caused by any medication additions or changes? No   Is the patient taking all medications as directed (includes completed medication regime)? Yes   Has the patient kept scheduled appointments due by today? Yes   Is the patient still receiving Home Health Services? Yes   Psychosocial issues? No   What is the patient's perception of their health status since discharge? Improving   Nursing interventions Nurse provided patient education   Is the patient/caregiver able to teach back normal signs of recovery? Pain or discomfort at incisional site   Nursing interventions Reassured on normal signs of recovery   Is the patient/caregiver able to teach back steps to recovery at home? Set small, achievable goals for return to baseline health, Rest and rebuild strength, gradually increase activity, Eat a well-balance diet   Is the patient /caregiver able to teach back the importance of cardiac rehab? --  [N/A]   Is the patient/caregiver able to teach back the hierarchy of who to call/visit for symptoms/problems? PCP, Specialist, Home health nurse, Urgent Care, ED, 911 Yes   Additional teach back comments states has pain in left knee where vein removed, no other complaints   Week 4 Call Completed? Yes   Would the patient like one additional call? No   Graduated Yes   Is the patient interested in additional calls from an ambulatory ?  NOTE:  applies to high risk patients requiring additional follow-up. No   Did the patient feel the follow  up calls were helpful during their recovery period? Yes          ROCIO THORNTON - Registered Nurse

## 2022-11-29 RX ORDER — OSELTAMIVIR PHOSPHATE 75 MG/1
75 CAPSULE ORAL 2 TIMES DAILY
Qty: 14 CAPSULE | Refills: 0 | Status: SHIPPED | OUTPATIENT
Start: 2022-11-29 | End: 2023-01-16

## 2022-11-29 RX ORDER — OSELTAMIVIR PHOSPHATE 75 MG/1
75 CAPSULE ORAL 2 TIMES DAILY
Qty: 14 CAPSULE | Refills: 0 | Status: SHIPPED | OUTPATIENT
Start: 2022-11-29 | End: 2022-11-29 | Stop reason: SDUPTHER

## 2022-11-29 NOTE — TELEPHONE ENCOUNTER
Attempted to reach patient, home # is out of service.  The cell # has no VM box set up, unable to leave message for patient.

## 2022-11-29 NOTE — TELEPHONE ENCOUNTER
Kimber spoke with patients mother and informed we could not reach patient and asked if he could make a follow up appointment in office

## 2022-12-13 ENCOUNTER — OFFICE VISIT (OUTPATIENT)
Dept: CARDIOLOGY | Facility: CLINIC | Age: 52
End: 2022-12-13

## 2022-12-13 VITALS
DIASTOLIC BLOOD PRESSURE: 85 MMHG | WEIGHT: 216 LBS | BODY MASS INDEX: 34.72 KG/M2 | SYSTOLIC BLOOD PRESSURE: 131 MMHG | HEIGHT: 66 IN | HEART RATE: 69 BPM

## 2022-12-13 DIAGNOSIS — I10 ESSENTIAL HYPERTENSION: ICD-10-CM

## 2022-12-13 DIAGNOSIS — I25.810 CORONARY ARTERY DISEASE INVOLVING CORONARY BYPASS GRAFT OF NATIVE HEART WITHOUT ANGINA PECTORIS: Primary | ICD-10-CM

## 2022-12-13 DIAGNOSIS — R07.89 STERNAL PAIN: ICD-10-CM

## 2022-12-13 DIAGNOSIS — Z95.1 S/P CABG (CORONARY ARTERY BYPASS GRAFT): ICD-10-CM

## 2022-12-13 DIAGNOSIS — E78.2 HYPERLIPEMIA, MIXED: ICD-10-CM

## 2022-12-13 PROCEDURE — 99214 OFFICE O/P EST MOD 30 MIN: CPT | Performed by: NURSE PRACTITIONER

## 2022-12-13 NOTE — PROGRESS NOTES
Chief Complaint  Hyperlipidemia and Unstable Angina    Subjective            Ilya Colon presents to [facility] CARDIOLOGY  History of Present Illness    Mr. Colon is here today for follow-up evaluation management of coronary artery disease, ischemic cardiomyopathy EF 35%, chronic kidney disease, diabetes.  He underwent CABG x4 with LIMA on 10/20/2022 with Dr. Blackmon.  He initially had erythema along the sternal incision line and was discharged on doxycycline which resolved this issue.  He followed up with CT surgery earlier in November who cleared him from a surgical standpoint.     He was doing well until about 2 weeks ago when he began having severe pain at the sternal incision.  The pain is described as a pinching or grabbing sensation that occurs with certain positions and lasts about 1 minute.  No other associated symptoms.  He denies erythema, edema, or drainage.  He denies exertional chest discomfort or excessive shortness of breath.  He has been compliant with medications.  He had the flu about 2 weeks ago also and did have cough.    PMH  Past Medical History:   Diagnosis Date   • Arthritis    • Diabetes mellitus (HCC)    • Hypertension    • Myocardial infarction (HCC)          SURGICALHX  Past Surgical History:   Procedure Laterality Date   • CARDIAC CATHETERIZATION N/A 10/12/2022    Procedure: Left Heart Cath - Radial artery;  Surgeon: Tad Batista MD;  Location: [facility] CATH INVASIVE LOCATION;  Service: Cardiology;  Laterality: N/A;   • CARDIAC SURGERY     • CORONARY ARTERY BYPASS GRAFT N/A 10/20/2022    Procedure: INTRAOP NANCI, STERNOTOMY, CORONARY ARTERY BYPASS GRAFTS X 4 USING LEFT ENDOSCOPICALLY HARVESTED GREATER SAPHENOUS VEIN AND LEFT RADHA, PRP;  Surgeon: Jr Tutu Blackmon MD;  [facility];  Service: Cardiothoracic;  Laterality: N/A;        SOC  Social History     Socioeconomic History   • Marital status:    Tobacco Use   • Smoking status:  Former     Packs/day: 1.00     Types: Cigarettes     Start date: 2015     Quit date: 10/1/2022     Years since quittin.2   • Smokeless tobacco: Never   Vaping Use   • Vaping Use: Never used   Substance and Sexual Activity   • Alcohol use: Not Currently   • Drug use: Yes     Types: Marijuana   • Sexual activity: Defer         FAMHX  History reviewed. No pertinent family history.       ALLERGY  No Known Allergies     MEDSCURRENT    Current Outpatient Medications:   •  amitriptyline (ELAVIL) 25 MG tablet, Take 1 tablet by mouth Every Night., Disp: , Rfl:   •  aspirin 81 MG EC tablet, Take 1 tablet by mouth Daily., Disp: , Rfl:   •  atorvastatin (LIPITOR) 40 MG tablet, Take 1 tablet by mouth Daily., Disp: 90 tablet, Rfl: 3  •  empagliflozin (JARDIANCE) 25 MG tablet tablet, Take 1 tablet by mouth Daily., Disp: 30 tablet, Rfl: 3  •  gabapentin (NEURONTIN) 600 MG tablet, Take 1 tablet by mouth Every 6 (Six) Hours., Disp: , Rfl:   •  HYDROcodone-acetaminophen (NORCO) 7.5-325 MG per tablet, Take 1 tablet by mouth Every 12 (Twelve) Hours As Needed for Moderate Pain., Disp: , Rfl:   •  Insulin Aspart (novoLOG) 100 UNIT/ML injection, Inject 5 Units under the skin into the appropriate area as directed 3 (Three) Times a Day Before Meals. (Patient taking differently: Inject 15 Units under the skin into the appropriate area as directed 3 (Three) Times a Day Before Meals.), Disp: , Rfl: 12  •  Insulin Degludec (Tresiba) 100 UNIT/ML solution injection, INJECT 25 UNITS IN THE MORNING and INJECT 25 UNITS IN THE afternoon (Patient taking differently: 47 Units 2 (Two) Times a Day.), Disp: 30 mL, Rfl: 2  •  losartan-hydrochlorothiazide (HYZAAR) 100-25 MG per tablet, Take 1 tablet by mouth Daily., Disp: 30 tablet, Rfl: 2  •  magnesium oxide (MAG-OX) 400 MG tablet, Take 1 tablet by mouth Daily., Disp: , Rfl:   •  melatonin 5 MG tablet tablet, Take 2 tablets by mouth Every Night., Disp: , Rfl:   •  metoprolol succinate XL  "(TOPROL-XL) 50 MG 24 hr tablet, Take 1 tablet by mouth Daily., Disp: 30 tablet, Rfl: 2  •  Narcan 4 MG/0.1ML nasal spray, INSTILL 1 SPRAY IN EACH NOSTRIL AS NEEDED, Disp: , Rfl:   •  potassium chloride (K-DUR,KLOR-CON) 20 MEQ CR tablet, Take 1 tablet by mouth 2 (Two) Times a Day., Disp: 60 tablet, Rfl: 5  •  tamsulosin (FLOMAX) 0.4 MG capsule 24 hr capsule, Take 1 capsule by mouth Daily., Disp: 90 capsule, Rfl: 3  •  traZODone (DESYREL) 100 MG tablet, Take 1 tablet by mouth Daily With Breakfast., Disp: 90 tablet, Rfl: 3  •  vitamin B-12 (CYANOCOBALAMIN) 1000 MCG tablet, Take 1 tablet by mouth Daily., Disp: , Rfl:   •  vitamin D3 125 MCG (5000 UT) capsule capsule, Take 1 capsule by mouth Daily., Disp: , Rfl:   •  chlorpheniramine (CHLOR-TRIMETON) 4 MG tablet, Take 1 tablet by mouth Every 6 (Six) Hours As Needed for Allergies., Disp: , Rfl:   •  hydrOXYzine (ATARAX) 25 MG tablet, Take 1 tablet by mouth Every 8 (Eight) Hours., Disp: 270 tablet, Rfl: 3  •  oseltamivir (Tamiflu) 75 MG capsule, Take 1 capsule by mouth 2 (Two) Times a Day., Disp: 14 capsule, Rfl: 0  •  pantoprazole (PROTONIX) 40 MG EC tablet, Take 1 tablet by mouth Every Morning Before Breakfast., Disp: 90 tablet, Rfl: 3      Review of Systems   Constitutional: Negative for chills, fever, malaise/fatigue and weight gain.   Cardiovascular: Negative for chest pain, dyspnea on exertion, irregular heartbeat, leg swelling and palpitations.        Sternal pain   Respiratory: Negative for shortness of breath.    Neurological: Negative for dizziness and light-headedness.        Objective     /85   Pulse 69   Ht 167.6 cm (66\")   Wt 98 kg (216 lb)   BMI 34.86 kg/m²       General Appearance:   · well developed  · well nourished  HENT:   · oropharynx moist  · lips not cyanotic  Neck:  · thyroid not enlarged  · supple  Respiratory:  · no respiratory distress  · normal breath sounds  · no rales  Cardiovascular:  · no jugular venous distention  · regular " rhythm  · apical impulse normal  · S1 normal, S2 normal  · no S3, no S4   · no murmur  · no rub, no thrill  · carotid pulses normal; no bruit  · pedal pulses normal  · lower extremity edema: none    Musculoskeletal:  · no clubbing of fingers.   · normocephalic, head atraumatic  Skin:   · warm, dry  · Sternal incision healing well as well as left SVG site.  Free from erythema, edema, or drainage  Psychiatric:  · judgement and insight appropriate  · normal mood and affect      Result Review :         CMP    CMP 10/22/22 10/23/22 10/24/22   Glucose 194 (A) 237 (A) 91   BUN 22 (A) 28 (A) 26 (A)   Creatinine 1.41 (A) 1.56 (A) 1.34 (A)   Sodium 135 (A) 136 137   Potassium 4.0 4.3 3.8   Chloride 98 99 97 (A)   Calcium 8.9 9.2 9.3   (A) Abnormal value            CBC    CBC 10/22/22 10/23/22 10/24/22   WBC 11.76 (A) 11.41 (A) 14.57 (A)   RBC 3.19 (A) 3.13 (A) 3.09 (A)   Hemoglobin 9.6 (A) 9.4 (A) 9.3 (A)   Hematocrit 27.5 (A) 28.6 (A) 27.4 (A)   MCV 86.2 91.4 88.7   MCH 30.1 30.0 30.1   MCHC 34.9 32.9 33.9   RDW 12.6 12.8 12.6   Platelets 174 202 248   (A) Abnormal value            Lipid Panel    Lipid Panel 1/10/22 10/19/22   Total Cholesterol 186 97   Triglycerides 176 (A) 204 (A)   HDL Cholesterol 39 (A) 36 (A)   VLDL Cholesterol 31 32   LDL Cholesterol  116 (A) 29   LDL/HDL Ratio 2.87 0.56   (A) Abnormal value              Data reviewed: Cardiology studies Procedure and CT surgery notes reviewed.  Laboratory studies reviewed     Procedures      Ilya Colon  reports that he quit smoking about 2 months ago. His smoking use included cigarettes. He started smoking about 7 years ago. He smoked an average of 1 pack per day. He has never used smokeless tobacco.. I have educated him on the risk of diseases from using tobacco products such as cancer, COPD and heart disease.              Assessment and Plan        ASSESSMENT:  Encounter Diagnoses   Name Primary?   • Coronary artery disease involving coronary bypass graft of  native heart without angina pectoris Yes   • S/P CABG (coronary artery bypass graft)    • Sternal pain    • Essential hypertension    • Hyperlipemia, mixed          PLAN:    1.  Coronary artery disease- s/p four-vessel CABG, concerning his sternal pain I did discuss the case with Krissy Cowan with CT surgery she recommends a chest x-ray to evaluate wiring.  I have placed this order and will follow up with the results.  He is not having angina.  Volume status is stable.  Continue lifting restrictions, no excessive jarring or twisting motions.    I discussed with him getting started with cardiac rehab after we have sternal pain evaluated and resolved, he declines at this time due to transportation, location, and financial concerns.    2.  Essential hypertension-controlled, continue current medical therapy.  3.  Mixed hyperlipidemia-continue statin therapy.    Mr. Colon is agreeable to the plan of care, we will follow-up with chest x-ray once available otherwise routine follow-up.       Patient was given instructions and counseling regarding his condition or for health maintenance advice. Please see specific information pulled into the AVS if appropriate.           Petrona Barillas, APRN   12/13/2022  12:02 EST

## 2022-12-14 ENCOUNTER — HOSPITAL ENCOUNTER (OUTPATIENT)
Dept: GENERAL RADIOLOGY | Facility: HOSPITAL | Age: 52
Discharge: HOME | End: 2022-12-14
Admitting: NURSE PRACTITIONER

## 2022-12-14 DIAGNOSIS — R07.89 STERNAL PAIN: ICD-10-CM

## 2022-12-14 DIAGNOSIS — I25.810 CORONARY ARTERY DISEASE INVOLVING CORONARY BYPASS GRAFT OF NATIVE HEART WITHOUT ANGINA PECTORIS: ICD-10-CM

## 2022-12-14 DIAGNOSIS — Z95.1 S/P CABG (CORONARY ARTERY BYPASS GRAFT): ICD-10-CM

## 2022-12-14 PROCEDURE — 71046 X-RAY EXAM CHEST 2 VIEWS: CPT

## 2022-12-15 ENCOUNTER — TELEPHONE (OUTPATIENT)
Dept: CARDIOLOGY | Facility: CLINIC | Age: 52
End: 2022-12-15

## 2022-12-15 NOTE — TELEPHONE ENCOUNTER
SW patient regarding results and recommendations. Patient states he will call and get himself an appointment.

## 2022-12-15 NOTE — TELEPHONE ENCOUNTER
----- Message from BELEM Pearson sent at 12/14/2022  2:27 PM EST -----  Please notify Mr Colon that I received his chest X-ray results. There was no mention of any obvious issue with the hardware from his recent bypass surgery. There is mention of a small amount of fluid around left lung however this was also noted on previous X-ray and has not increased in size. I discussed his case with CT surgery NP and I would like for him to see them one more time so they can view the imaging and evaluate his sternal pain also. He will need to see BELEM Smith if we can get this scheduled. Thanks.

## 2022-12-20 ENCOUNTER — OFFICE VISIT (OUTPATIENT)
Dept: CARDIAC SURGERY | Facility: CLINIC | Age: 52
End: 2022-12-20

## 2022-12-20 ENCOUNTER — HOSPITAL ENCOUNTER (OUTPATIENT)
Dept: CT IMAGING | Facility: HOSPITAL | Age: 52
Discharge: HOME | End: 2022-12-20
Admitting: NURSE PRACTITIONER

## 2022-12-20 VITALS
BODY MASS INDEX: 34.39 KG/M2 | WEIGHT: 214 LBS | DIASTOLIC BLOOD PRESSURE: 69 MMHG | HEART RATE: 74 BPM | TEMPERATURE: 97.3 F | SYSTOLIC BLOOD PRESSURE: 105 MMHG | HEIGHT: 66 IN | RESPIRATION RATE: 20 BRPM | OXYGEN SATURATION: 97 %

## 2022-12-20 DIAGNOSIS — S22.20XA CLOSED FRACTURE OF STERNUM, UNSPECIFIED PORTION OF STERNUM, INITIAL ENCOUNTER: Primary | ICD-10-CM

## 2022-12-20 DIAGNOSIS — Z95.1 S/P CABG X 4: ICD-10-CM

## 2022-12-20 PROCEDURE — 71250 CT THORAX DX C-: CPT

## 2022-12-20 PROCEDURE — 87147 CULTURE TYPE IMMUNOLOGIC: CPT | Performed by: NURSE PRACTITIONER

## 2022-12-20 PROCEDURE — 87070 CULTURE OTHR SPECIMN AEROBIC: CPT | Performed by: NURSE PRACTITIONER

## 2022-12-20 PROCEDURE — 99024 POSTOP FOLLOW-UP VISIT: CPT | Performed by: NURSE PRACTITIONER

## 2022-12-20 PROCEDURE — 87205 SMEAR GRAM STAIN: CPT | Performed by: NURSE PRACTITIONER

## 2022-12-20 RX ORDER — DOXYCYCLINE 100 MG/1
100 TABLET ORAL 2 TIMES DAILY
Qty: 20 TABLET | Refills: 0 | Status: SHIPPED | OUTPATIENT
Start: 2022-12-20 | End: 2022-12-30

## 2022-12-20 NOTE — PROGRESS NOTES
"CARDIOVASCULAR SURGERY FOLLOW-UP PROGRESS NOTE  Chief Complaint: Sternal wound evaluation        HPI:   Dear Edgard Mendoza DO and colleagues:    It was nice to see Ilya Colon in follow up 9 weeks after surgery.  As you know, he is a 52 y.o. male with CAD, ischemic cardiomyopathy EF 35%, chronic pain syndrome, CKD 3 (baseline creatinine 1.3-1.6), DM2 who underwent CABG x4 with LIMA on 10/20/2022 at [facility] with Dr. Blackmon. He was sent home on doxycycline at discharge for some erythema along the incision line, that had completely resolved on his last office visit at 3 weeks.  He states that he has had increased pain bilateral chest wall that increased towards the end of November after he had the flu.  Chest x-ray demonstrated intact sternal wires, patient states it has continued to be painful and he now had a \"blister\" pop up approximately 3 days ago.  He denies fever or chills, his main complaint is the chest discomfort with movement such as turning over in bed or with standing from lying or sitting position.  His sternal incision does have a dark fluid-filled blister with some erythema distal to the site, I performed an I&D with sterile scalpel and cultured serous and bloody material, no purulent material was expressed.  The area is relatively superficial and we taught his mother how to pack with iodoform gauze.  I sent a prescription for doxycycline 100 mg twice daily for 10 days, he has a pain physician that manages his chronic pain.  His sternum is stable to palpation and he denies popping or clicking with my exam, he does state that he has been having a \"popping\" sensation whenever he is changing positions, will obtain CT of the chest to evaluate for sternal nonunion.    Of note, I had to remind him several times while in the office to keep his fingers off of his incision to avoid putting bacteria in the open area.    **Picture of wound in media tab    Physical Exam:         BP " "105/69 (BP Location: Left arm, Patient Position: Sitting, Cuff Size: Adult)   Pulse 74   Temp 97.3 °F (36.3 °C)   Resp 20   Ht 167.6 cm (66\")   Wt 97.1 kg (214 lb)   SpO2 97%   BMI 34.54 kg/m²       Assessment/Plan:     S/P CABG. Overall, he is doing well.    No significant post-op complications    Keep incisions clean and dry  Return to clinic in 2 week(s) with no new studies    Continue lifting restriction of 10 lbs until 6 weeks and 50 lbs until 12 weeks from the date of surgery, no excessive jarring motions or twisting motions until 12 weeks from the date of surgery    Return to clinic if any signs or symptoms of infection or sternal instability develop       Thank you for allowing me to participate in the care of your patient.    Regards,  BELEM Mccarthy      "

## 2022-12-21 ENCOUNTER — APPOINTMENT (OUTPATIENT)
Dept: CT IMAGING | Facility: HOSPITAL | Age: 52
End: 2022-12-21

## 2022-12-22 ENCOUNTER — TELEPHONE (OUTPATIENT)
Dept: CARDIAC SURGERY | Facility: CLINIC | Age: 52
End: 2022-12-22

## 2022-12-22 DIAGNOSIS — E11.65 TYPE 2 DIABETES MELLITUS WITH HYPERGLYCEMIA, WITH LONG-TERM CURRENT USE OF INSULIN: Chronic | ICD-10-CM

## 2022-12-22 DIAGNOSIS — Z79.4 TYPE 2 DIABETES MELLITUS WITH HYPERGLYCEMIA, WITH LONG-TERM CURRENT USE OF INSULIN: Chronic | ICD-10-CM

## 2022-12-22 RX ORDER — TRAZODONE HYDROCHLORIDE 100 MG/1
100 TABLET ORAL
Qty: 90 TABLET | Refills: 3 | Status: SHIPPED | OUTPATIENT
Start: 2022-12-22 | End: 2023-12-22

## 2022-12-22 NOTE — TELEPHONE ENCOUNTER
Spoke to pt's mother regarding CT results. Notified her that Dr. Blackmon and BELEM Paulino, have reviewed the scan and there are abnormalities with his sternum. Pt has appt on 1/4/23 where he will be seen by Dr. Blackmon and BELEM Paulino, to determine if intervention is necessary. Pt's mother states that pt is in constant pain in his sternum. Notified BELEM Paulino who states that she cannot prescribe pain medication due to pt being treated by pain management. Left voicemail for pt's mother to return call to discuss.

## 2022-12-23 LAB
BACTERIA SPEC AEROBE CULT: ABNORMAL
GRAM STN SPEC: ABNORMAL
GRAM STN SPEC: ABNORMAL

## 2022-12-23 RX ORDER — INSULIN DEGLUDEC 100 U/ML
INJECTION, SOLUTION SUBCUTANEOUS
Qty: 100 ML | Refills: 3 | Status: ON HOLD | OUTPATIENT
Start: 2022-12-23 | End: 2023-01-18

## 2022-12-23 RX ORDER — TAMSULOSIN HYDROCHLORIDE 0.4 MG/1
CAPSULE ORAL
Qty: 90 CAPSULE | Refills: 3 | Status: SHIPPED | OUTPATIENT
Start: 2022-12-23

## 2022-12-28 ENCOUNTER — TELEPHONE (OUTPATIENT)
Dept: CARDIAC SURGERY | Facility: CLINIC | Age: 52
End: 2022-12-28

## 2022-12-28 RX ORDER — CEPHALEXIN 500 MG/1
500 CAPSULE ORAL 3 TIMES DAILY
Qty: 30 CAPSULE | Refills: 0 | Status: SHIPPED | OUTPATIENT
Start: 2022-12-28 | End: 2023-01-11 | Stop reason: SDUPTHER

## 2022-12-28 NOTE — TELEPHONE ENCOUNTER
Pt's mother called to report that pt has a new spot on his incision that looks similar to the spot that was opened up in office last week. Picture reviewed by BELEM Paulino, who recommends pt stop doxycycline and start keflex 500mg TID for 10 days and keep office visit on 1/4/23. Keflex sent to pharmacy. Pt's mother verbalized understanding and agreed to plan of care.

## 2023-01-04 ENCOUNTER — OFFICE VISIT (OUTPATIENT)
Dept: CARDIAC SURGERY | Facility: CLINIC | Age: 53
End: 2023-01-04
Payer: MEDICARE

## 2023-01-04 VITALS
HEART RATE: 65 BPM | DIASTOLIC BLOOD PRESSURE: 84 MMHG | BODY MASS INDEX: 34.39 KG/M2 | TEMPERATURE: 97.8 F | RESPIRATION RATE: 22 BRPM | HEIGHT: 66 IN | OXYGEN SATURATION: 96 % | WEIGHT: 214 LBS | SYSTOLIC BLOOD PRESSURE: 151 MMHG

## 2023-01-04 DIAGNOSIS — Z95.1 S/P CABG X 4: Primary | ICD-10-CM

## 2023-01-04 DIAGNOSIS — Z79.4 TYPE 2 DIABETES MELLITUS WITH DIABETIC NEUROPATHY, WITH LONG-TERM CURRENT USE OF INSULIN: ICD-10-CM

## 2023-01-04 DIAGNOSIS — E11.40 TYPE 2 DIABETES MELLITUS WITH DIABETIC NEUROPATHY, WITH LONG-TERM CURRENT USE OF INSULIN: ICD-10-CM

## 2023-01-04 PROCEDURE — 3077F SYST BP >= 140 MM HG: CPT | Performed by: NURSE PRACTITIONER

## 2023-01-04 PROCEDURE — 1160F RVW MEDS BY RX/DR IN RCRD: CPT | Performed by: NURSE PRACTITIONER

## 2023-01-04 PROCEDURE — 3079F DIAST BP 80-89 MM HG: CPT | Performed by: NURSE PRACTITIONER

## 2023-01-04 PROCEDURE — 1159F MED LIST DOCD IN RCRD: CPT | Performed by: NURSE PRACTITIONER

## 2023-01-04 PROCEDURE — 99024 POSTOP FOLLOW-UP VISIT: CPT | Performed by: NURSE PRACTITIONER

## 2023-01-04 RX ORDER — AMOXICILLIN 500 MG/1
CAPSULE ORAL
COMMUNITY
Start: 2022-12-28 | End: 2023-01-11

## 2023-01-04 NOTE — LETTER
January 4, 2023     Edgard Dickey DO  [address]  [facility] [address]    Patient: Ilya Colon   YOB: 1970   Date of Visit: 1/4/2023       Dear Dr. Noble DO:    Thank you for referring Ilya Colon to me for evaluation. Below are the relevant portions of my assessment and plan of care.    If you have questions, please do not hesitate to call me. I look forward to following Ilya along with you.         Sincerely,        BELEM Mccarthy        CC: No Recipients  Jr Tutu Blackmon MD  01/04/23 1258  Sign when Signing Visit  He returns for recheck for his sternal and incision.  There is another area that opened up with a bubble.  I opened it up there was some mild purulence down about 1 cm.  Does not communicate with the risk of his chest.  He has quite a bit of pain and I think it is from his sternal separation which appears to have occurred after he had the flu in November.  He was fine up until that time.  I do not think we can redo his sternum until the superficial infection clears up and I explained this to the patient and his wife.  He is in quite a bit of pain and until this is cleared up we will have to wait.  He will either need plating or rewiring of the sternum.  This is based on the CT scan that I saw.  There is no deep sternal wound infection.  I will see him in 1 week.

## 2023-01-04 NOTE — PROGRESS NOTES
He returns for recheck for his sternal and incision.  There is another area that opened up with a bubble.  I opened it up there was some mild purulence down about 1 cm.  Does not communicate with the risk of his chest.  He has quite a bit of pain and I think it is from his sternal separation which appears to have occurred after he had the flu in November.  He was fine up until that time.  I do not think we can redo his sternum until the superficial infection clears up and I explained this to the patient and his wife.  He is in quite a bit of pain and until this is cleared up we will have to wait.  He will either need plating or rewiring of the sternum.  This is based on the CT scan that I saw.  There is no deep sternal wound infection.  I will see him in 1 week.

## 2023-01-11 ENCOUNTER — OFFICE VISIT (OUTPATIENT)
Dept: CARDIAC SURGERY | Facility: CLINIC | Age: 53
End: 2023-01-11
Payer: MEDICARE

## 2023-01-11 VITALS
DIASTOLIC BLOOD PRESSURE: 83 MMHG | TEMPERATURE: 97.5 F | HEIGHT: 66 IN | RESPIRATION RATE: 20 BRPM | BODY MASS INDEX: 34.39 KG/M2 | OXYGEN SATURATION: 94 % | WEIGHT: 214 LBS | HEART RATE: 67 BPM | SYSTOLIC BLOOD PRESSURE: 128 MMHG

## 2023-01-11 DIAGNOSIS — Z95.1 S/P CABG X 4: ICD-10-CM

## 2023-01-11 DIAGNOSIS — N18.9 CHRONIC KIDNEY DISEASE, UNSPECIFIED CKD STAGE: ICD-10-CM

## 2023-01-11 DIAGNOSIS — R79.1 ABNORMAL COAGULATION PROFILE: ICD-10-CM

## 2023-01-11 DIAGNOSIS — T81.32XA STERNAL WOUND DEHISCENCE, INITIAL ENCOUNTER: Primary | ICD-10-CM

## 2023-01-11 PROBLEM — T81.328A STERNAL WOUND DEHISCENCE: Status: ACTIVE | Noted: 2023-01-11

## 2023-01-11 PROCEDURE — 99024 POSTOP FOLLOW-UP VISIT: CPT | Performed by: NURSE PRACTITIONER

## 2023-01-11 RX ORDER — CHLORHEXIDINE GLUCONATE 500 MG/1
1 CLOTH TOPICAL EVERY 12 HOURS PRN
Status: CANCELLED | OUTPATIENT
Start: 2023-01-11

## 2023-01-11 RX ORDER — CHLORHEXIDINE GLUCONATE 0.12 MG/ML
15 RINSE ORAL EVERY 12 HOURS
Status: CANCELLED | OUTPATIENT
Start: 2023-01-11 | End: 2023-01-12

## 2023-01-11 RX ORDER — CEPHALEXIN 500 MG/1
500 CAPSULE ORAL 3 TIMES DAILY
Qty: 30 CAPSULE | Refills: 0 | Status: ON HOLD | OUTPATIENT
Start: 2023-01-11 | End: 2023-01-18

## 2023-01-11 RX ORDER — CHLORHEXIDINE GLUCONATE 0.12 MG/ML
15 RINSE ORAL ONCE
Status: CANCELLED | OUTPATIENT
Start: 2023-01-11 | End: 2023-01-11

## 2023-01-11 NOTE — PROGRESS NOTES
"CARDIOVASCULAR SURGERY FOLLOW-UP PROGRESS NOTE  Chief Complaint: Follow-up sternal wound        HPI:   Dear Edgard Mendoza DO and colleagues:    It was nice to see Ilya Colon in follow up 12 weeks after surgery.  As you know, he is a 52 y.o. male with , ischemic cardiomyopathy EF 35%, chronic pain syndrome, CKD 3 (baseline creatinine 1.3-1.6), DM2 who underwent CABG x4 with LIMA on 10/20/2022 at [facility] with Dr. Blackmon.  His incision was healing fine and was completely approximated after 3 weeks from surgery, he presented to the office mid December with a \"blister\" for which we did a sterile I&D and obtained a CT of the chest with new chest wall discomfort.  His CT demonstrated a sternal separation, he was reevaluated by Dr. Blackmon last week who opened another blister and placed him on another round of antibiotics.  He presents today for reevaluation of wound and discussion regarding scheduling for sternal rewiring or plating.  The wound is still approximately 5 mm deep, iodoform packing was removed that did not have any purulent material, the wound bed has healthy tissue.    Dr. Blackmon evaluated the patient in tandem and agrees that we can now pursue intervention.  We will schedule him for sternal rewiring next week, repeat prescription for Keflex to be taken up until surgery.  We will schedule for PAT with emphasis on rechecking his renal function.  Reiterated to staff to call our office if he shows any signs of infection prior to procedure.    Physical Exam:         /83 (BP Location: Left arm, Patient Position: Sitting, Cuff Size: Adult)   Pulse 67   Temp 97.5 °F (36.4 °C)   Resp 20   Ht 167.6 cm (66\")   Wt 97.1 kg (214 lb)   SpO2 94%   BMI 34.54 kg/m²       Assessment/Plan:     S/P sternal separation status post CABG, plan for rewiring next week.    Thank you for allowing me to participate in the care of your patient.    Regards,  BELEM Mccarthy      "

## 2023-01-12 ENCOUNTER — TELEPHONE (OUTPATIENT)
Dept: CARDIAC SURGERY | Facility: CLINIC | Age: 53
End: 2023-01-12
Payer: MEDICARE

## 2023-01-12 NOTE — TELEPHONE ENCOUNTER
Spoke to patient, PAT on 1- at 0630, any testing to follow. Surgery is on 1- 0730, arrival time is 0500. Expressed verbal understanding.

## 2023-01-16 ENCOUNTER — ANESTHESIA EVENT (OUTPATIENT)
Dept: PERIOP | Facility: HOSPITAL | Age: 53
DRG: 908 | End: 2023-01-16
Payer: MEDICARE

## 2023-01-16 ENCOUNTER — PRE-ADMISSION TESTING (OUTPATIENT)
Dept: PREADMISSION TESTING | Facility: HOSPITAL | Age: 53
DRG: 908 | End: 2023-01-16
Payer: MEDICARE

## 2023-01-16 ENCOUNTER — HOSPITAL ENCOUNTER (OUTPATIENT)
Dept: GENERAL RADIOLOGY | Facility: HOSPITAL | Age: 53
Discharge: HOME | DRG: 908 | End: 2023-01-16
Payer: MEDICARE

## 2023-01-16 VITALS
RESPIRATION RATE: 16 BRPM | OXYGEN SATURATION: 97 % | WEIGHT: 212 LBS | HEART RATE: 76 BPM | BODY MASS INDEX: 34.07 KG/M2 | SYSTOLIC BLOOD PRESSURE: 138 MMHG | HEIGHT: 66 IN | DIASTOLIC BLOOD PRESSURE: 60 MMHG | TEMPERATURE: 97.6 F

## 2023-01-16 DIAGNOSIS — T81.32XA STERNAL WOUND DEHISCENCE, INITIAL ENCOUNTER: ICD-10-CM

## 2023-01-16 LAB
ABO GROUP BLD: NORMAL
ALBUMIN SERPL-MCNC: 4.3 G/DL (ref 3.5–5.2)
ALBUMIN/GLOB SERPL: 1.4 G/DL
ALP SERPL-CCNC: 137 U/L (ref 39–117)
ALT SERPL W P-5'-P-CCNC: 18 U/L (ref 1–41)
ANION GAP SERPL CALCULATED.3IONS-SCNC: 13.5 MMOL/L (ref 5–15)
APTT PPP: 36 SECONDS (ref 22.7–35.4)
ARTERIAL PATENCY WRIST A: ABNORMAL
AST SERPL-CCNC: 20 U/L (ref 1–40)
ATMOSPHERIC PRESS: 753.5 MMHG
BASE EXCESS BLDA CALC-SCNC: -0.2 MMOL/L (ref 0–2)
BASOPHILS # BLD AUTO: 0.09 10*3/MM3 (ref 0–0.2)
BASOPHILS NFR BLD AUTO: 1.2 % (ref 0–1.5)
BDY SITE: ABNORMAL
BILIRUB SERPL-MCNC: 0.6 MG/DL (ref 0–1.2)
BILIRUB UR QL STRIP: NEGATIVE
BLD GP AB SCN SERPL QL: NEGATIVE
BUN SERPL-MCNC: 9 MG/DL (ref 6–20)
BUN/CREAT SERPL: 7 (ref 7–25)
CALCIUM SPEC-SCNC: 9.2 MG/DL (ref 8.6–10.5)
CHLORIDE SERPL-SCNC: 102 MMOL/L (ref 98–107)
CLARITY UR: CLEAR
CLOSE TME COLL+ADP + EPINEP PNL BLD: 89 % (ref 86–100)
CO2 SERPL-SCNC: 24.5 MMOL/L (ref 22–29)
COLOR UR: YELLOW
CREAT SERPL-MCNC: 1.29 MG/DL (ref 0.76–1.27)
DEPRECATED RDW RBC AUTO: 38.9 FL (ref 37–54)
EGFRCR SERPLBLD CKD-EPI 2021: 66.7 ML/MIN/1.73
EOSINOPHIL # BLD AUTO: 0.37 10*3/MM3 (ref 0–0.4)
EOSINOPHIL NFR BLD AUTO: 4.8 % (ref 0.3–6.2)
ERYTHROCYTE [DISTWIDTH] IN BLOOD BY AUTOMATED COUNT: 14 % (ref 12.3–15.4)
GLOBULIN UR ELPH-MCNC: 3 GM/DL
GLUCOSE SERPL-MCNC: 84 MG/DL (ref 65–99)
GLUCOSE UR STRIP-MCNC: ABNORMAL MG/DL
HBA1C MFR BLD: 7.7 % (ref 4.8–5.6)
HCO3 BLDA-SCNC: 25.3 MMOL/L (ref 22–28)
HCT VFR BLD AUTO: 43.2 % (ref 37.5–51)
HGB BLD-MCNC: 13.9 G/DL (ref 13–17.7)
HGB UR QL STRIP.AUTO: NEGATIVE
IMM GRANULOCYTES # BLD AUTO: 0.03 10*3/MM3 (ref 0–0.05)
IMM GRANULOCYTES NFR BLD AUTO: 0.4 % (ref 0–0.5)
INR PPP: 0.95 (ref 0.9–1.1)
KETONES UR QL STRIP: NEGATIVE
LEUKOCYTE ESTERASE UR QL STRIP.AUTO: NEGATIVE
LYMPHOCYTES # BLD AUTO: 2.04 10*3/MM3 (ref 0.7–3.1)
LYMPHOCYTES NFR BLD AUTO: 26.2 % (ref 19.6–45.3)
MAGNESIUM SERPL-MCNC: 2.2 MG/DL (ref 1.6–2.6)
MCH RBC QN AUTO: 25.1 PG (ref 26.6–33)
MCHC RBC AUTO-ENTMCNC: 32.2 G/DL (ref 31.5–35.7)
MCV RBC AUTO: 78 FL (ref 79–97)
MODALITY: ABNORMAL
MONOCYTES # BLD AUTO: 0.79 10*3/MM3 (ref 0.1–0.9)
MONOCYTES NFR BLD AUTO: 10.2 % (ref 5–12)
NEUTROPHILS NFR BLD AUTO: 4.46 10*3/MM3 (ref 1.7–7)
NEUTROPHILS NFR BLD AUTO: 57.2 % (ref 42.7–76)
NITRITE UR QL STRIP: NEGATIVE
NRBC BLD AUTO-RTO: 0 /100 WBC (ref 0–0.2)
PCO2 BLDA: 43.3 MM HG (ref 35–45)
PH BLDA: 7.37 PH UNITS (ref 7.35–7.45)
PH UR STRIP.AUTO: <=5 [PH] (ref 5–8)
PLATELET # BLD AUTO: 373 10*3/MM3 (ref 140–450)
PMV BLD AUTO: 10.2 FL (ref 6–12)
PO2 BLDA: 84.8 MM HG (ref 80–100)
POTASSIUM SERPL-SCNC: 4.1 MMOL/L (ref 3.5–5.2)
PROT SERPL-MCNC: 7.3 G/DL (ref 6–8.5)
PROT UR QL STRIP: NEGATIVE
PROTHROMBIN TIME: 12.8 SECONDS (ref 11.7–14.2)
RBC # BLD AUTO: 5.54 10*6/MM3 (ref 4.14–5.8)
RH BLD: POSITIVE
SAO2 % BLDCOA: 96.1 % (ref 92–99)
SARS-COV-2 ORF1AB RESP QL NAA+PROBE: NOT DETECTED
SODIUM SERPL-SCNC: 140 MMOL/L (ref 136–145)
SP GR UR STRIP: 1.02 (ref 1–1.03)
T&S EXPIRATION DATE: NORMAL
TOTAL RATE: 18 BREATHS/MINUTE
UROBILINOGEN UR QL STRIP: ABNORMAL
WBC NRBC COR # BLD: 7.78 10*3/MM3 (ref 3.4–10.8)

## 2023-01-16 PROCEDURE — 86901 BLOOD TYPING SEROLOGIC RH(D): CPT

## 2023-01-16 PROCEDURE — 36600 WITHDRAWAL OF ARTERIAL BLOOD: CPT | Performed by: FAMILY MEDICINE

## 2023-01-16 PROCEDURE — C9803 HOPD COVID-19 SPEC COLLECT: HCPCS

## 2023-01-16 PROCEDURE — S0260 H&P FOR SURGERY: HCPCS | Performed by: NURSE PRACTITIONER

## 2023-01-16 PROCEDURE — 83036 HEMOGLOBIN GLYCOSYLATED A1C: CPT

## 2023-01-16 PROCEDURE — 85576 BLOOD PLATELET AGGREGATION: CPT

## 2023-01-16 PROCEDURE — 83735 ASSAY OF MAGNESIUM: CPT

## 2023-01-16 PROCEDURE — U0004 COV-19 TEST NON-CDC HGH THRU: HCPCS

## 2023-01-16 PROCEDURE — 71046 X-RAY EXAM CHEST 2 VIEWS: CPT

## 2023-01-16 PROCEDURE — 85610 PROTHROMBIN TIME: CPT

## 2023-01-16 PROCEDURE — 81003 URINALYSIS AUTO W/O SCOPE: CPT

## 2023-01-16 PROCEDURE — 86923 COMPATIBILITY TEST ELECTRIC: CPT

## 2023-01-16 PROCEDURE — 80053 COMPREHEN METABOLIC PANEL: CPT

## 2023-01-16 PROCEDURE — 36415 COLL VENOUS BLD VENIPUNCTURE: CPT

## 2023-01-16 PROCEDURE — 86850 RBC ANTIBODY SCREEN: CPT

## 2023-01-16 PROCEDURE — 82803 BLOOD GASES ANY COMBINATION: CPT | Performed by: FAMILY MEDICINE

## 2023-01-16 PROCEDURE — 85025 COMPLETE CBC W/AUTO DIFF WBC: CPT

## 2023-01-16 PROCEDURE — 85730 THROMBOPLASTIN TIME PARTIAL: CPT

## 2023-01-16 PROCEDURE — 86900 BLOOD TYPING SEROLOGIC ABO: CPT

## 2023-01-16 RX ORDER — CHLORHEXIDINE GLUCONATE 0.12 MG/ML
15 RINSE ORAL
Status: ON HOLD | COMMUNITY
End: 2023-01-18

## 2023-01-16 RX ORDER — CHLORHEXIDINE GLUCONATE 500 MG/1
1 CLOTH TOPICAL EVERY 12 HOURS PRN
Status: ACTIVE | OUTPATIENT
Start: 2023-01-16

## 2023-01-16 RX ORDER — CHLORHEXIDINE GLUCONATE 0.12 MG/ML
15 RINSE ORAL EVERY 12 HOURS
Status: DISPENSED | OUTPATIENT
Start: 2023-01-16 | End: 2023-01-17

## 2023-01-16 NOTE — DISCHARGE INSTRUCTIONS
Take the following medications the morning of surgery with a small sip of water:    NO MEDS UNLESS INSTRUCTED BY YOUR MD    ARRIVE AT 5:00        If you are on prescription narcotic pain medication to control your pain you may also take that medication the morning of surgery.    General Instructions:  Do not eat or drink anything after midnight the night before surgery.  Infants may have breast milk up to four hours before surgery.  Infants drinking formula may drink formula up to six hours before surgery.   Patients who avoid smoking, chewing tobacco and alcohol for 4 weeks prior to surgery have a reduced risk of post-operative complications.  Quit smoking as many days before surgery as you can.  Do not smoke, use chewing tobacco or drink alcohol the day of surgery.   If applicable bring your C-PAP/ BI-PAP machine.  Bring any papers given to you in the doctor’s office.  Wear clean comfortable clothes.  Do not wear contact lenses, false eyelashes or make-up.  Bring a case for your glasses.   Bring crutches or walker if applicable.  Remove all piercings.  Leave jewelry and any other valuables at home.  Hair extensions with metal clips must be removed prior to surgery.  The Pre-Admission Testing nurse will instruct you to bring medications if unable to obtain an accurate list in Pre-Admission Testing.        If you were given a blood bank ID arm band remember to bring it with you the day of surgery.    Preventing a Surgical Site Infection:  For 2 to 3 days before surgery, avoid shaving with a razor because the razor can irritate skin and make it easier to develop an infection.    Any areas of open skin can increase the risk of a post-operative wound infection by allowing bacteria to enter and travel throughout the body.  Notify your surgeon if you have any skin wounds / rashes even if it is not near the expected surgical site.  The area will need assessed to determine if surgery should be delayed until it is  healed.  The night prior to surgery shower using a fresh bar of anti-bacterial soap (such as Dial) and clean washcloth.  Sleep in a clean bed with clean clothing.  Do not allow pets to sleep with you.  Shower on the morning of surgery using a fresh bar of anti-bacterial soap (such as Dial) and clean washcloth.  Dry with a clean towel and dress in clean clothing.  Ask your surgeon if you will be receiving antibiotics prior to surgery.  Make sure you, your family, and all healthcare providers clean their hands with soap and water or an alcohol based hand  before caring for you or your wound.    Day of surgery:  Your arrival time is approximately two hours before your scheduled surgery time.  Upon arrival, a Pre-op nurse and Anesthesiologist will review your health history, obtain vital signs, and answer questions you may have.  The only belongings needed at this time will be your home medications and if applicable your C-PAP/BI-PAP machine.  A Pre-op nurse will start an IV and you may receive medication in preparation for surgery, including something to help you relax.      Please be aware that surgery does come with discomfort.  We want to make every effort to control your discomfort so please discuss any uncontrolled symptoms with your nurse.   Your doctor will most likely have prescribed pain medications.      If you are going home after surgery you will receive individualized written care instructions before being discharged.  A responsible adult must drive you to and from the hospital on the day of your surgery and stay with you for 24 hours.  Discharge prescriptions can be filled by the hospital pharmacy during regular pharmacy hours.  If you are having surgery late in the day/evening your prescription may be e-prescribed to your pharmacy.  Please verify your pharmacy hours or chose a 24 hour pharmacy to avoid not having access to your prescription because your pharmacy has closed for the day.    If you  are staying overnight following surgery, you will be transported to your hospital room following the recovery period.  [facility] has all private rooms.    If you have any questions please call Pre-Admission Testing at [phone].  Deductibles and co-payments are collected on the day of service. Please be prepared to pay the required co-pay, deductible or deposit on the day of service as defined by your plan.    Call your surgeon immediately if you experience any of the following symptoms:  Sore Throat  Shortness of Breath or difficulty breathing  Cough  Chills  Body soreness or muscle pain  Headache  Fever  New loss of taste or smell  Do not arrive for your surgery ill.  Your procedure will need to be rescheduled to another time.  You will need to call your physician before the day of surgery to avoid any unnecessary exposure to hospital staff as well as other patients.       CHLORHEXIDINE CLOTH INSTRUCTIONS  The morning of surgery follow these instructions using the Chlorhexidine cloths you've been given.  These steps reduce bacteria on the body.  Do not use the cloths near your eyes, ears mouth, genitalia or on open wounds.  Throw the cloths away after use but do not try to flush them down a toilet.      Open and remove one cloth at a time from the package.    Leave the cloth unfolded and begin the bathing.  Massage the skin with the cloths using gentle pressure to remove bacteria.  Do not scrub harshly.   Follow the steps below with one 2% CHG cloth per area (6 total cloths).  One cloth for neck, shoulders and chest.  One cloth for both arms, hands, fingers and underarms (do underarms last).  One cloth for the abdomen followed by groin.  One cloth for right leg and foot including between the toes.  One cloth for left leg and foot including between the toes.  The last cloth is to be used for the back of the neck, back and buttocks.    Allow the CHG to air dry 3 minutes on the skin which will  give it time to work and decrease the chance of irritation.  The skin may feel sticky until it is dry.  Do not rinse with water or any other liquid or you will lose the beneficial effects of the CHG.  If mild skin irritation occurs, do rinse the skin to remove the CHG.  Report this to the nurse at time of admission.  Do not apply lotions, creams, ointments, deodorants or perfumes after using the clothes. Dress in clean clothes before coming to the hospital.    BACTROBAN NASAL OINTMENT  There are many germs normally in your nose. Bactroban is an ointment that will help reduce these germs. Please follow these instructions for Bactroban use:      ____The day before surgery in the morning  Date________      ____The day of surgery in the morning    Date________    **Squirt ½ package of Bactroban Ointment onto a cotton applicator and apply to inside of 1st nostril.  Squirt the remaining Bactroban and apply to the inside of the other nostril.    PERIDEX- ORAL:  Use only if your surgeon has ordered  Use the night before and morning of surgery - Swish, gargle, and spit - do not swallow.

## 2023-01-16 NOTE — ANESTHESIA PREPROCEDURE EVALUATION
Anesthesia Evaluation     Patient summary reviewed and Nursing notes reviewed   no history of anesthetic complications:  NPO Solid Status: > 8 hours  NPO Liquid Status: > 8 hours           Airway   Mallampati: II  Dental    (+) poor dentition    Pulmonary - normal exam   (+) a smoker Former,   Cardiovascular - normal exam    (+) hypertension, past MI , CAD, CABG 0-3 Months, angina, hyperlipidemia,       Neuro/Psych  (+) psychiatric history Depression,    GI/Hepatic/Renal/Endo    (+) obesity,  GERD,  liver disease fatty liver disease, renal disease CRI, diabetes mellitus type 2 using insulin,     Musculoskeletal     Abdominal    Substance History      OB/GYN          Other   arthritis,                      Anesthesia Plan    ASA 4     general     intravenous induction     Anesthetic plan, risks, benefits, and alternatives have been provided, discussed and informed consent has been obtained with: patient.        CODE STATUS:

## 2023-01-18 ENCOUNTER — PREP FOR SURGERY (OUTPATIENT)
Dept: OTHER | Facility: HOSPITAL | Age: 53
End: 2023-01-18
Payer: MEDICARE

## 2023-01-18 ENCOUNTER — HOSPITAL ENCOUNTER (INPATIENT)
Facility: HOSPITAL | Age: 53
LOS: 6 days | Discharge: HOME HEALTH | DRG: 908 | End: 2023-01-24
Attending: THORACIC SURGERY (CARDIOTHORACIC VASCULAR SURGERY) | Admitting: THORACIC SURGERY (CARDIOTHORACIC VASCULAR SURGERY)
Payer: MEDICARE

## 2023-01-18 ENCOUNTER — ANESTHESIA (OUTPATIENT)
Dept: PERIOP | Facility: HOSPITAL | Age: 53
DRG: 908 | End: 2023-01-18
Payer: MEDICARE

## 2023-01-18 ENCOUNTER — APPOINTMENT (OUTPATIENT)
Dept: GENERAL RADIOLOGY | Facility: HOSPITAL | Age: 53
DRG: 908 | End: 2023-01-18
Payer: MEDICARE

## 2023-01-18 ENCOUNTER — ANCILLARY PROCEDURE (OUTPATIENT)
Dept: PERIOP | Facility: HOSPITAL | Age: 53
DRG: 908 | End: 2023-01-18
Payer: MEDICARE

## 2023-01-18 DIAGNOSIS — T81.32XA STERNAL WOUND DEHISCENCE, INITIAL ENCOUNTER: ICD-10-CM

## 2023-01-18 DIAGNOSIS — Z95.1 S/P CABG X 4: Primary | ICD-10-CM

## 2023-01-18 DIAGNOSIS — T81.32XD STERNAL WOUND DEHISCENCE, SUBSEQUENT ENCOUNTER: ICD-10-CM

## 2023-01-18 PROBLEM — T81.328D STERNAL WOUND DEHISCENCE, SUBSEQUENT ENCOUNTER: Status: ACTIVE | Noted: 2023-01-18

## 2023-01-18 LAB
GLUCOSE BLDC GLUCOMTR-MCNC: 118 MG/DL (ref 70–130)
GLUCOSE BLDC GLUCOMTR-MCNC: 125 MG/DL (ref 70–130)
GLUCOSE BLDC GLUCOMTR-MCNC: 126 MG/DL (ref 70–130)
GLUCOSE BLDC GLUCOMTR-MCNC: 157 MG/DL (ref 70–130)
GLUCOSE BLDC GLUCOMTR-MCNC: 180 MG/DL (ref 70–130)
GLUCOSE BLDC GLUCOMTR-MCNC: 75 MG/DL (ref 70–130)

## 2023-01-18 PROCEDURE — 0PB00ZZ EXCISION OF STERNUM, OPEN APPROACH: ICD-10-PCS | Performed by: THORACIC SURGERY (CARDIOTHORACIC VASCULAR SURGERY)

## 2023-01-18 PROCEDURE — 93318 ECHO TRANSESOPHAGEAL INTRAOP: CPT | Performed by: ANESTHESIOLOGY

## 2023-01-18 PROCEDURE — 25010000002 HYDROMORPHONE PER 4 MG: Performed by: ANESTHESIOLOGY

## 2023-01-18 PROCEDURE — 25010000002 CEFAZOLIN PER 500 MG: Performed by: THORACIC SURGERY (CARDIOTHORACIC VASCULAR SURGERY)

## 2023-01-18 PROCEDURE — 25010000002 PROPOFOL 10 MG/ML EMULSION: Performed by: ANESTHESIOLOGY

## 2023-01-18 PROCEDURE — 21627 STERNAL DEBRIDEMENT: CPT | Performed by: THORACIC SURGERY (CARDIOTHORACIC VASCULAR SURGERY)

## 2023-01-18 PROCEDURE — 82962 GLUCOSE BLOOD TEST: CPT

## 2023-01-18 PROCEDURE — 25010000002 FENTANYL CITRATE (PF) 250 MCG/5ML SOLUTION: Performed by: ANESTHESIOLOGY

## 2023-01-18 PROCEDURE — 25010000002 ONDANSETRON PER 1 MG: Performed by: ANESTHESIOLOGY

## 2023-01-18 PROCEDURE — 21627 STERNAL DEBRIDEMENT: CPT | Performed by: PHYSICIAN ASSISTANT

## 2023-01-18 PROCEDURE — 87075 CULTR BACTERIA EXCEPT BLOOD: CPT | Performed by: THORACIC SURGERY (CARDIOTHORACIC VASCULAR SURGERY)

## 2023-01-18 PROCEDURE — C1751 CATH, INF, PER/CENT/MIDLINE: HCPCS | Performed by: ANESTHESIOLOGY

## 2023-01-18 PROCEDURE — 25010000002 CEFAZOLIN IN DEXTROSE 2-4 GM/100ML-% SOLUTION: Performed by: NURSE PRACTITIONER

## 2023-01-18 PROCEDURE — 25010000002 FENTANYL CITRATE (PF) 50 MCG/ML SOLUTION: Performed by: ANESTHESIOLOGY

## 2023-01-18 PROCEDURE — 63710000001 INSULIN REGULAR HUMAN PER 5 UNITS: Performed by: THORACIC SURGERY (CARDIOTHORACIC VASCULAR SURGERY)

## 2023-01-18 PROCEDURE — 99232 SBSQ HOSP IP/OBS MODERATE 35: CPT | Performed by: SURGERY

## 2023-01-18 PROCEDURE — 25010000002 PHENYLEPHRINE 10 MG/ML SOLUTION 5 ML VIAL: Performed by: ANESTHESIOLOGY

## 2023-01-18 PROCEDURE — 87070 CULTURE OTHR SPECIMN AEROBIC: CPT | Performed by: THORACIC SURGERY (CARDIOTHORACIC VASCULAR SURGERY)

## 2023-01-18 PROCEDURE — 0PC00ZZ EXTIRPATION OF MATTER FROM STERNUM, OPEN APPROACH: ICD-10-PCS | Performed by: THORACIC SURGERY (CARDIOTHORACIC VASCULAR SURGERY)

## 2023-01-18 PROCEDURE — 87205 SMEAR GRAM STAIN: CPT | Performed by: THORACIC SURGERY (CARDIOTHORACIC VASCULAR SURGERY)

## 2023-01-18 RX ORDER — HYDRALAZINE HYDROCHLORIDE 20 MG/ML
5 INJECTION INTRAMUSCULAR; INTRAVENOUS
Status: DISCONTINUED | OUTPATIENT
Start: 2023-01-18 | End: 2023-01-18 | Stop reason: HOSPADM

## 2023-01-18 RX ORDER — CHLORHEXIDINE GLUCONATE 500 MG/1
1 CLOTH TOPICAL EVERY 12 HOURS PRN
Status: DISCONTINUED | OUTPATIENT
Start: 2023-01-18 | End: 2023-01-18 | Stop reason: HOSPADM

## 2023-01-18 RX ORDER — TRAZODONE HYDROCHLORIDE 100 MG/1
100 TABLET ORAL
Status: DISCONTINUED | OUTPATIENT
Start: 2023-01-18 | End: 2023-01-24 | Stop reason: HOSPADM

## 2023-01-18 RX ORDER — HYDROCODONE BITARTRATE AND ACETAMINOPHEN 7.5; 325 MG/1; MG/1
1 TABLET ORAL EVERY 12 HOURS PRN
Status: DISCONTINUED | OUTPATIENT
Start: 2023-01-18 | End: 2023-01-24 | Stop reason: HOSPADM

## 2023-01-18 RX ORDER — CEFAZOLIN SODIUM IN 0.9 % NACL 3 G/100 ML
3 INTRAVENOUS SOLUTION, PIGGYBACK (ML) INTRAVENOUS EVERY 8 HOURS
Status: COMPLETED | OUTPATIENT
Start: 2023-01-18 | End: 2023-01-19

## 2023-01-18 RX ORDER — PROMETHAZINE HYDROCHLORIDE 25 MG/1
25 TABLET ORAL ONCE AS NEEDED
Status: DISCONTINUED | OUTPATIENT
Start: 2023-01-18 | End: 2023-01-18 | Stop reason: HOSPADM

## 2023-01-18 RX ORDER — FENTANYL CITRATE 50 UG/ML
50 INJECTION, SOLUTION INTRAMUSCULAR; INTRAVENOUS
Status: DISCONTINUED | OUTPATIENT
Start: 2023-01-18 | End: 2023-01-18 | Stop reason: HOSPADM

## 2023-01-18 RX ORDER — METOPROLOL SUCCINATE 50 MG/1
50 TABLET, EXTENDED RELEASE ORAL
Status: DISCONTINUED | OUTPATIENT
Start: 2023-01-19 | End: 2023-01-24 | Stop reason: HOSPADM

## 2023-01-18 RX ORDER — CEFAZOLIN SODIUM 2 G/100ML
2 INJECTION, SOLUTION INTRAVENOUS ONCE
Status: COMPLETED | OUTPATIENT
Start: 2023-01-18 | End: 2023-01-18

## 2023-01-18 RX ORDER — NALOXONE HCL 0.4 MG/ML
0.2 VIAL (ML) INJECTION AS NEEDED
Status: DISCONTINUED | OUTPATIENT
Start: 2023-01-18 | End: 2023-01-18 | Stop reason: HOSPADM

## 2023-01-18 RX ORDER — GABAPENTIN 300 MG/1
600 CAPSULE ORAL EVERY 8 HOURS SCHEDULED
Status: DISCONTINUED | OUTPATIENT
Start: 2023-01-18 | End: 2023-01-24 | Stop reason: HOSPADM

## 2023-01-18 RX ORDER — EPHEDRINE SULFATE 50 MG/ML
5 INJECTION, SOLUTION INTRAVENOUS ONCE AS NEEDED
Status: DISCONTINUED | OUTPATIENT
Start: 2023-01-18 | End: 2023-01-18 | Stop reason: HOSPADM

## 2023-01-18 RX ORDER — HYDROCHLOROTHIAZIDE 25 MG/1
25 TABLET ORAL
Status: DISCONTINUED | OUTPATIENT
Start: 2023-01-18 | End: 2023-01-24 | Stop reason: HOSPADM

## 2023-01-18 RX ORDER — SODIUM CHLORIDE 0.9 % (FLUSH) 0.9 %
3-10 SYRINGE (ML) INJECTION AS NEEDED
Status: DISCONTINUED | OUTPATIENT
Start: 2023-01-18 | End: 2023-01-18 | Stop reason: HOSPADM

## 2023-01-18 RX ORDER — CHLORHEXIDINE GLUCONATE 0.12 MG/ML
15 RINSE ORAL ONCE
Status: COMPLETED | OUTPATIENT
Start: 2023-01-18 | End: 2023-01-18

## 2023-01-18 RX ORDER — ALPRAZOLAM 0.5 MG/1
0.5 TABLET ORAL 4 TIMES DAILY PRN
Status: DISCONTINUED | OUTPATIENT
Start: 2023-01-18 | End: 2023-01-24 | Stop reason: HOSPADM

## 2023-01-18 RX ORDER — HYDROXYZINE HYDROCHLORIDE 25 MG/1
25 TABLET, FILM COATED ORAL EVERY 8 HOURS SCHEDULED
Status: DISCONTINUED | OUTPATIENT
Start: 2023-01-18 | End: 2023-01-24 | Stop reason: HOSPADM

## 2023-01-18 RX ORDER — LIDOCAINE HYDROCHLORIDE 10 MG/ML
0.5 INJECTION, SOLUTION EPIDURAL; INFILTRATION; INTRACAUDAL; PERINEURAL ONCE AS NEEDED
Status: DISCONTINUED | OUTPATIENT
Start: 2023-01-18 | End: 2023-01-18 | Stop reason: HOSPADM

## 2023-01-18 RX ORDER — PROMETHAZINE HYDROCHLORIDE 25 MG/1
25 SUPPOSITORY RECTAL ONCE AS NEEDED
Status: DISCONTINUED | OUTPATIENT
Start: 2023-01-18 | End: 2023-01-18 | Stop reason: HOSPADM

## 2023-01-18 RX ORDER — SODIUM CHLORIDE, SODIUM LACTATE, POTASSIUM CHLORIDE, CALCIUM CHLORIDE 600; 310; 30; 20 MG/100ML; MG/100ML; MG/100ML; MG/100ML
9 INJECTION, SOLUTION INTRAVENOUS CONTINUOUS
Status: DISCONTINUED | OUTPATIENT
Start: 2023-01-18 | End: 2023-01-18

## 2023-01-18 RX ORDER — SODIUM CHLORIDE 0.9 % (FLUSH) 0.9 %
3 SYRINGE (ML) INJECTION EVERY 12 HOURS SCHEDULED
Status: DISCONTINUED | OUTPATIENT
Start: 2023-01-18 | End: 2023-01-18 | Stop reason: HOSPADM

## 2023-01-18 RX ORDER — MIDAZOLAM HYDROCHLORIDE 1 MG/ML
1 INJECTION INTRAMUSCULAR; INTRAVENOUS
Status: DISCONTINUED | OUTPATIENT
Start: 2023-01-18 | End: 2023-01-18 | Stop reason: HOSPADM

## 2023-01-18 RX ORDER — ACETAMINOPHEN 500 MG
1000 TABLET ORAL ONCE
Status: COMPLETED | OUTPATIENT
Start: 2023-01-18 | End: 2023-01-18

## 2023-01-18 RX ORDER — OXYCODONE AND ACETAMINOPHEN 7.5; 325 MG/1; MG/1
1 TABLET ORAL EVERY 4 HOURS PRN
Status: DISCONTINUED | OUTPATIENT
Start: 2023-01-18 | End: 2023-01-18 | Stop reason: HOSPADM

## 2023-01-18 RX ORDER — PROPOFOL 10 MG/ML
VIAL (ML) INTRAVENOUS AS NEEDED
Status: DISCONTINUED | OUTPATIENT
Start: 2023-01-18 | End: 2023-01-18 | Stop reason: SURG

## 2023-01-18 RX ORDER — FAMOTIDINE 10 MG/ML
20 INJECTION, SOLUTION INTRAVENOUS ONCE
Status: COMPLETED | OUTPATIENT
Start: 2023-01-18 | End: 2023-01-18

## 2023-01-18 RX ORDER — DIPHENHYDRAMINE HYDROCHLORIDE 50 MG/ML
12.5 INJECTION INTRAMUSCULAR; INTRAVENOUS
Status: DISCONTINUED | OUTPATIENT
Start: 2023-01-18 | End: 2023-01-18 | Stop reason: HOSPADM

## 2023-01-18 RX ORDER — HYDROCODONE BITARTRATE AND ACETAMINOPHEN 7.5; 325 MG/1; MG/1
1 TABLET ORAL ONCE AS NEEDED
Status: DISCONTINUED | OUTPATIENT
Start: 2023-01-18 | End: 2023-01-18 | Stop reason: HOSPADM

## 2023-01-18 RX ORDER — ASPIRIN 81 MG/1
81 TABLET ORAL DAILY
Status: DISCONTINUED | OUTPATIENT
Start: 2023-01-18 | End: 2023-01-24 | Stop reason: HOSPADM

## 2023-01-18 RX ORDER — ONDANSETRON 2 MG/ML
INJECTION INTRAMUSCULAR; INTRAVENOUS AS NEEDED
Status: DISCONTINUED | OUTPATIENT
Start: 2023-01-18 | End: 2023-01-18 | Stop reason: SURG

## 2023-01-18 RX ORDER — AMITRIPTYLINE HYDROCHLORIDE 25 MG/1
25 TABLET, FILM COATED ORAL NIGHTLY
Status: DISCONTINUED | OUTPATIENT
Start: 2023-01-18 | End: 2023-01-24 | Stop reason: HOSPADM

## 2023-01-18 RX ORDER — ONDANSETRON 2 MG/ML
4 INJECTION INTRAMUSCULAR; INTRAVENOUS ONCE AS NEEDED
Status: COMPLETED | OUTPATIENT
Start: 2023-01-18 | End: 2023-01-18

## 2023-01-18 RX ORDER — HYDROMORPHONE HYDROCHLORIDE 1 MG/ML
0.5 INJECTION, SOLUTION INTRAMUSCULAR; INTRAVENOUS; SUBCUTANEOUS
Status: DISCONTINUED | OUTPATIENT
Start: 2023-01-18 | End: 2023-01-18 | Stop reason: HOSPADM

## 2023-01-18 RX ORDER — ATORVASTATIN CALCIUM 20 MG/1
40 TABLET, FILM COATED ORAL DAILY
Status: DISCONTINUED | OUTPATIENT
Start: 2023-01-18 | End: 2023-01-24 | Stop reason: HOSPADM

## 2023-01-18 RX ORDER — FENTANYL CITRATE 50 UG/ML
INJECTION, SOLUTION INTRAMUSCULAR; INTRAVENOUS AS NEEDED
Status: DISCONTINUED | OUTPATIENT
Start: 2023-01-18 | End: 2023-01-18 | Stop reason: SURG

## 2023-01-18 RX ORDER — POTASSIUM CHLORIDE 750 MG/1
20 TABLET, FILM COATED, EXTENDED RELEASE ORAL 2 TIMES DAILY WITH MEALS
Status: DISCONTINUED | OUTPATIENT
Start: 2023-01-18 | End: 2023-01-21

## 2023-01-18 RX ORDER — LOSARTAN POTASSIUM 100 MG/1
100 TABLET ORAL
Status: DISCONTINUED | OUTPATIENT
Start: 2023-01-18 | End: 2023-01-21

## 2023-01-18 RX ORDER — CHOLECALCIFEROL (VITAMIN D3) 125 MCG
10 CAPSULE ORAL NIGHTLY
Status: DISCONTINUED | OUTPATIENT
Start: 2023-01-18 | End: 2023-01-24 | Stop reason: HOSPADM

## 2023-01-18 RX ORDER — FLUMAZENIL 0.1 MG/ML
0.2 INJECTION INTRAVENOUS AS NEEDED
Status: DISCONTINUED | OUTPATIENT
Start: 2023-01-18 | End: 2023-01-18 | Stop reason: HOSPADM

## 2023-01-18 RX ORDER — LIDOCAINE HYDROCHLORIDE 20 MG/ML
INJECTION, SOLUTION INFILTRATION; PERINEURAL AS NEEDED
Status: DISCONTINUED | OUTPATIENT
Start: 2023-01-18 | End: 2023-01-18 | Stop reason: SURG

## 2023-01-18 RX ORDER — LABETALOL HYDROCHLORIDE 5 MG/ML
5 INJECTION, SOLUTION INTRAVENOUS
Status: DISCONTINUED | OUTPATIENT
Start: 2023-01-18 | End: 2023-01-18 | Stop reason: HOSPADM

## 2023-01-18 RX ORDER — TEMAZEPAM 15 MG/1
15 CAPSULE ORAL NIGHTLY PRN
Status: DISCONTINUED | OUTPATIENT
Start: 2023-01-18 | End: 2023-01-24 | Stop reason: HOSPADM

## 2023-01-18 RX ORDER — DEXTROSE MONOHYDRATE 25 G/50ML
25 INJECTION, SOLUTION INTRAVENOUS
Status: DISCONTINUED | OUTPATIENT
Start: 2023-01-18 | End: 2023-01-24 | Stop reason: HOSPADM

## 2023-01-18 RX ORDER — FAMOTIDINE 10 MG/ML
20 INJECTION, SOLUTION INTRAVENOUS ONCE
Status: DISCONTINUED | OUTPATIENT
Start: 2023-01-18 | End: 2023-01-18 | Stop reason: HOSPADM

## 2023-01-18 RX ORDER — MORPHINE SULFATE 2 MG/ML
2 INJECTION, SOLUTION INTRAMUSCULAR; INTRAVENOUS EVERY 4 HOURS PRN
Status: DISCONTINUED | OUTPATIENT
Start: 2023-01-18 | End: 2023-01-24 | Stop reason: HOSPADM

## 2023-01-18 RX ORDER — DIPHENHYDRAMINE HCL 25 MG
25 CAPSULE ORAL
Status: DISCONTINUED | OUTPATIENT
Start: 2023-01-18 | End: 2023-01-18 | Stop reason: HOSPADM

## 2023-01-18 RX ORDER — NICOTINE POLACRILEX 4 MG
15 LOZENGE BUCCAL
Status: DISCONTINUED | OUTPATIENT
Start: 2023-01-18 | End: 2023-01-24 | Stop reason: HOSPADM

## 2023-01-18 RX ORDER — MAGNESIUM OXIDE 400 MG/1
400 TABLET ORAL DAILY
Status: DISCONTINUED | OUTPATIENT
Start: 2023-01-18 | End: 2023-01-24 | Stop reason: HOSPADM

## 2023-01-18 RX ORDER — SODIUM CHLORIDE 9 MG/ML
INJECTION, SOLUTION INTRAVENOUS CONTINUOUS PRN
Status: DISCONTINUED | OUTPATIENT
Start: 2023-01-18 | End: 2023-01-18 | Stop reason: SURG

## 2023-01-18 RX ORDER — TAMSULOSIN HYDROCHLORIDE 0.4 MG/1
0.4 CAPSULE ORAL DAILY
Status: DISCONTINUED | OUTPATIENT
Start: 2023-01-18 | End: 2023-01-24 | Stop reason: HOSPADM

## 2023-01-18 RX ORDER — ROCURONIUM BROMIDE 10 MG/ML
INJECTION, SOLUTION INTRAVENOUS AS NEEDED
Status: DISCONTINUED | OUTPATIENT
Start: 2023-01-18 | End: 2023-01-18 | Stop reason: SURG

## 2023-01-18 RX ADMIN — HYDROXYZINE HYDROCHLORIDE 25 MG: 25 TABLET ORAL at 21:15

## 2023-01-18 RX ADMIN — FAMOTIDINE 20 MG: 10 INJECTION INTRAVENOUS at 05:51

## 2023-01-18 RX ADMIN — GABAPENTIN 600 MG: 300 CAPSULE ORAL at 21:15

## 2023-01-18 RX ADMIN — HYDROCODONE BITARTRATE AND ACETAMINOPHEN 1 TABLET: 7.5; 325 TABLET ORAL at 14:56

## 2023-01-18 RX ADMIN — FENTANYL CITRATE 50 MCG: 50 INJECTION, SOLUTION INTRAMUSCULAR; INTRAVENOUS at 07:50

## 2023-01-18 RX ADMIN — ONDANSETRON 4 MG: 2 INJECTION INTRAMUSCULAR; INTRAVENOUS at 09:00

## 2023-01-18 RX ADMIN — CEFAZOLIN 3 G: 10 INJECTION, POWDER, FOR SOLUTION INTRAVENOUS at 23:08

## 2023-01-18 RX ADMIN — HYDROCHLOROTHIAZIDE 25 MG: 25 TABLET ORAL at 14:55

## 2023-01-18 RX ADMIN — HYDROMORPHONE HYDROCHLORIDE 0.5 MG: 1 INJECTION, SOLUTION INTRAMUSCULAR; INTRAVENOUS; SUBCUTANEOUS at 09:30

## 2023-01-18 RX ADMIN — FENTANYL CITRATE 50 MCG: 50 INJECTION, SOLUTION INTRAMUSCULAR; INTRAVENOUS at 10:08

## 2023-01-18 RX ADMIN — ASPIRIN 81 MG: 81 TABLET, COATED ORAL at 14:55

## 2023-01-18 RX ADMIN — ACETAMINOPHEN 1000 MG: 500 TABLET, FILM COATED ORAL at 06:30

## 2023-01-18 RX ADMIN — FENTANYL CITRATE 100 MCG: 50 INJECTION, SOLUTION INTRAMUSCULAR; INTRAVENOUS at 08:00

## 2023-01-18 RX ADMIN — INSULIN HUMAN 4 UNITS: 100 INJECTION, SOLUTION PARENTERAL at 23:43

## 2023-01-18 RX ADMIN — PHENYLEPHRINE HYDROCHLORIDE 0.5 MCG/KG/MIN: 10 INJECTION INTRAVENOUS at 07:01

## 2023-01-18 RX ADMIN — ONDANSETRON 4 MG: 2 INJECTION INTRAMUSCULAR; INTRAVENOUS at 08:28

## 2023-01-18 RX ADMIN — MUPIROCIN 1 APPLICATION: 20 OINTMENT TOPICAL at 21:15

## 2023-01-18 RX ADMIN — SUGAMMADEX 200 MG: 100 INJECTION, SOLUTION INTRAVENOUS at 08:28

## 2023-01-18 RX ADMIN — SODIUM CHLORIDE, POTASSIUM CHLORIDE, SODIUM LACTATE AND CALCIUM CHLORIDE 9 ML/HR: 600; 310; 30; 20 INJECTION, SOLUTION INTRAVENOUS at 05:54

## 2023-01-18 RX ADMIN — HYDROXYZINE HYDROCHLORIDE 25 MG: 25 TABLET ORAL at 14:55

## 2023-01-18 RX ADMIN — ALPRAZOLAM 0.5 MG: 0.5 TABLET ORAL at 17:27

## 2023-01-18 RX ADMIN — TEMAZEPAM 15 MG: 15 CAPSULE ORAL at 21:15

## 2023-01-18 RX ADMIN — CHLORHEXIDINE GLUCONATE 1 APPLICATION: 500 CLOTH TOPICAL at 05:53

## 2023-01-18 RX ADMIN — Medication 10 MG: at 21:15

## 2023-01-18 RX ADMIN — POTASSIUM CHLORIDE 20 MEQ: 750 TABLET, EXTENDED RELEASE ORAL at 17:27

## 2023-01-18 RX ADMIN — ATORVASTATIN CALCIUM 40 MG: 20 TABLET, FILM COATED ORAL at 21:15

## 2023-01-18 RX ADMIN — LIDOCAINE HYDROCHLORIDE 100 MG: 20 INJECTION, SOLUTION INFILTRATION; PERINEURAL at 07:01

## 2023-01-18 RX ADMIN — HYDROMORPHONE HYDROCHLORIDE 0.5 MG: 1 INJECTION, SOLUTION INTRAMUSCULAR; INTRAVENOUS; SUBCUTANEOUS at 10:38

## 2023-01-18 RX ADMIN — FENTANYL CITRATE 50 MCG: 50 INJECTION, SOLUTION INTRAMUSCULAR; INTRAVENOUS at 08:58

## 2023-01-18 RX ADMIN — LOSARTAN POTASSIUM 100 MG: 100 TABLET, FILM COATED ORAL at 14:55

## 2023-01-18 RX ADMIN — ROCURONIUM BROMIDE 50 MG: 10 INJECTION, SOLUTION INTRAVENOUS at 07:03

## 2023-01-18 RX ADMIN — HYDROMORPHONE HYDROCHLORIDE 0.5 MG: 1 INJECTION, SOLUTION INTRAMUSCULAR; INTRAVENOUS; SUBCUTANEOUS at 10:50

## 2023-01-18 RX ADMIN — FENTANYL CITRATE 50 MCG: 50 INJECTION, SOLUTION INTRAMUSCULAR; INTRAVENOUS at 06:52

## 2023-01-18 RX ADMIN — AMITRIPTYLINE HYDROCHLORIDE 25 MG: 25 TABLET, FILM COATED ORAL at 21:15

## 2023-01-18 RX ADMIN — FENTANYL CITRATE 50 MCG: 50 INJECTION, SOLUTION INTRAMUSCULAR; INTRAVENOUS at 06:50

## 2023-01-18 RX ADMIN — MAGNESIUM OXIDE 400 MG (241.3 MG MAGNESIUM) TABLET 400 MG: TABLET at 14:55

## 2023-01-18 RX ADMIN — CEFAZOLIN SODIUM 2 G: 2 INJECTION, SOLUTION INTRAVENOUS at 07:09

## 2023-01-18 RX ADMIN — TAMSULOSIN HYDROCHLORIDE 0.4 MG: 0.4 CAPSULE ORAL at 14:55

## 2023-01-18 RX ADMIN — CEFAZOLIN 3 G: 10 INJECTION, POWDER, FOR SOLUTION INTRAVENOUS at 15:26

## 2023-01-18 RX ADMIN — METOPROLOL TARTRATE 12.5 MG: 25 TABLET, FILM COATED ORAL at 05:51

## 2023-01-18 RX ADMIN — PROPOFOL 150 MG: 10 INJECTION, EMULSION INTRAVENOUS at 07:02

## 2023-01-18 RX ADMIN — GABAPENTIN 600 MG: 300 CAPSULE ORAL at 14:55

## 2023-01-18 RX ADMIN — PROPOFOL 50 MG: 10 INJECTION, EMULSION INTRAVENOUS at 07:05

## 2023-01-18 RX ADMIN — CHLORHEXIDINE GLUCONATE 15 ML: 1.2 SOLUTION ORAL at 05:51

## 2023-01-18 RX ADMIN — SODIUM CHLORIDE: 9 INJECTION, SOLUTION INTRAVENOUS at 07:28

## 2023-01-18 RX ADMIN — EMPAGLIFLOZIN 25 MG: 25 TABLET, FILM COATED ORAL at 14:55

## 2023-01-18 NOTE — ANESTHESIA PROCEDURE NOTES
Airway  Urgency: elective    Date/Time: 1/18/2023 7:08 AM  Airway not difficult    General Information and Staff    Patient location during procedure: OR  Anesthesiologist: Denzel Perez MD    Indications and Patient Condition  Indications for airway management: airway protection    Preoxygenated: yes  Mask difficulty assessment: 1 - vent by mask    Final Airway Details  Final airway type: endotracheal airway      Successful airway: ETT  Cuffed: yes   Successful intubation technique: direct laryngoscopy  Endotracheal tube insertion site: oral  Blade: Juan Ramon  Blade size: 4  ETT size (mm): 7.5  Cormack-Lehane Classification: grade III - view of epiglottis only  Placement verified by: chest auscultation   Measured from: lips  ETT/EBT  to lips (cm): 22  Number of attempts at approach: 1  Assessment: lips, teeth, and gum same as pre-op and atraumatic intubation

## 2023-01-18 NOTE — ANESTHESIA PROCEDURE NOTES
Central Line      Patient reassessed immediately prior to procedure    Patient location during procedure: OR  Start time: 1/18/2023 7:12 AM  Stop Time:1/18/2023 7:26 AM  Indications: vascular access  Staff  Anesthesiologist: Denzel Perez MD  Preanesthetic Checklist  Completed: patient identified, IV checked, site marked, risks and benefits discussed, surgical consent, monitors and equipment checked, pre-op evaluation and timeout performed  Central Line Prep  Sterile Tech:cap, gloves, gown, mask and sterile barriers  Prep: chloraprep  Patient monitoring: continuous pulse oximetry, blood pressure monitoring and EKG  Central Line Procedure  Laterality:right  Location:internal jugular  Catheter Type:triple lumen  Catheter Size:7 Fr  Guidance:ultrasound guided  PROCEDURE NOTE/ULTRASOUND INTERPRETATION.  Using ultrasound guidance the potential vascular sites for insertion of the catheter were visualized to determine the patency of the vessel to be used for vascular access.  After selecting the appropriate site for insertion, the needle was visualized under ultrasound being inserted into the internal jugular vein, followed by ultrasound confirmation of wire and catheter placement. There were no abnormalities seen on ultrasound; an image was taken; and the patient tolerated the procedure with no complications. Images: still images obtained, printed/placed on chart  Assessment  Post procedure:biopatch applied, line sutured and occlusive dressing applied  Assessement:blood return through all ports, free fluid flow, placement verified by x-ray, Jorge Test, no pneumothorax on x-ray and chest x-ray ordered  Complications:no  Patient Tolerance:patient tolerated the procedure well with no apparent complications  Additional Notes  Ultrasound guidance was used for needle placement.

## 2023-01-18 NOTE — ANESTHESIA PROCEDURE NOTES
Diagnostic IntraOp Anesthesia Logan    Procedure Performed: Diagnostic IntraOp Anesthesia Logan       Start Time:        End Time:        General Procedure Information  Physician Requesting Echo: Jr Tutu Blackmon MD        Anesthesia Information      Echocardiogram Comments:       Ultrasound done for line access for art line and central line only

## 2023-01-18 NOTE — ANESTHESIA PROCEDURE NOTES
Arterial Line      Patient reassessed immediately prior to procedure    Patient location during procedure: OR  Start time: 1/18/2023 6:55 AM  Stop Time:1/18/2023 6:58 AM       Line placed for hemodynamic monitoring, ABGs/Labs/ISTAT and MD/Surgeon request.  Performed By   Anesthesiologist: Denzel Perez MD   Preanesthetic Checklist  Completed: patient identified, IV checked, site marked, risks and benefits discussed, surgical consent, monitors and equipment checked, pre-op evaluation and timeout performed  Arterial Line Prep    Sterile Tech: cap, gloves, mask and sterile barriers  Prep: ChloraPrep  Patient monitoring: blood pressure monitoring, continuous pulse oximetry and EKG  Arterial Line Procedure   Laterality:left  Location:  radial artery  Catheter size: 20 G   Guidance: palpation technique  PROCEDURE NOTE/ULTRASOUND INTERPRETATION.  Using ultrasound guidance the potential vascular sites for insertion of the catheter were visualized to determine the patency of the vessel to be used for vascular access.  After selecting the appropriate site for insertion, the needle was visualized under ultrasound being inserted into the radial artery, followed by ultrasound confirmation of wire and catheter placement. There were no abnormalities seen on ultrasound; an image was taken; and the patient tolerated the procedure with no complications.   Number of attempts: 1  Successful placement: yes Images: still images not obtained  Post Assessment   Dressing Type: wrist guard applied, occlusive dressing applied and secured with tape.   Complications no  Circ/Move/Sens Assessment: normal and unchanged.   Patient Tolerance: patient tolerated the procedure well with no apparent complications

## 2023-01-19 LAB
ANION GAP SERPL CALCULATED.3IONS-SCNC: 8 MMOL/L (ref 5–15)
BUN SERPL-MCNC: 16 MG/DL (ref 6–20)
BUN/CREAT SERPL: 12 (ref 7–25)
CALCIUM SPEC-SCNC: 9.2 MG/DL (ref 8.6–10.5)
CHLORIDE SERPL-SCNC: 98 MMOL/L (ref 98–107)
CO2 SERPL-SCNC: 29 MMOL/L (ref 22–29)
CREAT SERPL-MCNC: 1.33 MG/DL (ref 0.76–1.27)
DEPRECATED RDW RBC AUTO: 39.2 FL (ref 37–54)
EGFRCR SERPLBLD CKD-EPI 2021: 64.3 ML/MIN/1.73
ERYTHROCYTE [DISTWIDTH] IN BLOOD BY AUTOMATED COUNT: 14.1 % (ref 12.3–15.4)
GLUCOSE BLDC GLUCOMTR-MCNC: 131 MG/DL (ref 70–130)
GLUCOSE BLDC GLUCOMTR-MCNC: 188 MG/DL (ref 70–130)
GLUCOSE BLDC GLUCOMTR-MCNC: 193 MG/DL (ref 70–130)
GLUCOSE BLDC GLUCOMTR-MCNC: 307 MG/DL (ref 70–130)
GLUCOSE SERPL-MCNC: 205 MG/DL (ref 65–99)
HCT VFR BLD AUTO: 40.2 % (ref 37.5–51)
HGB BLD-MCNC: 12.4 G/DL (ref 13–17.7)
MCH RBC QN AUTO: 24.2 PG (ref 26.6–33)
MCHC RBC AUTO-ENTMCNC: 30.8 G/DL (ref 31.5–35.7)
MCV RBC AUTO: 78.5 FL (ref 79–97)
PLATELET # BLD AUTO: 362 10*3/MM3 (ref 140–450)
PMV BLD AUTO: 10 FL (ref 6–12)
POTASSIUM SERPL-SCNC: 4.4 MMOL/L (ref 3.5–5.2)
RBC # BLD AUTO: 5.12 10*6/MM3 (ref 4.14–5.8)
SODIUM SERPL-SCNC: 135 MMOL/L (ref 136–145)
WBC NRBC COR # BLD: 7.55 10*3/MM3 (ref 3.4–10.8)

## 2023-01-19 PROCEDURE — 85027 COMPLETE CBC AUTOMATED: CPT

## 2023-01-19 PROCEDURE — 63710000001 INSULIN REGULAR HUMAN PER 5 UNITS: Performed by: THORACIC SURGERY (CARDIOTHORACIC VASCULAR SURGERY)

## 2023-01-19 PROCEDURE — 80048 BASIC METABOLIC PNL TOTAL CA: CPT

## 2023-01-19 PROCEDURE — 25010000002 CEFAZOLIN PER 500 MG: Performed by: THORACIC SURGERY (CARDIOTHORACIC VASCULAR SURGERY)

## 2023-01-19 PROCEDURE — 82962 GLUCOSE BLOOD TEST: CPT

## 2023-01-19 PROCEDURE — 25010000002 MORPHINE PER 10 MG: Performed by: THORACIC SURGERY (CARDIOTHORACIC VASCULAR SURGERY)

## 2023-01-19 RX ADMIN — EMPAGLIFLOZIN 25 MG: 25 TABLET, FILM COATED ORAL at 11:41

## 2023-01-19 RX ADMIN — MUPIROCIN 1 APPLICATION: 20 OINTMENT TOPICAL at 08:21

## 2023-01-19 RX ADMIN — POTASSIUM CHLORIDE 20 MEQ: 750 TABLET, EXTENDED RELEASE ORAL at 08:20

## 2023-01-19 RX ADMIN — LOSARTAN POTASSIUM 100 MG: 100 TABLET, FILM COATED ORAL at 08:21

## 2023-01-19 RX ADMIN — GABAPENTIN 600 MG: 300 CAPSULE ORAL at 21:01

## 2023-01-19 RX ADMIN — HYDROXYZINE HYDROCHLORIDE 25 MG: 25 TABLET ORAL at 05:38

## 2023-01-19 RX ADMIN — TAMSULOSIN HYDROCHLORIDE 0.4 MG: 0.4 CAPSULE ORAL at 08:21

## 2023-01-19 RX ADMIN — MAGNESIUM OXIDE 400 MG (241.3 MG MAGNESIUM) TABLET 400 MG: TABLET at 08:21

## 2023-01-19 RX ADMIN — GABAPENTIN 600 MG: 300 CAPSULE ORAL at 05:38

## 2023-01-19 RX ADMIN — HYDROXYZINE HYDROCHLORIDE 25 MG: 25 TABLET ORAL at 15:34

## 2023-01-19 RX ADMIN — ATORVASTATIN CALCIUM 40 MG: 20 TABLET, FILM COATED ORAL at 21:02

## 2023-01-19 RX ADMIN — MORPHINE SULFATE 2 MG: 2 INJECTION, SOLUTION INTRAMUSCULAR; INTRAVENOUS at 21:50

## 2023-01-19 RX ADMIN — HYDROXYZINE HYDROCHLORIDE 25 MG: 25 TABLET ORAL at 22:15

## 2023-01-19 RX ADMIN — Medication 10 MG: at 21:05

## 2023-01-19 RX ADMIN — HYDROCHLOROTHIAZIDE 25 MG: 25 TABLET ORAL at 08:21

## 2023-01-19 RX ADMIN — MUPIROCIN 1 APPLICATION: 20 OINTMENT TOPICAL at 21:05

## 2023-01-19 RX ADMIN — METOPROLOL SUCCINATE 50 MG: 50 TABLET, FILM COATED, EXTENDED RELEASE ORAL at 08:21

## 2023-01-19 RX ADMIN — GABAPENTIN 600 MG: 300 CAPSULE ORAL at 15:34

## 2023-01-19 RX ADMIN — INSULIN HUMAN 16 UNITS: 100 INJECTION, SOLUTION PARENTERAL at 16:18

## 2023-01-19 RX ADMIN — INSULIN HUMAN 4 UNITS: 100 INJECTION, SOLUTION PARENTERAL at 11:41

## 2023-01-19 RX ADMIN — POTASSIUM CHLORIDE 20 MEQ: 750 TABLET, EXTENDED RELEASE ORAL at 17:55

## 2023-01-19 RX ADMIN — HYDROCODONE BITARTRATE AND ACETAMINOPHEN 1 TABLET: 7.5; 325 TABLET ORAL at 15:34

## 2023-01-19 RX ADMIN — INSULIN HUMAN 4 UNITS: 100 INJECTION, SOLUTION PARENTERAL at 21:04

## 2023-01-19 RX ADMIN — TRAZODONE HYDROCHLORIDE 100 MG: 100 TABLET ORAL at 08:21

## 2023-01-19 RX ADMIN — CEFAZOLIN 3 G: 10 INJECTION, POWDER, FOR SOLUTION INTRAVENOUS at 05:38

## 2023-01-19 RX ADMIN — AMITRIPTYLINE HYDROCHLORIDE 25 MG: 25 TABLET, FILM COATED ORAL at 21:02

## 2023-01-19 RX ADMIN — ASPIRIN 81 MG: 81 TABLET, COATED ORAL at 08:21

## 2023-01-20 ENCOUNTER — ANESTHESIA EVENT (OUTPATIENT)
Dept: PERIOP | Facility: HOSPITAL | Age: 53
DRG: 908 | End: 2023-01-20
Payer: MEDICARE

## 2023-01-20 ENCOUNTER — ANESTHESIA (OUTPATIENT)
Dept: PERIOP | Facility: HOSPITAL | Age: 53
DRG: 908 | End: 2023-01-20
Payer: MEDICARE

## 2023-01-20 PROBLEM — N18.30 CKD (CHRONIC KIDNEY DISEASE) STAGE 3, GFR 30-59 ML/MIN: Status: ACTIVE | Noted: 2023-01-20

## 2023-01-20 PROBLEM — N18.31 STAGE 3A CHRONIC KIDNEY DISEASE (HCC): Status: ACTIVE | Noted: 2023-01-20

## 2023-01-20 LAB
ANION GAP SERPL CALCULATED.3IONS-SCNC: 9 MMOL/L (ref 5–15)
BUN SERPL-MCNC: 20 MG/DL (ref 6–20)
BUN/CREAT SERPL: 15.6 (ref 7–25)
CALCIUM SPEC-SCNC: 10 MG/DL (ref 8.6–10.5)
CHLORIDE SERPL-SCNC: 99 MMOL/L (ref 98–107)
CO2 SERPL-SCNC: 30 MMOL/L (ref 22–29)
CREAT SERPL-MCNC: 1.28 MG/DL (ref 0.76–1.27)
DEPRECATED RDW RBC AUTO: 39.3 FL (ref 37–54)
EGFRCR SERPLBLD CKD-EPI 2021: 67.3 ML/MIN/1.73
ERYTHROCYTE [DISTWIDTH] IN BLOOD BY AUTOMATED COUNT: 14.4 % (ref 12.3–15.4)
GLUCOSE BLDC GLUCOMTR-MCNC: 169 MG/DL (ref 70–130)
GLUCOSE BLDC GLUCOMTR-MCNC: 172 MG/DL (ref 70–130)
GLUCOSE BLDC GLUCOMTR-MCNC: 215 MG/DL (ref 70–130)
GLUCOSE BLDC GLUCOMTR-MCNC: 241 MG/DL (ref 70–130)
GLUCOSE SERPL-MCNC: 138 MG/DL (ref 65–99)
HCT VFR BLD AUTO: 41.7 % (ref 37.5–51)
HGB BLD-MCNC: 12.9 G/DL (ref 13–17.7)
MCH RBC QN AUTO: 24.1 PG (ref 26.6–33)
MCHC RBC AUTO-ENTMCNC: 30.9 G/DL (ref 31.5–35.7)
MCV RBC AUTO: 77.8 FL (ref 79–97)
PLATELET # BLD AUTO: 417 10*3/MM3 (ref 140–450)
PMV BLD AUTO: 10.2 FL (ref 6–12)
POTASSIUM SERPL-SCNC: 4.5 MMOL/L (ref 3.5–5.2)
RBC # BLD AUTO: 5.36 10*6/MM3 (ref 4.14–5.8)
SODIUM SERPL-SCNC: 138 MMOL/L (ref 136–145)
WBC NRBC COR # BLD: 8.63 10*3/MM3 (ref 3.4–10.8)

## 2023-01-20 PROCEDURE — 25010000002 FENTANYL CITRATE (PF) 50 MCG/ML SOLUTION: Performed by: ANESTHESIOLOGY

## 2023-01-20 PROCEDURE — 25010000002 MAGNESIUM SULFATE PER 500 MG OF MAGNESIUM: Performed by: ANESTHESIOLOGY

## 2023-01-20 PROCEDURE — 25010000002 MORPHINE PER 10 MG: Performed by: THORACIC SURGERY (CARDIOTHORACIC VASCULAR SURGERY)

## 2023-01-20 PROCEDURE — 63710000001 INSULIN REGULAR HUMAN PER 5 UNITS: Performed by: THORACIC SURGERY (CARDIOTHORACIC VASCULAR SURGERY)

## 2023-01-20 PROCEDURE — 0LXF0ZZ TRANSFER RIGHT ABDOMEN TENDON, OPEN APPROACH: ICD-10-PCS | Performed by: SURGERY

## 2023-01-20 PROCEDURE — 87070 CULTURE OTHR SPECIMN AEROBIC: CPT | Performed by: SURGERY

## 2023-01-20 PROCEDURE — 25010000002 DEXAMETHASONE SODIUM PHOSPHATE 20 MG/5ML SOLUTION: Performed by: ANESTHESIOLOGY

## 2023-01-20 PROCEDURE — 25010000002 PHENYLEPHRINE 10 MG/ML SOLUTION 5 ML VIAL: Performed by: ANESTHESIOLOGY

## 2023-01-20 PROCEDURE — 11044 DBRDMT BONE 1ST 20 SQ CM/<: CPT | Performed by: SURGERY

## 2023-01-20 PROCEDURE — 25010000002 ONDANSETRON PER 1 MG: Performed by: ANESTHESIOLOGY

## 2023-01-20 PROCEDURE — 25010000002 HYDROMORPHONE PER 4 MG: Performed by: SURGERY

## 2023-01-20 PROCEDURE — 80048 BASIC METABOLIC PNL TOTAL CA: CPT

## 2023-01-20 PROCEDURE — 15734 MUSCLE-SKIN GRAFT TRUNK: CPT | Performed by: SURGERY

## 2023-01-20 PROCEDURE — 85027 COMPLETE CBC AUTOMATED: CPT

## 2023-01-20 PROCEDURE — 15860 IV NJX TST VASC FLO FLAP/GRF: CPT | Performed by: SURGERY

## 2023-01-20 PROCEDURE — 82962 GLUCOSE BLOOD TEST: CPT

## 2023-01-20 PROCEDURE — 11047 DBRDMT BONE EACH ADDL: CPT | Performed by: SURGERY

## 2023-01-20 PROCEDURE — 87176 TISSUE HOMOGENIZATION CULTR: CPT | Performed by: SURGERY

## 2023-01-20 PROCEDURE — 25010000002 PROPOFOL 10 MG/ML EMULSION: Performed by: ANESTHESIOLOGY

## 2023-01-20 PROCEDURE — 25010000002 HYDROMORPHONE PER 4 MG: Performed by: ANESTHESIOLOGY

## 2023-01-20 PROCEDURE — 25010000002 MIDAZOLAM PER 1 MG: Performed by: ANESTHESIOLOGY

## 2023-01-20 PROCEDURE — 87205 SMEAR GRAM STAIN: CPT | Performed by: SURGERY

## 2023-01-20 PROCEDURE — 25010000002 CEFAZOLIN PER 500 MG: Performed by: SURGERY

## 2023-01-20 PROCEDURE — 0KXL0ZZ TRANSFER LEFT ABDOMEN MUSCLE, OPEN APPROACH: ICD-10-PCS | Performed by: SURGERY

## 2023-01-20 DEVICE — LIGACLIP MCA MULTIPLE CLIP APPLIERS, 20 MEDIUM CLIPS
Type: IMPLANTABLE DEVICE | Site: ABDOMEN | Status: FUNCTIONAL
Brand: LIGACLIP

## 2023-01-20 RX ORDER — FENTANYL CITRATE 50 UG/ML
50 INJECTION, SOLUTION INTRAMUSCULAR; INTRAVENOUS
Status: DISCONTINUED | OUTPATIENT
Start: 2023-01-20 | End: 2023-01-20 | Stop reason: HOSPADM

## 2023-01-20 RX ORDER — PROPOFOL 10 MG/ML
VIAL (ML) INTRAVENOUS AS NEEDED
Status: DISCONTINUED | OUTPATIENT
Start: 2023-01-20 | End: 2023-01-20 | Stop reason: SURG

## 2023-01-20 RX ORDER — HYDROMORPHONE HYDROCHLORIDE 1 MG/ML
0.5 INJECTION, SOLUTION INTRAMUSCULAR; INTRAVENOUS; SUBCUTANEOUS
Status: DISCONTINUED | OUTPATIENT
Start: 2023-01-20 | End: 2023-01-24 | Stop reason: HOSPADM

## 2023-01-20 RX ORDER — LIDOCAINE HYDROCHLORIDE 10 MG/ML
0.5 INJECTION, SOLUTION EPIDURAL; INFILTRATION; INTRACAUDAL; PERINEURAL ONCE AS NEEDED
Status: DISCONTINUED | OUTPATIENT
Start: 2023-01-20 | End: 2023-01-20 | Stop reason: HOSPADM

## 2023-01-20 RX ORDER — OXYCODONE AND ACETAMINOPHEN 7.5; 325 MG/1; MG/1
1 TABLET ORAL EVERY 4 HOURS PRN
Status: DISCONTINUED | OUTPATIENT
Start: 2023-01-20 | End: 2023-01-20 | Stop reason: HOSPADM

## 2023-01-20 RX ORDER — CEFAZOLIN SODIUM IN 0.9 % NACL 3 G/100 ML
3 INTRAVENOUS SOLUTION, PIGGYBACK (ML) INTRAVENOUS ONCE
Status: COMPLETED | OUTPATIENT
Start: 2023-01-20 | End: 2023-01-20

## 2023-01-20 RX ORDER — ONDANSETRON 4 MG/1
4 TABLET, FILM COATED ORAL EVERY 6 HOURS PRN
Status: DISCONTINUED | OUTPATIENT
Start: 2023-01-20 | End: 2023-01-24 | Stop reason: HOSPADM

## 2023-01-20 RX ORDER — NALOXONE HCL 0.4 MG/ML
0.1 VIAL (ML) INJECTION
Status: DISCONTINUED | OUTPATIENT
Start: 2023-01-20 | End: 2023-01-24 | Stop reason: HOSPADM

## 2023-01-20 RX ORDER — SODIUM CHLORIDE, SODIUM LACTATE, POTASSIUM CHLORIDE, CALCIUM CHLORIDE 600; 310; 30; 20 MG/100ML; MG/100ML; MG/100ML; MG/100ML
9 INJECTION, SOLUTION INTRAVENOUS CONTINUOUS
Status: DISCONTINUED | OUTPATIENT
Start: 2023-01-20 | End: 2023-01-24 | Stop reason: HOSPADM

## 2023-01-20 RX ORDER — LABETALOL HYDROCHLORIDE 5 MG/ML
5 INJECTION, SOLUTION INTRAVENOUS
Status: DISCONTINUED | OUTPATIENT
Start: 2023-01-20 | End: 2023-01-20 | Stop reason: HOSPADM

## 2023-01-20 RX ORDER — EPHEDRINE SULFATE 50 MG/ML
INJECTION INTRAVENOUS AS NEEDED
Status: DISCONTINUED | OUTPATIENT
Start: 2023-01-20 | End: 2023-01-20 | Stop reason: SURG

## 2023-01-20 RX ORDER — SODIUM CHLORIDE 0.9 % (FLUSH) 0.9 %
3 SYRINGE (ML) INJECTION EVERY 12 HOURS SCHEDULED
Status: DISCONTINUED | OUTPATIENT
Start: 2023-01-20 | End: 2023-01-20 | Stop reason: HOSPADM

## 2023-01-20 RX ORDER — SODIUM CHLORIDE 9 MG/ML
INJECTION, SOLUTION INTRAVENOUS CONTINUOUS PRN
Status: DISCONTINUED | OUTPATIENT
Start: 2023-01-20 | End: 2023-01-20 | Stop reason: SURG

## 2023-01-20 RX ORDER — MAGNESIUM HYDROXIDE 1200 MG/15ML
LIQUID ORAL AS NEEDED
Status: DISCONTINUED | OUTPATIENT
Start: 2023-01-20 | End: 2023-01-20 | Stop reason: HOSPADM

## 2023-01-20 RX ORDER — DIPHENHYDRAMINE HCL 25 MG
25 CAPSULE ORAL
Status: DISCONTINUED | OUTPATIENT
Start: 2023-01-20 | End: 2023-01-20 | Stop reason: HOSPADM

## 2023-01-20 RX ORDER — MIDAZOLAM HYDROCHLORIDE 1 MG/ML
1 INJECTION INTRAMUSCULAR; INTRAVENOUS
Status: DISCONTINUED | OUTPATIENT
Start: 2023-01-20 | End: 2023-01-20 | Stop reason: HOSPADM

## 2023-01-20 RX ORDER — DEXAMETHASONE SODIUM PHOSPHATE 4 MG/ML
INJECTION, SOLUTION INTRA-ARTICULAR; INTRALESIONAL; INTRAMUSCULAR; INTRAVENOUS; SOFT TISSUE AS NEEDED
Status: DISCONTINUED | OUTPATIENT
Start: 2023-01-20 | End: 2023-01-20 | Stop reason: SURG

## 2023-01-20 RX ORDER — FLUMAZENIL 0.1 MG/ML
0.2 INJECTION INTRAVENOUS AS NEEDED
Status: DISCONTINUED | OUTPATIENT
Start: 2023-01-20 | End: 2023-01-20 | Stop reason: HOSPADM

## 2023-01-20 RX ORDER — MAGNESIUM SULFATE HEPTAHYDRATE 500 MG/ML
INJECTION, SOLUTION INTRAMUSCULAR; INTRAVENOUS AS NEEDED
Status: DISCONTINUED | OUTPATIENT
Start: 2023-01-20 | End: 2023-01-20 | Stop reason: SURG

## 2023-01-20 RX ORDER — ONDANSETRON 2 MG/ML
4 INJECTION INTRAMUSCULAR; INTRAVENOUS ONCE AS NEEDED
Status: COMPLETED | OUTPATIENT
Start: 2023-01-20 | End: 2023-01-20

## 2023-01-20 RX ORDER — HYDRALAZINE HYDROCHLORIDE 20 MG/ML
5 INJECTION INTRAMUSCULAR; INTRAVENOUS
Status: DISCONTINUED | OUTPATIENT
Start: 2023-01-20 | End: 2023-01-20 | Stop reason: HOSPADM

## 2023-01-20 RX ORDER — LIDOCAINE HYDROCHLORIDE 20 MG/ML
INJECTION, SOLUTION INFILTRATION; PERINEURAL AS NEEDED
Status: DISCONTINUED | OUTPATIENT
Start: 2023-01-20 | End: 2023-01-20 | Stop reason: SURG

## 2023-01-20 RX ORDER — DIPHENHYDRAMINE HYDROCHLORIDE 50 MG/ML
12.5 INJECTION INTRAMUSCULAR; INTRAVENOUS
Status: DISCONTINUED | OUTPATIENT
Start: 2023-01-20 | End: 2023-01-20 | Stop reason: HOSPADM

## 2023-01-20 RX ORDER — OXYCODONE HYDROCHLORIDE AND ACETAMINOPHEN 5; 325 MG/1; MG/1
1 TABLET ORAL EVERY 4 HOURS PRN
Status: DISCONTINUED | OUTPATIENT
Start: 2023-01-20 | End: 2023-01-24 | Stop reason: HOSPADM

## 2023-01-20 RX ORDER — PROMETHAZINE HYDROCHLORIDE 25 MG/1
25 SUPPOSITORY RECTAL ONCE AS NEEDED
Status: DISCONTINUED | OUTPATIENT
Start: 2023-01-20 | End: 2023-01-20 | Stop reason: HOSPADM

## 2023-01-20 RX ORDER — ROCURONIUM BROMIDE 10 MG/ML
INJECTION, SOLUTION INTRAVENOUS AS NEEDED
Status: DISCONTINUED | OUTPATIENT
Start: 2023-01-20 | End: 2023-01-20 | Stop reason: SURG

## 2023-01-20 RX ORDER — PROMETHAZINE HYDROCHLORIDE 25 MG/1
25 TABLET ORAL ONCE AS NEEDED
Status: DISCONTINUED | OUTPATIENT
Start: 2023-01-20 | End: 2023-01-20 | Stop reason: HOSPADM

## 2023-01-20 RX ORDER — SODIUM CHLORIDE 9 MG/ML
75 INJECTION, SOLUTION INTRAVENOUS CONTINUOUS
Status: DISCONTINUED | OUTPATIENT
Start: 2023-01-20 | End: 2023-01-24 | Stop reason: HOSPADM

## 2023-01-20 RX ORDER — INSULIN LISPRO 100 [IU]/ML
10 INJECTION, SOLUTION INTRAVENOUS; SUBCUTANEOUS 2 TIMES DAILY WITH MEALS
Status: DISCONTINUED | OUTPATIENT
Start: 2023-01-20 | End: 2023-01-21

## 2023-01-20 RX ORDER — HYDROMORPHONE HYDROCHLORIDE 1 MG/ML
0.5 INJECTION, SOLUTION INTRAMUSCULAR; INTRAVENOUS; SUBCUTANEOUS
Status: DISCONTINUED | OUTPATIENT
Start: 2023-01-20 | End: 2023-01-20 | Stop reason: HOSPADM

## 2023-01-20 RX ORDER — NALOXONE HCL 0.4 MG/ML
0.2 VIAL (ML) INJECTION AS NEEDED
Status: DISCONTINUED | OUTPATIENT
Start: 2023-01-20 | End: 2023-01-20 | Stop reason: HOSPADM

## 2023-01-20 RX ORDER — SODIUM CHLORIDE 9 MG/ML
9 INJECTION, SOLUTION INTRAVENOUS ONCE
Status: COMPLETED | OUTPATIENT
Start: 2023-01-20 | End: 2023-01-20

## 2023-01-20 RX ORDER — EPHEDRINE SULFATE 50 MG/ML
5 INJECTION, SOLUTION INTRAVENOUS ONCE AS NEEDED
Status: DISCONTINUED | OUTPATIENT
Start: 2023-01-20 | End: 2023-01-20 | Stop reason: HOSPADM

## 2023-01-20 RX ORDER — ONDANSETRON 2 MG/ML
INJECTION INTRAMUSCULAR; INTRAVENOUS AS NEEDED
Status: DISCONTINUED | OUTPATIENT
Start: 2023-01-20 | End: 2023-01-20 | Stop reason: SURG

## 2023-01-20 RX ORDER — INDOCYANINE GREEN AND WATER 25 MG
KIT INJECTION AS NEEDED
Status: DISCONTINUED | OUTPATIENT
Start: 2023-01-20 | End: 2023-01-20 | Stop reason: SURG

## 2023-01-20 RX ORDER — SODIUM CHLORIDE 0.9 % (FLUSH) 0.9 %
3-10 SYRINGE (ML) INJECTION AS NEEDED
Status: DISCONTINUED | OUTPATIENT
Start: 2023-01-20 | End: 2023-01-20 | Stop reason: HOSPADM

## 2023-01-20 RX ORDER — HYDROCODONE BITARTRATE AND ACETAMINOPHEN 7.5; 325 MG/1; MG/1
1 TABLET ORAL ONCE AS NEEDED
Status: DISCONTINUED | OUTPATIENT
Start: 2023-01-20 | End: 2023-01-20 | Stop reason: HOSPADM

## 2023-01-20 RX ORDER — FENTANYL CITRATE 50 UG/ML
INJECTION, SOLUTION INTRAMUSCULAR; INTRAVENOUS AS NEEDED
Status: DISCONTINUED | OUTPATIENT
Start: 2023-01-20 | End: 2023-01-20 | Stop reason: SURG

## 2023-01-20 RX ORDER — FAMOTIDINE 10 MG/ML
20 INJECTION, SOLUTION INTRAVENOUS ONCE
Status: COMPLETED | OUTPATIENT
Start: 2023-01-20 | End: 2023-01-20

## 2023-01-20 RX ORDER — ONDANSETRON 2 MG/ML
4 INJECTION INTRAMUSCULAR; INTRAVENOUS EVERY 6 HOURS PRN
Status: DISCONTINUED | OUTPATIENT
Start: 2023-01-20 | End: 2023-01-24 | Stop reason: HOSPADM

## 2023-01-20 RX ADMIN — HYDROXYZINE HYDROCHLORIDE 25 MG: 25 TABLET ORAL at 07:49

## 2023-01-20 RX ADMIN — GABAPENTIN 600 MG: 300 CAPSULE ORAL at 06:07

## 2023-01-20 RX ADMIN — HYDROXYZINE HYDROCHLORIDE 25 MG: 25 TABLET ORAL at 14:55

## 2023-01-20 RX ADMIN — HYDROMORPHONE HYDROCHLORIDE 0.5 MG: 1 INJECTION, SOLUTION INTRAMUSCULAR; INTRAVENOUS; SUBCUTANEOUS at 21:19

## 2023-01-20 RX ADMIN — TAMSULOSIN HYDROCHLORIDE 0.4 MG: 0.4 CAPSULE ORAL at 09:56

## 2023-01-20 RX ADMIN — METOPROLOL SUCCINATE 50 MG: 50 TABLET, FILM COATED, EXTENDED RELEASE ORAL at 09:56

## 2023-01-20 RX ADMIN — PHENYLEPHRINE HYDROCHLORIDE 1 MCG/KG/MIN: 10 INJECTION INTRAVENOUS at 17:16

## 2023-01-20 RX ADMIN — FAMOTIDINE 20 MG: 10 INJECTION INTRAVENOUS at 16:12

## 2023-01-20 RX ADMIN — ALPRAZOLAM 0.5 MG: 0.5 TABLET ORAL at 23:19

## 2023-01-20 RX ADMIN — DEXAMETHASONE SODIUM PHOSPHATE 10 MG: 4 INJECTION, SOLUTION INTRAMUSCULAR; INTRAVENOUS at 17:24

## 2023-01-20 RX ADMIN — SODIUM CHLORIDE: 9 INJECTION, SOLUTION INTRAVENOUS at 16:39

## 2023-01-20 RX ADMIN — ROCURONIUM BROMIDE 50 MG: 10 INJECTION, SOLUTION INTRAVENOUS at 17:22

## 2023-01-20 RX ADMIN — ONDANSETRON 4 MG: 2 INJECTION INTRAMUSCULAR; INTRAVENOUS at 19:32

## 2023-01-20 RX ADMIN — MAGNESIUM SULFATE HEPTAHYDRATE 1 G: 500 INJECTION, SOLUTION INTRAMUSCULAR; INTRAVENOUS at 17:35

## 2023-01-20 RX ADMIN — ROCURONIUM BROMIDE 50 MG: 10 INJECTION, SOLUTION INTRAVENOUS at 16:46

## 2023-01-20 RX ADMIN — MAGNESIUM OXIDE 400 MG (241.3 MG MAGNESIUM) TABLET 400 MG: TABLET at 09:55

## 2023-01-20 RX ADMIN — MORPHINE SULFATE 2 MG: 2 INJECTION, SOLUTION INTRAMUSCULAR; INTRAVENOUS at 12:34

## 2023-01-20 RX ADMIN — ALPRAZOLAM 0.5 MG: 0.5 TABLET ORAL at 03:32

## 2023-01-20 RX ADMIN — EMPAGLIFLOZIN 25 MG: 25 TABLET, FILM COATED ORAL at 09:56

## 2023-01-20 RX ADMIN — GABAPENTIN 600 MG: 300 CAPSULE ORAL at 23:19

## 2023-01-20 RX ADMIN — EPHEDRINE SULFATE 10 MG: 50 INJECTION INTRAVENOUS at 17:01

## 2023-01-20 RX ADMIN — FENTANYL CITRATE 50 MCG: 50 INJECTION, SOLUTION INTRAMUSCULAR; INTRAVENOUS at 21:09

## 2023-01-20 RX ADMIN — HYDROMORPHONE HYDROCHLORIDE 0.5 MG: 1 INJECTION, SOLUTION INTRAMUSCULAR; INTRAVENOUS; SUBCUTANEOUS at 22:03

## 2023-01-20 RX ADMIN — FENTANYL CITRATE 50 MCG: 50 INJECTION, SOLUTION INTRAMUSCULAR; INTRAVENOUS at 21:35

## 2023-01-20 RX ADMIN — GABAPENTIN 600 MG: 300 CAPSULE ORAL at 14:56

## 2023-01-20 RX ADMIN — ASPIRIN 81 MG: 81 TABLET, COATED ORAL at 09:56

## 2023-01-20 RX ADMIN — FENTANYL CITRATE 50 MCG: 50 INJECTION, SOLUTION INTRAMUSCULAR; INTRAVENOUS at 17:10

## 2023-01-20 RX ADMIN — CEFAZOLIN 3 G: 10 INJECTION, POWDER, FOR SOLUTION INTRAVENOUS at 16:30

## 2023-01-20 RX ADMIN — HYDROMORPHONE HYDROCHLORIDE 0.5 MG: 1 INJECTION, SOLUTION INTRAMUSCULAR; INTRAVENOUS; SUBCUTANEOUS at 21:43

## 2023-01-20 RX ADMIN — FENTANYL CITRATE 50 MCG: 50 INJECTION, SOLUTION INTRAMUSCULAR; INTRAVENOUS at 18:19

## 2023-01-20 RX ADMIN — MIDAZOLAM 1 MG: 1 INJECTION INTRAMUSCULAR; INTRAVENOUS at 16:12

## 2023-01-20 RX ADMIN — LOSARTAN POTASSIUM 100 MG: 100 TABLET, FILM COATED ORAL at 09:56

## 2023-01-20 RX ADMIN — ONDANSETRON 4 MG: 2 INJECTION INTRAMUSCULAR; INTRAVENOUS at 20:56

## 2023-01-20 RX ADMIN — FENTANYL CITRATE 50 MCG: 50 INJECTION, SOLUTION INTRAMUSCULAR; INTRAVENOUS at 22:33

## 2023-01-20 RX ADMIN — HYDROCODONE BITARTRATE AND ACETAMINOPHEN 1 TABLET: 7.5; 325 TABLET ORAL at 06:07

## 2023-01-20 RX ADMIN — SODIUM CHLORIDE 75 ML/HR: 9 INJECTION, SOLUTION INTRAVENOUS at 23:10

## 2023-01-20 RX ADMIN — FENTANYL CITRATE 50 MCG: 50 INJECTION, SOLUTION INTRAMUSCULAR; INTRAVENOUS at 18:57

## 2023-01-20 RX ADMIN — LIDOCAINE HYDROCHLORIDE 100 MG: 20 INJECTION, SOLUTION INFILTRATION; PERINEURAL at 16:38

## 2023-01-20 RX ADMIN — HYDROMORPHONE HYDROCHLORIDE 0.5 MG: 1 INJECTION, SOLUTION INTRAMUSCULAR; INTRAVENOUS; SUBCUTANEOUS at 23:19

## 2023-01-20 RX ADMIN — SODIUM CHLORIDE: 9 INJECTION, SOLUTION INTRAVENOUS at 18:58

## 2023-01-20 RX ADMIN — FENTANYL CITRATE 50 MCG: 50 INJECTION, SOLUTION INTRAMUSCULAR; INTRAVENOUS at 16:45

## 2023-01-20 RX ADMIN — MUPIROCIN 1 APPLICATION: 20 OINTMENT TOPICAL at 09:56

## 2023-01-20 RX ADMIN — POTASSIUM CHLORIDE 20 MEQ: 750 TABLET, EXTENDED RELEASE ORAL at 09:56

## 2023-01-20 RX ADMIN — SODIUM CHLORIDE 9 ML/HR: 9 INJECTION, SOLUTION INTRAVENOUS at 16:29

## 2023-01-20 RX ADMIN — INSULIN HUMAN 8 UNITS: 100 INJECTION, SOLUTION PARENTERAL at 11:51

## 2023-01-20 RX ADMIN — PROPOFOL 150 MG: 10 INJECTION, EMULSION INTRAVENOUS at 16:46

## 2023-01-20 RX ADMIN — HYDROCHLOROTHIAZIDE 25 MG: 25 TABLET ORAL at 09:56

## 2023-01-20 RX ADMIN — FENTANYL CITRATE 50 MCG: 50 INJECTION, SOLUTION INTRAMUSCULAR; INTRAVENOUS at 16:12

## 2023-01-20 RX ADMIN — INDOCYANINE GREEN AND WATER 7.5 MG: KIT at 18:22

## 2023-01-20 RX ADMIN — SUGAMMADEX 400 MG: 100 INJECTION, SOLUTION INTRAVENOUS at 19:52

## 2023-01-20 NOTE — ANESTHESIA PROCEDURE NOTES
Airway  Urgency: elective    Date/Time: 1/20/2023 4:51 PM  Airway not difficult    General Information and Staff    Patient location during procedure: OR  Anesthesiologist: Denzel Perez MD    Indications and Patient Condition  Indications for airway management: airway protection    Preoxygenated: yes  Mask difficulty assessment: 1 - vent by mask    Final Airway Details  Final airway type: endotracheal airway      Successful airway: ETT  Cuffed: yes   Successful intubation technique: direct laryngoscopy and video laryngoscopy  Facilitating devices/methods: intubating stylet  Endotracheal tube insertion site: oral  Blade: Juan Ramon  Blade size: D  ETT size (mm): 7.5  Cormack-Lehane Classification: grade IIb - view of arytenoids or posterior of glottis only  Placement verified by: chest auscultation   Measured from: lips  ETT/EBT  to lips (cm): 21  Number of attempts at approach: 2  Assessment: lips, teeth, and gum same as pre-op and atraumatic intubation    Additional Comments  First attempt with muniz 3 unsuccessful

## 2023-01-20 NOTE — ANESTHESIA PREPROCEDURE EVALUATION
Anesthesia Evaluation     Patient summary reviewed and Nursing notes reviewed   no history of anesthetic complications:  NPO Solid Status: > 8 hours  NPO Liquid Status: > 8 hours           Airway   Mallampati: II  Possible difficult intubation  Dental    (+) poor dentition    Pulmonary - normal exam   (+) a smoker Former,   Cardiovascular - normal exam    (+) hypertension, past MI , CAD, CABG 0-3 Months, angina, hyperlipidemia,     ROS comment: •  Left ventricular ejection fraction appears to be 56 - 60%. Left ventricular systolic function is normal.  •  Left ventricular wall thickness is consistent with mild concentric hypertrophy.  •  Left ventricular diastolic function was normal.  •  No significant valvular stenosis or regurgitation noted.  •  No evidence of pulmonary hypertension is presen         Neuro/Psych  (+) psychiatric history Depression,    GI/Hepatic/Renal/Endo    (+) obesity,  GERD,  liver disease fatty liver disease, renal disease CRI, diabetes mellitus type 2 using insulin,     Musculoskeletal     Abdominal    Substance History      OB/GYN          Other   arthritis,                      Anesthesia Plan    ASA 4     general     (Grade 3 view last intubation with MAC4.  Had full breakfast 8 AM,  Has right IJ,  Possible A-line)  intravenous induction     Anesthetic plan, risks, benefits, and alternatives have been provided, discussed and informed consent has been obtained with: patient.        CODE STATUS:

## 2023-01-21 LAB
ANION GAP SERPL CALCULATED.3IONS-SCNC: 9 MMOL/L (ref 5–15)
BACTERIA SPEC AEROBE CULT: NORMAL
BUN SERPL-MCNC: 24 MG/DL (ref 6–20)
BUN/CREAT SERPL: 17.4 (ref 7–25)
CALCIUM SPEC-SCNC: 8.9 MG/DL (ref 8.6–10.5)
CHLORIDE SERPL-SCNC: 97 MMOL/L (ref 98–107)
CO2 SERPL-SCNC: 26 MMOL/L (ref 22–29)
CREAT SERPL-MCNC: 1.38 MG/DL (ref 0.76–1.27)
DEPRECATED RDW RBC AUTO: 38.6 FL (ref 37–54)
EGFRCR SERPLBLD CKD-EPI 2021: 61.5 ML/MIN/1.73
ERYTHROCYTE [DISTWIDTH] IN BLOOD BY AUTOMATED COUNT: 13.8 % (ref 12.3–15.4)
GLUCOSE BLDC GLUCOMTR-MCNC: 198 MG/DL (ref 70–130)
GLUCOSE BLDC GLUCOMTR-MCNC: 270 MG/DL (ref 70–130)
GLUCOSE BLDC GLUCOMTR-MCNC: 320 MG/DL (ref 70–130)
GLUCOSE SERPL-MCNC: 266 MG/DL (ref 65–99)
GRAM STN SPEC: NORMAL
GRAM STN SPEC: NORMAL
HCT VFR BLD AUTO: 39.6 % (ref 37.5–51)
HGB BLD-MCNC: 12.7 G/DL (ref 13–17.7)
MCH RBC QN AUTO: 25 PG (ref 26.6–33)
MCHC RBC AUTO-ENTMCNC: 32.1 G/DL (ref 31.5–35.7)
MCV RBC AUTO: 78.1 FL (ref 79–97)
PLATELET # BLD AUTO: 367 10*3/MM3 (ref 140–450)
PMV BLD AUTO: 10.3 FL (ref 6–12)
POTASSIUM SERPL-SCNC: 5.5 MMOL/L (ref 3.5–5.2)
RBC # BLD AUTO: 5.07 10*6/MM3 (ref 4.14–5.8)
SODIUM SERPL-SCNC: 132 MMOL/L (ref 136–145)
WBC NRBC COR # BLD: 13.44 10*3/MM3 (ref 3.4–10.8)

## 2023-01-21 PROCEDURE — 25010000002 HYDROMORPHONE PER 4 MG: Performed by: SURGERY

## 2023-01-21 PROCEDURE — 25010000002 ONDANSETRON PER 1 MG: Performed by: SURGERY

## 2023-01-21 PROCEDURE — 63710000001 INSULIN LISPRO (HUMAN) PER 5 UNITS: Performed by: SURGERY

## 2023-01-21 PROCEDURE — 63710000001 INSULIN REGULAR HUMAN PER 5 UNITS: Performed by: SURGERY

## 2023-01-21 PROCEDURE — 25010000002 MORPHINE PER 10 MG: Performed by: SURGERY

## 2023-01-21 PROCEDURE — 99024 POSTOP FOLLOW-UP VISIT: CPT

## 2023-01-21 PROCEDURE — 82962 GLUCOSE BLOOD TEST: CPT

## 2023-01-21 PROCEDURE — 63710000001 INSULIN LISPRO (HUMAN) PER 5 UNITS: Performed by: STUDENT IN AN ORGANIZED HEALTH CARE EDUCATION/TRAINING PROGRAM

## 2023-01-21 PROCEDURE — 80048 BASIC METABOLIC PNL TOTAL CA: CPT | Performed by: SURGERY

## 2023-01-21 PROCEDURE — 25010000002 CEFAZOLIN IN DEXTROSE 2-4 GM/100ML-% SOLUTION: Performed by: SURGERY

## 2023-01-21 PROCEDURE — 85027 COMPLETE CBC AUTOMATED: CPT | Performed by: SURGERY

## 2023-01-21 RX ORDER — ACETAMINOPHEN 650 MG/1
650 SUPPOSITORY RECTAL EVERY 4 HOURS PRN
Status: DISCONTINUED | OUTPATIENT
Start: 2023-01-21 | End: 2023-01-24 | Stop reason: HOSPADM

## 2023-01-21 RX ORDER — ACETAMINOPHEN 325 MG/1
650 TABLET ORAL EVERY 4 HOURS PRN
Status: DISCONTINUED | OUTPATIENT
Start: 2023-01-21 | End: 2023-01-24 | Stop reason: HOSPADM

## 2023-01-21 RX ORDER — AMOXICILLIN 250 MG
2 CAPSULE ORAL 2 TIMES DAILY PRN
Status: DISCONTINUED | OUTPATIENT
Start: 2023-01-21 | End: 2023-01-24 | Stop reason: HOSPADM

## 2023-01-21 RX ORDER — INSULIN LISPRO 100 [IU]/ML
15 INJECTION, SOLUTION INTRAVENOUS; SUBCUTANEOUS 2 TIMES DAILY WITH MEALS
Status: DISCONTINUED | OUTPATIENT
Start: 2023-01-21 | End: 2023-01-24 | Stop reason: HOSPADM

## 2023-01-21 RX ORDER — GUAIFENESIN 600 MG/1
1200 TABLET, EXTENDED RELEASE ORAL EVERY 12 HOURS SCHEDULED
Status: DISCONTINUED | OUTPATIENT
Start: 2023-01-21 | End: 2023-01-24 | Stop reason: HOSPADM

## 2023-01-21 RX ORDER — CEFAZOLIN SODIUM 2 G/100ML
2 INJECTION, SOLUTION INTRAVENOUS EVERY 8 HOURS
Status: COMPLETED | OUTPATIENT
Start: 2023-01-21 | End: 2023-01-21

## 2023-01-21 RX ORDER — AMOXICILLIN 250 MG
2 CAPSULE ORAL NIGHTLY
Status: DISCONTINUED | OUTPATIENT
Start: 2023-01-21 | End: 2023-01-24 | Stop reason: HOSPADM

## 2023-01-21 RX ADMIN — HYDROMORPHONE HYDROCHLORIDE 0.5 MG: 1 INJECTION, SOLUTION INTRAMUSCULAR; INTRAVENOUS; SUBCUTANEOUS at 09:20

## 2023-01-21 RX ADMIN — INSULIN HUMAN 16 UNITS: 100 INJECTION, SOLUTION PARENTERAL at 17:55

## 2023-01-21 RX ADMIN — GABAPENTIN 600 MG: 300 CAPSULE ORAL at 20:27

## 2023-01-21 RX ADMIN — GUAIFENESIN 1200 MG: 600 TABLET, EXTENDED RELEASE ORAL at 20:27

## 2023-01-21 RX ADMIN — Medication 10 MG: at 00:34

## 2023-01-21 RX ADMIN — INSULIN HUMAN 4 UNITS: 100 INJECTION, SOLUTION PARENTERAL at 21:55

## 2023-01-21 RX ADMIN — HYDROXYZINE HYDROCHLORIDE 25 MG: 25 TABLET ORAL at 20:28

## 2023-01-21 RX ADMIN — GABAPENTIN 600 MG: 300 CAPSULE ORAL at 16:01

## 2023-01-21 RX ADMIN — INSULIN GLARGINE-YFGN 24 UNITS: 100 INJECTION, SOLUTION SUBCUTANEOUS at 09:21

## 2023-01-21 RX ADMIN — MORPHINE SULFATE 2 MG: 2 INJECTION, SOLUTION INTRAMUSCULAR; INTRAVENOUS at 16:13

## 2023-01-21 RX ADMIN — MAGNESIUM OXIDE 400 MG (241.3 MG MAGNESIUM) TABLET 400 MG: TABLET at 09:21

## 2023-01-21 RX ADMIN — DOCUSATE SODIUM 50MG AND SENNOSIDES 8.6MG 2 TABLET: 8.6; 5 TABLET, FILM COATED ORAL at 21:57

## 2023-01-21 RX ADMIN — ONDANSETRON 4 MG: 2 INJECTION INTRAMUSCULAR; INTRAVENOUS at 17:53

## 2023-01-21 RX ADMIN — INSULIN GLARGINE-YFGN 40 UNITS: 100 INJECTION, SOLUTION SUBCUTANEOUS at 21:55

## 2023-01-21 RX ADMIN — ATORVASTATIN CALCIUM 40 MG: 20 TABLET, FILM COATED ORAL at 20:27

## 2023-01-21 RX ADMIN — AMITRIPTYLINE HYDROCHLORIDE 25 MG: 25 TABLET, FILM COATED ORAL at 20:28

## 2023-01-21 RX ADMIN — EMPAGLIFLOZIN 25 MG: 25 TABLET, FILM COATED ORAL at 09:21

## 2023-01-21 RX ADMIN — METOPROLOL SUCCINATE 50 MG: 50 TABLET, FILM COATED, EXTENDED RELEASE ORAL at 09:21

## 2023-01-21 RX ADMIN — HYDROXYZINE HYDROCHLORIDE 25 MG: 25 TABLET ORAL at 06:45

## 2023-01-21 RX ADMIN — HYDROXYZINE HYDROCHLORIDE 25 MG: 25 TABLET ORAL at 21:48

## 2023-01-21 RX ADMIN — INSULIN LISPRO 10 UNITS: 100 INJECTION, SOLUTION INTRAVENOUS; SUBCUTANEOUS at 09:21

## 2023-01-21 RX ADMIN — OXYCODONE AND ACETAMINOPHEN 1 TABLET: 5; 325 TABLET ORAL at 20:33

## 2023-01-21 RX ADMIN — HYDROXYZINE HYDROCHLORIDE 25 MG: 25 TABLET ORAL at 16:01

## 2023-01-21 RX ADMIN — GUAIFENESIN 1200 MG: 600 TABLET, EXTENDED RELEASE ORAL at 09:28

## 2023-01-21 RX ADMIN — INSULIN HUMAN 12 UNITS: 100 INJECTION, SOLUTION PARENTERAL at 12:24

## 2023-01-21 RX ADMIN — HYDROCODONE BITARTRATE AND ACETAMINOPHEN 1 TABLET: 7.5; 325 TABLET ORAL at 06:57

## 2023-01-21 RX ADMIN — CEFAZOLIN SODIUM 2 G: 2 INJECTION, SOLUTION INTRAVENOUS at 09:28

## 2023-01-21 RX ADMIN — TAMSULOSIN HYDROCHLORIDE 0.4 MG: 0.4 CAPSULE ORAL at 12:25

## 2023-01-21 RX ADMIN — ASPIRIN 81 MG: 81 TABLET, COATED ORAL at 09:21

## 2023-01-21 RX ADMIN — HYDROMORPHONE HYDROCHLORIDE 0.5 MG: 1 INJECTION, SOLUTION INTRAMUSCULAR; INTRAVENOUS; SUBCUTANEOUS at 03:46

## 2023-01-21 RX ADMIN — MUPIROCIN: 20 OINTMENT TOPICAL at 21:48

## 2023-01-21 RX ADMIN — TRAZODONE HYDROCHLORIDE 100 MG: 100 TABLET ORAL at 09:22

## 2023-01-21 RX ADMIN — CEFAZOLIN SODIUM 2 G: 2 INJECTION, SOLUTION INTRAVENOUS at 00:57

## 2023-01-21 RX ADMIN — TRAZODONE HYDROCHLORIDE 100 MG: 100 TABLET ORAL at 00:33

## 2023-01-21 RX ADMIN — Medication 10 MG: at 20:28

## 2023-01-21 RX ADMIN — INSULIN LISPRO 15 UNITS: 100 INJECTION, SOLUTION INTRAVENOUS; SUBCUTANEOUS at 17:55

## 2023-01-21 RX ADMIN — GABAPENTIN 600 MG: 300 CAPSULE ORAL at 06:45

## 2023-01-21 RX ADMIN — AMITRIPTYLINE HYDROCHLORIDE 25 MG: 25 TABLET, FILM COATED ORAL at 00:34

## 2023-01-21 RX ADMIN — OXYCODONE AND ACETAMINOPHEN 1 TABLET: 5; 325 TABLET ORAL at 12:25

## 2023-01-21 RX ADMIN — HYDROCHLOROTHIAZIDE 25 MG: 25 TABLET ORAL at 09:21

## 2023-01-21 RX ADMIN — MUPIROCIN 1 APPLICATION: 20 OINTMENT TOPICAL at 09:21

## 2023-01-21 NOTE — ANESTHESIA POSTPROCEDURE EVALUATION
"Patient: Ilya Colon    Procedure Summary     Date: 01/20/23 Room / Location: [facility] OR  / [facility] MAIN OR    Anesthesia Start: 1639 Anesthesia Stop: 2010    Procedure: TRAM FLAP RECONSTRUCTION, use of spy, sternal wound (Breast) Diagnosis:       Sternal wound dehiscence, subsequent encounter      (Sternal wound dehiscence, subsequent encounter [T81.32XD])    Surgeons: Stephenie Li MD Provider: Denzel Perez MD    Anesthesia Type: general ASA Status: 4          Anesthesia Type: general    Vitals  Vitals Value Taken Time   /83 01/20/23 2216   Temp 36.9 °C (98.4 °F) 01/20/23 2200   Pulse 91 01/20/23 2219   Resp 20 01/20/23 2200   SpO2 94 % 01/20/23 2219   Vitals shown include unvalidated device data.        Post Anesthesia Care and Evaluation    Patient location during evaluation: PACU  Patient participation: complete - patient participated  Level of consciousness: awake and alert  Pain management: adequate    Airway patency: patent  Anesthetic complications: No anesthetic complications    Cardiovascular status: acceptable  Respiratory status: acceptable  Hydration status: acceptable    Comments: /83   Pulse 85   Temp 36.9 °C (98.4 °F) (Oral)   Resp 20   Ht 167.6 cm (65.98\")   Wt 96.2 kg (212 lb 1.3 oz)   SpO2 94%   BMI 34.25 kg/m²         "

## 2023-01-22 LAB
ANION GAP SERPL CALCULATED.3IONS-SCNC: 13.8 MMOL/L (ref 5–15)
BUN SERPL-MCNC: 37 MG/DL (ref 6–20)
BUN/CREAT SERPL: 26.2 (ref 7–25)
CALCIUM SPEC-SCNC: 10 MG/DL (ref 8.6–10.5)
CHLORIDE SERPL-SCNC: 92 MMOL/L (ref 98–107)
CO2 SERPL-SCNC: 28.2 MMOL/L (ref 22–29)
CREAT SERPL-MCNC: 1.41 MG/DL (ref 0.76–1.27)
DEPRECATED RDW RBC AUTO: 40.7 FL (ref 37–54)
EGFRCR SERPLBLD CKD-EPI 2021: 60 ML/MIN/1.73
ERYTHROCYTE [DISTWIDTH] IN BLOOD BY AUTOMATED COUNT: 14.4 % (ref 12.3–15.4)
GLUCOSE BLDC GLUCOMTR-MCNC: 136 MG/DL (ref 70–130)
GLUCOSE BLDC GLUCOMTR-MCNC: 179 MG/DL (ref 70–130)
GLUCOSE BLDC GLUCOMTR-MCNC: 179 MG/DL (ref 70–130)
GLUCOSE BLDC GLUCOMTR-MCNC: 185 MG/DL (ref 70–130)
GLUCOSE SERPL-MCNC: 168 MG/DL (ref 65–99)
HCT VFR BLD AUTO: 36.6 % (ref 37.5–51)
HGB BLD-MCNC: 11.8 G/DL (ref 13–17.7)
MCH RBC QN AUTO: 25.4 PG (ref 26.6–33)
MCHC RBC AUTO-ENTMCNC: 32.2 G/DL (ref 31.5–35.7)
MCV RBC AUTO: 78.7 FL (ref 79–97)
PLATELET # BLD AUTO: 370 10*3/MM3 (ref 140–450)
PMV BLD AUTO: 10.3 FL (ref 6–12)
POTASSIUM SERPL-SCNC: 3.9 MMOL/L (ref 3.5–5.2)
RBC # BLD AUTO: 4.65 10*6/MM3 (ref 4.14–5.8)
SODIUM SERPL-SCNC: 134 MMOL/L (ref 136–145)
WBC NRBC COR # BLD: 12.06 10*3/MM3 (ref 3.4–10.8)

## 2023-01-22 PROCEDURE — 63710000001 INSULIN REGULAR HUMAN PER 5 UNITS: Performed by: SURGERY

## 2023-01-22 PROCEDURE — 25010000002 MORPHINE PER 10 MG: Performed by: SURGERY

## 2023-01-22 PROCEDURE — 82962 GLUCOSE BLOOD TEST: CPT

## 2023-01-22 PROCEDURE — 99024 POSTOP FOLLOW-UP VISIT: CPT

## 2023-01-22 PROCEDURE — 85027 COMPLETE CBC AUTOMATED: CPT | Performed by: SURGERY

## 2023-01-22 PROCEDURE — 63710000001 INSULIN LISPRO (HUMAN) PER 5 UNITS: Performed by: STUDENT IN AN ORGANIZED HEALTH CARE EDUCATION/TRAINING PROGRAM

## 2023-01-22 PROCEDURE — 80048 BASIC METABOLIC PNL TOTAL CA: CPT | Performed by: SURGERY

## 2023-01-22 RX ORDER — PANTOPRAZOLE SODIUM 40 MG/1
40 TABLET, DELAYED RELEASE ORAL
Status: DISCONTINUED | OUTPATIENT
Start: 2023-01-22 | End: 2023-01-24 | Stop reason: HOSPADM

## 2023-01-22 RX ORDER — POLYETHYLENE GLYCOL 3350 17 G/17G
17 POWDER, FOR SOLUTION ORAL DAILY
Status: DISCONTINUED | OUTPATIENT
Start: 2023-01-22 | End: 2023-01-24 | Stop reason: HOSPADM

## 2023-01-22 RX ADMIN — INSULIN HUMAN 4 UNITS: 100 INJECTION, SOLUTION PARENTERAL at 07:42

## 2023-01-22 RX ADMIN — MUPIROCIN 1 APPLICATION: 20 OINTMENT TOPICAL at 21:13

## 2023-01-22 RX ADMIN — INSULIN LISPRO 15 UNITS: 100 INJECTION, SOLUTION INTRAVENOUS; SUBCUTANEOUS at 07:42

## 2023-01-22 RX ADMIN — HYDROXYZINE HYDROCHLORIDE 25 MG: 25 TABLET ORAL at 05:50

## 2023-01-22 RX ADMIN — METOPROLOL SUCCINATE 50 MG: 50 TABLET, FILM COATED, EXTENDED RELEASE ORAL at 08:28

## 2023-01-22 RX ADMIN — OXYCODONE AND ACETAMINOPHEN 1 TABLET: 5; 325 TABLET ORAL at 02:36

## 2023-01-22 RX ADMIN — MORPHINE SULFATE 2 MG: 2 INJECTION, SOLUTION INTRAMUSCULAR; INTRAVENOUS at 12:03

## 2023-01-22 RX ADMIN — GABAPENTIN 600 MG: 300 CAPSULE ORAL at 14:59

## 2023-01-22 RX ADMIN — HYDROXYZINE HYDROCHLORIDE 25 MG: 25 TABLET ORAL at 21:08

## 2023-01-22 RX ADMIN — INSULIN GLARGINE-YFGN 40 UNITS: 100 INJECTION, SOLUTION SUBCUTANEOUS at 21:08

## 2023-01-22 RX ADMIN — HYDROCHLOROTHIAZIDE 25 MG: 25 TABLET ORAL at 08:28

## 2023-01-22 RX ADMIN — INSULIN LISPRO 15 UNITS: 100 INJECTION, SOLUTION INTRAVENOUS; SUBCUTANEOUS at 17:30

## 2023-01-22 RX ADMIN — PANTOPRAZOLE SODIUM 40 MG: 40 TABLET, DELAYED RELEASE ORAL at 17:33

## 2023-01-22 RX ADMIN — GABAPENTIN 600 MG: 300 CAPSULE ORAL at 05:50

## 2023-01-22 RX ADMIN — GUAIFENESIN 1200 MG: 600 TABLET, EXTENDED RELEASE ORAL at 21:08

## 2023-01-22 RX ADMIN — MAGNESIUM OXIDE 400 MG (241.3 MG MAGNESIUM) TABLET 400 MG: TABLET at 08:28

## 2023-01-22 RX ADMIN — TRAZODONE HYDROCHLORIDE 100 MG: 100 TABLET ORAL at 08:29

## 2023-01-22 RX ADMIN — GABAPENTIN 600 MG: 300 CAPSULE ORAL at 21:07

## 2023-01-22 RX ADMIN — POLYETHYLENE GLYCOL 3350 17 G: 17 POWDER, FOR SOLUTION ORAL at 12:04

## 2023-01-22 RX ADMIN — INSULIN HUMAN 4 UNITS: 100 INJECTION, SOLUTION PARENTERAL at 17:33

## 2023-01-22 RX ADMIN — ASPIRIN 81 MG: 81 TABLET, COATED ORAL at 08:28

## 2023-01-22 RX ADMIN — OXYCODONE AND ACETAMINOPHEN 1 TABLET: 5; 325 TABLET ORAL at 07:48

## 2023-01-22 RX ADMIN — ATORVASTATIN CALCIUM 40 MG: 20 TABLET, FILM COATED ORAL at 21:07

## 2023-01-22 RX ADMIN — INSULIN HUMAN 4 UNITS: 100 INJECTION, SOLUTION PARENTERAL at 21:08

## 2023-01-22 RX ADMIN — DOCUSATE SODIUM 50MG AND SENNOSIDES 8.6MG 2 TABLET: 8.6; 5 TABLET, FILM COATED ORAL at 21:08

## 2023-01-22 RX ADMIN — GUAIFENESIN 1200 MG: 600 TABLET, EXTENDED RELEASE ORAL at 08:28

## 2023-01-22 RX ADMIN — INSULIN GLARGINE-YFGN 40 UNITS: 100 INJECTION, SOLUTION SUBCUTANEOUS at 08:29

## 2023-01-22 RX ADMIN — HYDROXYZINE HYDROCHLORIDE 25 MG: 25 TABLET ORAL at 14:59

## 2023-01-22 RX ADMIN — Medication 10 MG: at 21:08

## 2023-01-22 RX ADMIN — EMPAGLIFLOZIN 25 MG: 25 TABLET, FILM COATED ORAL at 08:29

## 2023-01-22 RX ADMIN — AMITRIPTYLINE HYDROCHLORIDE 25 MG: 25 TABLET, FILM COATED ORAL at 21:08

## 2023-01-22 RX ADMIN — MUPIROCIN 1 APPLICATION: 20 OINTMENT TOPICAL at 08:29

## 2023-01-22 RX ADMIN — TAMSULOSIN HYDROCHLORIDE 0.4 MG: 0.4 CAPSULE ORAL at 08:28

## 2023-01-22 RX ADMIN — OXYCODONE AND ACETAMINOPHEN 1 TABLET: 5; 325 TABLET ORAL at 17:30

## 2023-01-22 RX ADMIN — ALPRAZOLAM 0.5 MG: 0.5 TABLET ORAL at 19:29

## 2023-01-23 LAB
ANION GAP SERPL CALCULATED.3IONS-SCNC: 10.8 MMOL/L (ref 5–15)
BACTERIA SPEC AEROBE CULT: NORMAL
BACTERIA SPEC ANAEROBE CULT: NORMAL
BUN SERPL-MCNC: 32 MG/DL (ref 6–20)
BUN/CREAT SERPL: 25.2 (ref 7–25)
CALCIUM SPEC-SCNC: 10.1 MG/DL (ref 8.6–10.5)
CHLORIDE SERPL-SCNC: 96 MMOL/L (ref 98–107)
CO2 SERPL-SCNC: 29.2 MMOL/L (ref 22–29)
CREAT SERPL-MCNC: 1.27 MG/DL (ref 0.76–1.27)
DEPRECATED RDW RBC AUTO: 39.3 FL (ref 37–54)
EGFRCR SERPLBLD CKD-EPI 2021: 68 ML/MIN/1.73
ERYTHROCYTE [DISTWIDTH] IN BLOOD BY AUTOMATED COUNT: 14.2 % (ref 12.3–15.4)
GLUCOSE BLDC GLUCOMTR-MCNC: 180 MG/DL (ref 70–130)
GLUCOSE BLDC GLUCOMTR-MCNC: 191 MG/DL (ref 70–130)
GLUCOSE BLDC GLUCOMTR-MCNC: 306 MG/DL (ref 70–130)
GLUCOSE BLDC GLUCOMTR-MCNC: 88 MG/DL (ref 70–130)
GLUCOSE SERPL-MCNC: 75 MG/DL (ref 65–99)
GRAM STN SPEC: NORMAL
GRAM STN SPEC: NORMAL
HCT VFR BLD AUTO: 36 % (ref 37.5–51)
HGB BLD-MCNC: 11.5 G/DL (ref 13–17.7)
MCH RBC QN AUTO: 24.5 PG (ref 26.6–33)
MCHC RBC AUTO-ENTMCNC: 31.9 G/DL (ref 31.5–35.7)
MCV RBC AUTO: 76.6 FL (ref 79–97)
PLATELET # BLD AUTO: 372 10*3/MM3 (ref 140–450)
PMV BLD AUTO: 10.6 FL (ref 6–12)
POTASSIUM SERPL-SCNC: 3.5 MMOL/L (ref 3.5–5.2)
RBC # BLD AUTO: 4.7 10*6/MM3 (ref 4.14–5.8)
SODIUM SERPL-SCNC: 136 MMOL/L (ref 136–145)
WBC NRBC COR # BLD: 12.78 10*3/MM3 (ref 3.4–10.8)

## 2023-01-23 PROCEDURE — 80048 BASIC METABOLIC PNL TOTAL CA: CPT | Performed by: SURGERY

## 2023-01-23 PROCEDURE — 25010000002 HYDROMORPHONE PER 4 MG: Performed by: SURGERY

## 2023-01-23 PROCEDURE — 63710000001 INSULIN REGULAR HUMAN PER 5 UNITS: Performed by: SURGERY

## 2023-01-23 PROCEDURE — 63710000001 INSULIN LISPRO (HUMAN) PER 5 UNITS: Performed by: STUDENT IN AN ORGANIZED HEALTH CARE EDUCATION/TRAINING PROGRAM

## 2023-01-23 PROCEDURE — 82962 GLUCOSE BLOOD TEST: CPT

## 2023-01-23 PROCEDURE — 85027 COMPLETE CBC AUTOMATED: CPT | Performed by: SURGERY

## 2023-01-23 RX ORDER — DIAZEPAM 5 MG/1
5 TABLET ORAL EVERY 8 HOURS PRN
Status: DISCONTINUED | OUTPATIENT
Start: 2023-01-23 | End: 2023-01-24 | Stop reason: HOSPADM

## 2023-01-23 RX ADMIN — POLYETHYLENE GLYCOL 3350 17 G: 17 POWDER, FOR SOLUTION ORAL at 09:02

## 2023-01-23 RX ADMIN — AMITRIPTYLINE HYDROCHLORIDE 25 MG: 25 TABLET, FILM COATED ORAL at 21:06

## 2023-01-23 RX ADMIN — OXYCODONE AND ACETAMINOPHEN 1 TABLET: 5; 325 TABLET ORAL at 01:40

## 2023-01-23 RX ADMIN — GUAIFENESIN 1200 MG: 600 TABLET, EXTENDED RELEASE ORAL at 09:02

## 2023-01-23 RX ADMIN — HYDROXYZINE HYDROCHLORIDE 25 MG: 25 TABLET ORAL at 06:10

## 2023-01-23 RX ADMIN — HYDROXYZINE HYDROCHLORIDE 25 MG: 25 TABLET ORAL at 21:06

## 2023-01-23 RX ADMIN — DIAZEPAM 5 MG: 5 TABLET ORAL at 12:03

## 2023-01-23 RX ADMIN — INSULIN HUMAN 16 UNITS: 100 INJECTION, SOLUTION PARENTERAL at 21:07

## 2023-01-23 RX ADMIN — OXYCODONE AND ACETAMINOPHEN 1 TABLET: 5; 325 TABLET ORAL at 06:10

## 2023-01-23 RX ADMIN — PANTOPRAZOLE SODIUM 40 MG: 40 TABLET, DELAYED RELEASE ORAL at 17:02

## 2023-01-23 RX ADMIN — Medication 10 MG: at 21:06

## 2023-01-23 RX ADMIN — MUPIROCIN 1 APPLICATION: 20 OINTMENT TOPICAL at 09:02

## 2023-01-23 RX ADMIN — INSULIN LISPRO 15 UNITS: 100 INJECTION, SOLUTION INTRAVENOUS; SUBCUTANEOUS at 17:02

## 2023-01-23 RX ADMIN — HYDROCHLOROTHIAZIDE 25 MG: 25 TABLET ORAL at 09:02

## 2023-01-23 RX ADMIN — TAMSULOSIN HYDROCHLORIDE 0.4 MG: 0.4 CAPSULE ORAL at 09:02

## 2023-01-23 RX ADMIN — OXYCODONE AND ACETAMINOPHEN 1 TABLET: 5; 325 TABLET ORAL at 15:19

## 2023-01-23 RX ADMIN — GABAPENTIN 600 MG: 300 CAPSULE ORAL at 06:09

## 2023-01-23 RX ADMIN — GABAPENTIN 600 MG: 300 CAPSULE ORAL at 21:06

## 2023-01-23 RX ADMIN — HYDROCODONE BITARTRATE AND ACETAMINOPHEN 1 TABLET: 7.5; 325 TABLET ORAL at 09:02

## 2023-01-23 RX ADMIN — ASPIRIN 81 MG: 81 TABLET, COATED ORAL at 09:02

## 2023-01-23 RX ADMIN — HYDROMORPHONE HYDROCHLORIDE 0.5 MG: 1 INJECTION, SOLUTION INTRAMUSCULAR; INTRAVENOUS; SUBCUTANEOUS at 21:07

## 2023-01-23 RX ADMIN — EMPAGLIFLOZIN 25 MG: 25 TABLET, FILM COATED ORAL at 09:03

## 2023-01-23 RX ADMIN — PANTOPRAZOLE SODIUM 40 MG: 40 TABLET, DELAYED RELEASE ORAL at 06:12

## 2023-01-23 RX ADMIN — HYDROXYZINE HYDROCHLORIDE 25 MG: 25 TABLET ORAL at 15:19

## 2023-01-23 RX ADMIN — INSULIN GLARGINE-YFGN 40 UNITS: 100 INJECTION, SOLUTION SUBCUTANEOUS at 21:07

## 2023-01-23 RX ADMIN — TRAZODONE HYDROCHLORIDE 100 MG: 100 TABLET ORAL at 09:02

## 2023-01-23 RX ADMIN — INSULIN HUMAN 4 UNITS: 100 INJECTION, SOLUTION PARENTERAL at 17:03

## 2023-01-23 RX ADMIN — ATORVASTATIN CALCIUM 40 MG: 20 TABLET, FILM COATED ORAL at 21:06

## 2023-01-23 RX ADMIN — MUPIROCIN 1 APPLICATION: 20 OINTMENT TOPICAL at 21:07

## 2023-01-23 RX ADMIN — GABAPENTIN 600 MG: 300 CAPSULE ORAL at 15:19

## 2023-01-23 RX ADMIN — METOPROLOL SUCCINATE 50 MG: 50 TABLET, FILM COATED, EXTENDED RELEASE ORAL at 09:02

## 2023-01-23 RX ADMIN — GUAIFENESIN 1200 MG: 600 TABLET, EXTENDED RELEASE ORAL at 21:06

## 2023-01-23 RX ADMIN — MAGNESIUM OXIDE 400 MG (241.3 MG MAGNESIUM) TABLET 400 MG: TABLET at 09:02

## 2023-01-24 ENCOUNTER — TELEPHONE (OUTPATIENT)
Dept: PLASTIC SURGERY | Facility: CLINIC | Age: 53
End: 2023-01-24
Payer: MEDICARE

## 2023-01-24 ENCOUNTER — READMISSION MANAGEMENT (OUTPATIENT)
Dept: CALL CENTER | Facility: HOSPITAL | Age: 53
End: 2023-01-24
Payer: MEDICARE

## 2023-01-24 VITALS
BODY MASS INDEX: 32.22 KG/M2 | HEIGHT: 66 IN | RESPIRATION RATE: 17 BRPM | SYSTOLIC BLOOD PRESSURE: 153 MMHG | OXYGEN SATURATION: 92 % | HEART RATE: 108 BPM | WEIGHT: 200.5 LBS | TEMPERATURE: 98.2 F | DIASTOLIC BLOOD PRESSURE: 96 MMHG

## 2023-01-24 LAB
ANION GAP SERPL CALCULATED.3IONS-SCNC: 7.8 MMOL/L (ref 5–15)
BUN SERPL-MCNC: 29 MG/DL (ref 6–20)
BUN/CREAT SERPL: 23 (ref 7–25)
CALCIUM SPEC-SCNC: 9.7 MG/DL (ref 8.6–10.5)
CHLORIDE SERPL-SCNC: 90 MMOL/L (ref 98–107)
CO2 SERPL-SCNC: 36.2 MMOL/L (ref 22–29)
CREAT SERPL-MCNC: 1.26 MG/DL (ref 0.76–1.27)
DEPRECATED RDW RBC AUTO: 41.8 FL (ref 37–54)
EGFRCR SERPLBLD CKD-EPI 2021: 68.6 ML/MIN/1.73
ERYTHROCYTE [DISTWIDTH] IN BLOOD BY AUTOMATED COUNT: 14.5 % (ref 12.3–15.4)
GLUCOSE BLDC GLUCOMTR-MCNC: 104 MG/DL (ref 70–130)
GLUCOSE BLDC GLUCOMTR-MCNC: 244 MG/DL (ref 70–130)
GLUCOSE SERPL-MCNC: 105 MG/DL (ref 65–99)
HBA1C MFR BLD: 7.7 % (ref 4.8–5.6)
HCT VFR BLD AUTO: 36.4 % (ref 37.5–51)
HGB BLD-MCNC: 11.5 G/DL (ref 13–17.7)
MCH RBC QN AUTO: 25.1 PG (ref 26.6–33)
MCHC RBC AUTO-ENTMCNC: 31.6 G/DL (ref 31.5–35.7)
MCV RBC AUTO: 79.3 FL (ref 79–97)
PLATELET # BLD AUTO: 349 10*3/MM3 (ref 140–450)
PMV BLD AUTO: 10.3 FL (ref 6–12)
POTASSIUM SERPL-SCNC: 3.6 MMOL/L (ref 3.5–5.2)
RBC # BLD AUTO: 4.59 10*6/MM3 (ref 4.14–5.8)
SODIUM SERPL-SCNC: 134 MMOL/L (ref 136–145)
WBC NRBC COR # BLD: 11.91 10*3/MM3 (ref 3.4–10.8)

## 2023-01-24 PROCEDURE — 82962 GLUCOSE BLOOD TEST: CPT

## 2023-01-24 PROCEDURE — 85027 COMPLETE CBC AUTOMATED: CPT | Performed by: SURGERY

## 2023-01-24 PROCEDURE — 83036 HEMOGLOBIN GLYCOSYLATED A1C: CPT | Performed by: NURSE PRACTITIONER

## 2023-01-24 PROCEDURE — 80048 BASIC METABOLIC PNL TOTAL CA: CPT | Performed by: SURGERY

## 2023-01-24 PROCEDURE — 63710000001 INSULIN REGULAR HUMAN PER 5 UNITS: Performed by: SURGERY

## 2023-01-24 RX ORDER — DIAZEPAM 5 MG/1
5 TABLET ORAL EVERY 8 HOURS PRN
Qty: 21 TABLET | Refills: 0 | Status: SHIPPED | OUTPATIENT
Start: 2023-01-24 | End: 2023-01-30

## 2023-01-24 RX ORDER — INSULIN ASPART 100 [IU]/ML
16 INJECTION, SOLUTION INTRAVENOUS; SUBCUTANEOUS
COMMUNITY

## 2023-01-24 RX ORDER — INSULIN DEGLUDEC 100 U/ML
47 INJECTION, SOLUTION SUBCUTANEOUS 2 TIMES DAILY
COMMUNITY
End: 2023-02-06 | Stop reason: SDUPTHER

## 2023-01-24 RX ADMIN — MUPIROCIN 1 APPLICATION: 20 OINTMENT TOPICAL at 09:12

## 2023-01-24 RX ADMIN — GUAIFENESIN 1200 MG: 600 TABLET, EXTENDED RELEASE ORAL at 09:12

## 2023-01-24 RX ADMIN — HYDROXYZINE HYDROCHLORIDE 25 MG: 25 TABLET ORAL at 14:39

## 2023-01-24 RX ADMIN — HYDROCODONE BITARTRATE AND ACETAMINOPHEN 1 TABLET: 7.5; 325 TABLET ORAL at 08:20

## 2023-01-24 RX ADMIN — ALPRAZOLAM 0.5 MG: 0.5 TABLET ORAL at 11:15

## 2023-01-24 RX ADMIN — TAMSULOSIN HYDROCHLORIDE 0.4 MG: 0.4 CAPSULE ORAL at 09:12

## 2023-01-24 RX ADMIN — GABAPENTIN 600 MG: 300 CAPSULE ORAL at 06:08

## 2023-01-24 RX ADMIN — OXYCODONE AND ACETAMINOPHEN 1 TABLET: 5; 325 TABLET ORAL at 15:07

## 2023-01-24 RX ADMIN — INSULIN HUMAN 8 UNITS: 100 INJECTION, SOLUTION PARENTERAL at 13:02

## 2023-01-24 RX ADMIN — ASPIRIN 81 MG: 81 TABLET, COATED ORAL at 09:12

## 2023-01-24 RX ADMIN — GABAPENTIN 600 MG: 300 CAPSULE ORAL at 14:39

## 2023-01-24 RX ADMIN — MAGNESIUM OXIDE 400 MG (241.3 MG MAGNESIUM) TABLET 400 MG: TABLET at 09:12

## 2023-01-24 RX ADMIN — TRAZODONE HYDROCHLORIDE 100 MG: 100 TABLET ORAL at 09:12

## 2023-01-24 RX ADMIN — METOPROLOL SUCCINATE 50 MG: 50 TABLET, FILM COATED, EXTENDED RELEASE ORAL at 09:12

## 2023-01-24 RX ADMIN — OXYCODONE AND ACETAMINOPHEN 1 TABLET: 5; 325 TABLET ORAL at 01:55

## 2023-01-24 RX ADMIN — EMPAGLIFLOZIN 25 MG: 25 TABLET, FILM COATED ORAL at 09:11

## 2023-01-24 RX ADMIN — INSULIN GLARGINE-YFGN 40 UNITS: 100 INJECTION, SOLUTION SUBCUTANEOUS at 09:15

## 2023-01-24 RX ADMIN — HYDROCHLOROTHIAZIDE 25 MG: 25 TABLET ORAL at 09:12

## 2023-01-24 RX ADMIN — HYDROXYZINE HYDROCHLORIDE 25 MG: 25 TABLET ORAL at 06:08

## 2023-01-24 RX ADMIN — PANTOPRAZOLE SODIUM 40 MG: 40 TABLET, DELAYED RELEASE ORAL at 06:08

## 2023-01-24 NOTE — TELEPHONE ENCOUNTER
Please call Kelley mullins/ [facility] re: discharge for him. They are needing ok and home instructions.  [phone]

## 2023-01-25 ENCOUNTER — TRANSITIONAL CARE MANAGEMENT TELEPHONE ENCOUNTER (OUTPATIENT)
Dept: CALL CENTER | Facility: HOSPITAL | Age: 53
End: 2023-01-25
Payer: MEDICARE

## 2023-01-25 LAB
BH BB BLOOD EXPIRATION DATE: NORMAL
BH BB BLOOD EXPIRATION DATE: NORMAL
BH BB BLOOD TYPE BARCODE: 6200
BH BB BLOOD TYPE BARCODE: 6200
BH BB DISPENSE STATUS: NORMAL
BH BB DISPENSE STATUS: NORMAL
BH BB PRODUCT CODE: NORMAL
BH BB PRODUCT CODE: NORMAL
BH BB UNIT NUMBER: NORMAL
BH BB UNIT NUMBER: NORMAL
CROSSMATCH INTERPRETATION: NORMAL
CROSSMATCH INTERPRETATION: NORMAL
UNIT  ABO: NORMAL
UNIT  ABO: NORMAL
UNIT  RH: NORMAL
UNIT  RH: NORMAL

## 2023-01-25 NOTE — OUTREACH NOTE
Call Center TCM Note    Flowsheet Row Responses   [facility] patient discharged from? [place]   Does the patient have one of the following disease processes/diagnoses(primary or secondary)? Cardiothoracic surgery   TCM attempt successful? Yes   Call start time 1256   Call end time 1301   Discharge diagnosis Sternal wound dehiscence,   s/p sternal debridement and removal of sternal wires    Person spoke with today (if not patient) and relationship [place]   Meds reviewed with patient/caregiver? Yes   Is the patient having any side effects they believe may be caused by any medication additions or changes? No   Does the patient have all medications related to this admission filled (includes all antibiotics, pain medications, cardiac medications, etc.) Yes   Is the patient taking all medications as directed (includes completed medication regime)? Yes   Comments HOSP DC FU appt 2/6/23 @ 1 pm.    Does the patient have an appointment with their PCP within 7 days of discharge? Yes   What is the Home health agency?  [facility]    Has home health visited the patient within 72 hours of discharge? Unsure   Psychosocial issues? No   Did the patient receive a copy of their discharge instructions? Yes   Nursing interventions Reviewed instructions with patient   What is the patient's perception of their health status since discharge? Improving   Nursing interventions Nurse provided patient education   Is the patient /caregiver able to teach back basic post-op care? Shower daily, No tub bath, swimming, or hot tub until instructed by MD, Lifting as instructed by MD in discharge instructions   Is the patient/caregiver able to teach back signs and symptoms of incisional infection? Increased redness, swelling or pain at the incisonal site, Increased drainage or bleeding, Incisional warmth, Pus or odor from incision, Fever   Is the patient/caregiver able to teach back steps to recovery at home? Rest and rebuild strength, gradually  increase activity, Set small, achievable goals for return to baseline health, Eat a well-balance diet   If the patient is a current smoker, are they able to teach back resources for cessation? Not a smoker   Is the patient/caregiver able to teach back the hierarchy of who to call/visit for symptoms/problems? PCP, Specialist, Home health nurse, Urgent Care, ED, 911 Yes   TCM call completed? Yes   Wrap up additional comments Mother reports pt do8ing ok. Is still having pain as expected.    Call end time 1301          Brittni Franco RN    1/25/2023, 13:02 EST

## 2023-01-25 NOTE — OUTREACH NOTE
Call Center TCM Note    Flowsheet Row Responses   [facility] patient discharged from? [place]   Does the patient have one of the following disease processes/diagnoses(primary or secondary)? Cardiothoracic surgery   TCM attempt successful? No   Unsuccessful attempts Attempt 1          Brittni Franco RN    1/25/2023, 08:22 EST

## 2023-01-25 NOTE — OUTREACH NOTE
Prep Survey    Flowsheet Row Responses   [facility] patient discharged from? [place]   Is LACE score < 7 ? No   Eligibility [facility]   Date of Admission 01/18/23   Date of Discharge 01/24/23   Discharge Disposition Home or Self Care   Discharge diagnosis Sternal wound dehiscence,   s/p sternal debridement and removal of sternal wires    Does the patient have one of the following disease processes/diagnoses(primary or secondary)? Cardiothoracic surgery   Does the patient have Home health ordered? Yes   What is the Home health agency?  [facility]   Is there a DME ordered? No   Prep survey completed? Yes          LAURA BEDOLLA - Registered Nurse

## 2023-01-27 ENCOUNTER — TELEPHONE (OUTPATIENT)
Dept: FAMILY MEDICINE CLINIC | Facility: CLINIC | Age: 53
End: 2023-01-27
Payer: MEDICARE

## 2023-01-28 NOTE — TELEPHONE ENCOUNTER
I received a call from [facility] with [facility] reporting that this pt has a THIEN drain that is losing suction.  She states it has been holding suction for the last 15-20 mins but there looks to be a thick mucus drainage in the tubing.  She has tried to milk the tubing without success.  I have advise the nurse to call the surgeon that placed the tubing for further instructions.  This pt has not had a follow with PCP yet.  He was discharged from the hospital 1/24/23 and had an infection from the sternal inc from a post CABG 10/2022.  Nurse did state she would call the surgeon.  I gave her the office # to call and advised if there is anything else I could do to call back and we will go from there.  Yasmin Cavazos, APRN  01/27/2023

## 2023-01-30 ENCOUNTER — TELEPHONE (OUTPATIENT)
Dept: PLASTIC SURGERY | Facility: CLINIC | Age: 53
End: 2023-01-30
Payer: MEDICARE

## 2023-01-30 RX ORDER — CLINDAMYCIN HYDROCHLORIDE 300 MG/1
300 CAPSULE ORAL 3 TIMES DAILY
Qty: 30 CAPSULE | Refills: 0 | Status: SHIPPED | OUTPATIENT
Start: 2023-01-30 | End: 2023-02-09

## 2023-01-30 NOTE — TELEPHONE ENCOUNTER
Home health nurse Gu[place] called and reported superior 5 staples are red around them and very irritated. The mother of the patient would like antibiotics sent in as precaution. He has been taking Kelfex that they had in the home. Home health nurse notes this area looks healed but irritated and would like to remove staples.     Discussed if area is healed and staples are causing irritation then may remove the top 5 staples. Will send in Clindamycin. The mother is concerned patient will not get to come for follow up due to in coming snow storm. Instructed to keep appt if possible but if unable to make appt, please call the office for any worsening of symptoms.

## 2023-02-01 ENCOUNTER — OFFICE VISIT (OUTPATIENT)
Dept: PLASTIC SURGERY | Facility: CLINIC | Age: 53
End: 2023-02-01
Payer: MEDICARE

## 2023-02-01 VITALS
DIASTOLIC BLOOD PRESSURE: 80 MMHG | SYSTOLIC BLOOD PRESSURE: 122 MMHG | TEMPERATURE: 98.4 F | WEIGHT: 209 LBS | HEART RATE: 72 BPM | BODY MASS INDEX: 33.59 KG/M2 | HEIGHT: 66 IN | OXYGEN SATURATION: 94 %

## 2023-02-01 DIAGNOSIS — T81.32XD STERNAL WOUND DEHISCENCE, SUBSEQUENT ENCOUNTER: ICD-10-CM

## 2023-02-01 DIAGNOSIS — M62.838 MUSCLE SPASM: Primary | ICD-10-CM

## 2023-02-01 PROCEDURE — 99024 POSTOP FOLLOW-UP VISIT: CPT | Performed by: SURGERY

## 2023-02-01 RX ORDER — DIAZEPAM 5 MG/1
5 TABLET ORAL EVERY 6 HOURS PRN
Qty: 30 TABLET | Refills: 0 | Status: SHIPPED | OUTPATIENT
Start: 2023-02-01 | End: 2024-02-01

## 2023-02-01 RX ORDER — MIRTAZAPINE 45 MG/1
TABLET, FILM COATED ORAL EVERY 24 HOURS
COMMUNITY

## 2023-02-06 ENCOUNTER — OFFICE VISIT (OUTPATIENT)
Dept: FAMILY MEDICINE CLINIC | Facility: CLINIC | Age: 53
End: 2023-02-06
Payer: MEDICARE

## 2023-02-06 ENCOUNTER — OFFICE VISIT (OUTPATIENT)
Dept: PLASTIC SURGERY | Facility: CLINIC | Age: 53
End: 2023-02-06
Payer: MEDICARE

## 2023-02-06 VITALS
WEIGHT: 213.4 LBS | DIASTOLIC BLOOD PRESSURE: 51 MMHG | BODY MASS INDEX: 34.3 KG/M2 | RESPIRATION RATE: 20 BRPM | SYSTOLIC BLOOD PRESSURE: 103 MMHG | TEMPERATURE: 98 F | HEART RATE: 72 BPM | OXYGEN SATURATION: 97 % | HEIGHT: 66 IN

## 2023-02-06 VITALS
OXYGEN SATURATION: 96 % | BODY MASS INDEX: 34.07 KG/M2 | SYSTOLIC BLOOD PRESSURE: 129 MMHG | TEMPERATURE: 98.6 F | HEART RATE: 79 BPM | WEIGHT: 212 LBS | DIASTOLIC BLOOD PRESSURE: 81 MMHG | HEIGHT: 66 IN

## 2023-02-06 DIAGNOSIS — I10 HYPERTENSION, ESSENTIAL: Chronic | ICD-10-CM

## 2023-02-06 DIAGNOSIS — Z79.4 INSULIN DEPENDENT TYPE 2 DIABETES MELLITUS: ICD-10-CM

## 2023-02-06 DIAGNOSIS — N18.31 ANEMIA DUE TO STAGE 3A CHRONIC KIDNEY DISEASE: Chronic | ICD-10-CM

## 2023-02-06 DIAGNOSIS — E78.2 MIXED HYPERLIPIDEMIA: Chronic | ICD-10-CM

## 2023-02-06 DIAGNOSIS — Z79.4 TYPE 2 DIABETES MELLITUS WITH HYPERGLYCEMIA, WITH LONG-TERM CURRENT USE OF INSULIN: Primary | Chronic | ICD-10-CM

## 2023-02-06 DIAGNOSIS — E11.22 CKD STAGE 3 DUE TO TYPE 2 DIABETES MELLITUS: Chronic | ICD-10-CM

## 2023-02-06 DIAGNOSIS — E11.9 INSULIN DEPENDENT TYPE 2 DIABETES MELLITUS: ICD-10-CM

## 2023-02-06 DIAGNOSIS — E11.65 TYPE 2 DIABETES MELLITUS WITH HYPERGLYCEMIA, WITH LONG-TERM CURRENT USE OF INSULIN: Primary | Chronic | ICD-10-CM

## 2023-02-06 DIAGNOSIS — D63.1 ANEMIA DUE TO STAGE 3A CHRONIC KIDNEY DISEASE: Chronic | ICD-10-CM

## 2023-02-06 DIAGNOSIS — Z09 POSTOPERATIVE FOLLOW-UP: Primary | ICD-10-CM

## 2023-02-06 DIAGNOSIS — I25.118 CORONARY ARTERY DISEASE OF NATIVE HEART WITH STABLE ANGINA PECTORIS, UNSPECIFIED VESSEL OR LESION TYPE: Chronic | ICD-10-CM

## 2023-02-06 DIAGNOSIS — Z95.1 S/P CABG X 4: Chronic | ICD-10-CM

## 2023-02-06 DIAGNOSIS — N18.30 CKD STAGE 3 DUE TO TYPE 2 DIABETES MELLITUS: Chronic | ICD-10-CM

## 2023-02-06 PROCEDURE — 99495 TRANSJ CARE MGMT MOD F2F 14D: CPT | Performed by: FAMILY MEDICINE

## 2023-02-06 PROCEDURE — 3051F HG A1C>EQUAL 7.0%<8.0%: CPT | Performed by: FAMILY MEDICINE

## 2023-02-06 PROCEDURE — 82570 ASSAY OF URINE CREATININE: CPT | Performed by: FAMILY MEDICINE

## 2023-02-06 PROCEDURE — 82043 UR ALBUMIN QUANTITATIVE: CPT | Performed by: FAMILY MEDICINE

## 2023-02-06 PROCEDURE — 99024 POSTOP FOLLOW-UP VISIT: CPT | Performed by: SURGERY

## 2023-02-06 RX ORDER — EMPAGLIFLOZIN 25 MG/1
TABLET, FILM COATED ORAL
Qty: 30 TABLET | Refills: 3 | OUTPATIENT
Start: 2023-02-06

## 2023-02-06 RX ORDER — METOPROLOL SUCCINATE 50 MG/1
50 TABLET, EXTENDED RELEASE ORAL
Qty: 90 TABLET | Refills: 1 | Status: SHIPPED | OUTPATIENT
Start: 2023-02-06 | End: 2023-02-06 | Stop reason: SDUPTHER

## 2023-02-06 RX ORDER — METOPROLOL SUCCINATE 50 MG/1
50 TABLET, EXTENDED RELEASE ORAL
Qty: 90 TABLET | Refills: 4 | Status: SHIPPED | OUTPATIENT
Start: 2023-02-06

## 2023-02-06 RX ORDER — INSULIN DEGLUDEC 100 U/ML
47 INJECTION, SOLUTION SUBCUTANEOUS 2 TIMES DAILY
Qty: 10 ML | Refills: 5 | Status: SHIPPED | OUTPATIENT
Start: 2023-02-06

## 2023-02-06 RX ORDER — EMPAGLIFLOZIN 25 MG/1
TABLET, FILM COATED ORAL
Qty: 30 TABLET | Refills: 3 | Status: SHIPPED | OUTPATIENT
Start: 2023-02-06 | End: 2023-02-06 | Stop reason: SDUPTHER

## 2023-02-06 RX ORDER — ATORVASTATIN CALCIUM 40 MG/1
40 TABLET, FILM COATED ORAL DAILY
Qty: 90 TABLET | Refills: 1 | Status: SHIPPED | OUTPATIENT
Start: 2023-02-06 | End: 2023-02-06 | Stop reason: SDUPTHER

## 2023-02-06 RX ORDER — ATORVASTATIN CALCIUM 40 MG/1
40 TABLET, FILM COATED ORAL DAILY
Qty: 90 TABLET | Refills: 4 | Status: SHIPPED | OUTPATIENT
Start: 2023-02-06

## 2023-02-06 NOTE — PROGRESS NOTES
"Transitional Care Follow Up Visit  Subjective     Ilya Colon is a 52 y.o. male who presents for a transitional care management visit.    Within 48 business hours after discharge our office contacted him via telephone to coordinate his care and needs.      I reviewed and discussed the details of that call along with the discharge summary, hospital problems, inpatient lab results, inpatient diagnostic studies, and consultation reports with Ilya.     Current outpatient and discharge medications have been reconciled for the patient.  Reviewed by: Edgard Dickey DO      Date of TCM Phone Call 1/24/2023   [facility]   Date of Admission 1/18/2023   Date of Discharge 1/24/2023   Discharge Disposition Home or Self Care     Risk for Readmission (LACE) Score: 10 (1/24/2023  6:00 AM)      History of Present Illness   The patient is here for hospital follow-up. He was admitted for surgery to repair sternal blister, acute chest wall discomfort, status post CABG 3 weeks prior. Follows with CT surgery, flap was performed, debridement. Discharge hospital course reviewed. Patient has significant chronic comorbidities including CKD stage 3a, type 2 diabetes, insulin-dependent CAD, hypertension, hyperlipidemia, anemia among others. He is following with several specialists. Needs insulins and medications refilled today. Labs and monitoring additionally.    The patient reports his wound is healing well. He notes he is unsure if he will follow up with Dr. Dias again. He states he had drain tubes removed last week. He notes on 02/04/2023 one of the drain fell out and he followed up with Dr. Dias this morning. He states he was told to let it drain. He notes he is not happy with the plastic surgeon. He states on 02/05/2023 it \"exploded and burns to water\".    The patient reports he is trying to quit smoking. He states he had a large stent placed 7 years ago. He notes he started smoking 8 years ago. He " states he is only smoking half a pack or a pack a day.      Course During Hospital Stay:     1/18 to 1/24  Hospital Course     Patient is a 52 y.o. male presented for previously scheduled sternal rewiring after he was found to have a sternal blister and acute chest wall discomfort. This began three weeks post CABG. 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 The blister was treated with sterile I&D and a course of antibiotics and he was reevaluated by Dr. Blackmon. On 1/18/23 he underwent sternal debridement and removal of sternal wires with Dr. Blackmon.  Postoperatively he did well and plastic surgery was consulted postoperatively to evaluate the patient for closure.  On 1/20/2023 he underwent sternal wound debridement and closure with a VRAM flap with Dr. Li.  Post operatively he went to the PACU and then to the stepdown unit.  His main complaint postoperatively has been pain. Dr. Li has recommended Valium, gabapentin, and Percocet at discharge for pain and muscle spasms. He has a history of CKD stage III his creatinine was briefly elevated but has returned to his baseline.  On postop day 4 following closure with flap, he was deemed stable for discharge home with home health. They have been notified he will be discharging with THIEN drains in place. He is tolerating the medication regimen.  He has been weaned from oxygen and met PT goals.  He is urinating and defecating sufficiently as well as eating and drinking adequately.  Strict sternal precautions reviewed as well as signs and symptoms of sternal wound infection.  He is to follow-up with Dr. Li in her [place] office on 2/1/23 at 1:30pm. Per Dr. Blackmon, he does not need to follow-up in our office unless symptoms reoccur.        The following portions of the patient's history were reviewed and updated as appropriate: allergies, current medications, past family history, past medical history, past social history, past surgical  "history and problem list.    /51   Pulse 72   Temp 98 °F (36.7 °C)   Resp 20   Ht 167.6 cm (66\")   Wt 96.8 kg (213 lb 6.4 oz)   SpO2 97%   BMI 34.44 kg/m²      Blood Pressures during visit    02/06/23 1305   BP: 103/51       Objective   Physical Exam  Vitals reviewed.   Constitutional:       Appearance: Normal appearance. He is well-developed.   HENT:      Head: Normocephalic and atraumatic.      Right Ear: External ear normal.      Left Ear: External ear normal.      Nose: Nose normal.   Eyes:      Conjunctiva/sclera: Conjunctivae normal.      Pupils: Pupils are equal, round, and reactive to light.   Cardiovascular:      Rate and Rhythm: Normal rate.   Pulmonary:      Effort: Pulmonary effort is normal.      Breath sounds: Normal breath sounds.   Abdominal:      General: There is no distension.   Skin:     General: Skin is warm and dry.      Comments: Healing wounds from recent CT surgery   Neurological:      Mental Status: He is alert and oriented to person, place, and time. Mental status is at baseline.      Motor: Weakness present.   Psychiatric:         Mood and Affect: Mood and affect normal.         Behavior: Behavior normal.         Thought Content: Thought content normal.         Judgment: Judgment normal.           Lab Frequency Next Occurrence   Magnesium Once 07/25/2023   Iron and TIBC Once 07/25/2023   Follow Anesthesia Guidelines / Protocol Once 01/16/2023   Obtain Informed Consent Once 01/16/2023   Notify Surgeon if Patient Currently Taking Plavix, Effient, Ticlid, Brillinta, Pradaxa, Xarelto, Eliquis or Savaysa Once 01/16/2023   Give Patient Pre-Op Education Booklet Once 01/16/2023   Provide Patient Instuctions on NPO Status Once 01/16/2023   Hold Home Medications Morning of Surgery As Directed on Patient Instruction Sheet Once 01/16/2023   Height & Weight Once 01/16/2023   Check BP in Both Arms & Document on Cardiac Surgery Information Sheet Once 01/16/2023   Instruct Patient on " Coughing, Deep Breathing and Incentive Spirometry Once 01/16/2023   TSH+Free T4 Once 02/11/2023   Vitamin B12 & Folate Once 02/11/2023   Iron Profile Once 02/11/2023   Ferritin Once 02/11/2023   Hemoglobin A1c Every 12 Weeks 4/28/2023, 7/21/2023, 10/13/2023, 1/5/2024, 3/29/2024, 6/21/2024, 9/13/2024, 12/6/2024, 2/28/2025, 5/23/2025, 8/15/2025, 11/7/2025   CBC (No Diff) Every 12 Weeks 4/28/2023, 7/21/2023, 10/13/2023, 1/5/2024, 3/29/2024, 6/21/2024, 9/13/2024, 12/6/2024, 2/28/2025, 5/23/2025, 8/15/2025, 11/7/2025   Comprehensive metabolic panel Every 12 Weeks 4/28/2023, 7/21/2023, 10/13/2023, 1/5/2024, 3/29/2024, 6/21/2024, 9/13/2024, 12/6/2024, 2/28/2025, 5/23/2025, 8/15/2025, 11/7/2025   Lipid panel Every 12 Weeks 4/28/2023, 7/21/2023, 10/13/2023, 1/5/2024, 3/29/2024, 6/21/2024, 9/13/2024, 12/6/2024, 2/28/2025, 5/23/2025, 8/15/2025, 11/7/2025                Assessment & Plan   Problems Addressed this Visit        Active Problems    Mixed hyperlipidemia (Chronic)    Relevant Medications    atorvastatin (LIPITOR) 40 MG tablet    Other Relevant Orders    CBC (No Diff)    Comprehensive metabolic panel    Hypertension, essential (Chronic)    Relevant Medications    metoprolol succinate XL (TOPROL-XL) 50 MG 24 hr tablet    Other Relevant Orders    CBC (No Diff)    Comprehensive metabolic panel    Insulin dependent type 2 diabetes mellitus (HCC) - Primary    Relevant Medications    empagliflozin (Jardiance) 25 MG tablet tablet    Insulin Degludec (TRESIBA) 100 UNIT/ML solution injection    Other Relevant Orders    Hemoglobin A1c    CAD (coronary artery disease)    Relevant Medications    metoprolol succinate XL (TOPROL-XL) 50 MG 24 hr tablet    S/P CABG x 4    Anemia due to stage 3a chronic kidney disease (HCC)    Relevant Orders    Vitamin B12 & Folate    Iron Profile    Ferritin    CKD stage 3 due to type 2 diabetes mellitus (HCC)    Relevant Medications    empagliflozin (Jardiance) 25 MG tablet tablet    Insulin  Degludec (TRESIBA) 100 UNIT/ML solution injection    Other Relevant Orders    CBC (No Diff)    Comprehensive metabolic panel   Diagnoses       Codes Comments    Type 2 diabetes mellitus with hyperglycemia, with long-term current use of insulin (HCC)    -  Primary ICD-10-CM: E11.65, Z79.4  ICD-9-CM: 250.00, 790.29, V58.67     CKD stage 3 due to type 2 diabetes mellitus (HCC)     ICD-10-CM: E11.22, N18.30  ICD-9-CM: 250.40, 585.3     Coronary artery disease of native heart with stable angina pectoris, unspecified vessel or lesion type (HCC)     ICD-10-CM: I25.118  ICD-9-CM: 414.01, 413.9     Hypertension, essential     ICD-10-CM: I10  ICD-9-CM: 401.9     Insulin dependent type 2 diabetes mellitus (HCC)     ICD-10-CM: E11.9, Z79.4  ICD-9-CM: 250.00, V58.67     Mixed hyperlipidemia     ICD-10-CM: E78.2  ICD-9-CM: 272.2     S/P CABG x 4     ICD-10-CM: Z95.1  ICD-9-CM: V45.81     Anemia due to stage 3a chronic kidney disease (HCC)     ICD-10-CM: N18.31, D63.1  ICD-9-CM: 285.21, 585.3       Chronic conditions for patient are stable, managed. Continue insulin, monitoring sugars at home. Renew medications. A1c was 7.7, recently checked 01/20/2023. Recheck in 3 months as well as other labs. Urine microalbumin performed today.    Status post flap CABG. Complications from healing, rewiring. Hospital discharge in the last 14 days is healing, but swollen. No warmth, signs of infection, drainage are no longer there. He is following up with CT surgery and other specialists regarding his wound healing and touch. Otherwise, no chest pain, has quit smoking. Blood pressure controlled, low normal than anything else. CKD stage III, stable. Should be following up with nephrology and other specialists. We will see him back in a couple of months, sooner if indicated based on patient's ability to manage conditions and annual wellness visit at that time.             Follow Up   Return in about 3 months (around 5/6/2023), or if symptoms worsen  or fail to improve, for Recheck, Labs before, AWV.      Additional Instructions for the Follow-ups that You Need to Schedule     Microalbumin / Creatinine Urine Ratio - Urine, Clean Catch   As directed      Release to patient: Routine Release         Ferritin    Feb 11, 2023 (Approximate)      Release to patient: Routine Release         Iron Profile   Feb 11, 2023      TSH+Free T4   Feb 11, 2023      Vitamin B12 & Folate   Feb 11, 2023      CBC (No Diff)  (Every 12 Weeks)  Feb 06, 2028      Release to patient: Immediate         Comprehensive metabolic panel  (Every 12 Weeks)  Feb 06, 2028      Release to patient: Routine Release         Hemoglobin A1c  (Every 12 Weeks)  Feb 06, 2028      Lipid panel  (Every 12 Weeks)  Feb 06, 2028      Release to patient: Routine Release              Your Scheduled Appointments    Feb 09, 2023  9:45 AM  Follow Up with Slade Dias MD  [facility] ([place]) [address]  [place] [address]  [phone]   Arrive 30  minutes early  and  if self-pay - bring photo ID, consult fee ($75 New Patient, $50 Established Patient), records if procedure performed at any non-[facility] facility.  Insurance  - Bring insurance card, photo ID, and co-pay/consult fee, list of medications, labs and any imaging, or records performed at a non-[facility] facility.     Feb 23, 2023  2:15 PM  Post-Op with BELEM Carmona  [facility] ([place]) [address]  [place] [address]  [phone]      Mar 17, 2023 11:30 AM  Follow Up with GRAEME Chawla MD  [facility] CARDIOLOGY ([place]) [address]  [facility] [id number] [phone]   -Bring photo ID, insurance card, and list of medications to appointment  -If testing was completed outside of [facility] then patient must bring images on a disc  -Copay will be collected at time of  appointment  -Established patients should arrive 15 minutes prior to appointment             Transcribed from ambient dictation for Edgard Dickey DO by Neema Marks.  02/06/23   14:30 EST    Patient or patient representative verbalized consent to the visit recording.  I have personally performed the services described in this document as transcribed by the above individual, and it is both accurate and complete.

## 2023-02-06 NOTE — PROGRESS NOTES
"Chief Complaint  Post-op Follow-up (Post op f/u on drain)    Subjective          History of Present Illness  Ilya Colon is a 52 y.o. male who presents to [facility] for Postoperative Follow-Up of TRAM FLAP RECONSTRUCTION, use of spy, sternal wound 01/20/23.     He is here today for follow up after drain fell out on Friday. Pt states he is still having some drainage in decreasing and the pain is mainly mid abd.    Pt is still currently taking Cleocin. Pt has 3 days left.   No fevers or chills.  Patient concerned over intermittent right-sided chest pain.  The drain fell out last Friday was on the left side.  The patient states he has an intermittent right-sided chest pain that sometimes makes it difficult for him to sleep.      Updated pictures done in clinic today.    Allergies: Patient has no known allergies.  Allergies Reconciled.    Review of Systems   All review of system has been reviewed and it  is negative except the ones note above.     Objective     /82 (BP Location: Left arm, Patient Position: Sitting, Cuff Size: Adult)   Pulse 72   Temp 98.2 °F (36.8 °C) (Temporal)   Ht 167.6 cm (65.98\")   Wt 96.6 kg (213 lb)   SpO2 97%   BMI 34.40 kg/m²     Body mass index is 34.4 kg/m².    Physical Exam:   Chest and abdomen  The patient with the same amount of reactive erythema at the incision sites, especially at the suprasternal notch.  No streaking.  There is a shallow healing wound where the drain site was located on the left chest.  No tunneling or undermining.  No fluctuance.  No drainage.  There is a shallow healing wound at the level of the umbilicus.  No tunneling or undermining.  No surrounding cellulitis or induration.  Careful palpation of the chest and abdomen with no no fluctuance or induration.  No excessive swelling.  Patient does have the normal amount of swelling 1 would expect for only 3 weeks status post a major surgery    Result " Review :                Assessment and Plan      Diagnoses and all orders for this visit:    1. Postoperative follow-up (Primary)        Plan:   Continued observation  Patient advised to see pain management  Patient has some right chest pain that does not appear to be related to infection.  However it could be a neuropathic pain or other pain otherwise associated with normal healing.  The patient states that disturbs his sleep.  It is reasonable for this patient to have pain only 3 weeks after a sternal debridement and a major muscle flap from the abdomen to cover a large sternal defect    Discussed swelling will decrease over months which will make it easier for the pt to do daily exercises.   Addressed concerns about additional surgeries that will need to be done.  Patient is concerned about the swelling of the sternal flap.  This can be debulked with liposuction once it is completely healed.  At least 3 to 4 months from now.  This does not mean it would be flat, but will allow for the removal of some of the subcutaneous fat.  Not all of the subcutaneous fat obviously.  The patient might have an occult fluid collection.  However it does not appear to be associated with any signs of infection.  The patient will have a dynamic fluid process where serous fluid is both made and absorbed within the wound.  When he is absorbing more fluid than he is making, then the cavity will collapse.  When he makes more fluid than is absorbed, then a seroma will develop.  Should the seroma drain to the skin or become symptomatic (infection) then the skin will be opened and this wound will be packed.  He will be sent to wound care for evaluation for VAC placement and the wound will have to heal from the bottom up, by secondary intention.  The patient will be observed for these changes.    No worrisome changes noted on this visit.          RTC in 1 week      Return RTC in 1 week.     Scribed by Libia Cline, acting as a scribe for  Slade Dias MD, 02/06/23 09:25 EST.  Slade Dias MD's signature on the note affirms that the note adequately documents the care provided.      Patient was given instructions and counseling regarding his condition. Please see specific information pulled into the AVS if appropriate.     Slade Dias MD  02/09/2023

## 2023-02-06 NOTE — PROGRESS NOTES
"Chief Complaint  Post-op Follow-up (Left drain came out Friday night.)    Subjective          History of Present Illness  Ilya Colon is a 52 y.o. male who presents to [facility] for Postoperative Follow-Up of TRAM FLAP RECONSTRUCTION, use of spy, sternal wound 01/20/23.   Pt presents today due to left drain came out on Friday night.  I spoke to the patient Friday night via the hub when he called about the drain having fallen out.  At that time he denied any fevers or chills or excessive swelling.  Advised the patient to come in this morning for evaluation.    Pt states there was no drainage coming out drain area but has had drainage exiting out his bellybutton area.  States the umbilical drainage has occurred since the loss of the sternal drain.    Pt states his abd was hurting and felt dizzy this morning; not as bad now.  Pt states pain level is 7/10 today.    Pt states HH comes out 2xweek Monday/Thursday.      Allergies: Patient has no known allergies.  Allergies Reconciled.    Review of Systems   All review of system has been reviewed and it  is negative except the ones note above.     Objective     /81 (BP Location: Left arm, Patient Position: Sitting)   Pulse 79   Temp 98.6 °F (37 °C) (Temporal)   Ht 167.6 cm (65.98\")   Wt 96.2 kg (212 lb)   SpO2 96%   BMI 34.23 kg/m²     Body mass index is 34.23 kg/m².    Physical Exam  Sternum: Incision c/d/i, no signs of infections.  The flap is adherent.  Small amount of reactive erythema but no cellulitis.  No dehiscence.  A blue Prolene suture was then placed on the left upper abdomen/chest at the site of the previous drain.  This was probed with a Betadine soak cotton-tipped applicator and it did extend superiorly approximately 15 cm but did not appear to be a large cavity and no significant amount of seroma was identified.  The umbilical area of maceration was not probed.  There was no overlying " erythema or induration over the abdomen.  No fluctuance.  Nontender.    Result Review :                Assessment and Plan      Diagnoses and all orders for this visit:    1. Postoperative follow-up (Primary)        Plan:   Observation    Wound was cleaned and covered today in clinic.  Advised pt it can take up to 3 months for swelling to go down.  Discussed possible wound vac with pt.  No signs of infection today.  Advised pt can shower, no soaking.    Patient with several questions.  Long-term question regarding the appearance of the VRAM flap, its thickness.  This could be addressed at a later time once all the swelling is subsided.  Most likely this would be at least 6 months away.  This will help decrease the thickness of the sternal flap improving his chest contour.  Presently the flap is swollen and there will be a decrease in its appearance.  It is unpredictable as to whether the decrease in swelling will be sufficient so that the patient does not desire additional surgeries to improve the contour.  Should the patient develop a seroma, this will likely require that the incision be opened up for distance of approximately 5 cm and then it will have to heal from the bottom up, by secondary intention.  The patient will be referred to wound care for possible VAC placement.  Possibly the abdominal incision will drain the sternal wound so that he would not require an additional incision closer to the sternum.  That is a decision that we made on further evaluation.  Patient and mother advised to return sooner should he develop significant drainage or any worrisome changes.    No evidence on physical examination of fluid collection  No evidence of infection    Follow Up   RTC on Thursday to f/u on wound.    Return RTC on Thursday.     Scribed by Libia Cline, acting as a scribe for Slade Dias MD, 02/06/23 09:25 EST.  Slade Dias MD's signature on the note affirms that the note adequately  documents the care provided.      Patient was given instructions and counseling regarding his condition. Please see specific information pulled into the AVS if appropriate.     Slade Dias MD  02/06/2023

## 2023-02-07 LAB
ALBUMIN UR-MCNC: <1.2 MG/DL
CREAT UR-MCNC: 20.5 MG/DL
MICROALBUMIN/CREAT UR: NORMAL MG/G{CREAT}

## 2023-02-09 ENCOUNTER — OFFICE VISIT (OUTPATIENT)
Dept: PLASTIC SURGERY | Facility: CLINIC | Age: 53
End: 2023-02-09
Payer: MEDICARE

## 2023-02-09 VITALS
OXYGEN SATURATION: 97 % | HEART RATE: 72 BPM | TEMPERATURE: 98.2 F | SYSTOLIC BLOOD PRESSURE: 126 MMHG | DIASTOLIC BLOOD PRESSURE: 82 MMHG | HEIGHT: 66 IN | WEIGHT: 213 LBS | BODY MASS INDEX: 34.23 KG/M2

## 2023-02-09 DIAGNOSIS — Z09 POSTOPERATIVE FOLLOW-UP: Primary | ICD-10-CM

## 2023-02-09 PROCEDURE — 99024 POSTOP FOLLOW-UP VISIT: CPT | Performed by: SURGERY

## 2023-02-10 ENCOUNTER — READMISSION MANAGEMENT (OUTPATIENT)
Dept: CALL CENTER | Facility: HOSPITAL | Age: 53
End: 2023-02-10
Payer: MEDICARE

## 2023-02-10 NOTE — PROGRESS NOTES
"Chief Complaint  Post-op Follow-up (Post op follow up on drainage)    Subjective          History of Present Illness  Ilya Colon is a 52 y.o. male who presents to [facility] for Postoperative Follow-Up of TRAM FLAP RECONSTRUCTION, use of spy, sternal wound 01/20/23.     He is here today for follow up after drain fell out on 2/3/23.     Pt is 1 month post op follow up.    Pt states when he takes a shower he is losing large amounts of hair. Pt states it has progressed over the last 3 weeks.      Pt states he is having sharp pains in mid abd area.    Pt states when he moves around it feels like the flap area is moving, not connected.    Pt pain level is 7/10 today.  Pt has finished antibiotics.  Pt has appt with pain mgmt today, will discuss medication.        Allergies: Patient has no known allergies.  Allergies Reconciled.    Review of Systems   All review of system has been reviewed and it  is negative except the ones note above.     Objective     /83 (BP Location: Left arm, Patient Position: Sitting, Cuff Size: Adult)   Pulse 65   Temp 98.4 °F (36.9 °C) (Temporal)   Ht 167.6 cm (65.98\")   Wt 96.2 kg (212 lb)   SpO2 97%   BMI 34.23 kg/m²     Body mass index is 34.23 kg/m².    Physical Exam:   Chest and abdomen  Abdominal donor site with a superficial dehiscence immediately superior to the level of the umbilicus.  This area was gently probed and it did not appear to tunnel any deeper.  Emphasis on gently probed, did not wish to create false passage.  The flap to the sternum  Is adherent and viable.  There is a small area of dehiscence at the most inferior aspect of the flap, the junction between the chest and the abdomen.  There is a small cavity that was gently probed with a cotton-tipped applicator and it appears to have a superficial tunnel under the incision line of approximately 4 cm to the right and 2 cm to the left.  Gently probing it to the " deeper spaces did not evidence a deeper fluid collection.  No overlying erythema or induration.  No fluctuance.  No increased swelling over his last visit.  Result Review :                Assessment and Plan      Diagnoses and all orders for this visit:    1. Postoperative follow-up (Primary)        Plan:   The abdominal superficial wound should heal by secondary intention    The inferior sternal wound will continue to be observed, it may require an enlarging of the opening so as to more easily provide wound care (wet-to-dry dressings) to this area    At this time there is no evidence of infection or significant fluid collection    Pt has very little drainage output; no signs of infection.  Pt has 2 small shallow wounds on lower chest area.  Will continue to watch wound at bottom of flap.  Advised pt to keep area clean and covered.  Discussed with pt when liposuction can be performed; several months away.  Discussed and addressed pain with pt.    Long discussion with the patient and his mother about his hair loss, his chest pain, his expected course of recovery, anticipated revision surgeries.    1.  Chest pain  The patient appears to have very real pain.  He has relatively recently undergone significant surgeries (open heart surgery, sternal debridement, major flap surgery for sternal coverage) and he is still healing.  It is reasonable for him to have some pain from these issues.  He also appears to have a neuropathic component as well.  At this time the pain does not appear to be related to an infectious process.  The patient is seeing pain management, and they would be best able to address his pain.  The patient was reassured that the pain is not an indicator of something not healing or otherwise going wrong with his surgery or his healing.  Pain is often a normal part of healing.  Although, always an undesirable part of healing.    2.  Hair loss  The patient states he is losing hair, he sees it falling out in  clumps in the shower.  He sees his hair thinning.  A lot of things can cause hair loss.  It can be caused by other illnesses (thyroid, malnutrition, etc.).  However it can also be caused by stress.  The patient is undergoing a significant amount of stress as a consequence of the surgeries and the necessary healing.  This could exacerbate any hair loss.  The patient is seeing his PCP and it appears that the PCP is drawing appropriate labs to check things such as B12, albumin, thyroid levels.  The hair loss issue is best addressed by his PCP.  Only because it is often secondary symptom of it different problem.  The hair loss is not the disease, it is a symptom.  Sometimes men of a certain age get thinning hair.  The patient states that no one on other side of his family has a history of hair loss.    3.  Course of recovery  The patient still has many more weeks of recovery.  The inferior sternal wound may take a month or longer to heal by secondary intention.  If this is related to a deeper cavity, it might even take longer.  The patient was encouraged to quit nicotine.  In his case, nicotine and poor diabetes control are going to be the 2 most significant negative impacts on his healing.  Less nicotine and better blood sugar control will go a long way towards healing faster.  And by less nicotine, no nicotine is best.  Even if he did everything right, he still had a significant surgery and this just takes time to heal.    4.  Additional surgeries  Once the patient has completely healed, I would like to wait another 3 to 4 months before contemplating a liposuction to reduce the amount of subcutaneous fat on the VRAM flap.  Unfortunately, this timetable cannot be accelerated.  It would be beneficial if there was some weight loss associated with this as well.  Weight loss will overall improve the result.  Discussion with patient regarding the thickness of the VRAM flap.  The VRAM (the muscle and skin flap that was used  to protect his heart and mediastinal contents after the debridement of the sternum) was thick because the patient is obese.  The reconstructive surgeon could not have chosen a thinner portion of his abdomen, it was the thickness that it was because of his truncal obesity.  1 could optimistically say that this provides better coverage of his mediastinum and heart.  However it does produce a significant deformity to his midline anterior chest.  He states it also makes it hard to lean over, it bunches up and prevents bending down.  The flap is still swollen, the scars are still recent and swollen.  As the swelling decreases, this area might become more pliable and flexible.  With weight loss, this will also be improved.  With liposuction it can also be improved.    The patient is understandably frustrated by this process.  The patient had an unintended surgery, open heart surgery.  It is very difficult to schedule an emergency, is very difficult to optimize the patient before any emergency surgery.  He did not plan on needing heart surgery.  And he has had a rough go of it.  There are aspects to the patient that increased his risk for wound complications (diabetes, obesity, nicotine abuse).  However, even patients who are otherwise healthy will have complications after surgery.  Fortunately, most of these issues can be addressed with time and some revision surgery.  Unfortunately, the patient has significant and real concerns right now and being asked to wait to get better is rarely what 1 wants to hear.  The patient's mother seems very supportive of his care.  She is involved in his care, which is good.  She shares his frustrations.  I think the patient will be significantly better when we look back at this 1 year from now.  Thinning of the sternal flap, possibly some scar revisions if any of the scars are thickened and painful.  He will be more active.  However right now he is still in the midst of healing,  appropriate postoperative swelling, requiring some superficial wound care.        Scribed by Libia Cline, acting as a scribe for Slade Dias MD, 02/16/23 11:20 EST.  Slade Dias MD's signature on the note affirms that the note adequately documents the care provided.      Return RTC in 1 week.     RTC in 1 week    Patient was given instructions and counseling regarding his condition. Please see specific information pulled into the AVS if appropriate.     Slade Dias MD  02/16/2023

## 2023-02-10 NOTE — OUTREACH NOTE
CT Surgery Week 3 Survey    Flowsheet Row Responses   [facility] patient discharged from? [place]   Does the patient have one of the following disease processes/diagnoses(primary or secondary)? Cardiothoracic surgery   Week 3 attempt successful? No   Unsuccessful attempts Attempt 1          CYNTHIA VILLASEÑOR - Registered Nurse

## 2023-02-14 ENCOUNTER — READMISSION MANAGEMENT (OUTPATIENT)
Dept: CALL CENTER | Facility: HOSPITAL | Age: 53
End: 2023-02-14
Payer: MEDICARE

## 2023-02-14 NOTE — PROGRESS NOTES
"Chief Complaint  Post-op Follow-up (1 month post op f/u)    Subjective          History of Present Illness  Ilya Colon is a 52 y.o. male who presents to [facility] for Postoperative Follow-Up of TRAM FLAP RECONSTRUCTION, use of spy, sternal wound 01/20/23.     Pt presents today for 1 month post op.    Pt states the wound at the bottom of his flap has a small opening; the wound below is the same. Not much drainage from either wounds.    Pt does have HH once a week.      Allergies: Patient has no known allergies.  Allergies Reconciled.    Review of Systems        Objective     /82 (BP Location: Left arm, Patient Position: Sitting, Cuff Size: Adult)   Pulse 67   Temp 98.6 °F (37 °C) (Temporal)   Ht 167.6 cm (65.98\")   Wt 97.5 kg (215 lb)   SpO2 95%   BMI 34.72 kg/m²     Body mass index is 34.72 kg/m².    Physical Exam:   Chest and abdomen  The patient with the same amount of reactive erythema at the incision sites, especially at the suprasternal notch.  No streaking.  There is a shallow healing wound where the drain site was located on the left chest.  No tunneling or undermining.  No fluctuance.  No drainage.  There is a shallow healing wound at the level of the umbilicus.  No tunneling or undermining.  No surrounding cellulitis or induration.  Careful palpation of the chest and abdomen with no no fluctuance or induration.  No excessive swelling.  Patient does have the normal amount of swelling 1 would expect for only 5 weeks status post a major surgery    The patient has some undermining at the wound at the most inferior aspect of the VRAM.  There has been no significant change to the overlying skin.  No cellulitis or induration.  No significant drainage from this area.  Result Review :       Assessment and Plan      Diagnoses and all orders for this visit:    1. Sternal wound dehiscence, subsequent encounter (Primary)  -     Ambulatory Referral to " Wound Clinic    2. Postoperative follow-up    Discussion with patient about enlarging the opening at the inferior aspect of the VRAM so as to allow for better wound care.  Presently the opening is so small, 1 cm diameter, that it is difficult to pack.  By enlarging the opening, better wound care can be provided to this area.  The risks and benefits of the procedure were discussed.  His questions were answered.  He agreed to the procedure.  The area was infiltrated with 5 cc of 2% lidocaine with epinephrine.  The area was cleaned and then iris scissors were used to reopen the incision immediately to the right of the opening.  This was opened for distance of approximately 3 cm.  The patient was found to have some undermining that went to the base of the flap, may be 5 cm of depth.  There was viable fatty tissue visible, but no granulation tissue.  No purulence noted.  No necrotic tissue.  Wound was packed with a Betadine soaked gauze.  Patient was provided with dressing change supplies.  Patient's mother is comfortable with wet-to-dry dressings.  She was also shown how to pack the superficial 2 cm of the wound.  She was instructed not to pack any deeper.    Plan:   Pt wounds were cleaned and bandages changed.  Discussed widening wound and refer to wound care to help heal faster.  No signs of infection on or around wound sites.  Discussed nerve pain with pt.  Discussed timeframe on when liposuction can be done on flap area; advised pt wounds must be healed.      Long discussion with the patient regarding his frustration with the slow healing.  This is a major surgery and he has reasons to heal slowly, diabetes, nicotine use, BMI of almost 35.  With better access to the wound, it will speed healing.  This does not mean it will heal quickly.  Patient is aware that it will be at least 2 months before the wound fills then from the bottom up.  He is likely to require a VAC.  Use of the VAC was discussed with the patient and  his mother.  He understands he will be on a device that will require intermittent charging as well as a plastic tube that will tether his wound to the pump.    Scribed by Libia Cline, acting as a scribe for Slade Dias MD, 02/23/23 14:42 EST.  Slade Dias MD's signature on the note affirms that the note adequately documents the care provided.    Patient was given instructions and counseling regarding his condition. Please see specific information pulled into the AVS if appropriate.     RTC in 2 week    Slade Dias MD  02/23/2023

## 2023-02-16 ENCOUNTER — OFFICE VISIT (OUTPATIENT)
Dept: PLASTIC SURGERY | Facility: CLINIC | Age: 53
End: 2023-02-16
Payer: MEDICARE

## 2023-02-16 VITALS
HEART RATE: 65 BPM | TEMPERATURE: 98.4 F | DIASTOLIC BLOOD PRESSURE: 83 MMHG | SYSTOLIC BLOOD PRESSURE: 121 MMHG | BODY MASS INDEX: 34.07 KG/M2 | HEIGHT: 66 IN | OXYGEN SATURATION: 97 % | WEIGHT: 212 LBS

## 2023-02-16 DIAGNOSIS — Z09 POSTOPERATIVE FOLLOW-UP: Primary | ICD-10-CM

## 2023-02-16 PROCEDURE — 99024 POSTOP FOLLOW-UP VISIT: CPT | Performed by: SURGERY

## 2023-02-23 ENCOUNTER — OFFICE VISIT (OUTPATIENT)
Dept: PLASTIC SURGERY | Facility: CLINIC | Age: 53
End: 2023-02-23
Payer: MEDICARE

## 2023-02-23 ENCOUNTER — TELEPHONE (OUTPATIENT)
Dept: FAMILY MEDICINE CLINIC | Facility: CLINIC | Age: 53
End: 2023-02-23
Payer: MEDICARE

## 2023-02-23 VITALS
HEIGHT: 66 IN | WEIGHT: 215 LBS | SYSTOLIC BLOOD PRESSURE: 131 MMHG | HEART RATE: 67 BPM | DIASTOLIC BLOOD PRESSURE: 82 MMHG | OXYGEN SATURATION: 95 % | BODY MASS INDEX: 34.55 KG/M2 | TEMPERATURE: 98.6 F

## 2023-02-23 DIAGNOSIS — T81.32XD STERNAL WOUND DEHISCENCE, SUBSEQUENT ENCOUNTER: Primary | ICD-10-CM

## 2023-02-23 DIAGNOSIS — Z09 POSTOPERATIVE FOLLOW-UP: ICD-10-CM

## 2023-02-23 PROCEDURE — 12021 TX SUPFC WND DEHSN W/PACKING: CPT | Performed by: SURGERY

## 2023-02-23 PROCEDURE — 99024 POSTOP FOLLOW-UP VISIT: CPT | Performed by: SURGERY

## 2023-02-23 NOTE — TELEPHONE ENCOUNTER
Audra Hollins with [facility] will fax an order requesting one week of skilled nursing.   Your blood work and EKG are both normal.     Please make an appointment to follow up with Your Primary Care Provider in 3-5 days even if entirely better.

## 2023-02-28 ENCOUNTER — OFFICE VISIT (OUTPATIENT)
Dept: WOUND CARE | Facility: HOSPITAL | Age: 53
End: 2023-02-28
Payer: MEDICARE

## 2023-02-28 VITALS
RESPIRATION RATE: 18 BRPM | HEART RATE: 79 BPM | DIASTOLIC BLOOD PRESSURE: 84 MMHG | TEMPERATURE: 99.1 F | SYSTOLIC BLOOD PRESSURE: 136 MMHG | OXYGEN SATURATION: 96 %

## 2023-02-28 DIAGNOSIS — E66.09 CLASS 1 OBESITY DUE TO EXCESS CALORIES WITH SERIOUS COMORBIDITY AND BODY MASS INDEX (BMI) OF 34.0 TO 34.9 IN ADULT: ICD-10-CM

## 2023-02-28 DIAGNOSIS — T81.31XA DEHISCENCE OF OPERATIVE WOUND, INITIAL ENCOUNTER: Primary | ICD-10-CM

## 2023-02-28 DIAGNOSIS — T81.89XA NON-HEALING SURGICAL WOUND, INITIAL ENCOUNTER: ICD-10-CM

## 2023-02-28 DIAGNOSIS — N18.30 CKD STAGE 3 DUE TO TYPE 2 DIABETES MELLITUS: ICD-10-CM

## 2023-02-28 DIAGNOSIS — T86.821 FAILED FLAP: ICD-10-CM

## 2023-02-28 DIAGNOSIS — E11.22 CKD STAGE 3 DUE TO TYPE 2 DIABETES MELLITUS: ICD-10-CM

## 2023-02-28 DIAGNOSIS — Z95.1 S/P CABG X 4: ICD-10-CM

## 2023-02-28 DIAGNOSIS — T86.828 OTHER COMPLICATION OF SKIN GRAFT: ICD-10-CM

## 2023-02-28 PROCEDURE — G0463 HOSPITAL OUTPT CLINIC VISIT: HCPCS | Performed by: EMERGENCY MEDICINE

## 2023-02-28 PROCEDURE — 87070 CULTURE OTHR SPECIMN AEROBIC: CPT | Performed by: EMERGENCY MEDICINE

## 2023-02-28 PROCEDURE — 87205 SMEAR GRAM STAIN: CPT | Performed by: EMERGENCY MEDICINE

## 2023-02-28 PROCEDURE — 99205 OFFICE O/P NEW HI 60 MIN: CPT | Performed by: EMERGENCY MEDICINE

## 2023-02-28 RX ORDER — SODIUM HYPOCHLORITE 1.25 MG/ML
10 SOLUTION TOPICAL 2 TIMES DAILY
Qty: 1000 ML | Refills: 1 | Status: SHIPPED | OUTPATIENT
Start: 2023-02-28

## 2023-02-28 NOTE — PROGRESS NOTES
Chief Complaint  Wound Check (New pt, open heart surgery wound dehisced, closed by plastic surgery, new wound from drain tube dehiscence, pt was sent here for wound VAC placement. (BS average 123))    Subjective      History of Present Illness    Ilya Colon  is a 52 y.o. male who underwent a four-vessel CABG in October 2022.  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  He underwent a debridement surgery for removal of the distal sternal wires with his CT surgeon and plastic surgery was consulted for a flap procedure for coverage.  Plastics performed a VRAM flap on 01/20/2023.  Patient has had some small areas of dehiscence.  On 02/23/2023 he was seen in the office and the inferior aspect of the wound was enlarged to allow for better wound care and packing of the wound.  There was an area of undermining found after the external opening was enlarged.  He was referred here for further assistance with wound care.    Patient has home health currently.  He has diabetes, obesity, hypertension, CAD, CKD, and a prior history of smoking 1PPD.  Currently he says he is only smoking about 1 cigarette a day although his mother gives us a glance that suggests he may still be smoking more than that.  His mom is here with him who does his dressing changes.  She has been packing the wound with Betadine soaked gauze but the patient says its very painful to have it packed and she hates causing him pain so the wound is barely packed just on the outside.  He also says that the Betadine burns.    Patient is extremely frustrated by his condition as is his mother.  He is very angry that there is so much soft tissue centrally from the flap and also upset that it is not healing and that he potentially will have to have another surgery down the road after it heals in order to debulk the flap and improve the chest contour.    Patient and mother state that they  were sent here to have a wound VAC placed and do not understand why that may not be an option today.      Allergies:  Patient has no known allergies.      Current Outpatient Medications:   •  amitriptyline (ELAVIL) 25 MG tablet, Take 1 tablet by mouth Every Night., Disp: , Rfl:   •  aspirin 81 MG EC tablet, Take 1 tablet by mouth Daily., Disp: , Rfl:   •  atorvastatin (LIPITOR) 40 MG tablet, Take 1 tablet by mouth Daily., Disp: 90 tablet, Rfl: 4  •  diazePAM (Valium) 5 MG tablet, Take 1 tablet by mouth Every 6 (Six) Hours As Needed for Muscle Spasms., Disp: 30 tablet, Rfl: 0  •  empagliflozin (Jardiance) 25 MG tablet tablet, Take 1 tablet by mouth Daily., Disp: 90 tablet, Rfl: 4  •  gabapentin (NEURONTIN) 600 MG tablet, Take 1 tablet by mouth Every 6 (Six) Hours., Disp: , Rfl:   •  glucose blood (Accu-Chek Kirsten Plus) test strip, Use to check glucose before breakfast and bedtime for Dx T2DM E11.9, Disp: 200 each, Rfl: 3  •  HYDROcodone-acetaminophen (NORCO) 7.5-325 MG per tablet, Take 1 tablet by mouth Every 12 (Twelve) Hours As Needed for Moderate Pain., Disp: , Rfl:   •  hydrOXYzine (ATARAX) 25 MG tablet, Take 1 tablet by mouth Every 8 (Eight) Hours., Disp: 270 tablet, Rfl: 3  •  Insulin Aspart (novoLOG) 100 UNIT/ML injection, Inject 16 Units under the skin into the appropriate area as directed 2 (Two) Times a Day Before Meals., Disp: , Rfl:   •  Insulin Degludec (TRESIBA) 100 UNIT/ML solution injection, Inject 47 Units under the skin into the appropriate area as directed 2 (Two) Times a Day., Disp: 10 mL, Rfl: 5  •  losartan-hydrochlorothiazide (HYZAAR) 100-25 MG per tablet, Take 1 tablet by mouth Daily., Disp: 30 tablet, Rfl: 2  •  magnesium oxide (MAG-OX) 400 MG tablet, Take 1 tablet by mouth Daily., Disp: , Rfl:   •  melatonin 5 MG tablet tablet, Take 2 tablets by mouth Every Night., Disp: , Rfl:   •  metoprolol succinate XL (TOPROL-XL) 50 MG 24 hr tablet, Take 1 tablet by mouth Daily., Disp: 90 tablet, Rfl:  4  •  mirtazapine (REMERON) 45 MG tablet, Daily., Disp: , Rfl:   •  sodium hypochlorite (DAKIN'S 1/4 STRENGTH) 0.125 % solution topical solution 0.125%, Apply 10 mL topically to the appropriate area as directed 2 (Two) Times a Day., Disp: 1000 mL, Rfl: 1  •  tamsulosin (FLOMAX) 0.4 MG capsule 24 hr capsule, TAKE 1 CAPSULE EVERY DAY (Patient taking differently: 1 capsule Daily.), Disp: 90 capsule, Rfl: 3  •  traZODone (DESYREL) 100 MG tablet, TAKE 1 TABLET BY MOUTH DAILY WITH BREAKFAST., Disp: 90 tablet, Rfl: 3  No current facility-administered medications for this visit.    Facility-Administered Medications Ordered in Other Visits:   •  Chlorhexidine Gluconate Cloth 2 % pads 1 application, 1 application, Topical, Q12H PRN, Lora Cowan APRN    Past Medical History:   Diagnosis Date   • Arthritis    • DDD (degenerative disc disease), lumbar    • Diabetes mellitus (HCC)    • Enlarged prostate    • Fatty liver    • Hearing decreased, left     states he is pretty much deaf in this ear, does not use hearing aides   • History of gastric ulcer    • Hypertension    • Myocardial infarction (HCC)    • Scoliosis    • Stage 3 chronic kidney disease (HCC)    • Sternal wound dehiscence     HX STERNAL WIRES     Past Surgical History:   Procedure Laterality Date   • CARDIAC CATHETERIZATION N/A 10/12/2022    Procedure: Left Heart Cath - Radial artery;  Surgeon: Tda Batista MD;  Location: [facility] INVASIVE LOCATION;  Service: Cardiology;  Laterality: N/A;   • CARDIAC SURGERY     • COLONOSCOPY     • CORONARY ARTERY BYPASS GRAFT N/A 10/20/2022    Procedure: INTRAOP NANCI, STERNOTOMY, CORONARY ARTERY BYPASS GRAFTS X 4 USING LEFT ENDOSCOPICALLY HARVESTED GREATER SAPHENOUS VEIN AND LEFT RADHA, PRP;  Surgeon: Jr Tutu Blackmon MD;  [facility];  Service: Cardiothoracic;  Laterality: N/A;   • HAND SURGERY Right     X2   • LAPAROSCOPIC CHOLECYSTECTOMY     • STERNAL REWIRING N/A 1/18/2023    Procedure: STERNAL WIRE  REMOVAL;  Surgeon: Jr Tutu Blackmon MD;  Location: [facility];  Service: Cardiothoracic;  Laterality: N/A;   • TONSILLECTOMY     • TRAM FLAP DELAYED N/A 2023    Procedure: TRAM FLAP RECONSTRUCTION, use of spy, sternal wound;  Surgeon: Stephenie Li MD;  Location: [facility] OR;  Service: Plastics;  Laterality: N/A;     Social History     Socioeconomic History   • Marital status:    Tobacco Use   • Smoking status: Former     Packs/day: 1.00     Types: Cigarettes     Start date: 2015     Quit date: 10/1/2022     Years since quittin.4   • Smokeless tobacco: Never   Vaping Use   • Vaping Use: Never used   Substance and Sexual Activity   • Alcohol use: Not Currently   • Drug use: Yes     Types: Marijuana     Comment: DAILY, last used 23   • Sexual activity: Defer           Objective     Vitals:    23 0839   BP: 136/84   BP Location: Left arm   Patient Position: Sitting   Pulse: 79   Resp: 18   Temp: 99.1 °F (37.3 °C)   TempSrc: Temporal   SpO2: 96%   PainSc:   7   PainLoc: Chest     There is no height or weight on file to calculate BMI.    STEADI Fall Risk Assessment has not been completed.     Review of Systems     ROS:  No fevers, chills, sweats, or body aches.     I have reviewed the HPI and ROS as documented by MA/RN. Jacklyn Valverde MD    Physical Exam     NAD  Normocephalic, atraumatic  EOMI, no scleral icterus  No respiratory distress, no cough  AAOx3, cooperative  VRAM flap central chest with overall good wound healing along the proximal aspect of the flap bilaterally.  Healthy tissue centrally.  The inferior margin of the flap exhibits a wound dehiscence with some tunneling in several directions, up to 3 cm.  Culture obtained from within the deepest tunnels.  No evidence of acute infection currently.  Moderate slough and exposed adipose tissue throughout the base of this wound.  There is a small area of dehiscence of the abdominal wound just superior to the  umbilicus.  This area is shallow and exhibits a healthy wound base.  There are a few small scattered areas of dried drainage and eschar and a few areas along the healing incision.    See photos for details.                Result Review :  The following data was reviewed by: Jacklyn Valverde MD on 02/28/2023:    Discharge summaries, op notes, CT surgery notes, plastic surgery notes, Hemoglobin A1c 7.7, CT chest, chest x-ray, CBC, BMP             Assessment and Plan   Diagnoses and all orders for this visit:    1. Dehiscence of operative wound, initial encounter (Primary)  -     Wound Culture - Wound, Chest  -     sodium hypochlorite (DAKIN'S 1/4 STRENGTH) 0.125 % solution topical solution 0.125%; Apply 10 mL topically to the appropriate area as directed 2 (Two) Times a Day.  Dispense: 1000 mL; Refill: 1    2. Other complication of skin graft    3. Failed flap    4. Non-healing surgical wound, initial encounter    5. Class 1 obesity due to excess calories with serious comorbidity and body mass index (BMI) of 34.0 to 34.9 in adult    6. S/P CABG x 4    7. CKD stage 3 due to type 2 diabetes mellitus (HCC)      Patient has a very unfortunate situation with nonhealing sternotomy with nonunion and subsequent VRAM flap for wound coverage complicated by wound dehiscence.  Patient's healing is complicated by CKD, diabetes, and tobacco use.    Patient's wound is not suitable for a wound VAC at this time.  He has a significant amount of slough within the wound and the wound itself is rather narrow although fairly deep and also exhibiting some tunneling to the left and right of the primary aspect of the wound.  Wound will need to be packed twice daily with Dakin's wet-to-dry dressing changes.  Patient's mother was shown how to do this here in the office.  Patient has a lot of pain in the wound but unfortunately will need to have the wound packed thoroughly in order to get better.  He says that he understands this.  They are  concerned about a wound infection although there is no evidence of abscess or cellulitis today.  Deep wound culture obtained to ensure there is no deep bacterial load that we need to address.  We have opted to hold off on antibiotics at this time until the culture results come back.    The smaller more inferior area of wound dehiscence can have collagen with silver packed into it.  This should be done daily or every other day depending on absorption and the wound should be kept covered at all times.    Pain control per surgery.    Recheck 2 weeks.  Patient was given instructions and counseling regarding their condition or for health maintenance advice, as well as the wound care plan and recommendations. They understand and agree with the plan.  They will follow back up here in the clinic but are instructed to contact us in the interim should they have any new, returning, or worsening symptoms or concerns. Please see specific information pulled into the AVS if appropriate.     Dragon Dictation utilized for chart completion.    I spent 62 minutes in both face-to-face and nonface-to-face time for patient care today including, but not limited to, review of old records, reviewing old images, history and physical exam, reviewing test results, counseling patient and family, coordinating care, and documenting.      Follow Up   Return in about 2 weeks (around 3/14/2023).      Jacklyn Valverde MD

## 2023-03-05 LAB
BACTERIA SPEC AEROBE CULT: NORMAL
GRAM STN SPEC: NORMAL

## 2023-03-09 ENCOUNTER — TELEPHONE (OUTPATIENT)
Dept: PLASTIC SURGERY | Facility: CLINIC | Age: 53
End: 2023-03-09
Payer: MEDICARE

## 2023-03-14 ENCOUNTER — OFFICE VISIT (OUTPATIENT)
Dept: WOUND CARE | Facility: HOSPITAL | Age: 53
End: 2023-03-14
Payer: MEDICARE

## 2023-03-14 ENCOUNTER — TELEPHONE (OUTPATIENT)
Dept: PLASTIC SURGERY | Facility: CLINIC | Age: 53
End: 2023-03-14
Payer: MEDICARE

## 2023-03-14 VITALS
HEART RATE: 65 BPM | DIASTOLIC BLOOD PRESSURE: 72 MMHG | SYSTOLIC BLOOD PRESSURE: 134 MMHG | TEMPERATURE: 97.7 F | RESPIRATION RATE: 18 BRPM

## 2023-03-14 DIAGNOSIS — Z95.1 S/P CABG X 4: ICD-10-CM

## 2023-03-14 DIAGNOSIS — E66.09 CLASS 1 OBESITY DUE TO EXCESS CALORIES WITH SERIOUS COMORBIDITY AND BODY MASS INDEX (BMI) OF 34.0 TO 34.9 IN ADULT: ICD-10-CM

## 2023-03-14 DIAGNOSIS — N18.30 CKD STAGE 3 DUE TO TYPE 2 DIABETES MELLITUS: ICD-10-CM

## 2023-03-14 DIAGNOSIS — T81.31XD DEHISCENCE OF OPERATIVE WOUND, SUBSEQUENT ENCOUNTER: Primary | ICD-10-CM

## 2023-03-14 DIAGNOSIS — T86.828 OTHER COMPLICATION OF SKIN GRAFT: ICD-10-CM

## 2023-03-14 DIAGNOSIS — T81.89XD NON-HEALING SURGICAL WOUND, SUBSEQUENT ENCOUNTER: ICD-10-CM

## 2023-03-14 DIAGNOSIS — E11.22 CKD STAGE 3 DUE TO TYPE 2 DIABETES MELLITUS: ICD-10-CM

## 2023-03-14 PROCEDURE — 3078F DIAST BP <80 MM HG: CPT | Performed by: EMERGENCY MEDICINE

## 2023-03-14 PROCEDURE — 99214 OFFICE O/P EST MOD 30 MIN: CPT | Performed by: EMERGENCY MEDICINE

## 2023-03-14 PROCEDURE — 3075F SYST BP GE 130 - 139MM HG: CPT | Performed by: EMERGENCY MEDICINE

## 2023-03-14 PROCEDURE — G0463 HOSPITAL OUTPT CLINIC VISIT: HCPCS | Performed by: EMERGENCY MEDICINE

## 2023-03-14 PROCEDURE — 1160F RVW MEDS BY RX/DR IN RCRD: CPT | Performed by: EMERGENCY MEDICINE

## 2023-03-14 PROCEDURE — 1159F MED LIST DOCD IN RCRD: CPT | Performed by: EMERGENCY MEDICINE

## 2023-03-14 RX ORDER — GINSENG 100 MG
1 CAPSULE ORAL 2 TIMES DAILY
Qty: 28 G | Refills: 1 | Status: SHIPPED | OUTPATIENT
Start: 2023-03-14

## 2023-03-14 NOTE — TELEPHONE ENCOUNTER
MOTHER OF PATIENT CALLED TO CANCEL IN OFFICE PROCEDURE AS THEY KNEW NOTHING ABOUT THIS APPOINTMENT AND DIDN'T UNDERSTAND WHY THEY WERE SEEING DR. BOBBY WHEN THEY HAD BEEN SEEING DR. WEST AND DO NOT WANT TO SEE DR. BOBBY ANYMORE.    ADVISED PATIENTS MOM THAT I WOULD TALK WITH THE PROVIDERS AND GET BACK WITH THEM.    SPOKE WITH PATIENT HE STATED THAT HE DID NOT WANT TO SEE DR. BOBBY, HE WANTED TO SEE DR. WEST.

## 2023-03-14 NOTE — PROGRESS NOTES
Chief Complaint  Wound Check (Wound check to abdomen/chest wall  ())    Subjective      History of Present Illness    Ilya Colon  is a 52 y.o. male who underwent a four-vessel CABG in October 2022.  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  He underwent a debridement surgery for removal of the distal sternal wires with his CT surgeon and plastic surgery was consulted for a flap procedure for coverage.  Plastics performed a VRAM flap on 01/20/2023.  Patient has had some small areas of dehiscence.  On 02/23/2023 he was seen in the office and the inferior aspect of the wound was enlarged to allow for better wound care and packing of the wound.  There was an area of undermining found after the external opening was enlarged.  He was referred here for further assistance with wound care.    Patient has home health currently.  He has diabetes, obesity, hypertension, CAD, CKD, and a history of smoking 1PPD.  His mom is here with him who helps him and does his dressing changes.  She has been packing the wound with Dakin's wet-to-dry dressing changes twice daily.  He has a smaller wound distally on his abdomen that they were instructed to use Yumiko and but she stopped putting anything in it a few days ago and has just been leaving it open to the air.    Patient and his mother are eager for him to get a wound VAC if possible to assist with healing.  Deep wound culture at his initial visit showed no evidence of infection.    Allergies:  Patient has no known allergies.      Current Outpatient Medications:   •  amitriptyline (ELAVIL) 25 MG tablet, Take 1 tablet by mouth Every Night., Disp: , Rfl:   •  aspirin 81 MG EC tablet, Take 1 tablet by mouth Daily., Disp: , Rfl:   •  atorvastatin (LIPITOR) 40 MG tablet, Take 1 tablet by mouth Daily., Disp: 90 tablet, Rfl: 4  •  bacitracin 500 UNIT/GM ointment, Apply 1 application topically to the appropriate area as directed 2 (Two) Times a  Day., Disp: 28 g, Rfl: 1  •  diazePAM (Valium) 5 MG tablet, Take 1 tablet by mouth Every 6 (Six) Hours As Needed for Muscle Spasms., Disp: 30 tablet, Rfl: 0  •  empagliflozin (Jardiance) 25 MG tablet tablet, Take 1 tablet by mouth Daily., Disp: 90 tablet, Rfl: 4  •  gabapentin (NEURONTIN) 600 MG tablet, Take 1 tablet by mouth Every 6 (Six) Hours., Disp: , Rfl:   •  glucose blood (Accu-Chek Kirsten Plus) test strip, Use to check glucose before breakfast and bedtime for Dx T2DM E11.9, Disp: 200 each, Rfl: 3  •  HYDROcodone-acetaminophen (NORCO) 7.5-325 MG per tablet, Take 1 tablet by mouth Every 12 (Twelve) Hours As Needed for Moderate Pain., Disp: , Rfl:   •  hydrOXYzine (ATARAX) 25 MG tablet, Take 1 tablet by mouth Every 8 (Eight) Hours., Disp: 270 tablet, Rfl: 3  •  Insulin Aspart (novoLOG) 100 UNIT/ML injection, Inject 16 Units under the skin into the appropriate area as directed 2 (Two) Times a Day Before Meals., Disp: , Rfl:   •  Insulin Degludec (TRESIBA) 100 UNIT/ML solution injection, Inject 47 Units under the skin into the appropriate area as directed 2 (Two) Times a Day., Disp: 10 mL, Rfl: 5  •  losartan-hydrochlorothiazide (HYZAAR) 100-25 MG per tablet, Take 1 tablet by mouth Daily., Disp: 30 tablet, Rfl: 2  •  magnesium oxide (MAG-OX) 400 MG tablet, Take 1 tablet by mouth Daily., Disp: , Rfl:   •  melatonin 5 MG tablet tablet, Take 2 tablets by mouth Every Night., Disp: , Rfl:   •  metoprolol succinate XL (TOPROL-XL) 50 MG 24 hr tablet, Take 1 tablet by mouth Daily., Disp: 90 tablet, Rfl: 4  •  mirtazapine (REMERON) 45 MG tablet, Daily., Disp: , Rfl:   •  sodium hypochlorite (DAKIN'S 1/4 STRENGTH) 0.125 % solution topical solution 0.125%, Apply 10 mL topically to the appropriate area as directed 2 (Two) Times a Day., Disp: 1000 mL, Rfl: 1  •  tamsulosin (FLOMAX) 0.4 MG capsule 24 hr capsule, TAKE 1 CAPSULE EVERY DAY (Patient taking differently: 1 capsule Daily.), Disp: 90 capsule, Rfl: 3  •  traZODone  (DESYREL) 100 MG tablet, TAKE 1 TABLET BY MOUTH DAILY WITH BREAKFAST., Disp: 90 tablet, Rfl: 3  No current facility-administered medications for this visit.    Facility-Administered Medications Ordered in Other Visits:   •  Chlorhexidine Gluconate Cloth 2 % pads 1 application, 1 application, Topical, Q12H CHANDANN, Lora Cowan APRN    Past Medical History:   Diagnosis Date   • Arthritis    • DDD (degenerative disc disease), lumbar    • Diabetes mellitus (HCC)    • Enlarged prostate    • Fatty liver    • Hearing decreased, left     states he is pretty much deaf in this ear, does not use hearing aides   • History of gastric ulcer    • Hypertension    • Myocardial infarction (HCC)    • Scoliosis    • Stage 3 chronic kidney disease (HCC)    • Sternal wound dehiscence     HX STERNAL WIRES     Past Surgical History:   Procedure Laterality Date   • CARDIAC CATHETERIZATION N/A 10/12/2022    Procedure: Left Heart Cath - Radial artery;  Surgeon: Tad Batista MD;  Location: [facility] INVASIVE LOCATION;  Service: Cardiology;  Laterality: N/A;   • CARDIAC SURGERY     • COLONOSCOPY     • CORONARY ARTERY BYPASS GRAFT N/A 10/20/2022    Procedure: INTRAOP NANCI, STERNOTOMY, CORONARY ARTERY BYPASS GRAFTS X 4 USING LEFT ENDOSCOPICALLY HARVESTED GREATER SAPHENOUS VEIN AND LEFT RADHA, PRP;  Surgeon: Jr Tutu Blackmon MD;  Location: [facility];  Service: Cardiothoracic;  Laterality: N/A;   • HAND SURGERY Right     X2   • LAPAROSCOPIC CHOLECYSTECTOMY     • STERNAL REWIRING N/A 1/18/2023    Procedure: STERNAL WIRE REMOVAL;  Surgeon: Jr Tutu Blackmon MD;  Location: [facility];  Service: Cardiothoracic;  Laterality: N/A;   • TONSILLECTOMY     • TRAM FLAP DELAYED N/A 1/20/2023    Procedure: TRAM FLAP RECONSTRUCTION, use of spy, sternal wound;  Surgeon: Stephenie Li MD;  Location: [facility];  Service: Plastics;  Laterality: N/A;     Social History     Socioeconomic History   • Marital status:    Tobacco  Use   • Smoking status: Former     Packs/day: 1.00     Types: Cigarettes     Start date: 2015     Quit date: 10/1/2022     Years since quittin.4   • Smokeless tobacco: Never   Vaping Use   • Vaping Use: Never used   Substance and Sexual Activity   • Alcohol use: Not Currently   • Drug use: Yes     Types: Marijuana     Comment: DAILY, last used 23   • Sexual activity: Defer           Objective     Vitals:    23 0930   BP: 134/72   BP Location: Left arm   Patient Position: Sitting   Pulse: 65   Resp: 18   Temp: 97.7 °F (36.5 °C)   TempSrc: Temporal   PainSc:   8   PainLoc: Chest  Comment: wound chest pain     There is no height or weight on file to calculate BMI.    STEADI Fall Risk Assessment has not been completed.     Review of Systems     ROS:  No fevers, chills, sweats, or body aches.     I have reviewed the HPI and ROS as documented by MA/RN. Jacklyn Valverde MD    Physical Exam     NAD  Normocephalic, atraumatic  EOMI, no scleral icterus  No respiratory distress, no cough  AAOx3, cooperative  VRAM flap central chest with overall good wound healing along the proximal aspect of the flap bilaterally.  Healthy tissue centrally.  The inferior margin of the flap exhibits a wound dehiscence which is significantly improved.  No tunneling to the left or right noted.  Minimal tunneling centrally and to the entire base of the wound shows healthy granulation tissue other than 1 very small spot that still has some slough.  Persistent TTP but improved.    The small area of dehiscence of the abdominal wound is smaller and has some dried drainage in the base which is cleared away.  No slough, no evidence of infection, no significant TTP.    There are a few small scattered areas of dried drainage and eschar in a few areas along the healing incision.  Most of these were able to be removed today.  There is dry skin along and around the majority of the incision sites.    See photos for  details.                    Result Review :  The following data was reviewed by: Jacklyn Valverde MD on 03/14/2023:    Prior notes and images.             Assessment and Plan   Diagnoses and all orders for this visit:    1. Dehiscence of operative wound, subsequent encounter (Primary)  -     Cancel: Wound Vac    2. Other complication of skin graft    3. Non-healing surgical wound, subsequent encounter  -     bacitracin 500 UNIT/GM ointment; Apply 1 application topically to the appropriate area as directed 2 (Two) Times a Day.  Dispense: 28 g; Refill: 1    4. Class 1 obesity due to excess calories with serious comorbidity and body mass index (BMI) of 34.0 to 34.9 in adult    5. S/P CABG x 4    6. CKD stage 3 due to type 2 diabetes mellitus (HCC)        Patient's wound is now suitable for a wound VAC which we will work on getting approved.  Home health will need to increase his visits to 3 times a week while he is getting the VAC.  The entire base of the wound should be packed with black foam and the periwound area draped.  Suction should be set at 125 mmHg continuous suction.  His mother should continue doing wet-to-dry dressing changes twice daily until the wound VAC is obtained and placed by home health.    The smaller abdominal wound should have bacitracin applied to it 1-2 times daily and it should be kept covered at all times.    Patient has good nutrition and is advised to continue being sure he manages and monitors his blood sugar closely and includes plenty of protein in his diet to continue healing.    Recheck 3 weeks.    Patient was given instructions and counseling regarding their condition or for health maintenance advice, as well as the wound care plan and recommendations. They understand and agree with the plan.  They will follow back up here in the clinic but are instructed to contact us in the interim should they have any new, returning, or worsening symptoms or concerns. Please see specific  information pulled into the AVS if appropriate.     Dragon Dictation utilized for chart completion.      Follow Up   Return in about 3 weeks (around 4/4/2023).      Jacklyn Valverde MD

## 2023-03-15 ENCOUNTER — TELEPHONE (OUTPATIENT)
Dept: FAMILY MEDICINE CLINIC | Facility: CLINIC | Age: 53
End: 2023-03-15
Payer: MEDICARE

## 2023-03-15 NOTE — TELEPHONE ENCOUNTER
Patient now has a wound vac for his dehisance of his chest wound and they will now see him 3 x weekly Just an FYI doesnt need a call back

## 2023-04-05 ENCOUNTER — OFFICE VISIT (OUTPATIENT)
Dept: WOUND CARE | Facility: HOSPITAL | Age: 53
End: 2023-04-05
Payer: MEDICARE

## 2023-04-05 VITALS
DIASTOLIC BLOOD PRESSURE: 84 MMHG | TEMPERATURE: 98.2 F | SYSTOLIC BLOOD PRESSURE: 124 MMHG | RESPIRATION RATE: 16 BRPM | HEART RATE: 62 BPM

## 2023-04-05 DIAGNOSIS — N18.30 CKD STAGE 3 DUE TO TYPE 2 DIABETES MELLITUS: ICD-10-CM

## 2023-04-05 DIAGNOSIS — Z95.1 S/P CABG X 4: ICD-10-CM

## 2023-04-05 DIAGNOSIS — T86.828 OTHER COMPLICATION OF SKIN GRAFT: ICD-10-CM

## 2023-04-05 DIAGNOSIS — T81.31XD DEHISCENCE OF OPERATIVE WOUND, SUBSEQUENT ENCOUNTER: Primary | ICD-10-CM

## 2023-04-05 DIAGNOSIS — E11.22 CKD STAGE 3 DUE TO TYPE 2 DIABETES MELLITUS: ICD-10-CM

## 2023-04-05 DIAGNOSIS — T81.89XD NON-HEALING SURGICAL WOUND, SUBSEQUENT ENCOUNTER: ICD-10-CM

## 2023-04-05 PROCEDURE — 1160F RVW MEDS BY RX/DR IN RCRD: CPT | Performed by: EMERGENCY MEDICINE

## 2023-04-05 PROCEDURE — 1159F MED LIST DOCD IN RCRD: CPT | Performed by: EMERGENCY MEDICINE

## 2023-04-05 PROCEDURE — G0463 HOSPITAL OUTPT CLINIC VISIT: HCPCS | Performed by: EMERGENCY MEDICINE

## 2023-04-05 PROCEDURE — 3074F SYST BP LT 130 MM HG: CPT | Performed by: EMERGENCY MEDICINE

## 2023-04-05 PROCEDURE — 3079F DIAST BP 80-89 MM HG: CPT | Performed by: EMERGENCY MEDICINE

## 2023-04-05 NOTE — PROGRESS NOTES
Chief Complaint  Wound Check (WOUND CHECK TO CHEST ())    Subjective      Wound Check        Ilya Colon  is a 52 y.o. male who underwent a four-vessel CABG in October 2022.  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  He underwent a debridement surgery for removal of the distal sternal wires with his CT surgeon and plastic surgery was consulted for a flap procedure for coverage.  Plastics performed a VRAM flap on 01/20/2023.  Patient has had some small areas of dehiscence.  On 02/23/2023 he was seen in the office and the inferior aspect of the wound was enlarged to allow for better wound care and packing of the wound.  There was an area of undermining found after the external opening was enlarged.  He was referred here for further assistance with wound care.    Patient has home health currently.  He has diabetes, obesity, hypertension, CAD, CKD, and a history of smoking 1PPD.  His mom is here with him who helps him and was doing his dressing changes.  Since his last visit the patient has had a wound VAC and the wound has made tremendous progress and the patient and family are very pleased.    Allergies:  Patient has no known allergies.      Current Outpatient Medications:   •  amitriptyline (ELAVIL) 25 MG tablet, Take 1 tablet by mouth Every Night., Disp: , Rfl:   •  aspirin 81 MG EC tablet, Take 1 tablet by mouth Daily., Disp: , Rfl:   •  atorvastatin (LIPITOR) 40 MG tablet, Take 1 tablet by mouth Daily., Disp: 90 tablet, Rfl: 4  •  bacitracin 500 UNIT/GM ointment, Apply 1 application topically to the appropriate area as directed 2 (Two) Times a Day., Disp: 28 g, Rfl: 1  •  diazePAM (Valium) 5 MG tablet, Take 1 tablet by mouth Every 6 (Six) Hours As Needed for Muscle Spasms., Disp: 30 tablet, Rfl: 0  •  empagliflozin (Jardiance) 25 MG tablet tablet, Take 1 tablet by mouth Daily., Disp: 90 tablet, Rfl: 4  •  gabapentin (NEURONTIN) 600 MG tablet, Take 1 tablet by mouth  Every 6 (Six) Hours., Disp: , Rfl:   •  glucose blood (Accu-Chek Kirsten Plus) test strip, Use to check glucose before breakfast and bedtime for Dx T2DM E11.9, Disp: 200 each, Rfl: 3  •  HYDROcodone-acetaminophen (NORCO) 7.5-325 MG per tablet, Take 1 tablet by mouth Every 12 (Twelve) Hours As Needed for Moderate Pain., Disp: , Rfl:   •  hydrOXYzine (ATARAX) 25 MG tablet, Take 1 tablet by mouth Every 8 (Eight) Hours., Disp: 270 tablet, Rfl: 3  •  Insulin Aspart (novoLOG) 100 UNIT/ML injection, Inject 16 Units under the skin into the appropriate area as directed 2 (Two) Times a Day Before Meals., Disp: , Rfl:   •  Insulin Degludec (TRESIBA) 100 UNIT/ML solution injection, Inject 47 Units under the skin into the appropriate area as directed 2 (Two) Times a Day., Disp: 10 mL, Rfl: 5  •  losartan-hydrochlorothiazide (HYZAAR) 100-25 MG per tablet, Take 1 tablet by mouth Daily., Disp: 30 tablet, Rfl: 2  •  magnesium oxide (MAG-OX) 400 MG tablet, Take 1 tablet by mouth Daily., Disp: , Rfl:   •  melatonin 5 MG tablet tablet, Take 2 tablets by mouth Every Night., Disp: , Rfl:   •  metoprolol succinate XL (TOPROL-XL) 50 MG 24 hr tablet, Take 1 tablet by mouth Daily., Disp: 90 tablet, Rfl: 4  •  mirtazapine (REMERON) 45 MG tablet, Daily., Disp: , Rfl:   •  sodium hypochlorite (DAKIN'S 1/4 STRENGTH) 0.125 % solution topical solution 0.125%, Apply 10 mL topically to the appropriate area as directed 2 (Two) Times a Day., Disp: 1000 mL, Rfl: 1  •  tamsulosin (FLOMAX) 0.4 MG capsule 24 hr capsule, TAKE 1 CAPSULE EVERY DAY (Patient taking differently: 1 capsule Daily.), Disp: 90 capsule, Rfl: 3  •  traZODone (DESYREL) 100 MG tablet, TAKE 1 TABLET BY MOUTH DAILY WITH BREAKFAST., Disp: 90 tablet, Rfl: 3  No current facility-administered medications for this visit.    Facility-Administered Medications Ordered in Other Visits:   •  Chlorhexidine Gluconate Cloth 2 % pads 1 application, 1 application, Topical, Q12H PRN, Lora Cowan,  APRN    Past Medical History:   Diagnosis Date   • Arthritis    • DDD (degenerative disc disease), lumbar    • Diabetes mellitus    • Enlarged prostate    • Fatty liver    • Hearing decreased, left     states he is pretty much deaf in this ear, does not use hearing aides   • History of gastric ulcer    • Hypertension    • Myocardial infarction    • Scoliosis    • Stage 3 chronic kidney disease    • Sternal wound dehiscence     HX STERNAL WIRES     Past Surgical History:   Procedure Laterality Date   • CARDIAC CATHETERIZATION N/A 10/12/2022    Procedure: Left Heart Cath - Radial artery;  Surgeon: Tad Batista MD;  Location: [facility] CATH INVASIVE LOCATION;  Service: Cardiology;  Laterality: N/A;   • CARDIAC SURGERY     • COLONOSCOPY     • CORONARY ARTERY BYPASS GRAFT N/A 10/20/2022    Procedure: INTRAOP NANCI, STERNOTOMY, CORONARY ARTERY BYPASS GRAFTS X 4 USING LEFT ENDOSCOPICALLY HARVESTED GREATER SAPHENOUS VEIN AND LEFT RADHA, PRP;  Surgeon: Jr Tutu Blackmon MD;  Location: [facility];  Service: Cardiothoracic;  Laterality: N/A;   • HAND SURGERY Right     X2   • LAPAROSCOPIC CHOLECYSTECTOMY     • STERNAL REWIRING N/A 2023    Procedure: STERNAL WIRE REMOVAL;  Surgeon: Jr Tutu Blackmon MD;  Location: [facility];  Service: Cardiothoracic;  Laterality: N/A;   • TONSILLECTOMY     • TRAM FLAP DELAYED N/A 2023    Procedure: TRAM FLAP RECONSTRUCTION, use of spy, sternal wound;  Surgeon: Stephenie Li MD;  Location: [facility];  Service: Plastics;  Laterality: N/A;     Social History     Socioeconomic History   • Marital status:    Tobacco Use   • Smoking status: Former     Packs/day: 1.00     Types: Cigarettes     Start date: 2015     Quit date: 10/1/2022     Years since quittin.5   • Smokeless tobacco: Never   Vaping Use   • Vaping Use: Never used   Substance and Sexual Activity   • Alcohol use: Not Currently   • Drug use: Yes     Types: Marijuana     Comment: DAILY,  last used 1/1/23   • Sexual activity: Defer           Objective     Vitals:    04/05/23 0943   BP: 124/84   BP Location: Left arm   Patient Position: Sitting   Pulse: 62   Resp: 16   Temp: 98.2 °F (36.8 °C)   TempSrc: Temporal   PainSc:   7   PainLoc: Chest  Comment: AT WOUND LOCATION     There is no height or weight on file to calculate BMI.    STEADI Fall Risk Assessment has not been completed.     Review of Systems     ROS:  No fevers, chills, sweats, or body aches.     I have reviewed the HPI and ROS as documented by MA/RN. Jacklyn Valverde MD    Physical Exam     NAD  Normocephalic, atraumatic  EOMI, no scleral icterus  No respiratory distress, no cough  AAOx3, cooperative  VRAM flap central chest with dramatic improvement of the inferior wound dehiscence.  The wound has filled in tremendously and there is now a much smaller shallow wound with healthy granulation tissue throughout the base.  No undermining or tunneling.  All the other small wounds have also healed and there is decreased swelling and tension of the flap tissue as well.    See photos for details.            Result Review :  The following data was reviewed by: Jakclyn Valverde MD on 04/05/2023:    Prior notes and images.             Assessment and Plan   Diagnoses and all orders for this visit:    1. Dehiscence of operative wound, subsequent encounter (Primary)    2. Other complication of skin graft    3. Non-healing surgical wound, subsequent encounter    4. S/P CABG x 4    5. CKD stage 3 due to type 2 diabetes mellitus        Tremendous improvement from the wound VAC.  The wound is now too small for VAC application.  We will have them apply Yumiko Ag into the base of the wound daily or every other day depending on absorption rate.  Wound should be kept covered at all times.  Patient may take the bandage off to shower.  He can wash the area with soap and water and pat dry thoroughly before reapplying the Yumiko and bandage.    Recheck 4  weeks.    Patient was given instructions and counseling regarding their condition or for health maintenance advice, as well as the wound care plan and recommendations. They understand and agree with the plan.  They will follow back up here in the clinic but are instructed to contact us in the interim should they have any new, returning, or worsening symptoms or concerns. Please see specific information pulled into the AVS if appropriate.     Dragon Dictation utilized for chart completion.      Follow Up   Return in about 4 weeks (around 5/3/2023).      Jacklyn Valverde MD

## 2023-04-24 ENCOUNTER — TELEPHONE (OUTPATIENT)
Dept: FAMILY MEDICINE CLINIC | Facility: CLINIC | Age: 53
End: 2023-04-24
Payer: MEDICARE

## 2023-04-25 NOTE — TELEPHONE ENCOUNTER
Patient has a rash on back, has tried 2 different types of cream from derm but it is not helping.    Scheduled appointment for 04/26

## 2023-04-26 ENCOUNTER — OFFICE VISIT (OUTPATIENT)
Dept: FAMILY MEDICINE CLINIC | Facility: CLINIC | Age: 53
End: 2023-04-26
Payer: MEDICARE

## 2023-04-26 VITALS
HEIGHT: 66 IN | OXYGEN SATURATION: 99 % | DIASTOLIC BLOOD PRESSURE: 69 MMHG | RESPIRATION RATE: 14 BRPM | HEART RATE: 62 BPM | BODY MASS INDEX: 34.97 KG/M2 | WEIGHT: 217.6 LBS | TEMPERATURE: 98.6 F | SYSTOLIC BLOOD PRESSURE: 169 MMHG

## 2023-04-26 DIAGNOSIS — E78.2 MIXED HYPERLIPIDEMIA: Chronic | ICD-10-CM

## 2023-04-26 DIAGNOSIS — Z79.4 TYPE 2 DIABETES MELLITUS WITH HYPERGLYCEMIA, WITH LONG-TERM CURRENT USE OF INSULIN: Chronic | ICD-10-CM

## 2023-04-26 DIAGNOSIS — E11.65 TYPE 2 DIABETES MELLITUS WITH HYPERGLYCEMIA, WITH LONG-TERM CURRENT USE OF INSULIN: Chronic | ICD-10-CM

## 2023-04-26 DIAGNOSIS — I10 HYPERTENSION, ESSENTIAL: Chronic | ICD-10-CM

## 2023-04-26 DIAGNOSIS — Z11.59 NEED FOR HEPATITIS C SCREENING TEST: ICD-10-CM

## 2023-04-26 DIAGNOSIS — E66.9 OBESITY (BMI 30-39.9): Chronic | ICD-10-CM

## 2023-04-26 DIAGNOSIS — I25.118 CORONARY ARTERY DISEASE OF NATIVE HEART WITH STABLE ANGINA PECTORIS, UNSPECIFIED VESSEL OR LESION TYPE: Chronic | ICD-10-CM

## 2023-04-26 DIAGNOSIS — R21 RASH OF BACK: Primary | Chronic | ICD-10-CM

## 2023-04-26 RX ORDER — ATORVASTATIN CALCIUM 40 MG/1
40 TABLET, FILM COATED ORAL DAILY
Qty: 90 TABLET | Refills: 4 | Status: SHIPPED | OUTPATIENT
Start: 2023-04-26

## 2023-04-26 RX ORDER — ATORVASTATIN CALCIUM 40 MG/1
40 TABLET, FILM COATED ORAL DAILY
Qty: 90 TABLET | Refills: 4 | Status: SHIPPED | OUTPATIENT
Start: 2023-04-26 | End: 2023-04-26 | Stop reason: SDUPTHER

## 2023-04-26 RX ORDER — TOPIRAMATE 100 MG/1
100 TABLET, FILM COATED ORAL DAILY
Qty: 90 TABLET | Refills: 3 | Status: SHIPPED | OUTPATIENT
Start: 2023-04-26 | End: 2023-04-26

## 2023-04-26 RX ORDER — METOPROLOL SUCCINATE 50 MG/1
50 TABLET, EXTENDED RELEASE ORAL
Qty: 90 TABLET | Refills: 4 | Status: SHIPPED | OUTPATIENT
Start: 2023-04-26

## 2023-04-26 RX ORDER — HYDROCODONE BITARTRATE AND ACETAMINOPHEN 10; 325 MG/1; MG/1
TABLET ORAL EVERY 12 HOURS
COMMUNITY

## 2023-04-26 RX ORDER — METOPROLOL SUCCINATE 50 MG/1
50 TABLET, EXTENDED RELEASE ORAL
Qty: 90 TABLET | Refills: 4 | Status: SHIPPED | OUTPATIENT
Start: 2023-04-26 | End: 2023-04-26 | Stop reason: SDUPTHER

## 2023-04-26 RX ORDER — TAMSULOSIN HYDROCHLORIDE 0.4 MG/1
1 CAPSULE ORAL DAILY
Qty: 90 CAPSULE | Refills: 3 | Status: SHIPPED | OUTPATIENT
Start: 2023-04-26 | End: 2023-04-26 | Stop reason: SDUPTHER

## 2023-04-26 RX ORDER — INSULIN DEGLUDEC 100 U/ML
47 INJECTION, SOLUTION SUBCUTANEOUS 2 TIMES DAILY
Qty: 10 ML | Refills: 5 | Status: SHIPPED | OUTPATIENT
Start: 2023-04-26 | End: 2023-04-26

## 2023-04-26 RX ORDER — LOSARTAN POTASSIUM AND HYDROCHLOROTHIAZIDE 25; 100 MG/1; MG/1
1 TABLET ORAL DAILY
Qty: 90 TABLET | Refills: 3 | Status: SHIPPED | OUTPATIENT
Start: 2023-04-26

## 2023-04-26 RX ORDER — TOPIRAMATE 100 MG/1
100 TABLET, FILM COATED ORAL DAILY
Qty: 90 TABLET | Refills: 3 | Status: SHIPPED | OUTPATIENT
Start: 2023-04-26 | End: 2023-04-26 | Stop reason: SDUPTHER

## 2023-04-26 RX ORDER — AMITRIPTYLINE HYDROCHLORIDE 25 MG/1
25 TABLET, FILM COATED ORAL NIGHTLY
Qty: 90 TABLET | Refills: 3 | Status: SHIPPED | OUTPATIENT
Start: 2023-04-26

## 2023-04-26 RX ORDER — AMITRIPTYLINE HYDROCHLORIDE 25 MG/1
25 TABLET, FILM COATED ORAL NIGHTLY
Qty: 90 TABLET | Refills: 3 | Status: SHIPPED | OUTPATIENT
Start: 2023-04-26 | End: 2023-04-26 | Stop reason: SDUPTHER

## 2023-04-26 RX ORDER — TOPIRAMATE 100 MG/1
100 TABLET, FILM COATED ORAL DAILY
Qty: 90 TABLET | Refills: 3 | Status: SHIPPED | OUTPATIENT
Start: 2023-04-26 | End: 2024-04-25

## 2023-04-26 RX ORDER — INSULIN DEGLUDEC 100 U/ML
47 INJECTION, SOLUTION SUBCUTANEOUS 2 TIMES DAILY
Qty: 10 ML | Refills: 5 | Status: SHIPPED | OUTPATIENT
Start: 2023-04-26

## 2023-04-26 RX ORDER — TAMSULOSIN HYDROCHLORIDE 0.4 MG/1
1 CAPSULE ORAL DAILY
Qty: 90 CAPSULE | Refills: 3 | Status: SHIPPED | OUTPATIENT
Start: 2023-04-26

## 2023-04-26 RX ORDER — INSULIN DEGLUDEC 100 U/ML
47 INJECTION, SOLUTION SUBCUTANEOUS 2 TIMES DAILY
Qty: 10 ML | Refills: 5 | Status: SHIPPED | OUTPATIENT
Start: 2023-04-26 | End: 2023-04-26 | Stop reason: SDUPTHER

## 2023-04-26 RX ORDER — LOSARTAN POTASSIUM AND HYDROCHLOROTHIAZIDE 25; 100 MG/1; MG/1
1 TABLET ORAL DAILY
Qty: 90 TABLET | Refills: 3 | Status: SHIPPED | OUTPATIENT
Start: 2023-04-26 | End: 2023-04-26 | Stop reason: SDUPTHER

## 2023-04-26 NOTE — PROGRESS NOTES
"Chief Complaint  Rash (Between shoulder blades, has had several creams and antibitoics for it. Not going away and itches)    Subjective        Ilya Colon presents to [facility]  History of Present Illness    Patient presents complaining of rash not improving along his back between shoulder blades using medicines prescribed creams antibiotics with no improvement he is actually enlarging main complaint is not going away and itching.     He has chronic conditions we continue to manage and monitor including hypertension CAD status post CABG T2DM with long-term use of insulin hyperlipidemia.  He has elevated blood pressure today advise monitoring at home denies any symptoms related to vision change loss or recurrent headaches.    He is taking and using insulin as prescribed for blood sugars and he relates them to be doing much better, goal fasting being under 120 and never to be above 200 he is right on point for his report.       Objective   Vital Signs:  /69   Pulse 62   Temp 98.6 °F (37 °C)   Resp 14   Ht 167.6 cm (66\")   Wt 98.7 kg (217 lb 9.6 oz)   SpO2 99%   BMI 35.12 kg/m²   Estimated body mass index is 35.12 kg/m² as calculated from the following:    Height as of this encounter: 167.6 cm (66\").    Weight as of this encounter: 98.7 kg (217 lb 9.6 oz).       Class 2 Severe Obesity (BMI >=35 and <=39.9). Obesity-related health conditions include the following: hypertension, coronary heart disease, diabetes mellitus, dyslipidemias, GERD and osteoarthritis. Obesity is unchanged. BMI is is above average; BMI management plan is completed. We discussed portion control and increasing exercise.      Physical Exam  Vitals reviewed.   Constitutional:       Appearance: Normal appearance. He is well-developed.   HENT:      Head: Normocephalic and atraumatic.      Right Ear: External ear normal.      Left Ear: External ear normal.      Nose: Nose normal.   Eyes:      " Conjunctiva/sclera: Conjunctivae normal.      Pupils: Pupils are equal, round, and reactive to light.   Cardiovascular:      Rate and Rhythm: Normal rate.   Pulmonary:      Effort: Pulmonary effort is normal.      Breath sounds: Normal breath sounds.   Chest:          Comments: Above area noted to be similar to size of raised area elevated above chest wall a few cm in height  Abdominal:      General: There is no distension.   Skin:     General: Skin is warm and dry.             Comments: Large raised circular regions of a rash on back, mainly around scapular region, with outer portion of [place] slightly more raised and red than central portions but areas seem to overlap on each. Picture obtained and shared with dermatologist to see follow up patient, referral today and contacted office to expedite being seen, appreciate help   Neurological:      Mental Status: He is alert and oriented to person, place, and time. Mental status is at baseline.      Motor: Weakness present.   Psychiatric:         Mood and Affect: Mood and affect normal.         Behavior: Behavior normal.         Thought Content: Thought content normal.         Judgment: Judgment normal.        Result Review :  The following data was reviewed by: Edgard Dickey DO on 04/26/2023:  Common labs        1/23/2023    03:18 1/24/2023    03:25 2/6/2023    14:22   Common Labs   Glucose 75   105      BUN 32   29      Creatinine 1.27   1.26      Sodium 136   134      Potassium 3.5   3.6      Chloride 96   90      Calcium 10.1   9.7      WBC 12.78   11.91      Hemoglobin 11.5   11.5      Hematocrit 36.0   36.4      Platelets 372   349      Hemoglobin A1C  7.70      Microalbumin, Urine   <1.2                    Assessment and Plan   Diagnoses and all orders for this visit:    1. Rash of back (Primary)  -     Ambulatory Referral to Dermatology    2. Type 2 diabetes mellitus with hyperglycemia, with long-term current use of insulin  -     Candida Skin Test; Future  -      Discontinue: Insulin Degludec (TRESIBA) 100 UNIT/ML solution injection; Inject 47 Units under the skin into the appropriate area as directed 2 (Two) Times a Day.  Dispense: 10 mL; Refill: 5  -     Discontinue: Insulin Degludec (TRESIBA) 100 UNIT/ML solution injection; Inject 47 Units under the skin into the appropriate area as directed 2 (Two) Times a Day.  Dispense: 10 mL; Refill: 5  -     Discontinue: empagliflozin (Jardiance) 25 MG tablet tablet; Take 1 tablet by mouth Daily.  Dispense: 90 tablet; Refill: 4  -     empagliflozin (Jardiance) 25 MG tablet tablet; Take 1 tablet by mouth Daily.  Dispense: 90 tablet; Refill: 4  -     Insulin Degludec (TRESIBA) 100 UNIT/ML solution injection; Inject 47 Units under the skin into the appropriate area as directed 2 (Two) Times a Day.  Dispense: 10 mL; Refill: 5    3. Coronary artery disease of native heart with stable angina pectoris, unspecified vessel or lesion type    4. Hypertension, essential  -     Discontinue: metoprolol succinate XL (TOPROL-XL) 50 MG 24 hr tablet; Take 1 tablet by mouth Daily.  Dispense: 90 tablet; Refill: 4  -     metoprolol succinate XL (TOPROL-XL) 50 MG 24 hr tablet; Take 1 tablet by mouth Daily.  Dispense: 90 tablet; Refill: 4    5. Obesity (BMI 30-39.9)  -     Discontinue: topiramate (Topamax) 100 MG tablet; Take 1 tablet by mouth Daily.  Dispense: 90 tablet; Refill: 3  -     Discontinue: topiramate (Topamax) 100 MG tablet; Take 1 tablet by mouth Daily.  Dispense: 90 tablet; Refill: 3  -     topiramate (Topamax) 100 MG tablet; Take 1 tablet by mouth Daily.  Dispense: 90 tablet; Refill: 3    6. Mixed hyperlipidemia  -     Discontinue: atorvastatin (LIPITOR) 40 MG tablet; Take 1 tablet by mouth Daily.  Dispense: 90 tablet; Refill: 4  -     atorvastatin (LIPITOR) 40 MG tablet; Take 1 tablet by mouth Daily.  Dispense: 90 tablet; Refill: 4    7. Need for hepatitis C screening test  -     Hepatitis C antibody; Future    Other orders  -      Discontinue: tamsulosin (FLOMAX) 0.4 MG capsule 24 hr capsule; Take 1 capsule by mouth Daily.  Dispense: 90 capsule; Refill: 3  -     Discontinue: amitriptyline (ELAVIL) 25 MG tablet; Take 1 tablet by mouth Every Night.  Dispense: 90 tablet; Refill: 3  -     Discontinue: losartan-hydrochlorothiazide (HYZAAR) 100-25 MG per tablet; Take 1 tablet by mouth Daily.  Dispense: 90 tablet; Refill: 3  -     amitriptyline (ELAVIL) 25 MG tablet; Take 1 tablet by mouth Every Night.  Dispense: 90 tablet; Refill: 3  -     losartan-hydrochlorothiazide (HYZAAR) 100-25 MG per tablet; Take 1 tablet by mouth Daily.  Dispense: 90 tablet; Refill: 3  -     tamsulosin (FLOMAX) 0.4 MG capsule 24 hr capsule; Take 1 capsule by mouth Daily.  Dispense: 90 capsule; Refill: 3      Reviewed with patient his medications to continue and consider adjusting or adding to better hypertension goal less than 140/90 at least recommended monitor at home follow-up if not at goal    Refer to dermatology for skin rash along his back, patient requested culturing back or ordering test to determine and evaluate, given referral and upcoming appt hopefully with derm to assist in managing, defer testing today and treatment recommendations other than routine skin care or what he is already doing at home so long as not worsening condition    Continue to monitor blood sugars at home goal A1c less than 7 recommend yearly eye exam foot exam see specialist and update vaccinations as appropriate per diabetes care plan    Patient with large uncomfortable protrusion from chest wall where repair of sternal cage status post CABG was performed secondary to complications from infection and is healed however he remains with large sternal with elevation above chest wall            Follow Up   Return in about 4 weeks (around 5/24/2023), or if symptoms worsen or fail to improve, for Recheck on rash follow up on culture results bx if needed.  Patient was given instructions and  counseling regarding his condition or for health maintenance advice. Please see specific information pulled into the AVS if appropriate.

## 2023-05-03 ENCOUNTER — OFFICE VISIT (OUTPATIENT)
Dept: WOUND CARE | Facility: HOSPITAL | Age: 53
End: 2023-05-03
Payer: MEDICARE

## 2023-05-03 VITALS
TEMPERATURE: 97.6 F | SYSTOLIC BLOOD PRESSURE: 146 MMHG | HEART RATE: 72 BPM | RESPIRATION RATE: 18 BRPM | DIASTOLIC BLOOD PRESSURE: 80 MMHG

## 2023-05-03 DIAGNOSIS — T81.31XD DEHISCENCE OF OPERATIVE WOUND, SUBSEQUENT ENCOUNTER: Primary | ICD-10-CM

## 2023-05-03 DIAGNOSIS — T86.828 OTHER COMPLICATION OF SKIN GRAFT: ICD-10-CM

## 2023-05-03 PROCEDURE — 3079F DIAST BP 80-89 MM HG: CPT | Performed by: EMERGENCY MEDICINE

## 2023-05-03 PROCEDURE — 3077F SYST BP >= 140 MM HG: CPT | Performed by: EMERGENCY MEDICINE

## 2023-05-03 PROCEDURE — 1159F MED LIST DOCD IN RCRD: CPT | Performed by: EMERGENCY MEDICINE

## 2023-05-03 PROCEDURE — 1160F RVW MEDS BY RX/DR IN RCRD: CPT | Performed by: EMERGENCY MEDICINE

## 2023-05-03 PROCEDURE — G0463 HOSPITAL OUTPT CLINIC VISIT: HCPCS | Performed by: EMERGENCY MEDICINE

## 2023-05-03 NOTE — PROGRESS NOTES
Chief Complaint  Wound Check (FOLLOW-UP ON STERNAL INCISION)    Subjective      Wound Check        Ilya Colon  is a 52 y.o. male who underwent a four-vessel CABG in October 2022.  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  He underwent a debridement surgery for removal of the distal sternal wires with his CT surgeon and plastic surgery was consulted for a flap procedure for coverage.  Plastics performed a VRAM flap on 01/20/2023.  Patient has had some small areas of dehiscence.  On 02/23/2023 he was seen in the office and the inferior aspect of the wound was enlarged to allow for better wound care and packing of the wound.  There was an area of undermining found after the external opening was enlarged.  He was referred here for further assistance with wound care.    Patient has home health currently.  He has diabetes, obesity, hypertension, CAD, CKD, and a history of smoking 1PPD.  His mom is here with him who helps him and was doing his dressing changes.  He then had a wound VAC and the wound made tremendous progress.  At his last visit they were instructed to apply a small piece of Yumiko Ag to the remaining area of the wound.  They state that they think everything is now healed.  He is still displeased with the cosmesis of his flap due to the thick tissue present and is hoping to undergo liposuction to remove some of that and achieve a better cosmetic result that he will be less self-conscious about.    Allergies:  Patient has no known allergies.      Current Outpatient Medications:   •  amitriptyline (ELAVIL) 25 MG tablet, Take 1 tablet by mouth Every Night., Disp: 90 tablet, Rfl: 3  •  aspirin 81 MG EC tablet, Take 1 tablet by mouth Daily., Disp: , Rfl:   •  atorvastatin (LIPITOR) 40 MG tablet, Take 1 tablet by mouth Daily., Disp: 90 tablet, Rfl: 4  •  bacitracin 500 UNIT/GM ointment, Apply 1 application topically to the appropriate area as directed 2 (Two) Times a  Day., Disp: 28 g, Rfl: 1  •  diazePAM (Valium) 5 MG tablet, Take 1 tablet by mouth Every 6 (Six) Hours As Needed for Muscle Spasms., Disp: 30 tablet, Rfl: 0  •  Diclofenac Sodium (VOLTAREN) 1 % gel gel, , Disp: , Rfl:   •  empagliflozin (Jardiance) 25 MG tablet tablet, Take 1 tablet by mouth Daily., Disp: 90 tablet, Rfl: 4  •  gabapentin (NEURONTIN) 600 MG tablet, Take 1 tablet by mouth Every 6 (Six) Hours., Disp: , Rfl:   •  glucose blood (Accu-Chek Kirsten Plus) test strip, Use to check glucose before breakfast and bedtime for Dx T2DM E11.9, Disp: 200 each, Rfl: 3  •  HYDROcodone-acetaminophen (NORCO)  MG per tablet, Every 12 (Twelve) Hours., Disp: , Rfl:   •  HYDROcodone-acetaminophen (NORCO) 7.5-325 MG per tablet, Take 1 tablet by mouth Every 12 (Twelve) Hours As Needed for Moderate Pain., Disp: , Rfl:   •  hydrOXYzine (ATARAX) 25 MG tablet, Take 1 tablet by mouth Every 8 (Eight) Hours., Disp: 270 tablet, Rfl: 3  •  Insulin Aspart (novoLOG) 100 UNIT/ML injection, Inject 16 Units under the skin into the appropriate area as directed 2 (Two) Times a Day Before Meals., Disp: , Rfl:   •  Insulin Degludec (TRESIBA) 100 UNIT/ML solution injection, Inject 47 Units under the skin into the appropriate area as directed 2 (Two) Times a Day., Disp: 10 mL, Rfl: 5  •  losartan-hydrochlorothiazide (HYZAAR) 100-25 MG per tablet, Take 1 tablet by mouth Daily., Disp: 90 tablet, Rfl: 3  •  magnesium oxide (MAG-OX) 400 MG tablet, Take 1 tablet by mouth Daily., Disp: , Rfl:   •  melatonin 5 MG tablet tablet, Take 2 tablets by mouth Every Night., Disp: , Rfl:   •  metoprolol succinate XL (TOPROL-XL) 50 MG 24 hr tablet, Take 1 tablet by mouth Daily., Disp: 90 tablet, Rfl: 4  •  sodium hypochlorite (DAKIN'S 1/4 STRENGTH) 0.125 % solution topical solution 0.125%, Apply 10 mL topically to the appropriate area as directed 2 (Two) Times a Day., Disp: 1000 mL, Rfl: 1  •  tamsulosin (FLOMAX) 0.4 MG capsule 24 hr capsule, Take 1 capsule by  mouth Daily., Disp: 90 capsule, Rfl: 3  •  topiramate (Topamax) 100 MG tablet, Take 1 tablet by mouth Daily., Disp: 90 tablet, Rfl: 3  No current facility-administered medications for this visit.    Facility-Administered Medications Ordered in Other Visits:   •  Chlorhexidine Gluconate Cloth 2 % pads 1 application, 1 application, Topical, Q12H PRN, Lora Cowan APRN    Past Medical History:   Diagnosis Date   • Arthritis    • DDD (degenerative disc disease), lumbar    • Diabetes mellitus    • Enlarged prostate    • Fatty liver    • Hearing decreased, left     states he is pretty much deaf in this ear, does not use hearing aides   • History of gastric ulcer    • Hypertension    • Myocardial infarction    • Scoliosis    • Stage 3 chronic kidney disease    • Sternal wound dehiscence     HX STERNAL WIRES     Past Surgical History:   Procedure Laterality Date   • CARDIAC CATHETERIZATION N/A 10/12/2022    Procedure: Left Heart Cath - Radial artery;  Surgeon: Tad Batista MD;  Location: [facility] INVASIVE LOCATION;  Service: Cardiology;  Laterality: N/A;   • CARDIAC SURGERY     • COLONOSCOPY     • CORONARY ARTERY BYPASS GRAFT N/A 10/20/2022    Procedure: INTRAOP NANCI, STERNOTOMY, CORONARY ARTERY BYPASS GRAFTS X 4 USING LEFT ENDOSCOPICALLY HARVESTED GREATER SAPHENOUS VEIN AND LEFT RADHA, PRP;  Surgeon: Jr Tutu Blackmon MD;  Location: [facility];  Service: Cardiothoracic;  Laterality: N/A;   • HAND SURGERY Right     X2   • LAPAROSCOPIC CHOLECYSTECTOMY     • STERNAL REWIRING N/A 1/18/2023    Procedure: STERNAL WIRE REMOVAL;  Surgeon: Jr Tutu Blackmon MD;  Location: [facility];  Service: Cardiothoracic;  Laterality: N/A;   • TONSILLECTOMY     • TRAM FLAP DELAYED N/A 1/20/2023    Procedure: TRAM FLAP RECONSTRUCTION, use of spy, sternal wound;  Surgeon: Stephenie Li MD;  Location: [facility];  Service: Plastics;  Laterality: N/A;     Social History     Socioeconomic History   • Marital  status:    Tobacco Use   • Smoking status: Former     Packs/day: 1.00     Types: Cigarettes     Start date: 2015     Quit date: 10/1/2022     Years since quittin.5   • Smokeless tobacco: Never   Vaping Use   • Vaping Use: Never used   Substance and Sexual Activity   • Alcohol use: Not Currently   • Drug use: Yes     Types: Marijuana     Comment: DAILY, last used 23   • Sexual activity: Defer           Objective     Vitals:    23 1113   BP: 146/80   BP Location: Left arm   Patient Position: Sitting   Pulse: 72   Resp: 18   Temp: 97.6 °F (36.4 °C)   TempSrc: Temporal   PainSc: 0-No pain     There is no height or weight on file to calculate BMI.    STEADI Fall Risk Assessment has not been completed.     Review of Systems     ROS:  No fevers, chills, sweats, or body aches.     I have reviewed the HPI and ROS as documented by MA/RN. Jacklyn Valverde MD    Physical Exam     NAD  Normocephalic, atraumatic  EOMI, no scleral icterus  No respiratory distress, no cough  AAOx3, cooperative  VRAM flap central chest with complete healing of all wounds.  Additionally, there is continued improvement in the amount of swelling and tension of the flap tissue as well.    See photos for details.                Result Review :  The following data was reviewed by: Jacklyn Valverde MD on 2023:    Prior notes and images.             Assessment and Plan   Diagnoses and all orders for this visit:    1. Dehiscence of operative wound, subsequent encounter (Primary)    2. Other complication of skin graft      Wounds are now all healed.  I recommend they continue to use a good skin care regimen including lotion and sunscreen to keep the tissues healthy.    Return as needed.    Patient was given instructions and counseling regarding their condition or for health maintenance advice, as well as the wound care plan and recommendations. They understand and agree with the plan.  They will follow back up here in the clinic  but are instructed to contact us in the interim should they have any new, returning, or worsening symptoms or concerns. Please see specific information pulled into the AVS if appropriate.     Dragon Dictation utilized for chart completion.      Follow Up   Return if symptoms worsen or fail to improve.      Jacklyn Valverde MD

## 2023-05-11 NOTE — PROGRESS NOTES
"Chief Complaint  Consult (Liposuction consult)    Subjective          History of Present Illness  Ilya Colon is a 52 y.o. male who presents to [facility] for Postoperative Follow-Up of TRAM FLAP RECONSTRUCTION, use of spy, sternal wound 01/20/23.     Pt presents today to discuss liposuction to flap area.  Patient has been seeing wound care.  Patient has successfully completed his course of negative pressure wound therapy with complete healing and closure of the flap to the chest.  Patient continues to smoke 1/2 pack/day.  Patient states his blood sugar has been much better controlled than before his initial hospitalization for his cardiac issues (and then the subsequent CABG)    Allergies: Patient has no known allergies.  Allergies Reconciled.    Review of Systems        Objective     /83 (BP Location: Right arm)   Pulse 75   Temp 98.2 °F (36.8 °C) (Temporal)   Ht 167.6 cm (66\")   Wt 94.3 kg (208 lb)   SpO2 94%   BMI 33.57 kg/m²     Body mass index is 33.57 kg/m².    Physical Exam:   Chest:   The flap is adherent to the sternum.  No open wounds.  The flap was carefully palpated and no firm or tender masses.  The abdominal scar is also closed and clean.  Mildly hyperemic.  The flap was ultrasounded there is at least 4 cm of depth  And no evidence of mediastinum.  The ultrasound has a 4 cm depth, so did not evaluate deeper than this.  Result Review :       Assessment and Plan :   Liposuction of flap to decrease some of the excess subcutaneous fullness    The procedure was discussed with the patient and his mother.  It was made very clear that this will not recreate the contour of his chest before the open sternal wound required flap placement.  He was still have a fullness there.  This is not a substitute for weight loss.  With weight loss, will significantly improve the contour of the flap.  The flap cannot be removed and the skin edges " reapproximated.  Patient encouraged to quit smoking.  Will order hemoglobin A1c to more objectively evaluate his improved glucose control.  Patient encouraged to continue with weight loss.  Photos obtained in clinic today  US performed to look at chest flap  Discussed flap will have to stay  Pt is currently smoking 1/2 pack today; will have to stop before any surgery(liposuction)  Discussed with weight loss the flap will be less prominent    CPT code 28339    Scribed by Libia Cline, acting as a scribe for Slade Dias MD, 05/12/23 11:34 EDT.  Slade Dias MD's signature on the note affirms that the note adequately documents the care provided.      Slade Dias MD  05/12/2023

## 2023-05-12 ENCOUNTER — PREP FOR SURGERY (OUTPATIENT)
Dept: OTHER | Facility: HOSPITAL | Age: 53
End: 2023-05-12
Payer: MEDICARE

## 2023-05-12 ENCOUNTER — OFFICE VISIT (OUTPATIENT)
Dept: PLASTIC SURGERY | Facility: CLINIC | Age: 53
End: 2023-05-12
Payer: MEDICARE

## 2023-05-12 VITALS
HEART RATE: 75 BPM | HEIGHT: 66 IN | DIASTOLIC BLOOD PRESSURE: 83 MMHG | WEIGHT: 208 LBS | BODY MASS INDEX: 33.43 KG/M2 | SYSTOLIC BLOOD PRESSURE: 172 MMHG | OXYGEN SATURATION: 94 % | TEMPERATURE: 98.2 F

## 2023-05-12 DIAGNOSIS — Z03.89 ENCOUNTER FOR OBSERVATION FOR OTHER SUSPECTED DISEASES AND CONDITIONS RULED OUT: ICD-10-CM

## 2023-05-12 DIAGNOSIS — T81.32XS STERNAL WOUND DEHISCENCE, SEQUELA: Primary | ICD-10-CM

## 2023-05-12 RX ORDER — CEFAZOLIN SODIUM 2 G/100ML
2 INJECTION, SOLUTION INTRAVENOUS ONCE
OUTPATIENT
Start: 2023-05-12 | End: 2023-05-12

## 2023-05-24 PROBLEM — M25.50 MULTIPLE JOINT PAIN: Status: ACTIVE | Noted: 2023-04-20

## 2023-05-24 PROBLEM — E11.42 DIABETIC PERIPHERAL NEUROPATHY: Status: ACTIVE | Noted: 2023-04-20

## 2023-06-02 ENCOUNTER — TELEPHONE (OUTPATIENT)
Dept: PLASTIC SURGERY | Facility: CLINIC | Age: 53
End: 2023-06-02
Payer: MEDICARE

## 2023-06-02 ENCOUNTER — TELEPHONE (OUTPATIENT)
Dept: PLASTIC SURGERY | Facility: CLINIC | Age: 53
End: 2023-06-02

## 2023-06-02 NOTE — TELEPHONE ENCOUNTER
rcvd call from pt mother Nishi inquiring on update on next surgery. Advise pt mother our surgery scheduler is not in office and will send message for update.

## 2023-06-09 ENCOUNTER — TELEPHONE (OUTPATIENT)
Dept: PLASTIC SURGERY | Facility: CLINIC | Age: 53
End: 2023-06-09
Payer: MEDICARE

## 2023-06-09 NOTE — TELEPHONE ENCOUNTER
Returned the patient's mother's phone call (Nishi Garcia) at phone number [phone].    I explained to the mother that I was leaving [facility] to pursue an opportunity at a level 1 trauma center in [place].  She had questions regarding the proposed liposuction to the flap covering his sternum.  I explained that even though it should have a relatively low amount of complications, that is not a guarantee.  I explained that the patient has several risk factors that increase the probability of a wound complication (elevated hemoglobin A1c, elevated BMI, persistent nicotine use).  Should the patient have a complication, I will be unable to complete his course of care.  I do not feel it is appropriate to leave knowing there was a complication and the patient with such risk factors.  Discussion with Mrs. Garcia about his staying in the office with Dr. Li.  I explained that we will be happy to see them back in clinic.  She stated that she would be interested in a second opinion from a different group.  I recommended the [facility] division of plastic surgery she asked me which surgeons would her son most likely see.  I thought it would be most likely either Dr. Rogel or .   She stated she would prefer to see Dr. Rogel.  We will submit a referral to [facility] plastic surgery for Mr. Colon to see them regarding the thickness of the myocutaneous flap to the sternum.    Explained that should they not hear from the [facility] within 4 weeks, to call this office back so we can reach out to the plastic surgery office in [place] and help identify why they had not received a call yet.    Lina, the , was on the call as well so as to expedite any questions she may have regarding the administrative part of his care (such as this referral).  Lina identified herself at the beginning of the call.

## 2023-06-12 DIAGNOSIS — T81.32XS STERNAL WOUND DEHISCENCE, SEQUELA: Primary | ICD-10-CM

## 2023-07-24 ENCOUNTER — TELEPHONE (OUTPATIENT)
Dept: FAMILY MEDICINE CLINIC | Facility: CLINIC | Age: 53
End: 2023-07-24
Payer: MEDICARE

## 2023-07-24 DIAGNOSIS — F17.209 TOBACCO USE DISORDER, CONTINUOUS: Primary | ICD-10-CM

## 2023-07-24 RX ORDER — VARENICLINE TARTRATE 0.5 MG/1
TABLET, FILM COATED ORAL
Qty: 45 TABLET | Refills: 0 | Status: SHIPPED | OUTPATIENT
Start: 2023-07-24

## 2023-07-27 RX ORDER — ICOSAPENT ETHYL 1000 MG/1
CAPSULE ORAL
Qty: 120 CAPSULE | Refills: 2 | Status: SHIPPED | OUTPATIENT
Start: 2023-07-27

## 2023-08-03 ENCOUNTER — TELEPHONE (OUTPATIENT)
Dept: FAMILY MEDICINE CLINIC | Facility: CLINIC | Age: 53
End: 2023-08-03
Payer: MEDICARE

## 2023-08-03 DIAGNOSIS — R09.02 OXYGEN DESATURATION: Primary | ICD-10-CM

## 2023-08-08 ENCOUNTER — OFFICE VISIT (OUTPATIENT)
Dept: FAMILY MEDICINE CLINIC | Facility: CLINIC | Age: 53
End: 2023-08-08
Payer: MEDICARE

## 2023-08-08 VITALS
SYSTOLIC BLOOD PRESSURE: 140 MMHG | RESPIRATION RATE: 16 BRPM | WEIGHT: 222.8 LBS | HEIGHT: 66 IN | DIASTOLIC BLOOD PRESSURE: 70 MMHG | TEMPERATURE: 98.7 F | OXYGEN SATURATION: 96 % | BODY MASS INDEX: 35.81 KG/M2 | HEART RATE: 68 BPM

## 2023-08-08 DIAGNOSIS — F41.1 GAD (GENERALIZED ANXIETY DISORDER): ICD-10-CM

## 2023-08-08 DIAGNOSIS — F41.0 PANIC ATTACKS: Primary | ICD-10-CM

## 2023-08-08 DIAGNOSIS — F17.209 TOBACCO USE DISORDER, CONTINUOUS: ICD-10-CM

## 2023-08-08 DIAGNOSIS — M62.838 MUSCLE SPASM: ICD-10-CM

## 2023-08-08 PROCEDURE — 1160F RVW MEDS BY RX/DR IN RCRD: CPT | Performed by: FAMILY MEDICINE

## 2023-08-08 PROCEDURE — 3078F DIAST BP <80 MM HG: CPT | Performed by: FAMILY MEDICINE

## 2023-08-08 PROCEDURE — 99214 OFFICE O/P EST MOD 30 MIN: CPT | Performed by: FAMILY MEDICINE

## 2023-08-08 PROCEDURE — 3052F HG A1C>EQUAL 8.0%<EQUAL 9.0%: CPT | Performed by: FAMILY MEDICINE

## 2023-08-08 PROCEDURE — 3077F SYST BP >= 140 MM HG: CPT | Performed by: FAMILY MEDICINE

## 2023-08-08 PROCEDURE — 1159F MED LIST DOCD IN RCRD: CPT | Performed by: FAMILY MEDICINE

## 2023-08-08 RX ORDER — DIAZEPAM 10 MG/1
10 TABLET ORAL DAILY PRN
Qty: 15 TABLET | Refills: 0 | Status: SHIPPED | OUTPATIENT
Start: 2023-08-08 | End: 2023-08-09

## 2023-08-08 RX ORDER — VARENICLINE TARTRATE 1 MG/1
1 TABLET, FILM COATED ORAL DAILY
Qty: 90 TABLET | Refills: 3 | Status: SHIPPED | OUTPATIENT
Start: 2023-08-08

## 2023-08-08 NOTE — PROGRESS NOTES
"Chief Complaint  Panic Attack (SOB at times)    Subjective        Ilya Colon presents to [facility]  History of Present Illness    Presents with recurrent panic attacks since being diagnosed with lupus not able to get in the same per patient report and dermatology review of notes he is already with chronic anxiety worse now has been through quite a bit with open heart surgery doing better with managing his diabetes sees pain management RHIANNON reviewed counseled on risk benefits of medicine has hydroxyzine and tried other medicines not helpful, no SI/HI     Objective   Vital Signs:  /70   Pulse 68   Temp 98.7 øF (37.1 øC)   Resp 16   Ht 167.6 cm (66\")   Wt 101 kg (222 lb 12.8 oz)   SpO2 96%   BMI 35.96 kg/mý   Estimated body mass index is 35.96 kg/mý as calculated from the following:    Height as of this encounter: 167.6 cm (66\").    Weight as of this encounter: 101 kg (222 lb 12.8 oz).               Physical Exam  Vitals reviewed.   Constitutional:       Appearance: Normal appearance. He is well-developed.   HENT:      Head: Normocephalic and atraumatic.      Right Ear: External ear normal.      Left Ear: External ear normal.      Nose: Nose normal.   Eyes:      Conjunctiva/sclera: Conjunctivae normal.      Pupils: Pupils are equal, round, and reactive to light.   Cardiovascular:      Rate and Rhythm: Normal rate.   Pulmonary:      Effort: Pulmonary effort is normal.      Breath sounds: Normal breath sounds.   Abdominal:      General: There is no distension.   Skin:     General: Skin is warm and dry.   Neurological:      Mental Status: He is alert and oriented to person, place, and time. Mental status is at baseline.   Psychiatric:         Mood and Affect: Mood and affect normal.         Behavior: Behavior normal.         Thought Content: Thought content normal.         Judgment: Judgment normal.      Comments: anxious      Result Review :  The following data was " reviewed by: Edgard Dickey DO on 08/08/2023:  Common labs          1/24/2023    03:25 2/6/2023    14:22 7/7/2023    11:34   Common Labs   Glucose 105      BUN 29   8    Creatinine 1.26   1.28    Sodium 134      Potassium 3.6      Chloride 90      Calcium 9.7      Albumin   4.5    Total Bilirubin   0.6    Alkaline Phosphatase   93    AST (SGOT)   31    ALT (SGPT)   49    WBC 11.91   7.56    Hemoglobin 11.5   13.8    Hematocrit 36.4   43.4    Platelets 349   343    Hemoglobin A1C 7.70   8.40    Microalbumin, Urine  <1.2                    Assessment and Plan   Diagnoses and all orders for this visit:    1. Panic attacks (Primary)    2. WALTER (generalized anxiety disorder)    3. Muscle spasm  -     diazePAM (Valium) 10 MG tablet; Take 1 tablet by mouth Daily As Needed for Anxiety.  Dispense: 15 tablet; Refill: 0    4. Tobacco use disorder, continuous  -     varenicline (Chantix) 1 MG tablet; Take 1 tablet by mouth Daily.  Dispense: 90 tablet; Refill: 3      Continue to work on smoking cessation plan prescribed only as needed to not take daily for panic attacks follow-up at least every 3 months for chronic conditions including diabetes A1c above 8 goal less than 7.5 continue insulin and monitoring at home follow-up with specialist as scheduled and see back in a month to review if medicine helping and how often he is taking RHIANNON reviewed we will update contract and UDS       Follow Up   Return in about 4 weeks (around 9/5/2023) for Recheck.  Patient was given instructions and counseling regarding his condition or for health maintenance advice. Please see specific information pulled into the AVS if appropriate.

## 2023-08-09 ENCOUNTER — DOCUMENTATION (OUTPATIENT)
Dept: FAMILY MEDICINE CLINIC | Facility: CLINIC | Age: 53
End: 2023-08-09
Payer: MEDICARE

## 2023-08-09 ENCOUNTER — TELEPHONE (OUTPATIENT)
Dept: FAMILY MEDICINE CLINIC | Facility: CLINIC | Age: 53
End: 2023-08-09
Payer: MEDICARE

## 2023-08-09 DIAGNOSIS — F41.0 PANIC ATTACKS: Primary | ICD-10-CM

## 2023-08-09 DIAGNOSIS — F41.1 GAD (GENERALIZED ANXIETY DISORDER): ICD-10-CM

## 2023-08-09 NOTE — PROGRESS NOTES
Patient presented to complain about prescription sent yesterday for anxiety, requested xanax in dose that does not exist tried to explain to patient but requested to be referred to psych or specialist that could rx xanax for patient so signed referral for patient. Canceled rx at pharmacy, patient not able to perform uds today either if we were to send alternative controlled prescription

## 2023-08-15 RX ORDER — NEBIVOLOL 5 MG/1
5 TABLET ORAL DAILY
Qty: 30 TABLET | Refills: 2 | Status: SHIPPED | OUTPATIENT
Start: 2023-08-15

## 2023-09-08 ENCOUNTER — OFFICE VISIT (OUTPATIENT)
Dept: FAMILY MEDICINE CLINIC | Age: 53
End: 2023-09-08
Payer: MEDICARE

## 2023-09-08 VITALS
WEIGHT: 221.2 LBS | DIASTOLIC BLOOD PRESSURE: 78 MMHG | OXYGEN SATURATION: 98 % | BODY MASS INDEX: 35.55 KG/M2 | HEART RATE: 58 BPM | SYSTOLIC BLOOD PRESSURE: 148 MMHG | HEIGHT: 66 IN

## 2023-09-08 DIAGNOSIS — E66.9 OBESITY (BMI 30-39.9): ICD-10-CM

## 2023-09-08 DIAGNOSIS — E78.2 MIXED HYPERLIPIDEMIA: Chronic | ICD-10-CM

## 2023-09-08 DIAGNOSIS — E11.65 TYPE 2 DIABETES MELLITUS WITH HYPERGLYCEMIA, WITH LONG-TERM CURRENT USE OF INSULIN: Primary | Chronic | ICD-10-CM

## 2023-09-08 DIAGNOSIS — Z95.1 S/P CABG X 4: ICD-10-CM

## 2023-09-08 DIAGNOSIS — E11.22 CKD STAGE 3 DUE TO TYPE 2 DIABETES MELLITUS: ICD-10-CM

## 2023-09-08 DIAGNOSIS — I25.10 ATHEROSCLEROSIS OF CORONARY ARTERY OF NATIVE HEART WITHOUT ANGINA PECTORIS, UNSPECIFIED VESSEL OR LESION TYPE: ICD-10-CM

## 2023-09-08 DIAGNOSIS — F17.211 CIGARETTE NICOTINE DEPENDENCE IN REMISSION: ICD-10-CM

## 2023-09-08 DIAGNOSIS — Z79.4 TYPE 2 DIABETES MELLITUS WITH HYPERGLYCEMIA, WITH LONG-TERM CURRENT USE OF INSULIN: Primary | Chronic | ICD-10-CM

## 2023-09-08 DIAGNOSIS — I10 HYPERTENSION, ESSENTIAL: Chronic | ICD-10-CM

## 2023-09-08 DIAGNOSIS — N18.30 CKD STAGE 3 DUE TO TYPE 2 DIABETES MELLITUS: ICD-10-CM

## 2023-09-08 RX ORDER — METRONIDAZOLE 7.5 MG/G
GEL TOPICAL
COMMUNITY
Start: 2023-08-02 | End: 2023-09-08

## 2023-09-08 RX ORDER — TRIAMCINOLONE ACETONIDE 1 MG/G
CREAM TOPICAL
COMMUNITY
Start: 2023-08-02

## 2023-09-08 RX ORDER — INSULIN DEGLUDEC 100 U/ML
55 INJECTION, SOLUTION SUBCUTANEOUS 2 TIMES DAILY
Qty: 100 ML | Refills: 0 | Status: SHIPPED | OUTPATIENT
Start: 2023-09-08

## 2023-09-08 RX ORDER — AMITRIPTYLINE HYDROCHLORIDE 25 MG/1
25 TABLET, FILM COATED ORAL NIGHTLY
COMMUNITY

## 2023-09-08 NOTE — PROGRESS NOTES
"Ilya Colon presents to [facility] with complaint of  Establish Care (Pt is a transfer from Edgard Dickey and is requesting full labs panel and medication refill)    SUBJECTIVE  History of Present Illness    52 year old male presents as a new patient to our office. He had been seeing Dr. Dickey in [place] for the past 3-4 yrs. Patient says he was recently diagnosed with lupus by his dermatologist which has him stressed. Patient has scheduled appointment 11/28 to establish care with Dr. White in [facility]. Asked patient why he is establishing in multiple places. Patient says he and Dr. Dickey \"got into it.\" Patient says he is not satisfied seeing previous PCP and this is reason for switching. Patient is overall a poor historian. Patient says he does not plan on coming to our office for continued primary care, but he needed his trulcity refilled and did not want to contact Dr. Dickey's office.  Documentation seems to show that patient was upset for something to do with Xanax.    The following portions of the patient's history were reviewed and updated as appropriate: allergies, current medications, past family history, past medical history, past social history, past surgical history and problem list.    OBJECTIVE  Vital Signs:   /78 (BP Location: Right arm, Patient Position: Sitting, Cuff Size: Adult)   Pulse 58   Ht 167.6 cm (66\")   Wt 100 kg (221 lb 3.2 oz)   SpO2 98% Comment: room air  BMI 35.70 kg/m²       Physical Exam  Constitutional:       General: He is not in acute distress.     Appearance: Normal appearance. He is obese. He is not ill-appearing.   HENT:      Head: Normocephalic and atraumatic.      Nose: Nose normal.      Mouth/Throat:      Pharynx: Oropharynx is clear.   Cardiovascular:      Rate and Rhythm: Normal rate and regular rhythm.      Pulses: Normal pulses.      Heart sounds: Normal heart sounds.   Pulmonary:      Effort: Pulmonary effort is normal. " No respiratory distress.      Breath sounds: Normal breath sounds.   Chest:      Chest wall: No tenderness.   Musculoskeletal:         General: Normal range of motion.      Cervical back: Normal range of motion and neck supple.   Skin:     General: Skin is warm and dry.   Neurological:      General: No focal deficit present.      Mental Status: He is alert and oriented to person, place, and time. Mental status is at baseline.   Psychiatric:         Mood and Affect: Mood normal. Affect is labile and blunt.         Speech: Speech is rapid and pressured.         Behavior: Behavior normal.        Results Review:  The following data was reviewed by Elina Juan, BELEM [unfilled] 14:02 EDT.  Hepatic Function Panel (07/07/2023 11:34)  Hemoglobin A1c (07/07/2023 11:34)  Creatinine Serum (kidney function) GFR component (07/07/2023 11:34)  CBC & Differential (07/07/2023 11:34)    ASSESSMENT AND PLAN:  Diagnoses and all orders for this visit:    1. Type 2 diabetes mellitus with hyperglycemia, with long-term current use of insulin (Primary)  -     Insulin Degludec (Tresiba) 100 UNIT/ML solution injection; Inject 55 Units under the skin into the appropriate area as directed 2 (Two) Times a Day.  Dispense: 100 mL; Refill: 0    2. S/P CABG x 4    3. Mixed hyperlipidemia    4. Hypertension, essential    5. Atherosclerosis of coronary artery of native heart without angina pectoris, unspecified vessel or lesion type    6. CKD stage 3 due to type 2 diabetes mellitus    7. Obesity (BMI 30-39.9)    8. Cigarette nicotine dependence in remission      Patient has multiple chronic health conditions, BP is not well controlled, he is still smoking.  He has active lab orders in his chart from February that were not completed ordered by prior PCP.  He does have recent A1c in his chart that was ordered by his dermatologist that resulted at 8.4.  His primary reason for coming to the office today is to get his Tresiba refilled.  He does not wish to  discuss anything else today.  Tresiba refills provided for him today.  He says it is too far for him to drive to this office, so he will keep appointment to establish care in [place] with Dr. White.  Discussed with patient importance/safety of not seeing multiple PCP offices. He verbalizes understanding.      Follow Up   No follow-ups on file. Patient to notify office with any acute concerns or issues.  Patient verbalizes understanding, agrees with plan of care and has no further questions upon discharge.     Patient was given instructions and counseling regarding his condition or for health maintenance advice. Please see specific information pulled into the AVS if appropriate.     Discussed the importance of following up with any needed screening tests/labs/specialist appointments and any requested follow-up recommended by me today. Importance of maintaining follow-up discussed and patient accepts that missed appointments can delay diagnosis and potentially lead to worsening of conditions.    Part of this note may be an electronic transcription/translation of spoken language to printed text using the Dragon Dictation System.

## 2023-09-08 NOTE — Clinical Note
Hey old friend, thought you would like to know status of this corin. I do not think he will be coming back to you. He was A LOT. Hope you're doing well!

## 2023-09-15 ENCOUNTER — TELEPHONE (OUTPATIENT)
Dept: FAMILY MEDICINE CLINIC | Age: 53
End: 2023-09-15
Payer: MEDICARE

## 2023-09-15 ENCOUNTER — PATIENT ROUNDING (BHMG ONLY) (OUTPATIENT)
Dept: FAMILY MEDICINE CLINIC | Age: 53
End: 2023-09-15
Payer: MEDICARE

## 2023-09-15 DIAGNOSIS — M32.9 SYSTEMIC LUPUS ERYTHEMATOSUS, UNSPECIFIED SLE TYPE, UNSPECIFIED ORGAN INVOLVEMENT STATUS: Primary | ICD-10-CM

## 2023-09-15 NOTE — TELEPHONE ENCOUNTER
Patient called requesting to see a rheumatologist in [facility] [place] please.

## 2023-09-15 NOTE — PROGRESS NOTES
September 15, 2023    Hello, may I speak with Ilya Colon?    My name is Leidy Henry, RN    I am  with [facility]  [address] [place] [place] [address] [address] [phone].    Before we get started may I verify your date of birth? 1970    I am calling to officially welcome you to our practice and ask about your recent visit. Is this a good time to talk? yes    Tell me about your visit with us. What things went well?  Went good.  I would like to see a rheumatologist that she suggested in [place] please.  I will send a message and we'll be in touch next  week.  The visit went good.       We're always looking for ways to make our patients' experiences even better. Do you have recommendations on ways we may improve?  no    Overall were you satisfied with your first visit to our practice? yes       I appreciate you taking the time to speak with me today. Is there anything else I can do for you? no      Thank you, and have a great day.

## 2023-11-20 ENCOUNTER — TELEPHONE (OUTPATIENT)
Dept: FAMILY MEDICINE CLINIC | Age: 53
End: 2023-11-20
Payer: MEDICARE

## 2023-11-20 RX ORDER — NEBIVOLOL 5 MG/1
5 TABLET ORAL DAILY
Qty: 30 TABLET | Refills: 2 | Status: CANCELLED | OUTPATIENT
Start: 2023-11-20

## 2023-11-20 RX ORDER — NEBIVOLOL 5 MG/1
5 TABLET ORAL DAILY
Qty: 30 TABLET | Refills: 2 | Status: SHIPPED | OUTPATIENT
Start: 2023-11-20

## 2023-11-20 NOTE — TELEPHONE ENCOUNTER
Caller: Ilya Colon    Relationship: Self    Best call back number: [phone]    Requested Prescriptions:   Requested Prescriptions     Pending Prescriptions Disp Refills    nebivolol (BYSTOLIC) 5 MG tablet 30 tablet 2     Sig: Take 1 tablet by mouth Daily.        Pharmacy where request should be sent: [facility] [place], [place] - [address] [facility]. - [phone]  - [phone] FX     Last office visit with prescribing clinician: 9/8/2023   Last telemedicine visit with prescribing clinician: Visit date not found   Next office visit with prescribing clinician: Visit date not found       Does the patient have less than a 3 day supply:  [x] Yes  [] No      Tammie Frost Rep   11/20/23 09:37 EST

## 2023-11-23 DIAGNOSIS — Z79.4 TYPE 2 DIABETES MELLITUS WITH HYPERGLYCEMIA, WITH LONG-TERM CURRENT USE OF INSULIN: Chronic | ICD-10-CM

## 2023-11-23 DIAGNOSIS — E11.65 TYPE 2 DIABETES MELLITUS WITH HYPERGLYCEMIA, WITH LONG-TERM CURRENT USE OF INSULIN: Chronic | ICD-10-CM

## 2023-11-27 RX ORDER — INSULIN DEGLUDEC INJECTION 100 U/ML
INJECTION, SOLUTION SUBCUTANEOUS
Qty: 100 ML | Refills: 3 | OUTPATIENT
Start: 2023-11-27

## 2023-11-28 ENCOUNTER — OFFICE VISIT (OUTPATIENT)
Dept: FAMILY MEDICINE CLINIC | Facility: CLINIC | Age: 53
End: 2023-11-28
Payer: MEDICARE

## 2023-11-28 VITALS
TEMPERATURE: 97.4 F | WEIGHT: 222 LBS | DIASTOLIC BLOOD PRESSURE: 80 MMHG | OXYGEN SATURATION: 97 % | HEART RATE: 66 BPM | SYSTOLIC BLOOD PRESSURE: 140 MMHG | HEIGHT: 66 IN | BODY MASS INDEX: 35.68 KG/M2

## 2023-11-28 DIAGNOSIS — M51.37 DEGENERATION OF LUMBOSACRAL INTERVERTEBRAL DISC: ICD-10-CM

## 2023-11-28 DIAGNOSIS — Z00.00 ENCOUNTER FOR MEDICAL EXAMINATION TO ESTABLISH CARE: ICD-10-CM

## 2023-11-28 DIAGNOSIS — I10 HYPERTENSION, ESSENTIAL: Chronic | ICD-10-CM

## 2023-11-28 DIAGNOSIS — E11.65 TYPE 2 DIABETES MELLITUS WITH HYPERGLYCEMIA, WITH LONG-TERM CURRENT USE OF INSULIN: Chronic | ICD-10-CM

## 2023-11-28 DIAGNOSIS — Z79.4 TYPE 2 DIABETES MELLITUS WITH HYPERGLYCEMIA, WITH LONG-TERM CURRENT USE OF INSULIN: Chronic | ICD-10-CM

## 2023-11-28 DIAGNOSIS — E11.42 DIABETIC PERIPHERAL NEUROPATHY: ICD-10-CM

## 2023-11-28 DIAGNOSIS — N18.31 ANEMIA DUE TO STAGE 3A CHRONIC KIDNEY DISEASE: ICD-10-CM

## 2023-11-28 DIAGNOSIS — D63.1 ANEMIA DUE TO STAGE 3A CHRONIC KIDNEY DISEASE: ICD-10-CM

## 2023-11-28 DIAGNOSIS — E78.2 MIXED HYPERLIPIDEMIA: Primary | Chronic | ICD-10-CM

## 2023-11-28 DIAGNOSIS — I25.10 CORONARY ARTERY DISEASE INVOLVING NATIVE CORONARY ARTERY OF NATIVE HEART WITHOUT ANGINA PECTORIS: ICD-10-CM

## 2023-11-28 RX ORDER — MOMETASONE FUROATE 1 MG/G
1 CREAM TOPICAL DAILY
COMMUNITY

## 2023-11-28 RX ORDER — NEBIVOLOL 5 MG/1
5 TABLET ORAL DAILY
Qty: 90 TABLET | Refills: 3 | Status: SHIPPED | OUTPATIENT
Start: 2023-11-28

## 2023-11-28 RX ORDER — POTASSIUM CHLORIDE 20 MEQ/1
20 TABLET, EXTENDED RELEASE ORAL 2 TIMES DAILY
COMMUNITY

## 2023-11-28 RX ORDER — TRAZODONE HYDROCHLORIDE 50 MG/1
50 TABLET ORAL NIGHTLY
COMMUNITY

## 2023-11-28 RX ORDER — NITROGLYCERIN 0.4 MG/1
0.4 TABLET SUBLINGUAL
COMMUNITY

## 2023-11-28 RX ORDER — CETIRIZINE HYDROCHLORIDE 5 MG/1
5 TABLET ORAL DAILY
COMMUNITY

## 2023-11-28 RX ORDER — IBUPROFEN 200 MG
1 TABLET ORAL 3 TIMES DAILY
Qty: 300 EACH | Refills: 3 | Status: SHIPPED | OUTPATIENT
Start: 2023-11-28

## 2023-11-28 NOTE — ASSESSMENT & PLAN NOTE
He states he has been much better with his diabetes as far as meds and diet.  Will update his A1c to see where he is at.

## 2023-11-28 NOTE — ASSESSMENT & PLAN NOTE
His blood pressure is a little elevated here today.  At home though it has been somewhat better.  Will recheck it 1 more time.

## 2023-11-28 NOTE — ASSESSMENT & PLAN NOTE
He had bypass surgery a little bit more than a year ago currently he denies any recurrent symptoms.

## 2023-11-28 NOTE — PROGRESS NOTES
Chief Complaint   Patient presents with    Med Refill     Patient is needing a refill on all medication since he is new to our office    Labs     Patient sees a plastic surgeon that told him he had to stop smoking for three months before he could do surgery and is needing a lab to verified he has stopped smoking to get the surgery      Establish Care     Previous PCP was Edgard Garces     Ilya Colon  has a past medical history of Arthritis, DDD (degenerative disc disease), lumbar, Enlarged prostate, Fatty liver, Hearing decreased, left, History of gastric ulcer, Myocardial infarction, Scoliosis, Stage 3 chronic kidney disease, and Sternal wound dehiscence.    Establish care-he presents today to establish care with a new provider.  He is concerned about the traveling distance that he had to do.    Type 2 diabetes-he states the highest his blood sugar will be will be 175.  Typically checks his blood sugar twice daily.  He does have blurred vision excessive thirst and excessive urination.  His last routine eye exam was 3 years ago.    Hypertension-he does check his blood pressure at home.  He states is typically in the 130s over 80s.  Is little elevated here today of 150/83    Hyperlipidemia-he has stopped his atorvastatin and Vascepa.  He states that it caused intolerable joint pain.  He states since stopping he does feel better.    CAD-he had a four-vessel bypass in October 2022.  He did have some sternal wound dehiscence and had to have a subsequent repair and graft.  He denies any recurrent anginal symptoms of chest pain tightness or heaviness.    Diabetic neuropathy-he still has some pain in his lower legs and feet.  He does the gabapentin 600 mg 4 times daily.  He currently sees [facility] pain management who prescribes his hydrocodone and gabapentin.        PHQ-2 Depression Screening  Little interest or pleasure in doing things?     Feeling down, depressed, or hopeless?     PHQ-2 Total  Score     PHQ-9 Depression Screening  Little interest or pleasure in doing things?     Feeling down, depressed, or hopeless?     Trouble falling or staying asleep, or sleeping too much?     Feeling tired or having little energy?     Poor appetite or overeating?     Feeling bad about yourself - or that you are a failure or have let yourself or your family down?     Trouble concentrating on things, such as reading the newspaper or watching television?     Moving or speaking so slowly that other people could have noticed? Or the opposite - being so fidgety or restless that you have been moving around a lot more than usual?     Thoughts that you would be better off dead, or of hurting yourself in some way?     PHQ-9 Total Score     If you checked off any problems, how difficult have these problems made it for you to do your work, take care of things at home, or get along with other people?       No Known Allergies    Prior to Admission medications    Medication Sig Start Date End Date Taking? Authorizing Provider   amitriptyline (ELAVIL) 25 MG tablet Take 1 tablet by mouth Every Night.   Yes Sheri Ward MD   aspirin 81 MG EC tablet Take 1 tablet by mouth Daily.   Yes Sheri Ward MD   cetirizine (zyrTEC) 5 MG tablet Take 1 tablet by mouth Daily.   Yes Sheri Ward MD   empagliflozin (Jardiance) 25 MG tablet tablet Take 1 tablet by mouth Daily. 4/26/23  Yes Edgard Dikcey, DO   esomeprazole (nexIUM) 20 MG capsule Take 1 capsule by mouth Every Morning Before Breakfast.   Yes Sheri Ward MD   gabapentin (NEURONTIN) 600 MG tablet Take 1 tablet by mouth Every 6 (Six) Hours.   Yes Sheri Ward MD   glucose blood (Accu-Chek Kirsten Plus) test strip Use to check glucose before breakfast and bedtime for Dx T2DM E11.9 2/6/23 2/6/24 Yes Edgard Dickey, DO   HYDROcodone-acetaminophen (NORCO)  MG per tablet Every 12 (Twelve) Hours.   Yes Sheri Wrad MD   hydrOXYzine (ATARAX)  25 MG tablet Take 1 tablet by mouth Every 8 (Eight) Hours. 12/22/21  Yes Edgard Dickey DO   Insulin Aspart (novoLOG) 100 UNIT/ML injection Inject 16 Units under the skin into the appropriate area as directed 2 (Two) Times a Day Before Meals.   Yes Sheri Ward MD   Insulin Degludec (Tresiba) 100 UNIT/ML solution injection Inject 55 Units under the skin into the appropriate area as directed 2 (Two) Times a Day. 9/8/23  Yes Elina Juan APRN   melatonin 5 MG tablet tablet Take 2 tablets by mouth Every Night.   Yes Sheri Ward MD   metroNIDAZOLE (METROCREAM) 0.75 % cream Apply 1 application  topically to the appropriate area as directed 2 (Two) Times a Day.   Yes Sheri Ward MD   mometasone (ELOCON) 0.1 % cream Apply 1 application  topically to the appropriate area as directed Daily.   Yes Sheri Ward MD   nebivolol (Bystolic) 5 MG tablet Take 1 tablet by mouth Daily. 11/20/23  Yes Edgard Dickey DO   nitroglycerin (NITROSTAT) 0.4 MG SL tablet Place 1 tablet under the tongue Every 5 (Five) Minutes As Needed for Chest Pain. Take no more than 3 doses in 15 minutes.   Yes Sheri Ward MD   potassium chloride (K-DUR,KLOR-CON) 20 MEQ CR tablet Take 1 tablet by mouth 2 (Two) Times a Day.   Yes Sheri Ward MD   tamsulosin (FLOMAX) 0.4 MG capsule 24 hr capsule Take 1 capsule by mouth Daily. 4/26/23  Yes Edgard Dickey DO   traZODone (DESYREL) 50 MG tablet Take 1 tablet by mouth Every Night.   Yes Sheri Ward MD   triamcinolone (KENALOG) 0.1 % cream  8/2/23  Yes Sheri Ward MD   varenicline (Chantix) 1 MG tablet Take 1 tablet by mouth Daily. 8/8/23  Yes Edgard Dickey DO   atorvastatin (LIPITOR) 40 MG tablet Take 1 tablet by mouth Every Night.  Patient not taking: Reported on 11/28/2023 6/11/23 11/28/23  Sheri Ward MD   bacitracin 500 UNIT/GM ointment Apply 1 application topically to the appropriate area as directed 2 (Two) Times a  Day.  Patient not taking: Reported on 11/28/2023 3/14/23 11/28/23  Jacklyn Valverde MD   Diclofenac Sodium (VOLTAREN) 1 % gel gel  2/16/23 11/28/23  Provider, MD Sheri   losartan-hydrochlorothiazide (HYZAAR) 100-25 MG per tablet Take 1 tablet by mouth Daily.  Patient not taking: Reported on 11/28/2023 4/26/23 11/28/23  Edgard Dickey DO   sodium hypochlorite (DAKIN'S 1/4 STRENGTH) 0.125 % solution topical solution 0.125% Apply 10 mL topically to the appropriate area as directed 2 (Two) Times a Day.  Patient not taking: Reported on 11/28/2023 2/28/23 11/28/23  Jacklyn Valverde MD   topiramate (Topamax) 100 MG tablet Take 1 tablet by mouth Daily.  Patient not taking: Reported on 11/28/2023 4/26/23 11/28/23  Edgard Dickey DO   Vascepa 1 g capsule capsule TAKE TWO CAPSULES TWICE A DAY WITH MEALS  Patient not taking: Reported on 11/28/2023 7/27/23 11/28/23  Edgard Dickey DO        Patient Active Problem List   Diagnosis    Osteoarthritis of thumbs, bilateral    Left hand pain    Right hand pain    Paresthesia of both hands    Atherosclerosis of coronary artery    Depression    Degeneration of lumbosacral intervertebral disc    GERD (gastroesophageal reflux disease)    Mixed hyperlipidemia    Hypertension, essential    Type 2 diabetes mellitus with hyperglycemia, with long-term current use of insulin    Scoliosis    Encounter for subsequent annual wellness visit (AWV) in Medicare patient    Allergic rhinitis    Insomnia    Cigarette nicotine dependence in remission    Class 1 obesity due to excess calories with serious comorbidity and body mass index (BMI) of 30.0 to 30.9 in adult    Unstable angina    CAD (coronary artery disease)    S/P CABG x 4    Sternal wound dehiscence    Sternal wound dehiscence, subsequent encounter    Anemia due to stage 3a chronic kidney disease    CKD stage 3 due to type 2 diabetes mellitus    Rash of back    Diabetic peripheral neuropathy    Multiple joint pain    Encounter for medical  examination to establish care        Past Surgical History:   Procedure Laterality Date    CARDIAC CATHETERIZATION N/A 10/12/2022    Procedure: Left Heart Cath - Radial artery;  Surgeon: Tad Batista MD;  Location: [facility] CATH INVASIVE LOCATION;  Service: Cardiology;  Laterality: N/A;    CARDIAC SURGERY      COLONOSCOPY      CORONARY ARTERY BYPASS GRAFT N/A 10/20/2022    Procedure: INTRAOP NANCI, STERNOTOMY, CORONARY ARTERY BYPASS GRAFTS X 4 USING LEFT ENDOSCOPICALLY HARVESTED GREATER SAPHENOUS VEIN AND LEFT RADHA, PRP;  Surgeon: Jr Tutu Blackmon MD;  Location: [facility];  Service: Cardiothoracic;  Laterality: N/A;    HAND SURGERY Right     X2    LAPAROSCOPIC CHOLECYSTECTOMY      STERNAL REWIRING N/A 2023    Procedure: STERNAL WIRE REMOVAL;  Surgeon: Jr Tutu Blackmon MD;  Location: [facility];  Service: Cardiothoracic;  Laterality: N/A;    TONSILLECTOMY      TRAM FLAP DELAYED N/A 2023    Procedure: TRAM FLAP RECONSTRUCTION, use of spy, sternal wound;  Surgeon: Stephenie Li MD;  Location: [facility];  Service: Plastics;  Laterality: N/A;       Social History     Socioeconomic History    Marital status:    Tobacco Use    Smoking status: Former     Packs/day: 1     Types: Cigarettes     Start date: 2015     Quit date: 10/1/2022     Years since quittin.1    Smokeless tobacco: Never   Vaping Use    Vaping Use: Never used   Substance and Sexual Activity    Alcohol use: Not Currently    Drug use: Yes     Types: Marijuana     Comment: DAILY, last used 23    Sexual activity: Defer       Family History   Problem Relation Age of Onset    Malig Hyperthermia Neg Hx        Family history, surgical history, past medical history, Allergies and meds reviewed with patient today and updated in [facility] EMR.     ROS:  Review of Systems   Constitutional:  Negative for fatigue.   HENT:  Negative for congestion, postnasal drip and rhinorrhea.    Eyes:  Positive for blurred  "vision. Negative for visual disturbance.   Respiratory:  Positive for cough. Negative for chest tightness, shortness of breath and wheezing.    Cardiovascular:  Negative for chest pain and palpitations.   Endocrine: Positive for polydipsia and polyuria.   Allergic/Immunologic: Negative for environmental allergies.   Neurological:  Positive for numbness. Negative for headache.   Psychiatric/Behavioral:  Negative for depressed mood. The patient is not nervous/anxious.        OBJECTIVE:  Vitals:    11/28/23 0947 11/28/23 1106   BP: 150/83 140/80   BP Location: Left arm Right arm   Patient Position: Sitting Sitting   Cuff Size:  Adult   Pulse: 66    Temp: 97.4 °F (36.3 °C)    TempSrc: Temporal    SpO2: 97%    Weight: 101 kg (222 lb)    Height: 167.6 cm (66\")      No results found.   Body mass index is 35.83 kg/m².  No LMP for male patient.    Physical Exam  Vitals and nursing note reviewed.   Constitutional:       General: He is not in acute distress.     Appearance: Normal appearance. He is obese.   HENT:      Head: Normocephalic.      Right Ear: Tympanic membrane, ear canal and external ear normal.      Left Ear: Tympanic membrane, ear canal and external ear normal.      Nose: Nose normal.      Mouth/Throat:      Mouth: Mucous membranes are moist.      Dentition: Abnormal dentition.      Pharynx: Oropharynx is clear.   Eyes:      General: No scleral icterus.     Conjunctiva/sclera: Conjunctivae normal.      Pupils: Pupils are equal, round, and reactive to light.   Cardiovascular:      Rate and Rhythm: Normal rate and regular rhythm.      Pulses: Normal pulses.      Heart sounds: Normal heart sounds. No murmur heard.  Pulmonary:      Effort: Pulmonary effort is normal.      Breath sounds: Normal breath sounds. No wheezing, rhonchi or rales.   Musculoskeletal:      Cervical back: Neck supple. No rigidity or tenderness.   Lymphadenopathy:      Cervical: No cervical adenopathy.   Skin:     General: Skin is warm and dry. "      Coloration: Skin is not jaundiced.      Findings: No rash.   Neurological:      General: No focal deficit present.      Mental Status: He is alert and oriented to person, place, and time.   Psychiatric:         Mood and Affect: Mood normal.         Thought Content: Thought content normal.         Judgment: Judgment normal.         Procedures    No visits with results within 30 Day(s) from this visit.   Latest known visit with results is:   Lab on 07/07/2023   Component Date Value Ref Range Status    Creatinine 07/07/2023 1.28 (H)  0.76 - 1.27 mg/dL Final    eGFR 07/07/2023 67.3  >60.0 mL/min/1.73 Final    RBC 07/07/2023 5.64  4.14 - 5.80 x10E6/uL Final    G6PD, Quantitative 07/07/2023 255  127 - 427 U/10E12 RBC Final    When decreased, G-6-PD, Quant. values are associated with acute  hemolytic anemia when deficient individuals are exposed to oxidative  stress, such as with certain medications (e.g., primaquine),  infection, or ingestion of hay beans.  Caution: In patients with acute hemolysis (e.g., abnormally low RBC  values), testing for G-6-PD may be falsely normal because older  erythrocytes with a higher enzyme deficiency have been hemolyzed.  Young erythrocytes and reticulocytes have normal or near-normal  enzyme activity. Normal values of G-6-PD may be measured for several  weeks following a hemolytic event.    Hemoglobin A1C 07/07/2023 8.40 (H)  4.80 - 5.60 % Final    Total Protein 07/07/2023 7.2  6.0 - 8.5 g/dL Final    Albumin 07/07/2023 4.5  3.5 - 5.2 g/dL Final    ALT (SGPT) 07/07/2023 49 (H)  1 - 41 U/L Final    AST (SGOT) 07/07/2023 31  1 - 40 U/L Final    Alkaline Phosphatase 07/07/2023 93  39 - 117 U/L Final    Total Bilirubin 07/07/2023 0.6  0.0 - 1.2 mg/dL Final    Bilirubin, Direct 07/07/2023 <0.2  0.0 - 0.3 mg/dL Final    Bilirubin, Indirect 07/07/2023    Final    Unable to calculate    Dilute Prothrombin Time(dPT) 07/07/2023 37.0  0.0 - 47.6 sec Final    dPT Confirm Ratio 07/07/2023 1.01   0.00 - 1.34 Ratio Final    Thrombin Time 07/07/2023 14.2  0.0 - 23.0 sec Final    PTT Lupus Anticoagulant 07/07/2023 42.4  0.0 - 43.5 sec Final    Dilute Viper Venom Time 07/07/2023 46.2  0.0 - 47.0 sec Final    Lupus Anticoagulant Reflex 07/07/2023 Comment:   Final    No lupus anticoagulant was detected.    BUN 07/07/2023 8  6 - 20 mg/dL Final    WBC 07/07/2023 7.56  3.40 - 10.80 10*3/mm3 Final    RBC 07/07/2023 5.75  4.14 - 5.80 10*6/mm3 Final    Hemoglobin 07/07/2023 13.8  13.0 - 17.7 g/dL Final    Hematocrit 07/07/2023 43.4  37.5 - 51.0 % Final    MCV 07/07/2023 75.5 (L)  79.0 - 97.0 fL Final    MCH 07/07/2023 24.0 (L)  26.6 - 33.0 pg Final    MCHC 07/07/2023 31.8  31.5 - 35.7 g/dL Final    RDW 07/07/2023 18.0 (H)  12.3 - 15.4 % Final    RDW-SD 07/07/2023 47.3  37.0 - 54.0 fl Final    MPV 07/07/2023 10.6  6.0 - 12.0 fL Final    Platelets 07/07/2023 343  140 - 450 10*3/mm3 Final    Neutrophil % 07/07/2023 57.4  42.7 - 76.0 % Final    Lymphocyte % 07/07/2023 29.1  19.6 - 45.3 % Final    Monocyte % 07/07/2023 8.7  5.0 - 12.0 % Final    Eosinophil % 07/07/2023 3.3  0.3 - 6.2 % Final    Basophil % 07/07/2023 1.1  0.0 - 1.5 % Final    Neutrophils, Absolute 07/07/2023 4.34  1.70 - 7.00 10*3/mm3 Final    Lymphocytes, Absolute 07/07/2023 2.20  0.70 - 3.10 10*3/mm3 Final    Monocytes, Absolute 07/07/2023 0.66  0.10 - 0.90 10*3/mm3 Final    Eosinophils, Absolute 07/07/2023 0.25  0.00 - 0.40 10*3/mm3 Final    Basophils, Absolute 07/07/2023 0.08  0.00 - 0.20 10*3/mm3 Final       ASSESSMENT/ PLAN:    Diagnoses and all orders for this visit:    1. Mixed hyperlipidemia (Primary)  Assessment & Plan:  Will update his lipid profile.  At this time he is somewhat resistant about taking a statin although he has significant risk factors including his underlying coronary disease that he should.      2. Type 2 diabetes mellitus with hyperglycemia, with long-term current use of insulin  Assessment & Plan:  He states he has been much  "better with his diabetes as far as meds and diet.  Will update his A1c to see where he is at.    Orders:  -     Comprehensive Metabolic Panel  -     Lipid Panel  -     Hemoglobin A1c  -     Microalbumin / Creatinine Urine Ratio - Urine, Clean Catch  -     CBC (No Diff)    3. Hypertension, essential  Assessment & Plan:  His blood pressure is a little elevated here today.  At home though it has been somewhat better.  Will recheck it 1 more time.      4. Diabetic peripheral neuropathy    5. Coronary artery disease involving native coronary artery of native heart without angina pectoris  Assessment & Plan:  He had bypass surgery a little bit more than a year ago currently he denies any recurrent symptoms.      6. Degeneration of lumbosacral intervertebral disc    7. Encounter for medical examination to establish care  Assessment & Plan:  He presents today to establish care.  We will get an update on his baseline labs.      8. Anemia due to stage 3a chronic kidney disease  -     CBC (No Diff)    Other orders  -     nebivolol (Bystolic) 5 MG tablet; Take 1 tablet by mouth Daily.  Dispense: 90 tablet; Refill: 3  -     Insulin Syringe (TRUEplus Insulin Syringe) 29G X 1/2\" 1 ML misc; Use 1 syringe 3 (Three) Times a Day.  Dispense: 300 each; Refill: 3               Orders Placed Today:     New Medications Ordered This Visit   Medications    nebivolol (Bystolic) 5 MG tablet     Sig: Take 1 tablet by mouth Daily.     Dispense:  90 tablet     Refill:  3    Insulin Syringe (TRUEplus Insulin Syringe) 29G X 1/2\" 1 ML misc     Sig: Use 1 syringe 3 (Three) Times a Day.     Dispense:  300 each     Refill:  3        Management Plan:     An After Visit Summary was printed and given to the patient at discharge.    Follow-up: Return in about 4 months (around 3/28/2024) for Recheck.    Ernie White, DO 11/28/2023 11:10 EST  This note was electronically signed.  "

## 2023-11-28 NOTE — ASSESSMENT & PLAN NOTE
Will update his lipid profile.  At this time he is somewhat resistant about taking a statin although he has significant risk factors including his underlying coronary disease that he should.

## 2023-12-18 DIAGNOSIS — F17.209 TOBACCO USE DISORDER, CONTINUOUS: ICD-10-CM

## 2023-12-18 RX ORDER — VARENICLINE TARTRATE 1 MG/1
1 TABLET, FILM COATED ORAL DAILY
Qty: 56 TABLET | Refills: 3 | OUTPATIENT
Start: 2023-12-18

## 2023-12-21 DIAGNOSIS — E11.65 TYPE 2 DIABETES MELLITUS WITH HYPERGLYCEMIA, WITH LONG-TERM CURRENT USE OF INSULIN: Chronic | ICD-10-CM

## 2023-12-21 DIAGNOSIS — Z79.4 TYPE 2 DIABETES MELLITUS WITH HYPERGLYCEMIA, WITH LONG-TERM CURRENT USE OF INSULIN: Chronic | ICD-10-CM

## 2023-12-21 RX ORDER — INSULIN DEGLUDEC INJECTION 100 U/ML
INJECTION, SOLUTION SUBCUTANEOUS
Qty: 100 ML | Refills: 3 | OUTPATIENT
Start: 2023-12-21

## 2023-12-28 DIAGNOSIS — F17.209 TOBACCO USE DISORDER, CONTINUOUS: ICD-10-CM

## 2023-12-28 RX ORDER — INSULIN ASPART 100 [IU]/ML
16 INJECTION, SOLUTION INTRAVENOUS; SUBCUTANEOUS
Qty: 30 ML | Refills: 1 | Status: SHIPPED | OUTPATIENT
Start: 2023-12-28

## 2023-12-28 RX ORDER — TRAZODONE HYDROCHLORIDE 100 MG/1
100 TABLET ORAL
Qty: 90 TABLET | Refills: 3 | OUTPATIENT
Start: 2023-12-28

## 2023-12-28 RX ORDER — VARENICLINE TARTRATE 1 MG/1
1 TABLET, FILM COATED ORAL DAILY
Qty: 56 TABLET | Refills: 3 | OUTPATIENT
Start: 2023-12-28

## 2024-01-03 ENCOUNTER — TELEPHONE (OUTPATIENT)
Dept: FAMILY MEDICINE CLINIC | Facility: CLINIC | Age: 54
End: 2024-01-03
Payer: MEDICARE

## 2024-01-03 DIAGNOSIS — Z79.4 TYPE 2 DIABETES MELLITUS WITH HYPERGLYCEMIA, WITH LONG-TERM CURRENT USE OF INSULIN: Chronic | ICD-10-CM

## 2024-01-03 DIAGNOSIS — F17.209 TOBACCO USE DISORDER, CONTINUOUS: ICD-10-CM

## 2024-01-03 DIAGNOSIS — E11.65 TYPE 2 DIABETES MELLITUS WITH HYPERGLYCEMIA, WITH LONG-TERM CURRENT USE OF INSULIN: Chronic | ICD-10-CM

## 2024-01-03 RX ORDER — VARENICLINE TARTRATE 1 MG/1
1 TABLET, FILM COATED ORAL DAILY
Qty: 56 TABLET | Refills: 3 | OUTPATIENT
Start: 2024-01-03

## 2024-01-03 RX ORDER — TRAZODONE HYDROCHLORIDE 100 MG/1
100 TABLET ORAL
Qty: 90 TABLET | Refills: 3 | OUTPATIENT
Start: 2024-01-03

## 2024-01-03 RX ORDER — INSULIN DEGLUDEC 100 U/ML
55 INJECTION, SOLUTION SUBCUTANEOUS 2 TIMES DAILY
Qty: 100 ML | Refills: 3 | Status: SHIPPED | OUTPATIENT
Start: 2024-01-03

## 2024-01-03 RX ORDER — TAMSULOSIN HYDROCHLORIDE 0.4 MG/1
1 CAPSULE ORAL DAILY
Qty: 90 CAPSULE | Refills: 3 | Status: SHIPPED | OUTPATIENT
Start: 2024-01-03

## 2024-01-03 RX ORDER — INSULIN ASPART 100 [IU]/ML
16 INJECTION, SOLUTION INTRAVENOUS; SUBCUTANEOUS
Qty: 30 ML | Refills: 1 | Status: SHIPPED | OUTPATIENT
Start: 2024-01-03

## 2024-01-03 NOTE — TELEPHONE ENCOUNTER
Caller: Darlene Ilya GONZALEZ    Relationship: Self    Best call back number:     [phone]       Requested Prescriptions:   Requested Prescriptions     Pending Prescriptions Disp Refills    Insulin Degludec (Tresiba) 100 UNIT/ML solution injection 100 mL 0     Sig: Inject 55 Units under the skin into the appropriate area as directed 2 (Two) Times a Day.    Insulin Aspart (novoLOG) 100 UNIT/ML injection 30 mL 1     Sig: Inject 16 Units under the skin into the appropriate area as directed 2 (Two) Times a Day Before Meals.    tamsulosin (FLOMAX) 0.4 MG capsule 24 hr capsule 90 capsule 3     Sig: Take 1 capsule by mouth Daily.        Pharmacy where request should be sent: [facility], [facility]. [address] [phone] [place] [phone] FX     Last office visit with prescribing clinician: 11/28/2023   Last telemedicine visit with prescribing clinician: Visit date not found   Next office visit with prescribing clinician: 3/28/2024     Additional details provided by patient:     Does the patient have less than a 3 day supply:  [] Yes  [x] No    Would you like a call back once the refill request has been completed: [] Yes [x] No    If the office needs to give you a call back, can they leave a voicemail: [] Yes [x] No    Tammie Jackson Rep   01/03/24 11:05 EST

## 2024-01-11 DIAGNOSIS — F17.209 TOBACCO USE DISORDER, CONTINUOUS: ICD-10-CM

## 2024-01-11 RX ORDER — TRAZODONE HYDROCHLORIDE 100 MG/1
100 TABLET ORAL
Qty: 90 TABLET | Refills: 3 | OUTPATIENT
Start: 2024-01-11

## 2024-01-11 RX ORDER — VARENICLINE TARTRATE 1 MG/1
1 TABLET, FILM COATED ORAL DAILY
Qty: 56 TABLET | Refills: 3 | OUTPATIENT
Start: 2024-01-11

## 2024-01-18 DIAGNOSIS — E11.9 INSULIN DEPENDENT TYPE 2 DIABETES MELLITUS: ICD-10-CM

## 2024-01-18 DIAGNOSIS — Z79.4 TYPE 2 DIABETES MELLITUS WITH HYPERGLYCEMIA, WITH LONG-TERM CURRENT USE OF INSULIN: Chronic | ICD-10-CM

## 2024-01-18 DIAGNOSIS — Z79.4 INSULIN DEPENDENT TYPE 2 DIABETES MELLITUS: ICD-10-CM

## 2024-01-18 DIAGNOSIS — E11.65 TYPE 2 DIABETES MELLITUS WITH HYPERGLYCEMIA, WITH LONG-TERM CURRENT USE OF INSULIN: Chronic | ICD-10-CM

## 2024-01-18 DIAGNOSIS — F17.209 TOBACCO USE DISORDER, CONTINUOUS: ICD-10-CM

## 2024-01-18 RX ORDER — AMITRIPTYLINE HYDROCHLORIDE 25 MG/1
25 TABLET, FILM COATED ORAL NIGHTLY
Qty: 90 TABLET | Refills: 1 | Status: SHIPPED | OUTPATIENT
Start: 2024-01-18

## 2024-01-18 RX ORDER — HYDROXYZINE HYDROCHLORIDE 25 MG/1
25 TABLET, FILM COATED ORAL EVERY 8 HOURS SCHEDULED
Qty: 270 TABLET | Refills: 3 | Status: SHIPPED | OUTPATIENT
Start: 2024-01-18

## 2024-01-18 RX ORDER — CETIRIZINE HYDROCHLORIDE 5 MG/1
5 TABLET ORAL DAILY
Qty: 90 TABLET | Refills: 1 | Status: SHIPPED | OUTPATIENT
Start: 2024-01-18

## 2024-01-18 RX ORDER — ASPIRIN 81 MG/1
81 TABLET ORAL DAILY
Qty: 90 TABLET | Refills: 1 | Status: SHIPPED | OUTPATIENT
Start: 2024-01-18

## 2024-01-18 RX ORDER — VARENICLINE TARTRATE 1 MG/1
1 TABLET, FILM COATED ORAL DAILY
Qty: 90 TABLET | Refills: 1 | Status: SHIPPED | OUTPATIENT
Start: 2024-01-18

## 2024-01-18 RX ORDER — NEBIVOLOL 5 MG/1
5 TABLET ORAL DAILY
Qty: 90 TABLET | Refills: 3 | Status: SHIPPED | OUTPATIENT
Start: 2024-01-18

## 2024-01-18 RX ORDER — POTASSIUM CHLORIDE 20 MEQ/1
20 TABLET, EXTENDED RELEASE ORAL 2 TIMES DAILY
Qty: 180 TABLET | Refills: 1 | Status: SHIPPED | OUTPATIENT
Start: 2024-01-18

## 2024-01-18 RX ORDER — HYDROCODONE BITARTRATE AND ACETAMINOPHEN 10; 325 MG/1; MG/1
TABLET ORAL EVERY 12 HOURS
OUTPATIENT
Start: 2024-01-18

## 2024-01-18 RX ORDER — TAMSULOSIN HYDROCHLORIDE 0.4 MG/1
1 CAPSULE ORAL DAILY
Qty: 90 CAPSULE | Refills: 3 | Status: SHIPPED | OUTPATIENT
Start: 2024-01-18

## 2024-01-18 RX ORDER — BLOOD SUGAR DIAGNOSTIC
STRIP MISCELLANEOUS
Qty: 200 EACH | Refills: 3 | Status: SHIPPED | OUTPATIENT
Start: 2024-01-18 | End: 2025-01-17

## 2024-01-18 RX ORDER — TRAZODONE HYDROCHLORIDE 50 MG/1
50 TABLET ORAL NIGHTLY
OUTPATIENT
Start: 2024-01-18

## 2024-01-18 RX ORDER — GABAPENTIN 600 MG/1
600 TABLET ORAL EVERY 6 HOURS
OUTPATIENT
Start: 2024-01-18

## 2024-01-19 RX ORDER — TRAZODONE HYDROCHLORIDE 100 MG/1
100 TABLET ORAL
Qty: 90 TABLET | Refills: 3 | OUTPATIENT
Start: 2024-01-19

## 2024-03-19 ENCOUNTER — OFFICE VISIT (OUTPATIENT)
Dept: FAMILY MEDICINE CLINIC | Facility: CLINIC | Age: 54
End: 2024-03-19
Payer: MEDICARE

## 2024-03-19 VITALS
DIASTOLIC BLOOD PRESSURE: 80 MMHG | OXYGEN SATURATION: 96 % | SYSTOLIC BLOOD PRESSURE: 154 MMHG | HEIGHT: 66 IN | TEMPERATURE: 97.3 F | WEIGHT: 227 LBS | BODY MASS INDEX: 36.48 KG/M2 | HEART RATE: 70 BPM

## 2024-03-19 DIAGNOSIS — Z79.4 TYPE 2 DIABETES MELLITUS WITH HYPERGLYCEMIA, WITH LONG-TERM CURRENT USE OF INSULIN: ICD-10-CM

## 2024-03-19 DIAGNOSIS — E78.2 MIXED HYPERLIPIDEMIA: Primary | Chronic | ICD-10-CM

## 2024-03-19 DIAGNOSIS — N18.30 CKD STAGE 3 DUE TO TYPE 2 DIABETES MELLITUS: ICD-10-CM

## 2024-03-19 DIAGNOSIS — E11.9 INSULIN DEPENDENT TYPE 2 DIABETES MELLITUS: ICD-10-CM

## 2024-03-19 DIAGNOSIS — D50.9 MICROCYTIC ANEMIA: ICD-10-CM

## 2024-03-19 DIAGNOSIS — Z11.59 ENCOUNTER FOR HEPATITIS C SCREENING TEST FOR LOW RISK PATIENT: ICD-10-CM

## 2024-03-19 DIAGNOSIS — E11.65 TYPE 2 DIABETES MELLITUS WITH HYPERGLYCEMIA, WITH LONG-TERM CURRENT USE OF INSULIN: ICD-10-CM

## 2024-03-19 DIAGNOSIS — Z79.4 INSULIN DEPENDENT TYPE 2 DIABETES MELLITUS: ICD-10-CM

## 2024-03-19 DIAGNOSIS — F17.211 CIGARETTE NICOTINE DEPENDENCE IN REMISSION: ICD-10-CM

## 2024-03-19 DIAGNOSIS — I25.10 CORONARY ARTERY DISEASE INVOLVING NATIVE CORONARY ARTERY OF NATIVE HEART WITHOUT ANGINA PECTORIS: ICD-10-CM

## 2024-03-19 DIAGNOSIS — Z51.81 ENCOUNTER FOR THERAPEUTIC DRUG LEVEL MONITORING: ICD-10-CM

## 2024-03-19 DIAGNOSIS — I10 HYPERTENSION, ESSENTIAL: Chronic | ICD-10-CM

## 2024-03-19 DIAGNOSIS — E11.22 CKD STAGE 3 DUE TO TYPE 2 DIABETES MELLITUS: ICD-10-CM

## 2024-03-19 LAB
ALBUMIN SERPL-MCNC: 4.4 G/DL (ref 3.5–5.2)
ALBUMIN UR-MCNC: 48.7 MG/DL
ALBUMIN/GLOB SERPL: 1.5 G/DL
ALP SERPL-CCNC: 91 U/L (ref 39–117)
ALT SERPL W P-5'-P-CCNC: 41 U/L (ref 1–41)
ANION GAP SERPL CALCULATED.3IONS-SCNC: 12.5 MMOL/L (ref 5–15)
AST SERPL-CCNC: 34 U/L (ref 1–40)
BILIRUB SERPL-MCNC: 0.9 MG/DL (ref 0–1.2)
BUN SERPL-MCNC: 7 MG/DL (ref 6–20)
BUN/CREAT SERPL: 5.4 (ref 7–25)
CALCIUM SPEC-SCNC: 9.6 MG/DL (ref 8.6–10.5)
CHLORIDE SERPL-SCNC: 99 MMOL/L (ref 98–107)
CHOLEST SERPL-MCNC: 194 MG/DL (ref 0–200)
CO2 SERPL-SCNC: 27.5 MMOL/L (ref 22–29)
COTININE UR-MCNC: NEGATIVE NG/ML
CREAT SERPL-MCNC: 1.3 MG/DL (ref 0.76–1.27)
CREAT UR-MCNC: 73.9 MG/DL
DEPRECATED RDW RBC AUTO: 43.3 FL (ref 37–54)
EGFRCR SERPLBLD CKD-EPI 2021: 65.7 ML/MIN/1.73
ERYTHROCYTE [DISTWIDTH] IN BLOOD BY AUTOMATED COUNT: 15.5 % (ref 12.3–15.4)
GLOBULIN UR ELPH-MCNC: 2.9 GM/DL
GLUCOSE SERPL-MCNC: 148 MG/DL (ref 65–99)
HBA1C MFR BLD: 7.4 % (ref 4.8–5.6)
HCT VFR BLD AUTO: 42.8 % (ref 37.5–51)
HCV AB SER DONR QL: NORMAL
HDLC SERPL-MCNC: 39 MG/DL (ref 40–60)
HGB BLD-MCNC: 14 G/DL (ref 13–17.7)
LDLC SERPL CALC-MCNC: 129 MG/DL (ref 0–100)
LDLC/HDLC SERPL: 3.23 {RATIO}
MCH RBC QN AUTO: 25.8 PG (ref 26.6–33)
MCHC RBC AUTO-ENTMCNC: 32.7 G/DL (ref 31.5–35.7)
MCV RBC AUTO: 78.8 FL (ref 79–97)
MICROALBUMIN/CREAT UR: 659 MG/G (ref 0–29)
PLATELET # BLD AUTO: 262 10*3/MM3 (ref 140–450)
PMV BLD AUTO: 10.8 FL (ref 6–12)
POTASSIUM SERPL-SCNC: 4 MMOL/L (ref 3.5–5.2)
PROT SERPL-MCNC: 7.3 G/DL (ref 6–8.5)
RBC # BLD AUTO: 5.43 10*6/MM3 (ref 4.14–5.8)
SODIUM SERPL-SCNC: 139 MMOL/L (ref 136–145)
TRIGL SERPL-MCNC: 145 MG/DL (ref 0–150)
VLDLC SERPL-MCNC: 26 MG/DL (ref 5–40)
WBC NRBC COR # BLD AUTO: 7.39 10*3/MM3 (ref 3.4–10.8)

## 2024-03-19 PROCEDURE — 80053 COMPREHEN METABOLIC PANEL: CPT | Performed by: FAMILY MEDICINE

## 2024-03-19 PROCEDURE — 86803 HEPATITIS C AB TEST: CPT | Performed by: FAMILY MEDICINE

## 2024-03-19 PROCEDURE — 82728 ASSAY OF FERRITIN: CPT | Performed by: FAMILY MEDICINE

## 2024-03-19 PROCEDURE — 82043 UR ALBUMIN QUANTITATIVE: CPT | Performed by: FAMILY MEDICINE

## 2024-03-19 PROCEDURE — 84466 ASSAY OF TRANSFERRIN: CPT | Performed by: FAMILY MEDICINE

## 2024-03-19 PROCEDURE — 82570 ASSAY OF URINE CREATININE: CPT | Performed by: FAMILY MEDICINE

## 2024-03-19 PROCEDURE — 85027 COMPLETE CBC AUTOMATED: CPT | Performed by: FAMILY MEDICINE

## 2024-03-19 PROCEDURE — 80061 LIPID PANEL: CPT | Performed by: FAMILY MEDICINE

## 2024-03-19 PROCEDURE — 83036 HEMOGLOBIN GLYCOSYLATED A1C: CPT | Performed by: FAMILY MEDICINE

## 2024-03-19 PROCEDURE — G0480 DRUG TEST DEF 1-7 CLASSES: HCPCS | Performed by: FAMILY MEDICINE

## 2024-03-19 PROCEDURE — 83540 ASSAY OF IRON: CPT | Performed by: FAMILY MEDICINE

## 2024-03-19 RX ORDER — BLOOD SUGAR DIAGNOSTIC
STRIP MISCELLANEOUS
Qty: 200 EACH | Refills: 3 | Status: SHIPPED | OUTPATIENT
Start: 2024-03-19 | End: 2025-03-19

## 2024-03-19 RX ORDER — CARVEDILOL 12.5 MG/1
12.5 TABLET ORAL 2 TIMES DAILY WITH MEALS
Qty: 180 TABLET | Refills: 1 | Status: SHIPPED | OUTPATIENT
Start: 2024-03-19

## 2024-03-19 NOTE — PROGRESS NOTES
Chief Complaint   Patient presents with    Hypertension        Subjective     Ilya Colon  has a past medical history of Arthritis, DDD (degenerative disc disease), lumbar, Enlarged prostate, Fatty liver, Hearing decreased, left, History of gastric ulcer, Myocardial infarction, Scoliosis, and Sternal wound dehiscence.    Type 2 diabetes-he does check his blood sugars at home.  They have been elevated about 180 and 190.  He did more often than not will be somewhere between .  He denies any blurred vision or excessive urination but is always thirsty.    Hypertension-he states his blood pressure has been high at home and any other healthcare visits.    Hyperlipidemia-he is currently not on any statin.  He states previously when he took anything he felt bad.    CAD-he denies any chest pain tightness or heaviness.        PHQ-2 Depression Screening  Little interest or pleasure in doing things?     Feeling down, depressed, or hopeless?     PHQ-2 Total Score     PHQ-9 Depression Screening  Little interest or pleasure in doing things?     Feeling down, depressed, or hopeless?     Trouble falling or staying asleep, or sleeping too much?     Feeling tired or having little energy?     Poor appetite or overeating?     Feeling bad about yourself - or that you are a failure or have let yourself or your family down?     Trouble concentrating on things, such as reading the newspaper or watching television?     Moving or speaking so slowly that other people could have noticed? Or the opposite - being so fidgety or restless that you have been moving around a lot more than usual?     Thoughts that you would be better off dead, or of hurting yourself in some way?     PHQ-9 Total Score     If you checked off any problems, how difficult have these problems made it for you to do your work, take care of things at home, or get along with other people?       No Known Allergies    Prior to Admission medications    Medication Sig  "Start Date End Date Taking? Authorizing Provider   amitriptyline (ELAVIL) 25 MG tablet Take 1 tablet by mouth Every Night. 1/18/24  Yes Ernie White DO   aspirin 81 MG EC tablet Take 1 tablet by mouth Daily. 1/18/24  Yes Ernie White DO   cetirizine (zyrTEC) 5 MG tablet Take 1 tablet by mouth Daily. 1/18/24  Yes Ernie White DO   empagliflozin (Jardiance) 25 MG tablet tablet Take 1 tablet by mouth Daily. 1/18/24  Yes Ernie White DO   esomeprazole (nexIUM) 20 MG capsule Take 1 capsule by mouth Every Morning Before Breakfast. 1/18/24  Yes Ernie White DO   gabapentin (NEURONTIN) 600 MG tablet Take 1 tablet by mouth Every 6 (Six) Hours.   Yes ProviderSheri MD   glucose blood (Accu-Chek Kirsten Plus) test strip Use to check glucose before breakfast and bedtime for Dx T2DM E11.9 1/18/24 1/17/25 Yes Ernie White DO   HYDROcodone-acetaminophen (NORCO)  MG per tablet Every 12 (Twelve) Hours.   Yes Sheri Ward MD   hydrOXYzine (ATARAX) 25 MG tablet Take 1 tablet by mouth Every 8 (Eight) Hours. 1/18/24  Yes Ernie White DO   Insulin Aspart (novoLOG) 100 UNIT/ML injection Inject 16 Units under the skin into the appropriate area as directed 2 (Two) Times a Day Before Meals. 1/3/24  Yes Ernie White DO   Insulin Degludec (Tresiba) 100 UNIT/ML solution injection Inject 55 Units under the skin into the appropriate area as directed 2 (Two) Times a Day. 1/3/24  Yes Ernie White DO   Insulin Syringe (TRUEplus Insulin Syringe) 29G X 1/2\" 1 ML misc Use 1 syringe 3 (Three) Times a Day. 11/28/23  Yes Ernie White DO   melatonin 5 MG tablet tablet Take 2 tablets by mouth Every Night.   Yes Sheri Ward MD   metroNIDAZOLE (METROCREAM) 0.75 % cream Apply 1 Application topically to the appropriate area as directed 2 (Two) Times a Day.   Yes Sheri Ward MD   mometasone (ELOCON) 0.1 % " cream Apply 1 Application topically to the appropriate area as directed Daily.   Yes ProviderSheri MD   nebivolol (Bystolic) 5 MG tablet Take 1 tablet by mouth Daily. 1/18/24  Yes Ernie White DO   nitroglycerin (NITROSTAT) 0.4 MG SL tablet Place 1 tablet under the tongue Every 5 (Five) Minutes As Needed for Chest Pain. Take no more than 3 doses in 15 minutes.   Yes ProviderSheri MD   potassium chloride (K-DUR,KLOR-CON) 20 MEQ CR tablet Take 1 tablet by mouth 2 (Two) Times a Day. 1/18/24  Yes Ernie White DO   tamsulosin (FLOMAX) 0.4 MG capsule 24 hr capsule Take 1 capsule by mouth Daily. 1/18/24  Yes Ernie White DO   traZODone (DESYREL) 50 MG tablet Take 1 tablet by mouth Every Night.   Yes Sheri Ward MD   triamcinolone (KENALOG) 0.1 % cream  8/2/23  Yes Sheri Ward MD   varenicline (Chantix) 1 MG tablet Take 1 tablet by mouth Daily. 1/18/24  Yes Ernie White DO        Patient Active Problem List   Diagnosis    Osteoarthritis of thumbs, bilateral    Left hand pain    Right hand pain    Paresthesia of both hands    Atherosclerosis of coronary artery    Depression    Degeneration of lumbosacral intervertebral disc    GERD (gastroesophageal reflux disease)    Mixed hyperlipidemia    Hypertension, essential    Type 2 diabetes mellitus with hyperglycemia, with long-term current use of insulin    Scoliosis    Encounter for subsequent annual wellness visit (AWV) in Medicare patient    Allergic rhinitis    Insomnia    Cigarette nicotine dependence in remission    Class 1 obesity due to excess calories with serious comorbidity and body mass index (BMI) of 30.0 to 30.9 in adult    Unstable angina    CAD (coronary artery disease)    S/P CABG x 4    Sternal wound dehiscence    Sternal wound dehiscence, subsequent encounter    Anemia due to stage 3a chronic kidney disease    CKD stage 3 due to type 2 diabetes mellitus    Rash of back     Diabetic peripheral neuropathy    Multiple joint pain    Encounter for medical examination to establish care        Past Surgical History:   Procedure Laterality Date    CARDIAC CATHETERIZATION N/A 10/12/2022    Procedure: Left Heart Cath - Radial artery;  Surgeon: Tad Batista MD;  Location: [facility] CATH INVASIVE LOCATION;  Service: Cardiology;  Laterality: N/A;    CARDIAC SURGERY      COLONOSCOPY      CORONARY ARTERY BYPASS GRAFT N/A 10/20/2022    Procedure: INTRAOP NANCI, STERNOTOMY, CORONARY ARTERY BYPASS GRAFTS X 4 USING LEFT ENDOSCOPICALLY HARVESTED GREATER SAPHENOUS VEIN AND LEFT RADHA, PRP;  Surgeon: Jr Tutu Blackmon MD;  Location: [facility];  Service: Cardiothoracic;  Laterality: N/A;    HAND SURGERY Right     X2    LAPAROSCOPIC CHOLECYSTECTOMY      STERNAL REWIRING N/A 2023    Procedure: STERNAL WIRE REMOVAL;  Surgeon: Jr Tutu Blackmon MD;  Location: [facility];  Service: Cardiothoracic;  Laterality: N/A;    TONSILLECTOMY      TRAM FLAP DELAYED N/A 2023    Procedure: TRAM FLAP RECONSTRUCTION, use of spy, sternal wound;  Surgeon: Stephenie Li MD;  Location: [facility];  Service: Plastics;  Laterality: N/A;       Social History     Socioeconomic History    Marital status:    Tobacco Use    Smoking status: Former     Average packs/day: 1 pack/day for 7.3 years (7.3 ttl pk-yrs)     Types: Cigarettes     Start date: 2015     Quit date: 10/1/2022     Years since quittin.4    Smokeless tobacco: Never   Vaping Use    Vaping status: Never Used   Substance and Sexual Activity    Alcohol use: Not Currently    Drug use: Yes     Types: Marijuana     Comment: DAILY, last used 23    Sexual activity: Defer       Family History   Problem Relation Age of Onset    Malig Hyperthermia Neg Hx        Family history, surgical history, past medical history, Allergies and meds reviewed with patient today and updated in [facility] E[facility].     ROS:  Review of Systems  "  Constitutional:  Negative for fatigue.   HENT:  Positive for congestion and postnasal drip. Negative for rhinorrhea.    Eyes:  Negative for blurred vision and visual disturbance.   Respiratory:  Negative for cough, chest tightness, shortness of breath and wheezing.    Cardiovascular:  Negative for chest pain and palpitations.   Endocrine: Positive for polydipsia. Negative for polyuria.   Allergic/Immunologic: Negative for environmental allergies.   Neurological:  Negative for headache.   Psychiatric/Behavioral:  Negative for depressed mood. The patient is not nervous/anxious.        OBJECTIVE:  Vitals:    03/19/24 1009 03/19/24 1053   BP: 178/95 154/80   BP Location: Left arm Left arm   Patient Position: Sitting Sitting   Cuff Size:  Adult   Pulse: 70    Temp: 97.3 °F (36.3 °C)    SpO2: 96%    Weight: 103 kg (227 lb)    Height: 167.6 cm (66\")      No results found.   Body mass index is 36.64 kg/m².  No LMP for male patient.    The ASCVD Risk score (Yehuda DK, et al., 2019) failed to calculate for the following reasons:    The patient has a prior MI or stroke diagnosis     Physical Exam  Vitals and nursing note reviewed.   Constitutional:       General: He is not in acute distress.     Appearance: Normal appearance. He is obese.   HENT:      Head: Normocephalic and atraumatic.      Right Ear: Tympanic membrane, ear canal and external ear normal.      Left Ear: Tympanic membrane, ear canal and external ear normal.      Nose: Nose normal.      Mouth/Throat:      Mouth: Mucous membranes are moist.   Eyes:      General: No scleral icterus.     Conjunctiva/sclera: Conjunctivae normal.      Pupils: Pupils are equal, round, and reactive to light.   Cardiovascular:      Rate and Rhythm: Normal rate and regular rhythm.      Pulses: Normal pulses.           Dorsalis pedis pulses are 2+ on the right side and 2+ on the left side.        Posterior tibial pulses are 2+ on the right side and 2+ on the left side.      Heart " sounds: Normal heart sounds. No murmur heard.  Pulmonary:      Breath sounds: Normal breath sounds. No wheezing, rhonchi or rales.   Musculoskeletal:      Cervical back: No tenderness.      Right foot: No deformity or bunion.      Left foot: No deformity or bunion.   Feet:      Right foot:      Protective Sensation: 8 sites tested.  8 sites sensed.      Skin integrity: Skin integrity normal.      Toenail Condition: Right toenails are abnormally thick.      Left foot:      Protective Sensation: 8 sites tested.  8 sites sensed.      Skin integrity: Skin integrity normal.      Toenail Condition: Left toenails are abnormally thick.   Lymphadenopathy:      Cervical: No cervical adenopathy.   Skin:     General: Skin is warm and dry.      Coloration: Skin is not jaundiced.   Neurological:      General: No focal deficit present.      Mental Status: He is alert and oriented to person, place, and time.   Psychiatric:         Mood and Affect: Mood normal.         Thought Content: Thought content normal.         Procedures    No visits with results within 30 Day(s) from this visit.   Latest known visit with results is:   Lab on 07/07/2023   Component Date Value Ref Range Status    Creatinine 07/07/2023 1.28 (H)  0.76 - 1.27 mg/dL Final    eGFR 07/07/2023 67.3  >60.0 mL/min/1.73 Final    RBC 07/07/2023 5.64  4.14 - 5.80 x10E6/uL Final    G6PD, Quantitative 07/07/2023 255  127 - 427 U/10E12 RBC Final    When decreased, G-6-PD, Quant. values are associated with acute  hemolytic anemia when deficient individuals are exposed to oxidative  stress, such as with certain medications (e.g., primaquine),  infection, or ingestion of hay beans.  Caution: In patients with acute hemolysis (e.g., abnormally low RBC  values), testing for G-6-PD may be falsely normal because older  erythrocytes with a higher enzyme deficiency have been hemolyzed.  Young erythrocytes and reticulocytes have normal or near-normal  enzyme activity. Normal values of  G-6-PD may be measured for several  weeks following a hemolytic event.    Hemoglobin A1C 07/07/2023 8.40 (H)  4.80 - 5.60 % Final    Total Protein 07/07/2023 7.2  6.0 - 8.5 g/dL Final    Albumin 07/07/2023 4.5  3.5 - 5.2 g/dL Final    ALT (SGPT) 07/07/2023 49 (H)  1 - 41 U/L Final    AST (SGOT) 07/07/2023 31  1 - 40 U/L Final    Alkaline Phosphatase 07/07/2023 93  39 - 117 U/L Final    Total Bilirubin 07/07/2023 0.6  0.0 - 1.2 mg/dL Final    Bilirubin, Direct 07/07/2023 <0.2  0.0 - 0.3 mg/dL Final    Bilirubin, Indirect 07/07/2023    Final    Unable to calculate    Dilute Prothrombin Time(dPT) 07/07/2023 37.0  0.0 - 47.6 sec Final    dPT Confirm Ratio 07/07/2023 1.01  0.00 - 1.34 Ratio Final    Thrombin Time 07/07/2023 14.2  0.0 - 23.0 sec Final    PTT Lupus Anticoagulant 07/07/2023 42.4  0.0 - 43.5 sec Final    Dilute Viper Venom Time 07/07/2023 46.2  0.0 - 47.0 sec Final    Lupus Anticoagulant Reflex 07/07/2023 Comment:   Final    No lupus anticoagulant was detected.    BUN 07/07/2023 8  6 - 20 mg/dL Final    WBC 07/07/2023 7.56  3.40 - 10.80 10*3/mm3 Final    RBC 07/07/2023 5.75  4.14 - 5.80 10*6/mm3 Final    Hemoglobin 07/07/2023 13.8  13.0 - 17.7 g/dL Final    Hematocrit 07/07/2023 43.4  37.5 - 51.0 % Final    MCV 07/07/2023 75.5 (L)  79.0 - 97.0 fL Final    MCH 07/07/2023 24.0 (L)  26.6 - 33.0 pg Final    MCHC 07/07/2023 31.8  31.5 - 35.7 g/dL Final    RDW 07/07/2023 18.0 (H)  12.3 - 15.4 % Final    RDW-SD 07/07/2023 47.3  37.0 - 54.0 fl Final    MPV 07/07/2023 10.6  6.0 - 12.0 fL Final    Platelets 07/07/2023 343  140 - 450 10*3/mm3 Final    Neutrophil % 07/07/2023 57.4  42.7 - 76.0 % Final    Lymphocyte % 07/07/2023 29.1  19.6 - 45.3 % Final    Monocyte % 07/07/2023 8.7  5.0 - 12.0 % Final    Eosinophil % 07/07/2023 3.3  0.3 - 6.2 % Final    Basophil % 07/07/2023 1.1  0.0 - 1.5 % Final    Neutrophils, Absolute 07/07/2023 4.34  1.70 - 7.00 10*3/mm3 Final    Lymphocytes, Absolute 07/07/2023 2.20  0.70 - 3.10  10*3/mm3 Final    Monocytes, Absolute 07/07/2023 0.66  0.10 - 0.90 10*3/mm3 Final    Eosinophils, Absolute 07/07/2023 0.25  0.00 - 0.40 10*3/mm3 Final    Basophils, Absolute 07/07/2023 0.08  0.00 - 0.20 10*3/mm3 Final       ASSESSMENT/ PLAN:    Diagnoses and all orders for this visit:    1. Mixed hyperlipidemia (Primary)  Assessment & Plan:   Update his lipid profile.  With his history of diabetes and coronary disease he should be on a statin.  Will assess his lipids and then rediscuss.    Orders:  -     Comprehensive Metabolic Panel  -     Lipid Panel    2. Hypertension, essential  Assessment & Plan:  His blood pressure is rather elevated here.  Will recheck it 1 more time.  Upon recheck his blood pressure was improved although still elevated.  Will adjust his medication for better control.    Orders:  -     Comprehensive Metabolic Panel  -     Lipid Panel    3. Coronary artery disease involving native coronary artery of native heart without angina pectoris  Assessment & Plan:  He denies any anginal-like complaints.  Will update his labs.    Orders:  -     Lipid Panel    4. Type 2 diabetes mellitus with hyperglycemia, with long-term current use of insulin  Assessment & Plan:  Will update his A1c with his labs.    Orders:  -     Comprehensive Metabolic Panel  -     Lipid Panel  -     Hemoglobin A1c  -     Microalbumin / Creatinine Urine Ratio - Urine, Clean Catch  -     glucose blood (Accu-Chek Kirsten Plus) test strip; Use to check glucose before breakfast and bedtime for Dx T2DM E11.9  Dispense: 200 each; Refill: 3    5. CKD stage 3 due to type 2 diabetes mellitus  -     Comprehensive Metabolic Panel  -     CBC (No Diff)    6. Cigarette nicotine dependence in remission  Assessment & Plan:  He has not smoked in about 6 months.  He needs to have plastic surgery for revision of his sternal wound from his sternal wound dehiscence.  He needs to document that he is nicotine  free.                                          Orders:  -     Nicotine Screen, Urine - Urine, Clean Catch; Future    7. Insulin dependent type 2 diabetes mellitus  -     Comprehensive Metabolic Panel  -     Lipid Panel  -     Hemoglobin A1c  -     Microalbumin / Creatinine Urine Ratio - Urine, Clean Catch  -     glucose blood (Accu-Chek Kirsten Plus) test strip; Use to check glucose before breakfast and bedtime for Dx T2DM E11.9  Dispense: 200 each; Refill: 3    8. Encounter for hepatitis C screening test for low risk patient  -     Hepatitis C Antibody    9. Encounter for therapeutic drug level monitoring  -     Nicotine Screen, Urine - Urine, Clean Catch; Future    Other orders  -     carvedilol (Coreg) 12.5 MG tablet; Take 1 tablet by mouth 2 (Two) Times a Day With Meals.  Dispense: 180 tablet; Refill: 1               Orders Placed Today:     New Medications Ordered This Visit   Medications    carvedilol (Coreg) 12.5 MG tablet     Sig: Take 1 tablet by mouth 2 (Two) Times a Day With Meals.     Dispense:  180 tablet     Refill:  1    glucose blood (Accu-Chek Kirsten Plus) test strip     Sig: Use to check glucose before breakfast and bedtime for Dx T2DM E11.9     Dispense:  200 each     Refill:  3        Management Plan:     An After Visit Summary was printed and given to the patient at discharge.    Follow-up: Return in about 2 months (around 5/19/2024) for Recheck.    Ernie White,  3/19/2024 11:02 EDT  This note was electronically signed.

## 2024-03-19 NOTE — ASSESSMENT & PLAN NOTE
His blood pressure is rather elevated here.  Will recheck it 1 more time.  Upon recheck his blood pressure was improved although still elevated.  Will adjust his medication for better control.

## 2024-03-19 NOTE — ASSESSMENT & PLAN NOTE
Update his lipid profile.  With his history of diabetes and coronary disease he should be on a statin.  Will assess his lipids and then rediscuss.

## 2024-03-19 NOTE — ASSESSMENT & PLAN NOTE
He has not smoked in about 6 months.  He needs to have plastic surgery for revision of his sternal wound from his sternal wound dehiscence.  He needs to document that he is nicotine free.

## 2024-03-20 DIAGNOSIS — D50.9 MICROCYTIC ANEMIA: ICD-10-CM

## 2024-03-20 DIAGNOSIS — D50.9 MICROCYTIC ANEMIA: Primary | ICD-10-CM

## 2024-03-20 DIAGNOSIS — E78.2 MIXED HYPERLIPIDEMIA: Primary | ICD-10-CM

## 2024-03-20 LAB
FERRITIN SERPL-MCNC: 26.8 NG/ML (ref 30–400)
IRON 24H UR-MRATE: 55 MCG/DL (ref 59–158)
IRON SATN MFR SERPL: 13 % (ref 20–50)
TIBC SERPL-MCNC: 440 MCG/DL (ref 298–536)
TRANSFERRIN SERPL-MCNC: 295 MG/DL (ref 200–360)

## 2024-03-20 RX ORDER — ROSUVASTATIN CALCIUM 10 MG/1
10 TABLET, COATED ORAL DAILY
Qty: 90 TABLET | Refills: 0 | Status: SHIPPED | OUTPATIENT
Start: 2024-03-20

## 2024-03-26 DIAGNOSIS — E11.65 TYPE 2 DIABETES MELLITUS WITH HYPERGLYCEMIA, WITH LONG-TERM CURRENT USE OF INSULIN: ICD-10-CM

## 2024-03-26 DIAGNOSIS — Z79.4 TYPE 2 DIABETES MELLITUS WITH HYPERGLYCEMIA, WITH LONG-TERM CURRENT USE OF INSULIN: ICD-10-CM

## 2024-03-26 DIAGNOSIS — E11.9 INSULIN DEPENDENT TYPE 2 DIABETES MELLITUS: ICD-10-CM

## 2024-03-26 DIAGNOSIS — Z79.4 INSULIN DEPENDENT TYPE 2 DIABETES MELLITUS: ICD-10-CM

## 2024-03-26 RX ORDER — BLOOD SUGAR DIAGNOSTIC
STRIP MISCELLANEOUS
Qty: 200 EACH | Refills: 3 | OUTPATIENT
Start: 2024-03-26 | End: 2025-03-26

## 2024-03-26 NOTE — TELEPHONE ENCOUNTER
Caller: Ilya Colon    Relationship: Self    Best call back number:[phone]    Requested Prescriptions:   Requested Prescriptions     Pending Prescriptions Disp Refills    glucose blood (Accu-Chek Kirsten Plus) test strip 200 each 3     Sig: Use to check glucose before breakfast and bedtime for Dx T2DM E11.9        [facility] [address] [phone] [facility] [phone] [facility]     Last office visit with prescribing clinician: 3/19/2024   Last telemedicine visit with prescribing clinician: Visit date not found   Next office visit with prescribing clinician: 5/24/2024     Does the patient have less than a 3 day supply:  [x] Yes  [] No

## 2024-05-24 ENCOUNTER — OFFICE VISIT (OUTPATIENT)
Dept: FAMILY MEDICINE CLINIC | Facility: CLINIC | Age: 54
End: 2024-05-24
Payer: MEDICARE

## 2024-05-24 VITALS
SYSTOLIC BLOOD PRESSURE: 183 MMHG | HEART RATE: 72 BPM | DIASTOLIC BLOOD PRESSURE: 84 MMHG | BODY MASS INDEX: 36.16 KG/M2 | HEIGHT: 66 IN | WEIGHT: 225 LBS | TEMPERATURE: 97.1 F | OXYGEN SATURATION: 96 %

## 2024-05-24 DIAGNOSIS — E78.2 MIXED HYPERLIPIDEMIA: Chronic | ICD-10-CM

## 2024-05-24 DIAGNOSIS — Z95.1 S/P CABG X 4: Primary | ICD-10-CM

## 2024-05-24 DIAGNOSIS — Z79.4 TYPE 2 DIABETES MELLITUS WITH HYPERGLYCEMIA, WITH LONG-TERM CURRENT USE OF INSULIN: ICD-10-CM

## 2024-05-24 DIAGNOSIS — E11.65 TYPE 2 DIABETES MELLITUS WITH HYPERGLYCEMIA, WITH LONG-TERM CURRENT USE OF INSULIN: ICD-10-CM

## 2024-05-24 DIAGNOSIS — I10 HYPERTENSION, ESSENTIAL: Chronic | ICD-10-CM

## 2024-05-24 PROCEDURE — 3051F HG A1C>EQUAL 7.0%<8.0%: CPT | Performed by: FAMILY MEDICINE

## 2024-05-24 PROCEDURE — 3077F SYST BP >= 140 MM HG: CPT | Performed by: FAMILY MEDICINE

## 2024-05-24 PROCEDURE — 3079F DIAST BP 80-89 MM HG: CPT | Performed by: FAMILY MEDICINE

## 2024-05-24 PROCEDURE — 1125F AMNT PAIN NOTED PAIN PRSNT: CPT | Performed by: FAMILY MEDICINE

## 2024-05-24 PROCEDURE — 99214 OFFICE O/P EST MOD 30 MIN: CPT | Performed by: FAMILY MEDICINE

## 2024-05-24 RX ORDER — SEMAGLUTIDE 1.34 MG/ML
INJECTION, SOLUTION SUBCUTANEOUS
Qty: 9 ML | Refills: 0 | Status: SHIPPED | OUTPATIENT
Start: 2024-05-24

## 2024-05-24 RX ORDER — SEMAGLUTIDE 1.34 MG/ML
INJECTION, SOLUTION SUBCUTANEOUS
Qty: 9 ML | Refills: 0 | Status: SHIPPED | OUTPATIENT
Start: 2024-05-24 | End: 2024-05-24

## 2024-05-24 NOTE — PROGRESS NOTES
Chief Complaint   Patient presents with    Follow-up     2 month     Hyperlipidemia    Diabetes        Subjective     Ilya Colon  has a past medical history of Arthritis, DDD (degenerative disc disease), lumbar, Enlarged prostate, Fatty liver, Hearing decreased, left, History of gastric ulcer, Myocardial infarction, Scoliosis, and Sternal wound dehiscence.    Type 2 diabetes-he does check his blood sugars at home.  His home blood sugar readings have been good.  His A1c 2 months ago was 7.4.    Hypertension-he does check his blood pressure at home.  His blood pressures at home are elevated like here at 184/84.    Hyperlipidemia-last visit after his labs we sent in some rosuvastatin.  He has been taking it and tolerating it well.    CAD-he denies any chest pain tightness or heaviness.        PHQ-2 Depression Screening  Little interest or pleasure in doing things?     Feeling down, depressed, or hopeless?     PHQ-2 Total Score     PHQ-9 Depression Screening  Little interest or pleasure in doing things?     Feeling down, depressed, or hopeless?     Trouble falling or staying asleep, or sleeping too much?     Feeling tired or having little energy?     Poor appetite or overeating?     Feeling bad about yourself - or that you are a failure or have let yourself or your family down?     Trouble concentrating on things, such as reading the newspaper or watching television?     Moving or speaking so slowly that other people could have noticed? Or the opposite - being so fidgety or restless that you have been moving around a lot more than usual?     Thoughts that you would be better off dead, or of hurting yourself in some way?     PHQ-9 Total Score     If you checked off any problems, how difficult have these problems made it for you to do your work, take care of things at home, or get along with other people?       No Known Allergies    Prior to Admission medications    Medication Sig Start Date End Date Taking?  "Authorizing Provider   amitriptyline (ELAVIL) 25 MG tablet Take 1 tablet by mouth Every Night. 1/18/24  Yes Ernie White DO   aspirin 81 MG EC tablet Take 1 tablet by mouth Daily. 1/18/24  Yes Ernie White DO   carvedilol (Coreg) 12.5 MG tablet Take 1 tablet by mouth 2 (Two) Times a Day With Meals. 3/19/24  Yes Ernie White DO   cetirizine (zyrTEC) 5 MG tablet Take 1 tablet by mouth Daily. 1/18/24  Yes Ernie White DO   empagliflozin (Jardiance) 25 MG tablet tablet Take 1 tablet by mouth Daily. 1/18/24  Yes Ernie White DO   esomeprazole (nexIUM) 20 MG capsule Take 1 capsule by mouth Every Morning Before Breakfast. 1/18/24  Yes Ernie White DO   gabapentin (NEURONTIN) 600 MG tablet Take 1 tablet by mouth Every 6 (Six) Hours.   Yes Sheri Ward MD   glucose blood (Accu-Chek Kirsten Plus) test strip Use to check glucose before breakfast and bedtime for Dx T2DM E11.9 3/19/24 3/19/25 Yes Ernie White DO   HYDROcodone-acetaminophen (NORCO)  MG per tablet Every 12 (Twelve) Hours.   Yes Sheri Ward MD   hydrOXYzine (ATARAX) 25 MG tablet Take 1 tablet by mouth Every 8 (Eight) Hours. 1/18/24  Yes Ernie White DO   Insulin Aspart (novoLOG) 100 UNIT/ML injection Inject 16 Units under the skin into the appropriate area as directed 2 (Two) Times a Day Before Meals. 1/3/24  Yes Ernie White DO   Insulin Degludec (Tresiba) 100 UNIT/ML solution injection Inject 55 Units under the skin into the appropriate area as directed 2 (Two) Times a Day. 1/3/24  Yes Ernie White DO   Insulin Syringe (TRUEplus Insulin Syringe) 29G X 1/2\" 1 ML misc Use 1 syringe 3 (Three) Times a Day. 11/28/23  Yes Ernie White DO   melatonin 5 MG tablet tablet Take 2 tablets by mouth Every Night.   Yes Sheri Ward MD   metroNIDAZOLE (METROCREAM) 0.75 % cream Apply 1 Application topically to the " appropriate area as directed 2 (Two) Times a Day.   Yes Sheri Ward MD   mometasone (ELOCON) 0.1 % cream Apply 1 Application topically to the appropriate area as directed Daily.   Yes Sheri Ward MD   nitroglycerin (NITROSTAT) 0.4 MG SL tablet Place 1 tablet under the tongue Every 5 (Five) Minutes As Needed for Chest Pain. Take no more than 3 doses in 15 minutes.   Yes Sheri Ward MD   potassium chloride (K-DUR,KLOR-CON) 20 MEQ CR tablet Take 1 tablet by mouth 2 (Two) Times a Day. 1/18/24  Yes Ernie White DO   rosuvastatin (Crestor) 10 MG tablet Take 1 tablet by mouth Daily. 3/20/24  Yes Ernie White DO   tamsulosin (FLOMAX) 0.4 MG capsule 24 hr capsule Take 1 capsule by mouth Daily. 1/18/24  Yes Ernie White DO   traZODone (DESYREL) 50 MG tablet Take 1 tablet by mouth Every Night.   Yes Sheri Ward MD   triamcinolone (KENALOG) 0.1 % cream  8/2/23  Yes Sheri Ward MD   varenicline (Chantix) 1 MG tablet Take 1 tablet by mouth Daily.  Patient not taking: Reported on 5/24/2024 1/18/24 5/24/24  Ernie White DO        Patient Active Problem List   Diagnosis    Osteoarthritis of thumbs, bilateral    Left hand pain    Right hand pain    Paresthesia of both hands    Atherosclerosis of coronary artery    Depression    Degeneration of lumbosacral intervertebral disc    GERD (gastroesophageal reflux disease)    Mixed hyperlipidemia    Hypertension, essential    Type 2 diabetes mellitus with hyperglycemia, with long-term current use of insulin    Scoliosis    Encounter for subsequent annual wellness visit (AWV) in Medicare patient    Allergic rhinitis    Insomnia    Cigarette nicotine dependence in remission    Class 1 obesity due to excess calories with serious comorbidity and body mass index (BMI) of 30.0 to 30.9 in adult    Unstable angina    CAD (coronary artery disease)    S/P CABG x 4    Sternal wound dehiscence    Sternal  wound dehiscence, subsequent encounter    Anemia due to stage 3a chronic kidney disease    CKD stage 3 due to type 2 diabetes mellitus    Rash of back    Diabetic peripheral neuropathy    Multiple joint pain    Encounter for medical examination to establish care        Past Surgical History:   Procedure Laterality Date    CARDIAC CATHETERIZATION N/A 10/12/2022    Procedure: Left Heart Cath - Radial artery;  Surgeon: Tad Batista MD;  Location: [facility] CATH INVASIVE LOCATION;  Service: Cardiology;  Laterality: N/A;    CARDIAC SURGERY      COLONOSCOPY      CORONARY ARTERY BYPASS GRAFT N/A 10/20/2022    Procedure: INTRAOP NANCI, STERNOTOMY, CORONARY ARTERY BYPASS GRAFTS X 4 USING LEFT ENDOSCOPICALLY HARVESTED GREATER SAPHENOUS VEIN AND LEFT RADHA, PRP;  Surgeon: Jr Tutu Blackmon MD;  Location: [facility];  Service: Cardiothoracic;  Laterality: N/A;    HAND SURGERY Right     X2    LAPAROSCOPIC CHOLECYSTECTOMY      STERNAL REWIRING N/A 2023    Procedure: STERNAL WIRE REMOVAL;  Surgeon: Jr Tutu Blackmon MD;  Location: [facility];  Service: Cardiothoracic;  Laterality: N/A;    TONSILLECTOMY      TRAM FLAP DELAYED N/A 2023    Procedure: TRAM FLAP RECONSTRUCTION, use of spy, sternal wound;  Surgeon: Stephenie Li MD;  Location: [facility];  Service: Plastics;  Laterality: N/A;       Social History     Socioeconomic History    Marital status:    Tobacco Use    Smoking status: Former     Average packs/day: 1 pack/day for 7.3 years (7.3 ttl pk-yrs)     Types: Cigarettes     Start date: 2015     Quit date: 10/1/2022     Years since quittin.6    Smokeless tobacco: Never   Vaping Use    Vaping status: Never Used   Substance and Sexual Activity    Alcohol use: Not Currently    Drug use: Yes     Types: Marijuana     Comment: DAILY, last used 23    Sexual activity: Defer       Family History   Problem Relation Age of Onset    Malig Hyperthermia Neg Hx        Family history,  "surgical history, past medical history, Allergies and meds reviewed with patient today and updated in [facility] EMR.     ROS:  Review of Systems   Constitutional:  Negative for fatigue.   HENT:  Negative for congestion, postnasal drip and rhinorrhea.    Eyes:  Negative for blurred vision and visual disturbance.   Respiratory:  Positive for cough. Negative for chest tightness, shortness of breath and wheezing.    Cardiovascular:  Negative for chest pain and palpitations.   Gastrointestinal:  Negative for blood in stool, constipation and diarrhea.   Endocrine: Negative for polydipsia and polyuria.   Allergic/Immunologic: Negative for environmental allergies.   Neurological:  Positive for headache.   Psychiatric/Behavioral:  Positive for depressed mood. The patient is not nervous/anxious.        OBJECTIVE:  Vitals:    05/24/24 1411   BP: (!) 183/84   BP Location: Left arm   Patient Position: Sitting   Pulse: 72   Temp: 97.1 °F (36.2 °C)   SpO2: 96%   Weight: 102 kg (225 lb)   Height: 167.6 cm (66\")     No results found.   Body mass index is 36.32 kg/m².  No LMP for male patient.    The 10-year ASCVD risk score (Yehuda DK, et al., 2019) is: 22.2%    Values used to calculate the score:      Age: 53 years      Sex: Male      Is Non- : No      Diabetic: Yes      Tobacco smoker: No      Systolic Blood Pressure: 183 mmHg      Is BP treated: Yes      HDL Cholesterol: 39 mg/dL      Total Cholesterol: 194 mg/dL     Physical Exam  Vitals and nursing note reviewed.   Constitutional:       General: He is not in acute distress.     Appearance: Normal appearance. He is obese.   HENT:      Head: Normocephalic.      Right Ear: Tympanic membrane, ear canal and external ear normal.      Left Ear: Tympanic membrane, ear canal and external ear normal.      Nose: Nose normal.      Mouth/Throat:      Mouth: Mucous membranes are moist.      Pharynx: Oropharynx is clear.   Eyes:      General: No scleral icterus.     " Conjunctiva/sclera: Conjunctivae normal.      Pupils: Pupils are equal, round, and reactive to light.   Cardiovascular:      Rate and Rhythm: Normal rate and regular rhythm.      Pulses: Normal pulses.      Heart sounds: Normal heart sounds. No murmur heard.  Pulmonary:      Effort: Pulmonary effort is normal.      Breath sounds: Normal breath sounds. No wheezing, rhonchi or rales.   Musculoskeletal:      Cervical back: Neck supple. No rigidity or tenderness.   Lymphadenopathy:      Cervical: No cervical adenopathy.   Skin:     General: Skin is warm and dry.      Coloration: Skin is not jaundiced.      Findings: No rash.   Neurological:      General: No focal deficit present.      Mental Status: He is alert and oriented to person, place, and time.   Psychiatric:         Mood and Affect: Mood normal.         Thought Content: Thought content normal.         Judgment: Judgment normal.         Procedures    No visits with results within 30 Day(s) from this visit.   Latest known visit with results is:   Office Visit on 03/19/2024   Component Date Value Ref Range Status    Glucose 03/19/2024 148 (H)  65 - 99 mg/dL Final    BUN 03/19/2024 7  6 - 20 mg/dL Final    Creatinine 03/19/2024 1.30 (H)  0.76 - 1.27 mg/dL Final    Sodium 03/19/2024 139  136 - 145 mmol/L Final    Potassium 03/19/2024 4.0  3.5 - 5.2 mmol/L Final    Chloride 03/19/2024 99  98 - 107 mmol/L Final    CO2 03/19/2024 27.5  22.0 - 29.0 mmol/L Final    Calcium 03/19/2024 9.6  8.6 - 10.5 mg/dL Final    Total Protein 03/19/2024 7.3  6.0 - 8.5 g/dL Final    Albumin 03/19/2024 4.4  3.5 - 5.2 g/dL Final    ALT (SGPT) 03/19/2024 41  1 - 41 U/L Final    AST (SGOT) 03/19/2024 34  1 - 40 U/L Final    Alkaline Phosphatase 03/19/2024 91  39 - 117 U/L Final    Total Bilirubin 03/19/2024 0.9  0.0 - 1.2 mg/dL Final    Globulin 03/19/2024 2.9  gm/dL Final    A/G Ratio 03/19/2024 1.5  g/dL Final    BUN/Creatinine Ratio 03/19/2024 5.4 (L)  7.0 - 25.0 Final    Anion Gap  03/19/2024 12.5  5.0 - 15.0 mmol/L Final    eGFR 03/19/2024 65.7  >60.0 mL/min/1.73 Final    Total Cholesterol 03/19/2024 194  0 - 200 mg/dL Final    Triglycerides 03/19/2024 145  0 - 150 mg/dL Final    HDL Cholesterol 03/19/2024 39 (L)  40 - 60 mg/dL Final    LDL Cholesterol  03/19/2024 129 (H)  0 - 100 mg/dL Final    VLDL Cholesterol 03/19/2024 26  5 - 40 mg/dL Final    LDL/HDL Ratio 03/19/2024 3.23   Final    Hemoglobin A1C 03/19/2024 7.40 (H)  4.80 - 5.60 % Final    Microalbumin/Creatinine Ratio 03/19/2024 659.0 (H)  0.0 - 29.0 mg/g Final    Creatinine, Urine 03/19/2024 73.9  mg/dL Final    Microalbumin, Urine 03/19/2024 48.7  mg/dL Final    Hepatitis C Ab 03/19/2024 Non-Reactive  Non-Reactive Final    WBC 03/19/2024 7.39  3.40 - 10.80 10*3/mm3 Final    RBC 03/19/2024 5.43  4.14 - 5.80 10*6/mm3 Final    Hemoglobin 03/19/2024 14.0  13.0 - 17.7 g/dL Final    Hematocrit 03/19/2024 42.8  37.5 - 51.0 % Final    MCV 03/19/2024 78.8 (L)  79.0 - 97.0 fL Final    MCH 03/19/2024 25.8 (L)  26.6 - 33.0 pg Final    MCHC 03/19/2024 32.7  31.5 - 35.7 g/dL Final    RDW 03/19/2024 15.5 (H)  12.3 - 15.4 % Final    RDW-SD 03/19/2024 43.3  37.0 - 54.0 fl Final    MPV 03/19/2024 10.8  6.0 - 12.0 fL Final    Platelets 03/19/2024 262  140 - 450 10*3/mm3 Final    Cotinine Screen, Urine  03/19/2024 Negative  Negative Final    Iron 03/19/2024 55 (L)  59 - 158 mcg/dL Final    Iron Saturation (TSAT) 03/19/2024 13 (L)  20 - 50 % Final    Transferrin 03/19/2024 295  200 - 360 mg/dL Final    TIBC 03/19/2024 440  298 - 536 mcg/dL Final    Ferritin 03/19/2024 26.80 (L)  30.00 - 400.00 ng/mL Final       ASSESSMENT/ PLAN:    Diagnoses and all orders for this visit:    1. S/P CABG x 4 (Primary)  Assessment & Plan:  He is doing well at this time and denies any anginal-like complaints.      2. Mixed hyperlipidemia  Assessment & Plan:   He is due to repeat his lipids.  He does have an outstanding order to have that done.      3. Hypertension,  essential  Assessment & Plan:  His blood pressure remains somewhat elevated here is as well as at home.  Currently he is only on the carvedilol at this time.  Will add an ARB for additional control and renal protection      4. Type 2 diabetes mellitus with hyperglycemia, with long-term current use of insulin  Assessment & Plan:  His diabetic control is not too bad at 7.4 but could always be better.  Will add some Ozempic for lower blood sugars, weight loss and to also reduce his cardiovascular risk.      Other orders  -     Discontinue: Semaglutide,0.25 or 0.5MG/DOS, (Ozempic, 0.25 or 0.5 MG/DOSE,) 2 MG/1.5ML solution pen-injector; Inject 0.25 mg subcu once weekly x 1 month.  Then increase to 0.5 mg subcu once weekly.  Dispense: 9 mL; Refill: 0  -     Semaglutide,0.25 or 0.5MG/DOS, (Ozempic, 0.25 or 0.5 MG/DOSE,) 2 MG/1.5ML solution pen-injector; Inject 0.25 mg subcu once weekly x 1 month.  Then increase to 0.5 mg subcu once weekly.  Dispense: 9 mL; Refill: 0        Class 2 Severe Obesity (BMI >=35 and <=39.9). Obesity-related health conditions include the following: hypertension, coronary heart disease, diabetes mellitus, and dyslipidemias. Obesity is unchanged. BMI is is above average; BMI management plan is completed. We discussed low calorie, low carb based diet program, portion control, and increasing exercise.      Orders Placed Today:     New Medications Ordered This Visit   Medications    Semaglutide,0.25 or 0.5MG/DOS, (Ozempic, 0.25 or 0.5 MG/DOSE,) 2 MG/1.5ML solution pen-injector     Sig: Inject 0.25 mg subcu once weekly x 1 month.  Then increase to 0.5 mg subcu once weekly.     Dispense:  9 mL     Refill:  0        Management Plan:     An After Visit Summary was printed and given to the patient at discharge.    Follow-up: Return in about 2 months (around 7/24/2024) for Recheck.    Ernie White,  5/24/2024 14:42 EDT  This note was electronically signed.

## 2024-05-24 NOTE — ASSESSMENT & PLAN NOTE
His blood pressure remains somewhat elevated here is as well as at home.  Currently he is only on the carvedilol at this time.  Will add an ARB for additional control and renal protection

## 2024-05-24 NOTE — ASSESSMENT & PLAN NOTE
His diabetic control is not too bad at 7.4 but could always be better.  Will add some Ozempic for lower blood sugars, weight loss and to also reduce his cardiovascular risk.

## 2024-07-01 DIAGNOSIS — E11.65 TYPE 2 DIABETES MELLITUS WITH HYPERGLYCEMIA, WITH LONG-TERM CURRENT USE OF INSULIN: Chronic | ICD-10-CM

## 2024-07-01 DIAGNOSIS — Z79.4 TYPE 2 DIABETES MELLITUS WITH HYPERGLYCEMIA, WITH LONG-TERM CURRENT USE OF INSULIN: Chronic | ICD-10-CM

## 2024-07-01 NOTE — TELEPHONE ENCOUNTER
Caller: Colon Ilya GONZALEZ    Relationship: Self    Best call back number: [phone]    Requested Prescriptions:   Requested Prescriptions     Pending Prescriptions Disp Refills    empagliflozin (Jardiance) 25 MG tablet tablet 90 tablet 1     Sig: Take 1 tablet by mouth Daily.        Pharmacy where request should be sent: [facility], [address] [phone] [facility] [phone] [facility]     Last office visit with prescribing clinician: 5/24/2024   Last telemedicine visit with prescribing clinician: Visit date not found   Next office visit with prescribing clinician: 7/25/2024     Additional details provided by patient:     1 WEEK LEFT OF MEDICATION       PATIENT WOULD LIKE A CALL BACK WHEN MEDICATION HAS BEEN SENT TO HIS PHARMACY.    PATIENT DOES NOT HAVE VOICEMAIL     PATIENT WANTS TO USE [facility] BECAUSE MEDICATIONS COST LESS.         Does the patient have less than a 3 day supply:  [] Yes  [x] No    Would you like a call back once the refill request has been completed: [x] Yes [] No    If the office needs to give you a call back, can they leave a voicemail: [] Yes [x] No    Tammie Cyr Rep   07/01/24 10:25 EDT

## 2024-07-02 RX ORDER — AMOXICILLIN 500 MG/1
CAPSULE ORAL
COMMUNITY
Start: 2024-06-17

## 2024-07-02 NOTE — TELEPHONE ENCOUNTER
Caller: Ilya Colon    Relationship to patient: Self    Best call back number: [phone]    Patient is needing: PATIENT CALLING IN FOR AN UPDATE, HE HAS ABOUT 6 DAYS LEFT BUT THIS IS A MAIL IN PRESCRIPTION THAT TAKES ABOUT 4 DAYS TO RECEIVE.

## 2024-07-25 ENCOUNTER — OFFICE VISIT (OUTPATIENT)
Dept: FAMILY MEDICINE CLINIC | Facility: CLINIC | Age: 54
End: 2024-07-25
Payer: MEDICARE

## 2024-07-25 VITALS
WEIGHT: 223 LBS | HEIGHT: 66 IN | OXYGEN SATURATION: 96 % | TEMPERATURE: 97.8 F | SYSTOLIC BLOOD PRESSURE: 168 MMHG | BODY MASS INDEX: 35.84 KG/M2 | HEART RATE: 78 BPM | DIASTOLIC BLOOD PRESSURE: 89 MMHG

## 2024-07-25 DIAGNOSIS — Z12.5 SCREENING FOR PROSTATE CANCER: ICD-10-CM

## 2024-07-25 DIAGNOSIS — Z79.4 TYPE 2 DIABETES MELLITUS WITH HYPERGLYCEMIA, WITH LONG-TERM CURRENT USE OF INSULIN: ICD-10-CM

## 2024-07-25 DIAGNOSIS — E11.65 TYPE 2 DIABETES MELLITUS WITH HYPERGLYCEMIA, WITH LONG-TERM CURRENT USE OF INSULIN: ICD-10-CM

## 2024-07-25 DIAGNOSIS — N18.31 ANEMIA DUE TO STAGE 3A CHRONIC KIDNEY DISEASE: ICD-10-CM

## 2024-07-25 DIAGNOSIS — I10 HYPERTENSION, ESSENTIAL: Chronic | ICD-10-CM

## 2024-07-25 DIAGNOSIS — Z00.00 MEDICARE ANNUAL WELLNESS VISIT, SUBSEQUENT: ICD-10-CM

## 2024-07-25 DIAGNOSIS — I25.10 CORONARY ARTERY DISEASE INVOLVING NATIVE CORONARY ARTERY OF NATIVE HEART WITHOUT ANGINA PECTORIS: ICD-10-CM

## 2024-07-25 DIAGNOSIS — R39.11 BENIGN PROSTATIC HYPERPLASIA WITH URINARY HESITANCY: ICD-10-CM

## 2024-07-25 DIAGNOSIS — N40.1 BENIGN PROSTATIC HYPERPLASIA WITH URINARY HESITANCY: ICD-10-CM

## 2024-07-25 DIAGNOSIS — E78.2 MIXED HYPERLIPIDEMIA: Primary | Chronic | ICD-10-CM

## 2024-07-25 DIAGNOSIS — F41.9 ANXIETY: ICD-10-CM

## 2024-07-25 DIAGNOSIS — N18.30 CKD STAGE 3 DUE TO TYPE 2 DIABETES MELLITUS: ICD-10-CM

## 2024-07-25 DIAGNOSIS — E11.22 CKD STAGE 3 DUE TO TYPE 2 DIABETES MELLITUS: ICD-10-CM

## 2024-07-25 DIAGNOSIS — D63.1 ANEMIA DUE TO STAGE 3A CHRONIC KIDNEY DISEASE: ICD-10-CM

## 2024-07-25 RX ORDER — VENLAFAXINE HYDROCHLORIDE 75 MG/1
75 CAPSULE, EXTENDED RELEASE ORAL DAILY
Qty: 90 CAPSULE | Refills: 0 | Status: SHIPPED | OUTPATIENT
Start: 2024-07-25 | End: 2024-07-25

## 2024-07-25 RX ORDER — VENLAFAXINE HYDROCHLORIDE 75 MG/1
75 CAPSULE, EXTENDED RELEASE ORAL DAILY
Qty: 90 CAPSULE | Refills: 0 | Status: SHIPPED | OUTPATIENT
Start: 2024-07-25

## 2024-07-25 RX ORDER — IRBESARTAN 150 MG/1
150 TABLET ORAL NIGHTLY
Qty: 90 TABLET | Refills: 1 | Status: SHIPPED | OUTPATIENT
Start: 2024-07-25

## 2024-07-25 RX ORDER — TAMSULOSIN HYDROCHLORIDE 0.4 MG/1
1 CAPSULE ORAL 2 TIMES DAILY
Qty: 180 CAPSULE | Refills: 3 | Status: SHIPPED | OUTPATIENT
Start: 2024-07-25

## 2024-07-25 RX ORDER — IRBESARTAN 150 MG/1
150 TABLET ORAL NIGHTLY
Qty: 90 TABLET | Refills: 1 | Status: SHIPPED | OUTPATIENT
Start: 2024-07-25 | End: 2024-07-25

## 2024-07-25 NOTE — ASSESSMENT & PLAN NOTE
Will update his A1c with his labs here today.  He is only recently started his Ozempic.  He is currently finishing up his first month of the 0.25 mg before titrating up to the 0.5 mg dosage weekly.  Instructed if he tolerates that well to let us know and then we can titrate him up to the 1 mg dosage.

## 2024-07-25 NOTE — ASSESSMENT & PLAN NOTE
He has more frequent anxiety as of late.  He states historically he used to take Xanax.  Will try adding some venlafaxine and see if it helps with his anxiety as well.

## 2024-07-25 NOTE — ASSESSMENT & PLAN NOTE
Overall he is not doing bad.  His only care gap at this time is a diabetic eye exam.  Encouraged him to schedule that but also to schedule I on an annual basis.

## 2024-07-25 NOTE — ASSESSMENT & PLAN NOTE
He is having increased difficulty urinating.  He does have a slow weak stream and lots of hesitancy and urgency.  Will try increasing his tamsulosin to twice a day.  If this fails to help he will need to see urology.

## 2024-07-25 NOTE — PROGRESS NOTES
Subjective   The ABCs of the Annual Wellness Visit  Medicare Wellness Visit      Ilya Colon is a 53 y.o. patient who presents for a Medicare Wellness Visit.    The following portions of the patient's history were reviewed and   updated as appropriate: allergies, current medications, past family history, past medical history, past social history, past surgical history, and problem list.    Compared to one year ago, the patient's physical   health is worse.  Compared to one year ago, the patient's mental   health is worse.    Recent Hospitalizations:  He was not admitted to the hospital during the last year.     Current Medical Providers:  Patient Care Team:  Ernie White DO as PCP - General (Family Medicine)  GRAEME Chawla MD as Consulting Physician (Cardiology)    Outpatient Medications Prior to Visit   Medication Sig Dispense Refill    amitriptyline (ELAVIL) 25 MG tablet Take 1 tablet by mouth Every Night. 90 tablet 1    aspirin 81 MG EC tablet Take 1 tablet by mouth Daily. 90 tablet 1    carvedilol (Coreg) 12.5 MG tablet Take 1 tablet by mouth 2 (Two) Times a Day With Meals. 180 tablet 1    cetirizine (zyrTEC) 5 MG tablet Take 1 tablet by mouth Daily. 90 tablet 1    empagliflozin (Jardiance) 25 MG tablet tablet Take 1 tablet by mouth Daily. 90 tablet 1    esomeprazole (nexIUM) 20 MG capsule Take 1 capsule by mouth Every Morning Before Breakfast. 90 capsule 1    gabapentin (NEURONTIN) 600 MG tablet Take 1 tablet by mouth Every 6 (Six) Hours.      glucose blood (Accu-Chek Kirsten Plus) test strip Use to check glucose before breakfast and bedtime for Dx T2DM E11.9 200 each 3    HYDROcodone-acetaminophen (NORCO)  MG per tablet Every 12 (Twelve) Hours.      Insulin Aspart (novoLOG) 100 UNIT/ML injection Inject 16 Units under the skin into the appropriate area as directed 2 (Two) Times a Day Before Meals. 30 mL 1    Insulin Degludec (Tresiba) 100 UNIT/ML solution injection Inject 55 Units under  "the skin into the appropriate area as directed 2 (Two) Times a Day. 100 mL 3    Insulin Syringe (TRUEplus Insulin Syringe) 29G X 1/2\" 1 ML misc Use 1 syringe 3 (Three) Times a Day. 300 each 3    melatonin 5 MG tablet tablet Take 2 tablets by mouth Every Night.      metroNIDAZOLE (METROCREAM) 0.75 % cream Apply 1 Application topically to the appropriate area as directed 2 (Two) Times a Day.      mometasone (ELOCON) 0.1 % cream Apply 1 Application topically to the appropriate area as directed Daily.      nitroglycerin (NITROSTAT) 0.4 MG SL tablet Place 1 tablet under the tongue Every 5 (Five) Minutes As Needed for Chest Pain. Take no more than 3 doses in 15 minutes.      potassium chloride (K-DUR,KLOR-CON) 20 MEQ CR tablet Take 1 tablet by mouth 2 (Two) Times a Day. 180 tablet 1    rosuvastatin (Crestor) 10 MG tablet Take 1 tablet by mouth Daily. 90 tablet 0    Semaglutide,0.25 or 0.5MG/DOS, (Ozempic, 0.25 or 0.5 MG/DOSE,) 2 MG/1.5ML solution pen-injector Inject 0.25 mg subcu once weekly x 1 month.  Then increase to 0.5 mg subcu once weekly. 9 mL 0    triamcinolone (KENALOG) 0.1 % cream       hydrOXYzine (ATARAX) 25 MG tablet Take 1 tablet by mouth Every 8 (Eight) Hours. 270 tablet 3    tamsulosin (FLOMAX) 0.4 MG capsule 24 hr capsule Take 1 capsule by mouth Daily. 90 capsule 3    traZODone (DESYREL) 50 MG tablet Take 1 tablet by mouth Every Night.      amoxicillin (AMOXIL) 500 MG capsule  (Patient not taking: Reported on 7/25/2024)       Facility-Administered Medications Prior to Visit   Medication Dose Route Frequency Provider Last Rate Last Admin    Chlorhexidine Gluconate Cloth 2 % pads 1 application  1 application  Topical Q12H PRN Lora Cowan APRN         Opioid medication/s are on active medication list.  and I have evaluated his active treatment plan and pain score trends (see table).  There were no vitals filed for this visit.  I have reviewed the chart for potential of high risk medication and harmful " "drug interactions in the elderly.        Aspirin is on active medication list. Aspirin use is indicated based on review of current medical condition/s. Pros and cons of this therapy have been discussed today. Benefits of this medication outweigh potential harm.  Patient has been encouraged to continue taking this medication.  .      Patient Active Problem List   Diagnosis    Osteoarthritis of thumbs, bilateral    Left hand pain    Right hand pain    Paresthesia of both hands    Atherosclerosis of coronary artery    Depression    Degeneration of lumbosacral intervertebral disc    GERD (gastroesophageal reflux disease)    Mixed hyperlipidemia    Hypertension, essential    Type 2 diabetes mellitus with hyperglycemia, with long-term current use of insulin    Scoliosis    Medicare annual wellness visit, subsequent    Allergic rhinitis    Insomnia    Cigarette nicotine dependence in remission    Class 1 obesity due to excess calories with serious comorbidity and body mass index (BMI) of 30.0 to 30.9 in adult    Unstable angina    CAD (coronary artery disease)    S/P CABG x 4    Sternal wound dehiscence    Sternal wound dehiscence, subsequent encounter    Anemia due to stage 3a chronic kidney disease    CKD stage 3 due to type 2 diabetes mellitus    Rash of back    Diabetic peripheral neuropathy    Multiple joint pain    Encounter for medical examination to establish care    Anxiety    Benign prostatic hyperplasia with urinary hesitancy    Screening for prostate cancer     Advance Care Planning (Click this link to access ACP Navigator)  Advance Directive is not on file.  ACP discussion was declined by the patient. Patient does not have an advance directive, declines further assistance.        Objective   Vitals:    07/25/24 1520   BP: 168/89   BP Location: Left arm   Patient Position: Sitting   Pulse: 78   Temp: 97.8 °F (36.6 °C)   SpO2: 96%   Weight: 101 kg (223 lb)   Height: 167.6 cm (66\")       Estimated body mass index " "is 35.99 kg/m² as calculated from the following:    Height as of this encounter: 167.6 cm (66\").    Weight as of this encounter: 101 kg (223 lb).             Does the patient have evidence of cognitive impairment? No                                                                                                Health  Risk Assessment    Smoking Status:  Social History     Tobacco Use   Smoking Status Former    Average packs/day: 1 pack/day for 7.3 years (7.3 ttl pk-yrs)    Types: Cigarettes    Start date: 2015    Quit date: 10/1/2022    Years since quittin.8   Smokeless Tobacco Never     Alcohol Consumption:  Social History     Substance and Sexual Activity   Alcohol Use Not Currently     Fall Risk Screen:  LILIAM Fall Risk Assessment was completed, and patient is at LOW risk for falls.Assessment completed on:2024    Depression Screenin/25/2024     3:23 PM   PHQ-2/PHQ-9 Depression Screening   Little Interest or Pleasure in Doing Things 0-->not at all   Feeling Down, Depressed or Hopeless 0-->not at all   PHQ-9: Brief Depression Severity Measure Score 0     Health Habits and Functional and Cognitive Screenin/25/2024     3:23 PM   Functional & Cognitive Status   Do you have difficulty preparing food and eating? Yes   Do you have difficulty bathing yourself, getting dressed or grooming yourself? Yes   Do you have difficulty using the toilet? Yes   Do you have difficulty moving around from place to place? Yes   Do you have trouble with steps or getting out of a bed or a chair? Yes   Current Diet Well Balanced Diet   Dental Exam Not up to date   Eye Exam Not up to date   Exercise (times per week) 1 times per week   Current Exercises Include No Regular Exercise;Walking   Do you need help using the phone?  No   Are you deaf or do you have serious difficulty hearing?  Yes   Do you need help to go to places out of walking distance? Yes   Do you need help shopping? No   Do you need help " preparing meals?  No   Do you need help with housework?  Yes   Do you need help with laundry? Yes   Do you need help taking your medications? No   Do you need help managing money? No   Do you ever drive or ride in a car without wearing a seat belt? No   Have you felt unusual stress, anger or loneliness in the last month? No   Who do you live with? Other   If you need help, do you have trouble finding someone available to you? No   Have you been bothered in the last four weeks by sexual problems? No   Do you have difficulty concentrating, remembering or making decisions? No             Age-appropriate Screening Schedule:  Refer to the list below for future screening recommendations based on patient's age, sex and/or medical conditions. Orders for these recommended tests are listed in the plan section. The patient has been provided with a written plan.    Health Maintenance List  Health Maintenance   Topic Date Due    DIABETIC EYE EXAM  Never done    HEMOGLOBIN A1C  09/19/2024    ZOSTER VACCINE (1 of 2) 07/25/2024 (Originally 10/30/2020)    COVID-19 Vaccine (1 - 2023-24 season) 08/13/2024 (Originally 9/1/2023)    Hepatitis B (1 of 3 - 19+ 3-dose series) 09/11/2024 (Originally 10/30/1989)    Pneumococcal Vaccine 0-64 (1 of 2 - PCV) 03/19/2025 (Originally 10/30/1976)    TDAP/TD VACCINES (1 - Tdap) 03/19/2025 (Originally 10/30/1989)    INFLUENZA VACCINE  08/01/2024    DIABETIC FOOT EXAM  03/19/2025    LIPID PANEL  03/19/2025    URINE MICROALBUMIN  03/19/2025    BMI FOLLOWUP  05/24/2025    COLORECTAL CANCER SCREENING  06/16/2025    ANNUAL WELLNESS VISIT  07/25/2025    HEPATITIS C SCREENING  Completed                                                                                                                                                CMS Preventative Services Quick Reference  Risk Factors Identified During Encounter  None Identified    The above risks/problems have been discussed with the patient.  Pertinent  information has been shared with the patient in the After Visit Summary.  An After Visit Summary and PPPS were made available to the patient.    Follow Up:Next Medicare Wellness visit to be scheduled in 1 year.         Additional E&M Note during same encounter follows:  Patient has additional, significant, and separately identifiable condition(s)/problem(s) that require work above and beyond the Medicare Wellness Visit     Chief Complaint  Medicare Wellness-subsequent (Requesting refills on all meds )    Subjective   Type 2 diabetes-he does check his blood sugars at home.  He states are somewhere between 1 30-1 50s.  Usually these are in the immediate postprandial state.  Denies any blurred vision or excessive urination but does have excessive thirst.    Hypertension-he does take his carvedilol on a daily basis.  He states his home blood pressure readings have also been equally as elevated at home.    Hyperlipidemia-he states he is taking his rosuvastatin on a daily basis.    CAD-he denies any chest pain tightness or heaviness.  He does not see cardiology on a regular basis.  He is taking his aspirin daily.    Anxiety disorder-his states his anxiety has been worse as of late.  Due to his lupus and the [place] weather he avoids going outside.  Unfortunately this means he is inside more often and that seems to aggravate his anxiety.  Currently is not on any medication.      Ilya is also being seen today for additional medical problem/s.    Review of Systems   Constitutional:  Positive for fatigue.   HENT:  Negative for congestion, postnasal drip and rhinorrhea.    Eyes:  Negative for visual disturbance.   Respiratory:  Positive for cough. Negative for chest tightness, shortness of breath and wheezing.    Cardiovascular:  Negative for chest pain and palpitations.   Endocrine: Positive for polydipsia. Negative for polyuria.   Musculoskeletal:  Positive for back pain.   Psychiatric/Behavioral:  The patient is  "nervous/anxious.               Objective   Vital Signs:  /89 (BP Location: Left arm, Patient Position: Sitting)   Pulse 78   Temp 97.8 °F (36.6 °C)   Ht 167.6 cm (66\")   Wt 101 kg (223 lb)   SpO2 96%   BMI 35.99 kg/m²   Physical Exam  Vitals and nursing note reviewed.   Constitutional:       General: He is not in acute distress.     Appearance: Normal appearance. He is obese.   HENT:      Head: Normocephalic.      Right Ear: Tympanic membrane, ear canal and external ear normal.      Left Ear: Tympanic membrane, ear canal and external ear normal.      Nose: Nose normal.      Mouth/Throat:      Mouth: Mucous membranes are moist.      Pharynx: Oropharynx is clear.      Comments: Dentition absent  Eyes:      General: No scleral icterus.     Conjunctiva/sclera: Conjunctivae normal.      Pupils: Pupils are equal, round, and reactive to light.   Cardiovascular:      Rate and Rhythm: Normal rate and regular rhythm.      Pulses: Normal pulses.      Heart sounds: Normal heart sounds. No murmur heard.  Pulmonary:      Effort: Pulmonary effort is normal.      Breath sounds: Normal breath sounds. No wheezing, rhonchi or rales.   Musculoskeletal:      Cervical back: Neck supple. No rigidity or tenderness.   Lymphadenopathy:      Cervical: No cervical adenopathy.   Skin:     General: Skin is warm and dry.      Coloration: Skin is not jaundiced.      Findings: No rash.   Neurological:      General: No focal deficit present.      Mental Status: He is alert and oriented to person, place, and time.   Psychiatric:         Mood and Affect: Mood normal.         Thought Content: Thought content normal.         Judgment: Judgment normal.         The following data was reviewed by: Ernie White DO on 07/25/2024:        Assessment and Plan Additional age appropriate preventative wellness advice topics were discussed during today's preventative wellness exam(some topics already addressed during AWV portion of the note " above):    Physical Activity: Advised cardiovascular activity 150 minutes per week as tolerated. (example brisk walk for 30 minutes, 5 days a week).     Nutrition: Discussed nutrition plan with patient. Information shared in after visit summary. Goal is for a well balanced diet to enhance overall health.     Healthy Weight: Discussed current and goal BMI with patient. Steps to attain this goal discussed. Information shared in after visit summary.              Mixed hyperlipidemia   Will update his lipid profile with his labs.  Hypertension, essential  His blood pressure remains elevated here as well as at home.  Will add an ARB on 20 for blood pressure but additional cardiovascular and diabetic and renal protection.  Coronary artery disease involving native coronary artery of native heart without angina pectoris  He denies any current anginal-like complaints.  Type 2 diabetes mellitus with hyperglycemia, with long-term current use of insulin  Will update his A1c with his labs here today.  He is only recently started his Ozempic.  He is currently finishing up his first month of the 0.25 mg before titrating up to the 0.5 mg dosage weekly.  Instructed if he tolerates that well to let us know and then we can titrate him up to the 1 mg dosage.  CKD stage 3 due to type 2 diabetes mellitus    Anemia due to stage 3a chronic kidney disease    Medicare annual wellness visit, subsequent  Overall he is not doing bad.  His only care gap at this time is a diabetic eye exam.  Encouraged him to schedule that but also to schedule I on an annual basis.  Anxiety  He has more frequent anxiety as of late.  He states historically he used to take Xanax.  Will try adding some venlafaxine and see if it helps with his anxiety as well.  Benign prostatic hyperplasia with urinary hesitancy  He is having increased difficulty urinating.  He does have a slow weak stream and lots of hesitancy and urgency.  Will try increasing his tamsulosin to  twice a day.  If this fails to help he will need to see urology.  Screening for prostate cancer      Orders Placed This Encounter   Procedures    Vitamin D,25-Hydroxy     Order Specific Question:   Release to patient     Answer:   Routine Release [[id number]]    Comprehensive Metabolic Panel     Order Specific Question:   Release to patient     Answer:   Routine Release [[id number]]    Lipid Panel     Order Specific Question:   Release to patient     Answer:   Routine Release [[id number]]    Hemoglobin A1c     Order Specific Question:   Release to patient     Answer:   Routine Release [[id number]]    Microalbumin / Creatinine Urine Ratio - Urine, Clean Catch     Order Specific Question:   Release to patient     Answer:   Routine Release [[id number]]    CBC (No Diff)     Order Specific Question:   Release to patient     Answer:   Routine Release [[id number]]    Ferritin     Order Specific Question:   Release to patient     Answer:   Routine Release [[id number]]    Iron Profile     Order Specific Question:   Release to patient     Answer:   Routine Release [[id number]]    Vitamin D,25-Hydroxy     Order Specific Question:   Release to patient     Answer:   Routine Release [[id number]]    PSA Screen     Order Specific Question:   Release to patient     Answer:   Routine Release [[id number]]     New Medications Ordered This Visit   Medications    tamsulosin (FLOMAX) 0.4 MG capsule 24 hr capsule     Sig: Take 1 capsule by mouth 2 (Two) Times a Day.     Dispense:  180 capsule     Refill:  3    irbesartan (Avapro) 150 MG tablet     Sig: Take 1 tablet by mouth Every Night.     Dispense:  90 tablet     Refill:  1    venlafaxine XR (Effexor XR) 75 MG 24 hr capsule     Sig: Take 1 capsule by mouth Daily.     Dispense:  90 capsule     Refill:  0          Follow Up   Return in about 3 months (around 10/25/2024).  Patient was given instructions and counseling regarding his condition or for health maintenance advice. Please see  specific information pulled into the AVS if appropriate.

## 2024-07-25 NOTE — ASSESSMENT & PLAN NOTE
His blood pressure remains elevated here as well as at home.  Will add an ARB on 20 for blood pressure but additional cardiovascular and diabetic and renal protection.

## 2024-08-21 ENCOUNTER — LAB (OUTPATIENT)
Dept: LAB | Facility: HOSPITAL | Age: 54
End: 2024-08-21
Payer: MEDICARE

## 2024-08-21 DIAGNOSIS — D50.9 MICROCYTIC ANEMIA: ICD-10-CM

## 2024-08-21 DIAGNOSIS — E78.2 MIXED HYPERLIPIDEMIA: ICD-10-CM

## 2024-08-21 LAB
25(OH)D3 SERPL-MCNC: 38 NG/ML (ref 30–100)
ALBUMIN SERPL-MCNC: 4.6 G/DL (ref 3.5–5.2)
ALBUMIN UR-MCNC: 15.5 MG/DL
ALBUMIN/GLOB SERPL: 1.5 G/DL
ALP SERPL-CCNC: 117 U/L (ref 39–117)
ALT SERPL W P-5'-P-CCNC: 39 U/L (ref 1–41)
ANION GAP SERPL CALCULATED.3IONS-SCNC: 13.5 MMOL/L (ref 5–15)
AST SERPL-CCNC: 35 U/L (ref 1–40)
BILIRUB CONJ SERPL-MCNC: 0.2 MG/DL (ref 0–0.3)
BILIRUB SERPL-MCNC: 1 MG/DL (ref 0–1.2)
BUN SERPL-MCNC: 6 MG/DL (ref 6–20)
BUN/CREAT SERPL: 5.2 (ref 7–25)
CALCIUM SPEC-SCNC: 9.8 MG/DL (ref 8.6–10.5)
CHLORIDE SERPL-SCNC: 96 MMOL/L (ref 98–107)
CHOLEST SERPL-MCNC: 189 MG/DL (ref 0–200)
CK SERPL-CCNC: 175 U/L (ref 20–200)
CO2 SERPL-SCNC: 23.5 MMOL/L (ref 22–29)
CREAT SERPL-MCNC: 1.15 MG/DL (ref 0.76–1.27)
CREAT UR-MCNC: 22.1 MG/DL
DEPRECATED RDW RBC AUTO: 41.9 FL (ref 37–54)
EGFRCR SERPLBLD CKD-EPI 2021: 76.1 ML/MIN/1.73
ERYTHROCYTE [DISTWIDTH] IN BLOOD BY AUTOMATED COUNT: 14.2 % (ref 12.3–15.4)
FERRITIN SERPL-MCNC: 40.9 NG/ML (ref 30–400)
GLOBULIN UR ELPH-MCNC: 3 GM/DL
GLUCOSE SERPL-MCNC: 131 MG/DL (ref 65–99)
HBA1C MFR BLD: 6.5 % (ref 4.8–5.6)
HCT VFR BLD AUTO: 44.7 % (ref 37.5–51)
HDLC SERPL-MCNC: 41 MG/DL (ref 40–60)
HGB BLD-MCNC: 15.1 G/DL (ref 13–17.7)
IRON 24H UR-MRATE: 62 MCG/DL (ref 59–158)
IRON SATN MFR SERPL: 15 % (ref 20–50)
LDLC SERPL CALC-MCNC: 120 MG/DL (ref 0–100)
LDLC/HDLC SERPL: 2.83 {RATIO}
MCH RBC QN AUTO: 27.6 PG (ref 26.6–33)
MCHC RBC AUTO-ENTMCNC: 33.8 G/DL (ref 31.5–35.7)
MCV RBC AUTO: 81.7 FL (ref 79–97)
MICROALBUMIN/CREAT UR: 701.4 MG/G (ref 0–29)
PLATELET # BLD AUTO: 332 10*3/MM3 (ref 140–450)
PMV BLD AUTO: 11.1 FL (ref 6–12)
POTASSIUM SERPL-SCNC: 3.3 MMOL/L (ref 3.5–5.2)
PROT SERPL-MCNC: 7.6 G/DL (ref 6–8.5)
PSA SERPL-MCNC: 0.53 NG/ML (ref 0–4)
RBC # BLD AUTO: 5.47 10*6/MM3 (ref 4.14–5.8)
SODIUM SERPL-SCNC: 133 MMOL/L (ref 136–145)
TIBC SERPL-MCNC: 425 MCG/DL (ref 298–536)
TRANSFERRIN SERPL-MCNC: 285 MG/DL (ref 200–360)
TRIGL SERPL-MCNC: 160 MG/DL (ref 0–150)
VLDLC SERPL-MCNC: 28 MG/DL (ref 5–40)
WBC NRBC COR # BLD AUTO: 8.67 10*3/MM3 (ref 3.4–10.8)

## 2024-08-21 PROCEDURE — 83036 HEMOGLOBIN GLYCOSYLATED A1C: CPT | Performed by: FAMILY MEDICINE

## 2024-08-21 PROCEDURE — 82306 VITAMIN D 25 HYDROXY: CPT | Performed by: FAMILY MEDICINE

## 2024-08-21 PROCEDURE — 80061 LIPID PANEL: CPT | Performed by: FAMILY MEDICINE

## 2024-08-21 PROCEDURE — 82728 ASSAY OF FERRITIN: CPT | Performed by: FAMILY MEDICINE

## 2024-08-21 PROCEDURE — G0103 PSA SCREENING: HCPCS | Performed by: FAMILY MEDICINE

## 2024-08-21 PROCEDURE — 82570 ASSAY OF URINE CREATININE: CPT | Performed by: FAMILY MEDICINE

## 2024-08-21 PROCEDURE — 82043 UR ALBUMIN QUANTITATIVE: CPT | Performed by: FAMILY MEDICINE

## 2024-08-21 PROCEDURE — 36415 COLL VENOUS BLD VENIPUNCTURE: CPT

## 2024-08-21 PROCEDURE — 85027 COMPLETE CBC AUTOMATED: CPT

## 2024-08-21 PROCEDURE — 80053 COMPREHEN METABOLIC PANEL: CPT | Performed by: FAMILY MEDICINE

## 2024-08-21 PROCEDURE — 82550 ASSAY OF CK (CPK): CPT

## 2024-08-21 PROCEDURE — 84466 ASSAY OF TRANSFERRIN: CPT | Performed by: FAMILY MEDICINE

## 2024-08-21 PROCEDURE — 82248 BILIRUBIN DIRECT: CPT

## 2024-08-21 PROCEDURE — 83540 ASSAY OF IRON: CPT | Performed by: FAMILY MEDICINE

## 2024-08-22 DIAGNOSIS — E87.6 LOW SERUM POTASSIUM: Primary | ICD-10-CM

## 2024-08-22 DIAGNOSIS — E87.1 LOW SODIUM LEVELS: ICD-10-CM

## 2024-09-04 NOTE — TELEPHONE ENCOUNTER
Caller: Ilya Colon    Relationship: Self    Best call back number: [phone]     Requested Prescriptions:   Requested Prescriptions     Pending Prescriptions Disp Refills    Insulin Aspart (novoLOG) 100 UNIT/ML injection 30 mL 1     Sig: Inject 16 Units under the skin into the appropriate area as directed 2 (Two) Times a Day Before Meals.        Pharmacy where request should be sent: [facility], [address] [phone] [facility] [phone]      Last office visit with prescribing clinician: 7/25/2024   Last telemedicine visit with prescribing clinician: Visit date not found   Next office visit with prescribing clinician: 10/25/2024     Additional details provided by patient: CALLER STATED THAT THE PHARMACY INFORMS THAT THE PROVIDER IS NEEDING TO SEND AGAIN AS IT WAS NOT SENT AS 90 DAY SUPPLY.  CALLER STATED THAT HE USES 35 ML 2 TIMES DAILY AND HAS ABOUT 10 DAYS OF THE MEDICATION REMAINING.     Does the patient have less than a 3 day supply:  [] Yes  [x] No    Tammie Keller Rep   09/04/24 10:55 EDT

## 2024-09-05 RX ORDER — INSULIN ASPART 100 [IU]/ML
16 INJECTION, SOLUTION INTRAVENOUS; SUBCUTANEOUS
Qty: 30 ML | Refills: 1 | Status: SHIPPED | OUTPATIENT
Start: 2024-09-05

## 2024-09-09 ENCOUNTER — TELEPHONE (OUTPATIENT)
Dept: FAMILY MEDICINE CLINIC | Facility: CLINIC | Age: 54
End: 2024-09-09
Payer: MEDICARE

## 2024-09-09 NOTE — TELEPHONE ENCOUNTER
Pt states he is having blurred vision , headache , double vision . Thinks its due to the ozempic , stopped taking today . Please advise

## 2024-10-25 ENCOUNTER — TELEPHONE (OUTPATIENT)
Dept: FAMILY MEDICINE CLINIC | Facility: CLINIC | Age: 54
End: 2024-10-25

## 2024-10-25 ENCOUNTER — OFFICE VISIT (OUTPATIENT)
Dept: FAMILY MEDICINE CLINIC | Facility: CLINIC | Age: 54
End: 2024-10-25
Payer: MEDICARE

## 2024-10-25 VITALS
HEIGHT: 66 IN | DIASTOLIC BLOOD PRESSURE: 94 MMHG | HEART RATE: 92 BPM | SYSTOLIC BLOOD PRESSURE: 191 MMHG | TEMPERATURE: 98.2 F | WEIGHT: 217 LBS | OXYGEN SATURATION: 98 % | BODY MASS INDEX: 34.87 KG/M2

## 2024-10-25 DIAGNOSIS — E11.65 TYPE 2 DIABETES MELLITUS WITH HYPERGLYCEMIA, WITH LONG-TERM CURRENT USE OF INSULIN: ICD-10-CM

## 2024-10-25 DIAGNOSIS — I10 HYPERTENSION, ESSENTIAL: Primary | Chronic | ICD-10-CM

## 2024-10-25 DIAGNOSIS — N40.1 BENIGN PROSTATIC HYPERPLASIA WITH URINARY HESITANCY: Primary | ICD-10-CM

## 2024-10-25 DIAGNOSIS — G44.52 NEW DAILY PERSISTENT HEADACHE: ICD-10-CM

## 2024-10-25 DIAGNOSIS — Z79.4 TYPE 2 DIABETES MELLITUS WITH HYPERGLYCEMIA, WITH LONG-TERM CURRENT USE OF INSULIN: ICD-10-CM

## 2024-10-25 DIAGNOSIS — Z95.1 S/P CABG X 4: ICD-10-CM

## 2024-10-25 DIAGNOSIS — F17.211 CIGARETTE NICOTINE DEPENDENCE IN REMISSION: ICD-10-CM

## 2024-10-25 DIAGNOSIS — H53.8 BLURRED VISION: ICD-10-CM

## 2024-10-25 DIAGNOSIS — E78.2 MIXED HYPERLIPIDEMIA: Chronic | ICD-10-CM

## 2024-10-25 DIAGNOSIS — R39.11 BENIGN PROSTATIC HYPERPLASIA WITH URINARY HESITANCY: Primary | ICD-10-CM

## 2024-10-25 DIAGNOSIS — I25.10 CORONARY ARTERY DISEASE INVOLVING NATIVE CORONARY ARTERY OF NATIVE HEART WITHOUT ANGINA PECTORIS: ICD-10-CM

## 2024-10-25 PROCEDURE — 3080F DIAST BP >= 90 MM HG: CPT | Performed by: FAMILY MEDICINE

## 2024-10-25 PROCEDURE — 3044F HG A1C LEVEL LT 7.0%: CPT | Performed by: FAMILY MEDICINE

## 2024-10-25 PROCEDURE — G2211 COMPLEX E/M VISIT ADD ON: HCPCS | Performed by: FAMILY MEDICINE

## 2024-10-25 PROCEDURE — 99214 OFFICE O/P EST MOD 30 MIN: CPT | Performed by: FAMILY MEDICINE

## 2024-10-25 PROCEDURE — 3077F SYST BP >= 140 MM HG: CPT | Performed by: FAMILY MEDICINE

## 2024-10-25 PROCEDURE — 1125F AMNT PAIN NOTED PAIN PRSNT: CPT | Performed by: FAMILY MEDICINE

## 2024-10-25 RX ORDER — CARVEDILOL 12.5 MG/1
12.5 TABLET ORAL 2 TIMES DAILY WITH MEALS
Qty: 180 TABLET | Refills: 1 | Status: SHIPPED | OUTPATIENT
Start: 2024-10-25

## 2024-10-25 RX ORDER — ROSUVASTATIN CALCIUM 10 MG/1
10 TABLET, COATED ORAL DAILY
Qty: 90 TABLET | Refills: 1 | Status: SHIPPED | OUTPATIENT
Start: 2024-10-25

## 2024-10-25 RX ORDER — VARENICLINE TARTRATE 1 MG/1
TABLET, FILM COATED ORAL
Qty: 180 TABLET | Refills: 1 | Status: SHIPPED | OUTPATIENT
Start: 2024-10-25

## 2024-10-25 NOTE — ASSESSMENT & PLAN NOTE
He is doing well at this time and denies any current anginal complaints.  Unfortunately his lipids are not at goal.  He has tried multiple statins but states he is unable to tolerate them.  Currently his LDL needs to be 55 or less.  Perhaps we can try and getting him prior authorized for Repatha.

## 2024-10-25 NOTE — ASSESSMENT & PLAN NOTE
He has had a new daily persistent headache for several weeks now.  He contributes this to the Ozempic.  This would be an unusual side effect particularly since it has persisted since discontinuing the medication.  This may be more related to his uncontrolled hypertension.  Will check a scan to make sure that is normal.

## 2024-10-25 NOTE — ASSESSMENT & PLAN NOTE
He has tried and failed multiple statins and states they have all made him feel lousy.  I wanted to give him a trial of Repatha but he declined.  He wants to go back and try the rosuvastatin again.

## 2024-10-25 NOTE — PROGRESS NOTES
Chief Complaint   Patient presents with    Follow-up     3 month     Hyperlipidemia    Diabetes        Subjective     Ilya Colon  has a past medical history of Arthritis, DDD (degenerative disc disease), lumbar, Enlarged prostate, Fatty liver, Hearing decreased, left, History of gastric ulcer, Myocardial infarction, Scoliosis, and Sternal wound dehiscence.    Type 2 diabetes-he does check his blood sugars at home.  He states his morning blood sugars are typically very good anywhere from 80 to about 120.  Midday Thursday will be about 140-150.  He stopped taking the Ozempic after our last visit because he felt it was causing headaches and changes in his vision.    Hypertension-he has not been checking his blood pressure as of late at home.  His blood pressure here today is somewhat elevated at 191/94.  There is a question of whether he is taking both the carvedilol and the irbesartan.    Hyperlipidemia-he has not been taking the rosuvastatin.  He states it made him feel bad and has not been taking it.    CAD-he is doing well at this time.  He denies any chest pain tightness or heaviness.        PHQ-2 Depression Screening  Little interest or pleasure in doing things?     Feeling down, depressed, or hopeless?     PHQ-2 Total Score     PHQ-9 Depression Screening  Little interest or pleasure in doing things?     Feeling down, depressed, or hopeless?     Trouble falling or staying asleep, or sleeping too much?     Feeling tired or having little energy?     Poor appetite or overeating?     Feeling bad about yourself - or that you are a failure or have let yourself or your family down?     Trouble concentrating on things, such as reading the newspaper or watching television?     Moving or speaking so slowly that other people could have noticed? Or the opposite - being so fidgety or restless that you have been moving around a lot more than usual?     Thoughts that you would be better off dead, or of hurting yourself  "in some way?     PHQ-9 Total Score     If you checked off any problems, how difficult have these problems made it for you to do your work, take care of things at home, or get along with other people?       No Known Allergies    Prior to Admission medications    Medication Sig Start Date End Date Taking? Authorizing Provider   amitriptyline (ELAVIL) 25 MG tablet Take 1 tablet by mouth Every Night. 1/18/24  Yes Ernie White DO   aspirin 81 MG EC tablet Take 1 tablet by mouth Daily. 1/18/24  Yes Ernie White DO   carvedilol (Coreg) 12.5 MG tablet Take 1 tablet by mouth 2 (Two) Times a Day With Meals. 3/19/24  Yes Ernie White DO   cetirizine (zyrTEC) 5 MG tablet Take 1 tablet by mouth Daily. 1/18/24  Yes Ernie White DO   empagliflozin (Jardiance) 25 MG tablet tablet Take 1 tablet by mouth Daily. 7/2/24  Yes Ernie White DO   esomeprazole (nexIUM) 20 MG capsule Take 1 capsule by mouth Every Morning Before Breakfast. 1/18/24  Yes Ernie White DO   gabapentin (NEURONTIN) 600 MG tablet Take 1 tablet by mouth Every 6 (Six) Hours.   Yes Sheri Ward MD   glucose blood (Accu-Chek Kirsten Plus) test strip Use to check glucose before breakfast and bedtime for Dx T2DM E11.9 3/19/24 3/19/25 Yes Ernie White DO   HYDROcodone-acetaminophen (NORCO)  MG per tablet Every 12 (Twelve) Hours.   Yes Sheri Ward MD   Insulin Aspart (novoLOG) 100 UNIT/ML injection Inject 16 Units under the skin into the appropriate area as directed 2 (Two) Times a Day Before Meals. 9/5/24  Yes Ernie White DO   Insulin Degludec (Tresiba) 100 UNIT/ML solution injection Inject 55 Units under the skin into the appropriate area as directed 2 (Two) Times a Day. 1/3/24  Yes Ernie White DO   Insulin Syringe (TRUEplus Insulin Syringe) 29G X 1/2\" 1 ML misc Use 1 syringe 3 (Three) Times a Day. 11/28/23  Yes Ernie White, DO "   irbesartan (Avapro) 150 MG tablet Take 1 tablet by mouth Every Night. 7/25/24  Yes Ernie White DO   melatonin 5 MG tablet tablet Take 2 tablets by mouth Every Night.   Yes Sheri Ward MD   metroNIDAZOLE (METROCREAM) 0.75 % cream Apply 1 Application topically to the appropriate area as directed 2 (Two) Times a Day.   Yes Sheri Ward MD   mometasone (ELOCON) 0.1 % cream Apply 1 Application topically to the appropriate area as directed Daily.   Yes Sheri Ward MD   nitroglycerin (NITROSTAT) 0.4 MG SL tablet Place 1 tablet under the tongue Every 5 (Five) Minutes As Needed for Chest Pain. Take no more than 3 doses in 15 minutes.   Yes Sheri Ward MD   potassium chloride (K-DUR,KLOR-CON) 20 MEQ CR tablet Take 1 tablet by mouth 2 (Two) Times a Day. 1/18/24  Yes Ernie White DO   rosuvastatin (Crestor) 10 MG tablet Take 1 tablet by mouth Daily. 3/20/24  Yes Ernie White DO   Semaglutide,0.25 or 0.5MG/DOS, (Ozempic, 0.25 or 0.5 MG/DOSE,) 2 MG/1.5ML solution pen-injector Inject 0.25 mg subcu once weekly x 1 month.  Then increase to 0.5 mg subcu once weekly. 5/24/24  Yes Ernie White DO   tamsulosin (FLOMAX) 0.4 MG capsule 24 hr capsule Take 1 capsule by mouth 2 (Two) Times a Day. 7/25/24  Yes Ernie White DO   triamcinolone (KENALOG) 0.1 % cream  8/2/23  Yes Sheri Ward MD   venlafaxine XR (Effexor XR) 75 MG 24 hr capsule Take 1 capsule by mouth Daily. 7/25/24  Yes Ernie White DO        Patient Active Problem List   Diagnosis    Osteoarthritis of thumbs, bilateral    Left hand pain    Right hand pain    Paresthesia of both hands    Atherosclerosis of coronary artery    Depression    Degeneration of lumbosacral intervertebral disc    GERD (gastroesophageal reflux disease)    Mixed hyperlipidemia    Hypertension, essential    Type 2 diabetes mellitus with hyperglycemia, with long-term current use of  insulin    Scoliosis    Medicare annual wellness visit, subsequent    Allergic rhinitis    Insomnia    Cigarette nicotine dependence in remission    Class 1 obesity due to excess calories with serious comorbidity and body mass index (BMI) of 30.0 to 30.9 in adult    Unstable angina    CAD (coronary artery disease)    S/P CABG x 4    Sternal wound dehiscence    Sternal wound dehiscence, subsequent encounter    Anemia due to stage 3a chronic kidney disease    CKD stage 3 due to type 2 diabetes mellitus    Rash of back    Diabetic peripheral neuropathy    Multiple joint pain    Encounter for medical examination to establish care    Anxiety    Benign prostatic hyperplasia with urinary hesitancy    Screening for prostate cancer    New daily persistent headache    Blurred vision        Past Surgical History:   Procedure Laterality Date    CARDIAC CATHETERIZATION N/A 10/12/2022    Procedure: Left Heart Cath - Radial artery;  Surgeon: Tad Batista MD;  Location: [facility]ATH [facility];  Service: Cardiology;  Laterality: N/A;    CARDIAC SURGERY      COLONOSCOPY      CORONARY ARTERY BYPASS GRAFT N/A 10/20/2022    Procedure: INTRAOP NANCI, STERNOTOMY, CORONARY ARTERY BYPASS GRAFTS X 4 USING LEFT ENDOSCOPICALLY HARVESTED GREATER SAPHENOUS VEIN AND LEFT RADHA, PRP;  Surgeon: Jr Tutu Blackmon MD;  Location: [facility];  Service: Cardiothoracic;  Laterality: N/A;    HAND SURGERY Right     X2    LAPAROSCOPIC CHOLECYSTECTOMY      STERNAL REWIRING N/A 1/18/2023    Procedure: STERNAL WIRE REMOVAL;  Surgeon: Jr Tutu Blackmon MD;  Location: [facility];  Service: Cardiothoracic;  Laterality: N/A;    TONSILLECTOMY      TRAM FLAP DELAYED N/A 1/20/2023    Procedure: TRAM FLAP RECONSTRUCTION, use of spy, sternal wound;  Surgeon: Stephenie Li MD;  Location: [facility];  Service: Plastics;  Laterality: N/A;       Social History     Socioeconomic History    Marital status:    Tobacco Use    Smoking  "status: Former     Average packs/day: 1 pack/day for 7.3 years (7.3 ttl pk-yrs)     Types: Cigarettes     Start date: 2015     Quit date: 10/1/2022     Years since quittin.0    Smokeless tobacco: Never   Vaping Use    Vaping status: Never Used   Substance and Sexual Activity    Alcohol use: Not Currently    Drug use: Yes     Types: Marijuana     Comment: DAILY, last used 23    Sexual activity: Defer       Family History   Problem Relation Age of Onset    Malig Hyperthermia Neg Hx        Family history, surgical history, past medical history, Allergies and meds reviewed with patient today and updated in [employer] EMR.     ROS:  Review of Systems   Constitutional:  Positive for fatigue.   HENT:  Positive for postnasal drip. Negative for congestion and rhinorrhea.    Eyes:  Negative for blurred vision and visual disturbance.   Respiratory:  Positive for cough. Negative for chest tightness, shortness of breath and wheezing.    Cardiovascular:  Negative for chest pain and palpitations.   Endocrine: Negative for polydipsia and polyuria.   Allergic/Immunologic: Negative for environmental allergies.   Neurological:  Positive for headache.   Psychiatric/Behavioral:  Negative for depressed mood. The patient is not nervous/anxious.        OBJECTIVE:  Vitals:    10/25/24 1151   BP: (!) 191/94   BP Location: Left arm   Patient Position: Sitting   Pulse: 92   Temp: 98.2 °F (36.8 °C)   SpO2: 98%   Weight: 98.4 kg (217 lb)   Height: 167.6 cm (66\")     No results found.   Body mass index is 35.02 kg/m².  No LMP for male patient.    The 10-year ASCVD risk score (Yehuda LOVING, et al., 2019) is: 22.1%    Values used to calculate the score:      Age: 53 years      Sex: Male      Is Non- : No      Diabetic: Yes      Tobacco smoker: No      Systolic Blood Pressure: 191 mmHg      Is BP treated: Yes      HDL Cholesterol: 41 mg/dL      Total Cholesterol: 189 mg/dL     Physical Exam  Vitals and nursing note " reviewed.   Constitutional:       General: He is not in acute distress.     Appearance: Normal appearance. He is obese.   HENT:      Head: Normocephalic.      Right Ear: Tympanic membrane, ear canal and external ear normal.      Left Ear: Tympanic membrane, ear canal and external ear normal.      Nose: Nose normal.      Mouth/Throat:      Mouth: Mucous membranes are moist.      Pharynx: Oropharynx is clear.      Comments: Dentition absent  Eyes:      General: No scleral icterus.     Conjunctiva/sclera: Conjunctivae normal.      Pupils: Pupils are equal, round, and reactive to light.   Cardiovascular:      Rate and Rhythm: Normal rate and regular rhythm.      Pulses: Normal pulses.      Heart sounds: Normal heart sounds. No murmur heard.  Pulmonary:      Effort: Pulmonary effort is normal.      Breath sounds: Normal breath sounds. No wheezing, rhonchi or rales.   Musculoskeletal:      Cervical back: Neck supple. No rigidity or tenderness.   Lymphadenopathy:      Cervical: No cervical adenopathy.   Skin:     General: Skin is warm and dry.      Coloration: Skin is not jaundiced.      Findings: No rash.   Neurological:      General: No focal deficit present.      Mental Status: He is alert and oriented to person, place, and time.   Psychiatric:         Mood and Affect: Mood normal.         Thought Content: Thought content normal.         Judgment: Judgment normal.         Procedures    No visits with results within 30 Day(s) from this visit.   Latest known visit with results is:   Lab on 08/21/2024   Component Date Value Ref Range Status    Creatine Kinase 08/21/2024 175  20 - 200 U/L Final    WBC 08/21/2024 8.67  3.40 - 10.80 10*3/mm3 Final    RBC 08/21/2024 5.47  4.14 - 5.80 10*6/mm3 Final    Hemoglobin 08/21/2024 15.1  13.0 - 17.7 g/dL Final    Hematocrit 08/21/2024 44.7  37.5 - 51.0 % Final    MCV 08/21/2024 81.7  79.0 - 97.0 fL Final    MCH 08/21/2024 27.6  26.6 - 33.0 pg Final    MCHC 08/21/2024 33.8  31.5 - 35.7  g/dL Final    RDW 08/21/2024 14.2  12.3 - 15.4 % Final    RDW-SD 08/21/2024 41.9  37.0 - 54.0 fl Final    MPV 08/21/2024 11.1  6.0 - 12.0 fL Final    Platelets 08/21/2024 332  140 - 450 10*3/mm3 Final    Bilirubin, Direct 08/21/2024 0.2  0.0 - 0.3 mg/dL Final       ASSESSMENT/ PLAN:    Diagnoses and all orders for this visit:    1. Hypertension, essential (Primary)  Assessment & Plan:  His blood pressure is too high here today.  I am not sure what medications he is taking or not taking.    Orders:  -     Comprehensive Metabolic Panel; Future  -     Lipid Panel; Future    2. Mixed hyperlipidemia  Assessment & Plan:   He has tried and failed multiple statins and states they have all made him feel lousy.  I wanted to give him a trial of Repatha but he declined.  He wants to go back and try the rosuvastatin again.    Orders:  -     Comprehensive Metabolic Panel; Future  -     Lipid Panel; Future  -     TSH+Free T4; Future  -     CK; Future    3. S/P CABG x 4  Assessment & Plan:  He is doing well at this time and denies any current anginal complaints.  Unfortunately his lipids are not at goal.  He has tried multiple statins but states he is unable to tolerate them.  Currently his LDL needs to be 55 or less.  Perhaps we can try and getting him prior authorized for Repatha.      4. Coronary artery disease involving native coronary artery of native heart without angina pectoris  Assessment & Plan:  He denies any current coronary symptoms.  He needs to have his lipids better treated and needs to quit smoking.  He is willing to work on both.    Orders:  -     Lipid Panel; Future    5. Type 2 diabetes mellitus with hyperglycemia, with long-term current use of insulin  Assessment & Plan:  His blood sugars at home do not sound too bad even without the Ozempic.  He will continue with his current meds.    Orders:  -     Comprehensive Metabolic Panel; Future  -     Lipid Panel; Future  -     Hemoglobin A1c; Future    6. Cigarette  nicotine dependence in remission  Assessment & Plan:  He was successful in quitting smoking but has recently started again.  He wants to go back and get back on the Chantix once again.                                            7. New daily persistent headache  Assessment & Plan:  He has had a new daily persistent headache for several weeks now.  He contributes this to the Ozempic.  This would be an unusual side effect particularly since it has persisted since discontinuing the medication.  This may be more related to his uncontrolled hypertension.  Will check a scan to make sure that is normal.            Orders:  -     CT Head Without Contrast; Future    8. Blurred vision  Assessment & Plan:  He has had ongoing persistent blurred vision now since our last visit.  He did go to the local optometry store and had an eye exam but felt that it was not very good.  With his ongoing blurred vision I think he needs a better eye exam.  Will refer him to one of the ophthalmologist.    Orders:  -     Ambulatory Referral to Ophthalmology    Other orders  -     rosuvastatin (Crestor) 10 MG tablet; Take 1 tablet by mouth Daily.  Dispense: 90 tablet; Refill: 1  -     varenicline (Chantix Continuing Month Pak) 1 MG tablet; Take a half a tab once daily for 3 days, then a half a tab twice daily for 4 days.  Then increase to 1 tab twice daily at the end of a meal with a large glass of water.  Dispense: 180 tablet; Refill: 1  -     carvedilol (Coreg) 12.5 MG tablet; Take 1 tablet by mouth 2 (Two) Times a Day With Meals.  Dispense: 180 tablet; Refill: 1               Orders Placed Today:     New Medications Ordered This Visit   Medications    rosuvastatin (Crestor) 10 MG tablet     Sig: Take 1 tablet by mouth Daily.     Dispense:  90 tablet     Refill:  1    varenicline (Chantix Continuing Month Brodie) 1 MG tablet     Sig: Take a half a tab once daily for 3 days, then a half a tab twice daily for 4 days.  Then increase to 1 tab twice  daily at the end of a meal with a large glass of water.     Dispense:  180 tablet     Refill:  1    carvedilol (Coreg) 12.5 MG tablet     Sig: Take 1 tablet by mouth 2 (Two) Times a Day With Meals.     Dispense:  180 tablet     Refill:  1        Management Plan:     An After Visit Summary was printed and given to the patient at discharge.    Follow-up: Return in about 2 months (around 12/25/2024).    Ernie White DO 10/25/2024 12:47 EDT  This note was electronically signed.

## 2024-10-25 NOTE — ASSESSMENT & PLAN NOTE
He has had ongoing persistent blurred vision now since our last visit.  He did go to the local optometry store and had an eye exam but felt that it was not very good.  With his ongoing blurred vision I think he needs a better eye exam.  Will refer him to one of the ophthalmologist.

## 2024-10-25 NOTE — ASSESSMENT & PLAN NOTE
His blood sugars at home do not sound too bad even without the Ozempic.  He will continue with his current meds.

## 2024-10-25 NOTE — ASSESSMENT & PLAN NOTE
He denies any current coronary symptoms.  He needs to have his lipids better treated and needs to quit smoking.  He is willing to work on both.

## 2024-10-25 NOTE — ASSESSMENT & PLAN NOTE
His blood pressure is too high here today.  I am not sure what medications he is taking or not taking.

## 2024-10-25 NOTE — ASSESSMENT & PLAN NOTE
He was successful in quitting smoking but has recently started again.  He wants to go back and get back on the Chantix once again.

## 2024-10-25 NOTE — TELEPHONE ENCOUNTER
Patient forgot to mention to Dr. White that he is still having urinary issues even with the med increase. Could you all put in a referral for him to see Urology. Thank you

## 2024-10-28 ENCOUNTER — TRANSCRIBE ORDERS (OUTPATIENT)
Dept: ADMINISTRATIVE | Facility: HOSPITAL | Age: 54
End: 2024-10-28
Payer: MEDICARE

## 2024-10-28 DIAGNOSIS — M54.16 RADICULOPATHY, LUMBAR REGION: Primary | ICD-10-CM

## 2024-10-30 DIAGNOSIS — Z79.4 TYPE 2 DIABETES MELLITUS WITH HYPERGLYCEMIA, WITH LONG-TERM CURRENT USE OF INSULIN: Chronic | ICD-10-CM

## 2024-10-30 DIAGNOSIS — E11.65 TYPE 2 DIABETES MELLITUS WITH HYPERGLYCEMIA, WITH LONG-TERM CURRENT USE OF INSULIN: Chronic | ICD-10-CM

## 2024-10-30 RX ORDER — INSULIN DEGLUDEC 100 U/ML
55 INJECTION, SOLUTION SUBCUTANEOUS 2 TIMES DAILY
Qty: 100 ML | Refills: 3 | Status: SHIPPED | OUTPATIENT
Start: 2024-10-30

## 2024-10-30 NOTE — TELEPHONE ENCOUNTER
[facility] IS OUT OF THIS MEDICATION    Caller: Ilya Colon    Relationship: Self    Best call back number: [phone]     What medication are you requesting:     Insulin Degludec (Tresiba) 100 UNIT/ML solution injection       If a prescription is needed, what is your preferred pharmacy and phone number: [facility] [place], [place] - [address] [facility].  [phone] [facility] [phone] [facility]     Additional notes:PATIENT STATES HIS PHARMACY DOES NOT HAVE THIS AND NEEDS THIS ORDER SENT TO [facility].     PATIENT ASKS FOR A TEXT OR CALL WHEN THIS IS SENT IN.

## 2024-11-01 ENCOUNTER — TELEPHONE (OUTPATIENT)
Dept: FAMILY MEDICINE CLINIC | Facility: CLINIC | Age: 54
End: 2024-11-01

## 2024-11-01 RX ORDER — INSULIN GLARGINE 100 [IU]/ML
55 INJECTION, SOLUTION SUBCUTANEOUS 2 TIMES DAILY
Qty: 100 ML | Refills: 1 | Status: SHIPPED | OUTPATIENT
Start: 2024-11-01

## 2024-11-01 NOTE — TELEPHONE ENCOUNTER
Pharmacy Name: [facility] [place], [place] - [address] [facility] [phone] [facility] [phone]      Reference Number (if applicable):     Pharmacy representative name: DEEPA    Pharmacy representative phone number: [phone] [facility]    What medication are you calling in regards to: Insulin Degludec (Tresiba) 100 UNIT/ML solution injection     What question does the pharmacy have: THIS MEDICATION IS OUT OF STOCK AND WOULD LIKE FOR A PRESCRIPTION FOR LANTUS VIAL    Who is the provider that prescribed the medication: CHALINO SONI    Additional notes:

## 2024-11-11 ENCOUNTER — TELEPHONE (OUTPATIENT)
Dept: FAMILY MEDICINE CLINIC | Facility: CLINIC | Age: 54
End: 2024-11-11
Payer: MEDICARE

## 2024-11-11 RX ORDER — INSULIN LISPRO 100 [IU]/ML
16 INJECTION, SOLUTION INTRAVENOUS; SUBCUTANEOUS 2 TIMES DAILY WITH MEALS
Qty: 30 ML | Refills: 3 | Status: SHIPPED | OUTPATIENT
Start: 2024-11-11

## 2024-11-11 RX ORDER — INSULIN LISPRO 100 [IU]/ML
16 INJECTION, SOLUTION INTRAVENOUS; SUBCUTANEOUS 2 TIMES DAILY WITH MEALS
Qty: 30 ML | Refills: 3 | Status: SHIPPED | OUTPATIENT
Start: 2024-11-11 | End: 2024-11-11 | Stop reason: SDUPTHER

## 2024-11-11 NOTE — TELEPHONE ENCOUNTER
Caller: BRANDT    Relationship: [employer]ABELLE REPRESENTATIVE     Best call back number: [phone]     What is the best time to reach you: ANY    Who are you requesting to speak with (clinical staff, provider,  specific staff member): CLINICAL STAFF    What was the call regarding: BRANDT WITH [employer] INS[employer] CALLED STATING THAT THE PATIENTS INSULIN ASPAR IS NOT COVERED AND WILL NEED TO HAVE AN ALTERNATIVE PRESCRIBED:  HUMALOG QUICKPEN  INSULIN LISPRO  NOVOLIN    PHARMACY: [facility], [facility]. [address] [phone] [facility] [phone]  [phone]

## 2024-12-23 DIAGNOSIS — Z79.4 TYPE 2 DIABETES MELLITUS WITH HYPERGLYCEMIA, WITH LONG-TERM CURRENT USE OF INSULIN: Chronic | ICD-10-CM

## 2024-12-23 DIAGNOSIS — E11.65 TYPE 2 DIABETES MELLITUS WITH HYPERGLYCEMIA, WITH LONG-TERM CURRENT USE OF INSULIN: Chronic | ICD-10-CM

## 2024-12-26 RX ORDER — EMPAGLIFLOZIN 25 MG/1
25 TABLET, FILM COATED ORAL DAILY
Qty: 90 TABLET | Refills: 1 | Status: SHIPPED | OUTPATIENT
Start: 2024-12-26

## 2024-12-31 NOTE — PROGRESS NOTES
Chief Complaint: Benign Prostatic Hypertrophy    Subjective         History of Present Illness  Ilya Colon is a 54 y.o. male presents to [facility] to be seen for BPH with urinary hesitancy.    He has had issues with BPH for about five years.  He has been on tamsulosin for 3 years.  They have increased his dosage to capsules per day for about 8-9 months.  The medication does seem to help, but not as well as it once did.  He is having to strain really hard to get his urine out.  He states that his symptoms are getting really bothersome at this point.  He states that he has to do drug screening for pain management and often times he really struggles to even be able to produce a urine specimen at those visits which is getting frustrating and embarrassing.    He also reports that he is having issues with ED.  He has had these issues for many years.  He was previously treated with Viagra several years ago, before his MI and CABG.      Frequency-admits    Urgency-admits     Incontinence-denies     Nocturia-admits, 1-2 X per night    Perineal pain-denies     Dysuria-denies     Stream-hard to start, has to strain to go, weak stream     GH-denies     History of stones-denies      surgeries-denies     Family history of  malignancy-maternal grandfather - prostate cancer diagnosed at age 70     Cardiopulmonary-HTN, CAD, MI, S/P CABG X 4, DM, CKD     Anticoagulants-ASA 81mg     Smoker-admits, 1/2 ppd X 7 years     PSA  7/25/2024 0.532    Objective     Past Medical History:   Diagnosis Date    Arthritis     DDD (degenerative disc disease), lumbar     Enlarged prostate     Fatty liver     Hearing decreased, left     states he is pretty much deaf in this ear, does not use hearing aides    History of gastric ulcer     Myocardial infarction     Scoliosis     Sternal wound dehiscence     HX STERNAL WIRES       Past Surgical History:   Procedure Laterality Date    CARDIAC CATHETERIZATION N/A  10/12/2022    Procedure: Left Heart Cath - Radial artery;  Surgeon: Tad Batista MD;  Location: [facility] CATH INVASIVE LOCATION;  Service: Cardiology;  Laterality: N/A;    CARDIAC SURGERY      COLONOSCOPY      CORONARY ARTERY BYPASS GRAFT N/A 10/20/2022    Procedure: INTRAOP NANCI, STERNOTOMY, CORONARY ARTERY BYPASS GRAFTS X 4 USING LEFT ENDOSCOPICALLY HARVESTED GREATER SAPHENOUS VEIN AND LEFT RADHA, PRP;  Surgeon: Jr Tutu Blackmon MD;  Location: [facility];  Service: Cardiothoracic;  Laterality: N/A;    HAND SURGERY Right     X2    LAPAROSCOPIC CHOLECYSTECTOMY      STERNAL REWIRING N/A 1/18/2023    Procedure: STERNAL WIRE REMOVAL;  Surgeon: Jr Tutu Blackmon MD;  Location: [facility];  Service: Cardiothoracic;  Laterality: N/A;    TONSILLECTOMY      TRAM FLAP DELAYED N/A 1/20/2023    Procedure: TRAM FLAP RECONSTRUCTION, use of spy, sternal wound;  Surgeon: Stephenie Li MD;  Location: [facility] OR;  Service: Plastics;  Laterality: N/A;         Current Outpatient Medications:     amitriptyline (ELAVIL) 25 MG tablet, Take 1 tablet by mouth Every Night., Disp: 90 tablet, Rfl: 1    aspirin 81 MG EC tablet, Take 1 tablet by mouth Daily., Disp: 90 tablet, Rfl: 1    carvedilol (Coreg) 12.5 MG tablet, Take 1 tablet by mouth 2 (Two) Times a Day With Meals., Disp: 180 tablet, Rfl: 1    cetirizine (zyrTEC) 5 MG tablet, Take 1 tablet by mouth Daily., Disp: 90 tablet, Rfl: 1    esomeprazole (nexIUM) 20 MG capsule, Take 1 capsule by mouth Every Morning Before Breakfast., Disp: 90 capsule, Rfl: 1    gabapentin (NEURONTIN) 600 MG tablet, Take 1 tablet by mouth Every 6 (Six) Hours., Disp: , Rfl:     glucose blood (Accu-Chek Kirsten Plus) test strip, Use to check glucose before breakfast and bedtime for Dx T2DM E11.9, Disp: 200 each, Rfl: 3    HYDROcodone-acetaminophen (NORCO)  MG per tablet, Every 12 (Twelve) Hours., Disp: , Rfl:     Insulin Degludec (Tresiba) 100 UNIT/ML solution injection, Inject 55  "Units under the skin into the appropriate area as directed 2 (Two) Times a Day., Disp: 100 mL, Rfl: 3    insulin glargine (Lantus) 100 UNIT/ML injection, Inject 55 Units under the skin into the appropriate area as directed 2 (Two) Times a Day., Disp: 100 mL, Rfl: 1    Insulin Lispro, 1 Unit Dial, (HumaLOG KwikPen) 100 UNIT/ML solution pen-injector, Inject 16 Units under the skin into the appropriate area as directed 2 (Two) Times a Day With Meals., Disp: 30 mL, Rfl: 3    Insulin Syringe (TRUEplus Insulin Syringe) 29G X 1/2\" 1 ML misc, Use 1 syringe 3 (Three) Times a Day., Disp: 300 each, Rfl: 3    irbesartan (Avapro) 150 MG tablet, Take 1 tablet by mouth Every Night., Disp: 90 tablet, Rfl: 1    Jardiance 25 MG tablet tablet, TAKE 1 TABLET BY MOUTH DAILY., Disp: 90 tablet, Rfl: 1    melatonin 5 MG tablet tablet, Take 2 tablets by mouth Every Night., Disp: , Rfl:     metroNIDAZOLE (METROCREAM) 0.75 % cream, Apply 1 Application topically to the appropriate area as directed 2 (Two) Times a Day., Disp: , Rfl:     mometasone (ELOCON) 0.1 % cream, Apply 1 Application topically to the appropriate area as directed Daily., Disp: , Rfl:     nitroglycerin (NITROSTAT) 0.4 MG SL tablet, Place 1 tablet under the tongue Every 5 (Five) Minutes As Needed for Chest Pain. Take no more than 3 doses in 15 minutes., Disp: , Rfl:     potassium chloride (K-DUR,KLOR-CON) 20 MEQ CR tablet, Take 1 tablet by mouth 2 (Two) Times a Day., Disp: 180 tablet, Rfl: 1    rosuvastatin (Crestor) 10 MG tablet, Take 1 tablet by mouth Daily., Disp: 90 tablet, Rfl: 1    tamsulosin (FLOMAX) 0.4 MG capsule 24 hr capsule, Take 1 capsule by mouth 2 (Two) Times a Day., Disp: 180 capsule, Rfl: 3    triamcinolone (KENALOG) 0.1 % cream, , Disp: , Rfl:     varenicline (Chantix Continuing Month Brodie) 1 MG tablet, Take a half a tab once daily for 3 days, then a half a tab twice daily for 4 days.  Then increase to 1 tab twice daily at the end of a meal with a large glass " of water., Disp: 180 tablet, Rfl: 1    venlafaxine XR (Effexor XR) 75 MG 24 hr capsule, Take 1 capsule by mouth Daily., Disp: 90 capsule, Rfl: 0  No current facility-administered medications for this visit.    Facility-Administered Medications Ordered in Other Visits:     Chlorhexidine Gluconate Cloth 2 % pads 1 application, 1 application , Topical, Q12H CHANDANN, Lora Cowan APRN    No Known Allergies     Family History   Problem Relation Age of Onset    Malig Hyperthermia Neg Hx        Social History     Socioeconomic History    Marital status:    Tobacco Use    Smoking status: Former     Average packs/day: 1 pack/day for 7.3 years (7.3 ttl pk-yrs)     Types: Cigarettes     Start date: 2015     Quit date: 10/1/2022     Years since quittin.2    Smokeless tobacco: Never   Vaping Use    Vaping status: Never Used   Substance and Sexual Activity    Alcohol use: Not Currently    Drug use: Yes     Types: Marijuana     Comment: DAILY, last used 23    Sexual activity: Defer       Vital Signs:   There were no vitals taken for this visit.     Physical Exam  Vitals reviewed.   Constitutional:       Appearance: Normal appearance.   Neurological:      General: No focal deficit present.      Mental Status: He is alert and oriented to person, place, and time.   Psychiatric:         Mood and Affect: Mood normal.         Behavior: Behavior normal.          Result Review :   The following data was reviewed by: BELEM Delgado on 2025:  Results for orders placed or performed in visit on 25   Bladder Scan    Collection Time: 25  2:05 PM   Result Value Ref Range    Urine Volume 0       PSA          2024    15:18   PSA   PSA 0.532      Bladder Scan interpretation 2025    Estimation of residual urine via BVI 3000 Verathon Bladder Scan  MA/nurse performing: ANNABELLA Godfrey  Residual Urine: 0 ml  Indication: Benign prostatic hyperplasia with lower urinary tract symptoms, symptom  details unspecified    Other male erectile dysfunction   Position: Supine  Examination: Incremental scanning of the suprapubic area using 2.0 MHz transducer using copious amounts of acoustic gel.   Findings: An anechoic area was demonstrated which represented the bladder, with measurement of residual urine as noted. I inspected this myself. In that the residual urine was stable or insignificant, refer to plan for treatment and plan necessary at this time.         Procedures        Assessment and Plan    Diagnoses and all orders for this visit:    1. Benign prostatic hyperplasia with lower urinary tract symptoms, symptom details unspecified (Primary)  -     Bladder Scan    2. Other male erectile dysfunction    Discussed with the patient that he is currently maxed out on tamsulosin.  I did discuss that we could consider adding finasteride which could be helpful to shrink the prostate over time and help decrease his symptoms, but there is a risk of possible decreased libido.  The patient is not willing to start finasteride because of this potential side effect, especially because he is already having issues with erectile dysfunction.  We did discuss the possibility of arranging the patient for cystoscopy for evaluation of prostate size and to determine if procedures on the prostate would be beneficial.  The patient would like to be set up for cystoscopy at this time.  The patient will continue Flomax 2 capsules/day for now.    Given the patient's history of coronary artery disease and the fact that he is prescribed nitroglycerin, we did discuss that he is not a good candidate for oral medications for erectile dysfunction.  We did discuss the possibility of Trimix injections which she is not opposed to, but would like to sort through his BPH issues before considering adding this treatment.  We did discuss that we could revisit this topic when he follows up with me after his cystoscopy.  I did recommend that the patient  follow-up with his cardiologist to ensure that they feel like he is healthy enough for sexual intercourse.  He states that he has not seen his cardiologist in about 2 years.    Will arrange for the patient to have cystoscopy with Dr. Lake Adams.  I will follow-up with the patient following his cystoscopy based on Dr. Adams' recommendation.          Follow Up   Return for cystoscopy with Bryan.  Patient was given instructions and counseling regarding his condition or for health maintenance advice. Please see specific information pulled into the AVS if appropriate.         This document has been electronically signed by BELEM Delgado  January 3, 2025 14:43 EST

## 2025-01-03 ENCOUNTER — OFFICE VISIT (OUTPATIENT)
Dept: UROLOGY | Age: 55
End: 2025-01-03
Payer: MEDICARE

## 2025-01-03 DIAGNOSIS — N40.1 BENIGN PROSTATIC HYPERPLASIA WITH LOWER URINARY TRACT SYMPTOMS, SYMPTOM DETAILS UNSPECIFIED: Primary | ICD-10-CM

## 2025-01-03 DIAGNOSIS — N52.8 OTHER MALE ERECTILE DYSFUNCTION: ICD-10-CM

## 2025-01-03 LAB — URINE VOLUME: 0

## 2025-01-21 RX ORDER — VENLAFAXINE HYDROCHLORIDE 75 MG/1
75 CAPSULE, EXTENDED RELEASE ORAL DAILY
Qty: 90 CAPSULE | Refills: 0 | Status: SHIPPED | OUTPATIENT
Start: 2025-01-21 | End: 2025-01-23 | Stop reason: SDUPTHER

## 2025-01-23 RX ORDER — IRBESARTAN 150 MG/1
150 TABLET ORAL NIGHTLY
Qty: 90 TABLET | Refills: 1 | Status: SHIPPED | OUTPATIENT
Start: 2025-01-23

## 2025-01-23 RX ORDER — ROSUVASTATIN CALCIUM 10 MG/1
10 TABLET, COATED ORAL DAILY
Qty: 90 TABLET | Refills: 1 | Status: SHIPPED | OUTPATIENT
Start: 2025-01-23

## 2025-01-23 RX ORDER — VENLAFAXINE HYDROCHLORIDE 75 MG/1
75 CAPSULE, EXTENDED RELEASE ORAL DAILY
Qty: 90 CAPSULE | Refills: 0 | Status: SHIPPED | OUTPATIENT
Start: 2025-01-23

## 2025-01-24 ENCOUNTER — TELEPHONE (OUTPATIENT)
Dept: FAMILY MEDICINE CLINIC | Facility: CLINIC | Age: 55
End: 2025-01-24
Payer: MEDICARE

## 2025-01-24 RX ORDER — INSULIN GLARGINE 100 [IU]/ML
55 INJECTION, SOLUTION SUBCUTANEOUS 2 TIMES DAILY
Qty: 100 ML | Refills: 1 | Status: SHIPPED | OUTPATIENT
Start: 2025-01-24

## 2025-01-24 NOTE — TELEPHONE ENCOUNTER
Caller: Ilya Colon    Relationship: Self    Best call back number: [phone] (Home)     Requested Prescriptions:   Requested Prescriptions     Pending Prescriptions Disp Refills    insulin glargine (Lantus) 100 UNIT/ML injection 100 mL 1     Sig: Inject 55 Units under the skin into the appropriate area as directed 2 (Two) Times a Day.        [facility]arm[facility] where [facility] be sent[facility], [address] [phone] [place] [phone]      Last office visit with prescribing clinician: 10/25/2024   Last telemedicine visit with prescribing clinician: Visit date not found   Next office visit with prescribing clinician: 2/11/2025     Additional details provided by patient:     Does the patient have less than a 3 day supply:  [] Yes  [x] No        Melvi Sanders, PCT   01/24/25 11:24 EST

## 2025-01-24 NOTE — TELEPHONE ENCOUNTER
Caller: Ilya Colon    Relationship to patient: Self    Best call back number: [phone]     Patient is needing: PATIENT IS ON OXYGEN AND NO LONGER NEEDS THIS. [employer] STATES THE PROVIDER HAS TO CALL AND STATE THEY CAN COME  THE OXYGEN MACHINE.

## 2025-01-30 RX ORDER — INSULIN LISPRO 100 [IU]/ML
16 INJECTION, SOLUTION INTRAVENOUS; SUBCUTANEOUS 2 TIMES DAILY WITH MEALS
Qty: 30 ML | Refills: 3 | Status: SHIPPED | OUTPATIENT
Start: 2025-01-30

## 2025-01-30 NOTE — TELEPHONE ENCOUNTER
I have submitted a request via [employer] website for them to  patients oxygen machine since it has been discontinued. I [facility]  [facility]. Patient verbalized understanding.

## 2025-02-10 ENCOUNTER — TELEPHONE (OUTPATIENT)
Dept: FAMILY MEDICINE CLINIC | Facility: CLINIC | Age: 55
End: 2025-02-10

## 2025-02-10 NOTE — TELEPHONE ENCOUNTER
Caller: [employer]. [facility] [address] [place] [address] [phone] [facility] [phone] [employer]    Relationship: Pharmacy    Best call back number:   CASE NUMBER:  [id number]    What is the best time to reach you:     Who are you requesting to speak with (clinical staff, provider,  specific staff member): CLINICAL     Do you know the name of the person who called: ROBERTO     What was the call regarding:       SHINE SAID A  PA IS NEEDED FOR THE insulin glargine (Lantus) 100 UNIT/ML injection   REGARDING THE QUANTITY LIMIT.

## 2025-02-11 ENCOUNTER — OFFICE VISIT (OUTPATIENT)
Dept: FAMILY MEDICINE CLINIC | Facility: CLINIC | Age: 55
End: 2025-02-11
Payer: MEDICARE

## 2025-02-11 VITALS
TEMPERATURE: 97.3 F | WEIGHT: 222 LBS | HEIGHT: 66 IN | OXYGEN SATURATION: 96 % | SYSTOLIC BLOOD PRESSURE: 148 MMHG | DIASTOLIC BLOOD PRESSURE: 90 MMHG | HEART RATE: 67 BPM | BODY MASS INDEX: 35.68 KG/M2

## 2025-02-11 DIAGNOSIS — Z12.11 SCREENING FOR COLON CANCER: ICD-10-CM

## 2025-02-11 DIAGNOSIS — E11.65 TYPE 2 DIABETES MELLITUS WITH HYPERGLYCEMIA, WITH LONG-TERM CURRENT USE OF INSULIN: ICD-10-CM

## 2025-02-11 DIAGNOSIS — E11.22 CKD STAGE 3 DUE TO TYPE 2 DIABETES MELLITUS: ICD-10-CM

## 2025-02-11 DIAGNOSIS — Z79.4 TYPE 2 DIABETES MELLITUS WITH HYPERGLYCEMIA, WITH LONG-TERM CURRENT USE OF INSULIN: ICD-10-CM

## 2025-02-11 DIAGNOSIS — I10 HYPERTENSION, ESSENTIAL: Chronic | ICD-10-CM

## 2025-02-11 DIAGNOSIS — N18.30 CKD STAGE 3 DUE TO TYPE 2 DIABETES MELLITUS: ICD-10-CM

## 2025-02-11 DIAGNOSIS — E78.2 MIXED HYPERLIPIDEMIA: Primary | Chronic | ICD-10-CM

## 2025-02-11 DIAGNOSIS — I25.10 CORONARY ARTERY DISEASE INVOLVING NATIVE CORONARY ARTERY OF NATIVE HEART WITHOUT ANGINA PECTORIS: ICD-10-CM

## 2025-02-11 LAB
ALBUMIN SERPL-MCNC: 4.6 G/DL (ref 3.5–5.2)
ALBUMIN/GLOB SERPL: 1.5 G/DL
ALP SERPL-CCNC: 163 U/L (ref 39–117)
ALT SERPL W P-5'-P-CCNC: 62 U/L (ref 1–41)
ANION GAP SERPL CALCULATED.3IONS-SCNC: 9 MMOL/L (ref 5–15)
AST SERPL-CCNC: 54 U/L (ref 1–40)
BILIRUB SERPL-MCNC: 1 MG/DL (ref 0–1.2)
BUN SERPL-MCNC: 9 MG/DL (ref 6–20)
BUN/CREAT SERPL: 6.6 (ref 7–25)
CALCIUM SPEC-SCNC: 10.6 MG/DL (ref 8.6–10.5)
CHLORIDE SERPL-SCNC: 99 MMOL/L (ref 98–107)
CHOLEST SERPL-MCNC: 151 MG/DL (ref 0–200)
CK SERPL-CCNC: 151 U/L (ref 20–200)
CO2 SERPL-SCNC: 29 MMOL/L (ref 22–29)
CREAT SERPL-MCNC: 1.36 MG/DL (ref 0.76–1.27)
EGFRCR SERPLBLD CKD-EPI 2021: 61.8 ML/MIN/1.73
GLOBULIN UR ELPH-MCNC: 3.1 GM/DL
GLUCOSE SERPL-MCNC: 207 MG/DL (ref 65–99)
HBA1C MFR BLD: 7.2 % (ref 4.8–5.6)
HDLC SERPL-MCNC: 39 MG/DL (ref 40–60)
LDLC SERPL CALC-MCNC: 74 MG/DL (ref 0–100)
LDLC/HDLC SERPL: 1.68 {RATIO}
POTASSIUM SERPL-SCNC: 4.9 MMOL/L (ref 3.5–5.2)
PROT SERPL-MCNC: 7.7 G/DL (ref 6–8.5)
SODIUM SERPL-SCNC: 137 MMOL/L (ref 136–145)
T4 FREE SERPL-MCNC: 0.9 NG/DL (ref 0.92–1.68)
TRIGL SERPL-MCNC: 232 MG/DL (ref 0–150)
TSH SERPL DL<=0.05 MIU/L-ACNC: 3.71 UIU/ML (ref 0.27–4.2)
VLDLC SERPL-MCNC: 38 MG/DL (ref 5–40)

## 2025-02-11 PROCEDURE — 80053 COMPREHEN METABOLIC PANEL: CPT | Performed by: FAMILY MEDICINE

## 2025-02-11 PROCEDURE — 84439 ASSAY OF FREE THYROXINE: CPT | Performed by: FAMILY MEDICINE

## 2025-02-11 PROCEDURE — 1125F AMNT PAIN NOTED PAIN PRSNT: CPT | Performed by: FAMILY MEDICINE

## 2025-02-11 PROCEDURE — 3080F DIAST BP >= 90 MM HG: CPT | Performed by: FAMILY MEDICINE

## 2025-02-11 PROCEDURE — 99214 OFFICE O/P EST MOD 30 MIN: CPT | Performed by: FAMILY MEDICINE

## 2025-02-11 PROCEDURE — 83036 HEMOGLOBIN GLYCOSYLATED A1C: CPT | Performed by: FAMILY MEDICINE

## 2025-02-11 PROCEDURE — 82550 ASSAY OF CK (CPK): CPT | Performed by: FAMILY MEDICINE

## 2025-02-11 PROCEDURE — 80061 LIPID PANEL: CPT | Performed by: FAMILY MEDICINE

## 2025-02-11 PROCEDURE — 3077F SYST BP >= 140 MM HG: CPT | Performed by: FAMILY MEDICINE

## 2025-02-11 PROCEDURE — 84443 ASSAY THYROID STIM HORMONE: CPT | Performed by: FAMILY MEDICINE

## 2025-02-11 RX ORDER — PEN NEEDLE, DIABETIC 30 GX3/16"
1 NEEDLE, DISPOSABLE MISCELLANEOUS 2 TIMES DAILY WITH MEALS
Qty: 200 EACH | Refills: 3 | Status: SHIPPED | OUTPATIENT
Start: 2025-02-11

## 2025-02-11 RX ORDER — IRBESARTAN 300 MG/1
300 TABLET ORAL NIGHTLY
Qty: 90 TABLET | Refills: 3 | Status: SHIPPED | OUTPATIENT
Start: 2025-02-11

## 2025-02-11 NOTE — ASSESSMENT & PLAN NOTE
His blood pressure is somewhat elevated here today.  It may just be to some of the trouble he had getting to the office versus uncontrolled hypertension.  Will recheck it 1 more time  After recheck his blood pressure was improved although still elevated.  Will increase his irbesartan up to 300 mg daily.

## 2025-02-11 NOTE — PROGRESS NOTES
Chief Complaint   Patient presents with    Follow-up     2 month     Hypertension    Hyperlipidemia        Subjective     Ilya Colon  has a past medical history of Arthritis, DDD (degenerative disc disease), lumbar, Enlarged prostate, Fatty liver, Hearing decreased, left, History of gastric ulcer, Myocardial infarction, Scoliosis, and Sternal wound dehiscence.    Type 2 diabetes-he does check his blood sugar twice a day.  In the mornings it is no higher than 130 in the evenings after dinner no higher than 180.  He denies any blurred vision excessive thirst or excessive urination.  He states his neuropathy bothers him intermittently.  He does take his gabapentin daily.    Hypertension-he has not been checking his blood pressure as of late.  It is somewhat elevated here today at 173/96.  He is somewhat upset because he had lots of issues getting out of the house and getting to the office.    Hyperlipidemia-he is taking his rosuvastatin daily.    CAD-he denies any chest pain tightness or heaviness.  He does take his aspirin daily.        PHQ-2 Depression Screening  Little interest or pleasure in doing things?     Feeling down, depressed, or hopeless?     PHQ-2 Total Score     PHQ-9 Depression Screening  Little interest or pleasure in doing things?     Feeling down, depressed, or hopeless?     Trouble falling or staying asleep, or sleeping too much?     Feeling tired or having little energy?     Poor appetite or overeating?     Feeling bad about yourself - or that you are a failure or have let yourself or your family down?     Trouble concentrating on things, such as reading the newspaper or watching television?     Moving or speaking so slowly that other people could have noticed? Or the opposite - being so fidgety or restless that you have been moving around a lot more than usual?     Thoughts that you would be better off dead, or of hurting yourself in some way?     PHQ-9 Total Score     If you checked off any  "problems, how difficult have these problems made it for you to do your work, take care of things at home, or get along with other people?       No Known Allergies    Prior to Admission medications    Medication Sig Start Date End Date Taking? Authorizing Provider   amitriptyline (ELAVIL) 25 MG tablet Take 1 tablet by mouth Every Night. 1/18/24  Yes Ernie White DO   aspirin 81 MG EC tablet Take 1 tablet by mouth Daily. 1/18/24  Yes Ernie White DO   carvedilol (Coreg) 12.5 MG tablet Take 1 tablet by mouth 2 (Two) Times a Day With Meals. 10/25/24  Yes Ernie White DO   cetirizine (zyrTEC) 5 MG tablet Take 1 tablet by mouth Daily. 1/18/24  Yes Ernie White DO   esomeprazole (nexIUM) 20 MG capsule Take 1 capsule by mouth Every Morning Before Breakfast. 1/18/24  Yes Ernie White DO   gabapentin (NEURONTIN) 600 MG tablet Take 1 tablet by mouth Every 6 (Six) Hours.   Yes Sheri Ward MD   glucose blood (Accu-Chek Kirsten Plus) test strip Use to check glucose before breakfast and bedtime for Dx T2DM E11.9 3/19/24 3/19/25 Yes Ernie White DO   HYDROcodone-acetaminophen (NORCO)  MG per tablet Every 12 (Twelve) Hours.   Yes Sheri Ward MD   Insulin Degludec (Tresiba) 100 UNIT/ML solution injection Inject 55 Units under the skin into the appropriate area as directed 2 (Two) Times a Day. 10/30/24  Yes Ernie White DO   insulin glargine (Lantus) 100 UNIT/ML injection Inject 55 Units under the skin into the appropriate area as directed 2 (Two) Times a Day. 1/24/25  Yes Ernie White DO   Insulin Lispro, 1 Unit Dial, (HumaLOG KwikPen) 100 UNIT/ML solution pen-injector Inject 16 Units under the skin into the appropriate area as directed 2 (Two) Times a Day With Meals. 1/30/25  Yes Ernie White DO   Insulin Syringe (TRUEplus Insulin Syringe) 29G X 1/2\" 1 ML misc Use 1 syringe 3 (Three) Times a Day. " 11/28/23  Yes Ernie White DO   irbesartan (Avapro) 150 MG tablet Take 1 tablet by mouth Every Night. 1/23/25  Yes Ernie White DO   Jardiance 25 MG tablet tablet TAKE 1 TABLET BY MOUTH DAILY. 12/26/24  Yes Ernie White DO   melatonin 5 MG tablet tablet Take 2 tablets by mouth Every Night.   Yes Sheri Ward MD   metroNIDAZOLE (METROCREAM) 0.75 % cream Apply 1 Application topically to the appropriate area as directed 2 (Two) Times a Day.   Yes Sheri Ward MD   mometasone (ELOCON) 0.1 % cream Apply 1 Application topically to the appropriate area as directed Daily.   Yes Sheri Ward MD   nitroglycerin (NITROSTAT) 0.4 MG SL tablet Place 1 tablet under the tongue Every 5 (Five) Minutes As Needed for Chest Pain. Take no more than 3 doses in 15 minutes.   Yes Sheri Ward MD   potassium chloride (K-DUR,KLOR-CON) 20 MEQ CR tablet Take 1 tablet by mouth 2 (Two) Times a Day. 1/18/24  Yes Ernie White DO   rosuvastatin (Crestor) 10 MG tablet Take 1 tablet by mouth Daily. 1/23/25  Yes Ernie White DO   tamsulosin (FLOMAX) 0.4 MG capsule 24 hr capsule Take 1 capsule by mouth 2 (Two) Times a Day. 7/25/24  Yes Ernie White DO   triamcinolone (KENALOG) 0.1 % cream  8/2/23  Yes Sheri Ward MD   venlafaxine XR (EFFEXOR-XR) 75 MG 24 hr capsule Take 1 capsule by mouth Daily. 1/23/25  Yes Ernie White DO   varenicline (Chantix Continuing Month Brodie) 1 MG tablet Take a half a tab once daily for 3 days, then a half a tab twice daily for 4 days.  Then increase to 1 tab twice daily at the end of a meal with a large glass of water.  Patient not taking: Reported on 2/11/2025 10/25/24   Ernie White DO        Patient Active Problem List   Diagnosis    Osteoarthritis of thumbs, bilateral    Left hand pain    Right hand pain    Paresthesia of both hands    Atherosclerosis of coronary artery    Depression     Degeneration of lumbosacral intervertebral disc    GERD (gastroesophageal reflux disease)    Mixed hyperlipidemia    Hypertension, essential    Type 2 diabetes mellitus with hyperglycemia, with long-term current use of insulin    Scoliosis    Medicare annual wellness visit, subsequent    Allergic rhinitis    Insomnia    Cigarette nicotine dependence in remission    Class 1 obesity due to excess calories with serious comorbidity and body mass index (BMI) of 30.0 to 30.9 in adult    Unstable angina    CAD (coronary artery disease)    S/P CABG x 4    Sternal wound dehiscence    Sternal wound dehiscence, subsequent encounter    Anemia due to stage 3a chronic kidney disease    CKD stage 3 due to type 2 diabetes mellitus    Rash of back    Diabetic peripheral neuropathy    Multiple joint pain    Encounter for medical examination to establish care    Anxiety    Benign prostatic hyperplasia with urinary hesitancy    Screening for prostate cancer    New daily persistent headache    Blurred vision        Past Surgical History:   Procedure Laterality Date    CARDIAC CATHETERIZATION N/A 10/12/2022    Procedure: Left Heart Cath - Radial artery;  Surgeon: Tad Batista MD;  Location: [facility] CATH INVASIVE LOCATION;  Service: Cardiology;  Laterality: N/A;    CARDIAC SURGERY      COLONOSCOPY      CORONARY ARTERY BYPASS GRAFT N/A 10/20/2022    Procedure: INTRAOP NANCI, STERNOTOMY, CORONARY ARTERY BYPASS GRAFTS X 4 USING LEFT ENDOSCOPICALLY HARVESTED GREATER SAPHENOUS VEIN AND LEFT RADHA, PRP;  Surgeon: Jr Tutu Blackmon MD;  Location: [facility];  Service: Cardiothoracic;  Laterality: N/A;    HAND SURGERY Right     X2    LAPAROSCOPIC CHOLECYSTECTOMY      STERNAL REWIRING N/A 1/18/2023    Procedure: STERNAL WIRE REMOVAL;  Surgeon: Jr Tutu Blackmon MD;  Location: [facility];  Service: Cardiothoracic;  Laterality: N/A;    TONSILLECTOMY      TRAM FLAP DELAYED N/A 1/20/2023    Procedure: TRAM FLAP RECONSTRUCTION, use of  "spy, sternal wound;  Surgeon: Stephenie Li MD;  Location: [facility] OR;  Service: Plastics;  Laterality: N/A;       Social History     Socioeconomic History    Marital status:    Tobacco Use    Smoking status: Former     Average packs/day: 1 pack/day for 7.3 years (7.3 ttl pk-yrs)     Types: Cigarettes     Start date: 2015     Quit date: 10/1/2022     Years since quittin.3    Smokeless tobacco: Never   Vaping Use    Vaping status: Never Used   Substance and Sexual Activity    Alcohol use: Not Currently    Drug use: Yes     Types: Marijuana     Comment: DAILY, last used 23    Sexual activity: Defer       Family History   Problem Relation Age of Onset    Malig Hyperthermia Neg Hx        Family history, surgical history, past medical history, Allergies and meds reviewed with patient today and updated in [facility] EMR.     ROS:  Review of Systems   Constitutional:  Positive for fatigue.   HENT:  Negative for congestion, postnasal drip and rhinorrhea.    Eyes:  Negative for blurred vision and visual disturbance.   Respiratory:  Negative for cough, chest tightness, shortness of breath and wheezing.    Cardiovascular:  Negative for chest pain and palpitations.   Allergic/Immunologic: Negative for environmental allergies.   Neurological:  Negative for headache.   Psychiatric/Behavioral:  Negative for depressed mood. The patient is not nervous/anxious.        OBJECTIVE:  Vitals:    25 1515 25 1529   BP: 173/96 148/90   BP Location: Left arm Left arm   Patient Position: Sitting Sitting   Cuff Size:  Adult   Pulse: 67    Temp: 97.3 °F (36.3 °C)    SpO2: 96%    Weight: 101 kg (222 lb)    Height: 167.6 cm (66\")      No results found.   Body mass index is 35.83 kg/m².  No LMP for male patient.    The 10-year ASCVD risk score (Yehuda DK, et al., 2019) is: 15.9%    Values used to calculate the score:      Age: 54 years      Sex: Male      Is Non- : No      Diabetic: " Yes      Tobacco smoker: No      Systolic Blood Pressure: 148 mmHg      Is BP treated: Yes      HDL Cholesterol: 41 mg/dL      Total Cholesterol: 189 mg/dL     Physical Exam  Vitals and nursing note reviewed.   Constitutional:       General: He is not in acute distress.     Appearance: Normal appearance. He is obese.   HENT:      Head: Normocephalic.      Right Ear: Tympanic membrane, ear canal and external ear normal.      Left Ear: Tympanic membrane, ear canal and external ear normal.      Nose: Nose normal.      Mouth/Throat:      Mouth: Mucous membranes are moist.      Pharynx: Oropharynx is clear.   Eyes:      General: No scleral icterus.     Conjunctiva/sclera: Conjunctivae normal.      Pupils: Pupils are equal, round, and reactive to light.   Cardiovascular:      Rate and Rhythm: Normal rate and regular rhythm.      Pulses: Normal pulses.      Heart sounds: Normal heart sounds. No murmur heard.  Pulmonary:      Effort: Pulmonary effort is normal.      Breath sounds: Normal breath sounds. No wheezing, rhonchi or rales.   Musculoskeletal:      Cervical back: Neck supple. No rigidity or tenderness.   Lymphadenopathy:      Cervical: No cervical adenopathy.   Skin:     General: Skin is warm and dry.      Coloration: Skin is not jaundiced.      Findings: No rash.   Neurological:      General: No focal deficit present.      Mental Status: He is alert and oriented to person, place, and time.   Psychiatric:         Mood and Affect: Mood normal.         Thought Content: Thought content normal.         Judgment: Judgment normal.         Procedures    No visits with results within 30 Day(s) from this visit.   Latest known visit with results is:   Office Visit on 01/03/2025   Component Date Value Ref Range Status    Urine Volume 01/03/2025 0   Final       ASSESSMENT/ PLAN:    Diagnoses and all orders for this visit:    1. Mixed hyperlipidemia (Primary)  Assessment & Plan:   Will update his lipid profile with his routine  labs.      2. Hypertension, essential  Assessment & Plan:  His blood pressure is somewhat elevated here today.  It may just be to some of the trouble he had getting to the office versus uncontrolled hypertension.  Will recheck it 1 more time  After recheck his blood pressure was improved although still elevated.  Will increase his irbesartan up to 300 mg daily.      3. Coronary artery disease involving native coronary artery of native heart without angina pectoris  Assessment & Plan:  He denies any recurrent anginal symptoms.  He will continue his aspirin daily      4. Type 2 diabetes mellitus with hyperglycemia, with long-term current use of insulin  Assessment & Plan:  Update his A1c.  Overall his blood sugars sound very good.      5. CKD stage 3 due to type 2 diabetes mellitus    6. Screening for colon cancer  -     Cologuard - Stool, Per Rectum; Future    Other orders  -     irbesartan (Avapro) 300 MG tablet; Take 1 tablet by mouth Every Night.  Dispense: 90 tablet; Refill: 3  -     Insulin Pen Needle (Pen Needles) 31G X 8 MM misc; Use 1 Needle 2 (Two) Times a Day With Meals.  Dispense: 200 each; Refill: 3               Orders Placed Today:     New Medications Ordered This Visit   Medications    irbesartan (Avapro) 300 MG tablet     Sig: Take 1 tablet by mouth Every Night.     Dispense:  90 tablet     Refill:  3    Insulin Pen Needle (Pen Needles) 31G X 8 MM misc     Sig: Use 1 Needle 2 (Two) Times a Day With Meals.     Dispense:  200 each     Refill:  3        Management Plan:     An After Visit Summary was printed and given to the patient at discharge.    Follow-up: Return in about 4 months (around 6/11/2025) for Recheck.    Ernie White,  2/11/2025 15:36 EST  This note was electronically signed.

## 2025-02-12 DIAGNOSIS — N28.9 KIDNEY FUNCTION ABNORMAL: Primary | ICD-10-CM

## 2025-02-23 PROBLEM — N40.1 BENIGN PROSTATIC HYPERPLASIA WITH LOWER URINARY TRACT SYMPTOMS: Status: ACTIVE | Noted: 2025-02-23

## 2025-02-23 NOTE — PROGRESS NOTES
Procedures       Urinalysis was checked today and was negative for signs of infection      Cytoscopy Procedure:     Procedure: Flexible cytoscope was passed per urethra into the bladder without difficulty after proper consent. The bladder was inspected in a systematic meridian fashion.     2.5 cm prostate, no median lobe    Minor trabeculation throughout.  No pathology    There were no tumors, lesions, stones, or other abnormalities noted within the bladder. Of note, there was no increased vascularity as well. Both ureteral orifices were identified and were normal in appearance. The flexible cytoscope was removed. The patient tolerated the procedure well.           BPH  ED    2022 started Flomax, increased dose in the last year.  Helping some  Bothersome urinary symptoms  Frequency/urgency, no incontinence nocturia 1-2    No history of nephrolithiasis    1/25 declines wanting to start finasteride secondary to worry about side effects    Trouble with initiation of stream    ED-trouble for many years - Viagra years ago      PVR    1/25   000      No history of  surgery  Prostate cancer in his grand father at 70  CAD status post MI status post CABG  Nitroglycerin  DM -on insulin  CKD  Hypertension    Aspirin 81    Smokes 1/2 pack/day for several years    2/25 1.3, GFR 61    PSA    7/24 0.53          BPH    Continue Flomax 0.8 mg daily.  Did discuss possibility of TURP.  Currently not interested.  Cystoscopy negative for pathology patient given reassurance      Follow-up with NP in 6 months

## 2025-02-25 ENCOUNTER — PROCEDURE VISIT (OUTPATIENT)
Dept: UROLOGY | Age: 55
End: 2025-02-25
Payer: MEDICARE

## 2025-02-25 DIAGNOSIS — N40.1 BENIGN PROSTATIC HYPERPLASIA WITH LOWER URINARY TRACT SYMPTOMS, SYMPTOM DETAILS UNSPECIFIED: Primary | ICD-10-CM

## 2025-03-13 DIAGNOSIS — E11.9 INSULIN DEPENDENT TYPE 2 DIABETES MELLITUS: ICD-10-CM

## 2025-03-13 DIAGNOSIS — Z79.4 INSULIN DEPENDENT TYPE 2 DIABETES MELLITUS: ICD-10-CM

## 2025-03-13 DIAGNOSIS — Z79.4 TYPE 2 DIABETES MELLITUS WITH HYPERGLYCEMIA, WITH LONG-TERM CURRENT USE OF INSULIN: ICD-10-CM

## 2025-03-13 DIAGNOSIS — E11.65 TYPE 2 DIABETES MELLITUS WITH HYPERGLYCEMIA, WITH LONG-TERM CURRENT USE OF INSULIN: ICD-10-CM

## 2025-03-13 RX ORDER — BLOOD SUGAR DIAGNOSTIC
STRIP MISCELLANEOUS
Qty: 200 EACH | Refills: 3 | Status: SHIPPED | OUTPATIENT
Start: 2025-03-13

## 2025-03-21 ENCOUNTER — TELEPHONE (OUTPATIENT)
Dept: FAMILY MEDICINE CLINIC | Facility: CLINIC | Age: 55
End: 2025-03-21
Payer: MEDICARE

## 2025-03-27 ENCOUNTER — TELEPHONE (OUTPATIENT)
Dept: FAMILY MEDICINE CLINIC | Facility: CLINIC | Age: 55
End: 2025-03-27

## 2025-03-27 NOTE — TELEPHONE ENCOUNTER
Caller: ELLEN MAYER    Relationship: SELF    Best call back number:[phone]    Who are you requesting to speak with (clinical staff, provider,  specific staff member): MA    What was the call regarding PATIENT RETURNED CALL   RELAYED MESSAGE REGARDING SCHEDULING APPT FOR HIGH BLOOD SUGAR AND BRINGING IN #S. PATIENT REFUSED TO SCHEDULE APPT AS HE DOES NOT LIVE IN [facility] AND WAS JUST IN. OFFERED TELEHEALTH AND PATIENT DECLINED. PATIENT REQUESTED TO BE PRESCRIBED HUMALOG AS THAT WORKED FOR HIM IN THE PAST. PATIENT WOULD LIKE A CALL BACK BETWEEN 9 AND 10AM ON 3.28.25    PLEASE ADVISE

## 2025-04-07 RX ORDER — VENLAFAXINE HYDROCHLORIDE 75 MG/1
75 CAPSULE, EXTENDED RELEASE ORAL DAILY
Qty: 90 CAPSULE | Refills: 1 | Status: SHIPPED | OUTPATIENT
Start: 2025-04-07

## 2025-04-07 RX ORDER — ROSUVASTATIN CALCIUM 10 MG/1
10 TABLET, COATED ORAL DAILY
Qty: 90 TABLET | Refills: 1 | Status: SHIPPED | OUTPATIENT
Start: 2025-04-07

## 2025-04-07 RX ORDER — INSULIN LISPRO 100 [IU]/ML
16 INJECTION, SOLUTION INTRAVENOUS; SUBCUTANEOUS 2 TIMES DAILY WITH MEALS
Qty: 30 ML | Refills: 3 | Status: SHIPPED | OUTPATIENT
Start: 2025-04-07

## 2025-04-07 RX ORDER — CARVEDILOL 12.5 MG/1
12.5 TABLET ORAL 2 TIMES DAILY WITH MEALS
Qty: 180 TABLET | Refills: 1 | Status: SHIPPED | OUTPATIENT
Start: 2025-04-07

## 2025-04-07 RX ORDER — INSULIN GLARGINE 100 [IU]/ML
INJECTION, SOLUTION SUBCUTANEOUS
Qty: 100 ML | Refills: 1 | Status: SHIPPED | OUTPATIENT
Start: 2025-04-07

## 2025-04-07 NOTE — TELEPHONE ENCOUNTER
Caller: Darlene Ilya GONZALEZ    Relationship: Self    Best call back number: [phone]    Requested Prescriptions:   Requested Prescriptions     Pending Prescriptions Disp Refills    Insulin Lispro, 1 Unit Dial, (HumaLOG KwikPen) 100 UNIT/ML solution pen-injector 30 mL 3     Sig: Inject 16 Units under the skin into the appropriate area as directed 2 (Two) Times a Day With Meals.        Pharmacy where request should be sent: [facility], [address] [phone] [place] [phone] FX     Last office visit with prescribing clinician: 2/11/2025   Last telemedicine visit with prescribing clinician: Visit date not found   Next office visit with prescribing clinician: 6/12/2025     Additional details provided by patient: PATIENT STATED BRAND NAME ONLY     Does the patient have less than a 3 day supply:  [x] Yes  [] No    Would you like a call back once the refill request has been completed: [x] Yes [] No    If the office needs to give you a call back, can they leave a voicemail: [x] Yes [] No    Tammie Encarnacion Rep   04/07/25 09:52 EDT

## 2025-04-29 ENCOUNTER — TELEPHONE (OUTPATIENT)
Dept: FAMILY MEDICINE CLINIC | Facility: CLINIC | Age: 55
End: 2025-04-29
Payer: MEDICARE

## 2025-04-29 RX ORDER — INSULIN LISPRO 100 [IU]/ML
16 INJECTION, SOLUTION INTRAVENOUS; SUBCUTANEOUS 2 TIMES DAILY WITH MEALS
Qty: 30 ML | Refills: 3 | Status: SHIPPED | OUTPATIENT
Start: 2025-04-29

## 2025-04-29 NOTE — TELEPHONE ENCOUNTER
Patient asking for humalog name brand, it was not sent in as [facility] but [facility] keeps filling it at generic. Stated it has to be sent in as name brand, please resend to [email or web address].

## 2025-06-30 DIAGNOSIS — E11.65 TYPE 2 DIABETES MELLITUS WITH HYPERGLYCEMIA, WITH LONG-TERM CURRENT USE OF INSULIN: Chronic | ICD-10-CM

## 2025-06-30 DIAGNOSIS — Z79.4 TYPE 2 DIABETES MELLITUS WITH HYPERGLYCEMIA, WITH LONG-TERM CURRENT USE OF INSULIN: Chronic | ICD-10-CM

## 2025-06-30 RX ORDER — EMPAGLIFLOZIN 25 MG/1
25 TABLET, FILM COATED ORAL DAILY
Qty: 90 TABLET | Refills: 1 | Status: SHIPPED | OUTPATIENT
Start: 2025-06-30

## 2025-07-25 ENCOUNTER — OFFICE VISIT (OUTPATIENT)
Dept: FAMILY MEDICINE CLINIC | Facility: CLINIC | Age: 55
End: 2025-07-25
Payer: MEDICARE

## 2025-07-25 VITALS
SYSTOLIC BLOOD PRESSURE: 140 MMHG | HEIGHT: 66 IN | WEIGHT: 220 LBS | OXYGEN SATURATION: 98 % | DIASTOLIC BLOOD PRESSURE: 72 MMHG | BODY MASS INDEX: 35.36 KG/M2 | TEMPERATURE: 97 F | HEART RATE: 74 BPM

## 2025-07-25 DIAGNOSIS — I10 HYPERTENSION, ESSENTIAL: Chronic | ICD-10-CM

## 2025-07-25 DIAGNOSIS — Z00.00 MEDICARE ANNUAL WELLNESS VISIT, SUBSEQUENT: ICD-10-CM

## 2025-07-25 DIAGNOSIS — Z79.4 TYPE 2 DIABETES MELLITUS WITH HYPERGLYCEMIA, WITH LONG-TERM CURRENT USE OF INSULIN: ICD-10-CM

## 2025-07-25 DIAGNOSIS — E78.2 MIXED HYPERLIPIDEMIA: Primary | Chronic | ICD-10-CM

## 2025-07-25 DIAGNOSIS — I25.10 CORONARY ARTERY DISEASE INVOLVING NATIVE CORONARY ARTERY OF NATIVE HEART WITHOUT ANGINA PECTORIS: ICD-10-CM

## 2025-07-25 DIAGNOSIS — E11.65 TYPE 2 DIABETES MELLITUS WITH HYPERGLYCEMIA, WITH LONG-TERM CURRENT USE OF INSULIN: ICD-10-CM

## 2025-07-25 RX ORDER — IRBESARTAN 300 MG/1
300 TABLET ORAL NIGHTLY
Qty: 15 TABLET | Refills: 0 | Status: SHIPPED | OUTPATIENT
Start: 2025-07-25

## 2025-07-25 RX ORDER — CARVEDILOL 25 MG/1
25 TABLET ORAL 2 TIMES DAILY WITH MEALS
Qty: 180 TABLET | Refills: 3 | Status: SHIPPED | OUTPATIENT
Start: 2025-07-25

## 2025-07-25 NOTE — PROGRESS NOTES
Subjective   The ABCs of the Annual Wellness Visit  Medicare Wellness Visit      Ilya Colon is a 54 y.o. patient who presents for a Medicare Wellness Visit.    The following portions of the patient's history were reviewed and   updated as appropriate: allergies, current medications, past family history, past medical history, past social history, past surgical history, and problem list.    Compared to one year ago, the patient's physical   health is worse.  Compared to one year ago, the patient's mental   health is the same.    Recent Hospitalizations:  He was not admitted to the hospital during the last year.     Current Medical Providers:  Patient Care Team:  Ernie White DO as PCP - General (Family Medicine)  GRAEME Chawla MD as Consulting Physician (Cardiology)    Outpatient Medications Prior to Visit   Medication Sig Dispense Refill    amitriptyline (ELAVIL) 25 MG tablet Take 1 tablet by mouth Every Night. 90 tablet 1    aspirin 81 MG EC tablet Take 1 tablet by mouth Daily. 90 tablet 1    cetirizine (zyrTEC) 5 MG tablet Take 1 tablet by mouth Daily. 90 tablet 1    esomeprazole (nexIUM) 20 MG capsule Take 1 capsule by mouth Every Morning Before Breakfast. 90 capsule 1    gabapentin (NEURONTIN) 600 MG tablet Take 1 tablet by mouth Every 6 (Six) Hours.      glucose blood (Accu-Chek Kirsten Plus) test strip USE TO CHECK GLUCOSE BEFORE BREAKFAST AND BEDTIME (FOR DIABETES) 200 each 3    HumaLOG KwikPen 100 UNIT/ML solution pen-injector Inject 16 Units under the skin into the appropriate area as directed 2 (Two) Times a Day With Meals. 30 mL 3    HYDROcodone-acetaminophen (NORCO)  MG per tablet Every 12 (Twelve) Hours.      Insulin Degludec (Tresiba) 100 UNIT/ML solution injection Inject 55 Units under the skin into the appropriate area as directed 2 (Two) Times a Day. 100 mL 3    Insulin Pen Needle (Pen Needles) 31G X 8 MM misc Use 1 Needle 2 (Two) Times a Day With Meals. 200 each 3     "Insulin Syringe (TRUEplus Insulin Syringe) 29G X 1/2\" 1 ML misc Use 1 syringe 3 (Three) Times a Day. 300 each 3    Jardiance 25 MG tablet tablet TAKE 1 TABLET BY MOUTH DAILY. 90 tablet 1    Lantus 100 UNIT/ML injection INJECT 55 UNITS UNDER THE SKIN INTO THE APPROPRIATE AREA AS DIRECTED TWO TIMES A DAY. 100 mL 1    melatonin 5 MG tablet tablet Take 2 tablets by mouth Every Night.      metroNIDAZOLE (METROCREAM) 0.75 % cream Apply 1 Application topically to the appropriate area as directed 2 (Two) Times a Day.      mometasone (ELOCON) 0.1 % cream Apply 1 Application topically to the appropriate area as directed Daily.      nitroglycerin (NITROSTAT) 0.4 MG SL tablet Place 1 tablet under the tongue Every 5 (Five) Minutes As Needed for Chest Pain. Take no more than 3 doses in 15 minutes.      potassium chloride (K-DUR,KLOR-CON) 20 MEQ CR tablet Take 1 tablet by mouth 2 (Two) Times a Day. 180 tablet 1    rosuvastatin (CRESTOR) 10 MG tablet TAKE ONE TABLET BY MOUTH EVERY DAY 90 tablet 1    tamsulosin (FLOMAX) 0.4 MG capsule 24 hr capsule Take 1 capsule by mouth 2 (Two) Times a Day. 180 capsule 3    triamcinolone (KENALOG) 0.1 % cream       varenicline (Chantix Continuing Month Brodie) 1 MG tablet Take a half a tab once daily for 3 days, then a half a tab twice daily for 4 days.  Then increase to 1 tab twice daily at the end of a meal with a large glass of water. 180 tablet 1    venlafaxine XR (EFFEXOR-XR) 75 MG 24 hr capsule TAKE ONE CAPSULE BY MOUTH EVERY DAY 90 capsule 1    carvedilol (COREG) 12.5 MG tablet TAKE ONE TABLET BY MOUTH TWICE A DAY WITH MEALS 180 tablet 1    irbesartan (Avapro) 300 MG tablet Take 1 tablet by mouth Every Night. 90 tablet 3    carvedilol (COREG) 12.5 MG tablet TAKE 1 TABLET BY MOUTH 2 TIMES A DAY WITH MEALS. (Patient not taking: Reported on 7/25/2025) 180 tablet 1    venlafaxine XR (EFFEXOR-XR) 75 MG 24 hr capsule TAKE ONE CAPSULE BY MOUTH EVERY DAY (Patient not taking: Reported on 7/25/2025) 90 " capsule 1     Facility-Administered Medications Prior to Visit   Medication Dose Route Frequency Provider Last Rate Last Admin    Chlorhexidine Gluconate Cloth 2 % pads 1 application  1 application  Topical Q12H PRN Lora Cowan APRN         Opioid medication/s are on active medication list.  and I have evaluated his active treatment plan and pain score trends (see table).  Vitals:    07/25/25 1538   PainSc: 8      I have reviewed the chart for potential of high risk medication and harmful drug interactions in the elderly.        Aspirin is on active medication list. Aspirin use is indicated based on review of current medical condition/s. Pros and cons of this therapy have been discussed today. Benefits of this medication outweigh potential harm.  Patient has been encouraged to continue taking this medication.  .      Patient Active Problem List   Diagnosis    Osteoarthritis of thumbs, bilateral    Left hand pain    Right hand pain    Paresthesia of both hands    Atherosclerosis of coronary artery    Depression    Degeneration of lumbosacral intervertebral disc    GERD (gastroesophageal reflux disease)    Mixed hyperlipidemia    Hypertension, essential    Type 2 diabetes mellitus with hyperglycemia, with long-term current use of insulin    Scoliosis    Medicare annual wellness visit, subsequent    Allergic rhinitis    Insomnia    Cigarette nicotine dependence in remission    Class 1 obesity due to excess calories with serious comorbidity and body mass index (BMI) of 30.0 to 30.9 in adult    Unstable angina    CAD (coronary artery disease)    S/P CABG x 4    Sternal wound dehiscence    Sternal wound dehiscence, subsequent encounter    Anemia due to stage 3a chronic kidney disease    CKD stage 3 due to type 2 diabetes mellitus    Rash of back    Diabetic peripheral neuropathy    Multiple joint pain    Encounter for medical examination to establish care    Anxiety    Benign prostatic hyperplasia with urinary  "hesitancy    Screening for prostate cancer    New daily persistent headache    Blurred vision    Benign prostatic hyperplasia with lower urinary tract symptoms     Advance Care Planning Advance Directive is not on file.  ACP discussion was declined by the patient. Patient does not have an advance directive, declines further assistance.            Objective   Vitals:    25 1538 25 1603   BP: 145/81 140/72   BP Location: Left arm Left arm   Patient Position: Sitting Sitting   Cuff Size:  Adult   Pulse: 74    Temp: 97 °F (36.1 °C)    SpO2: 98%    Weight: 99.8 kg (220 lb)    Height: 167.6 cm (66\")    PainSc: 8         Estimated body mass index is 35.51 kg/m² as calculated from the following:    Height as of this encounter: 167.6 cm (66\").    Weight as of this encounter: 99.8 kg (220 lb).    Class 2 Severe Obesity (BMI >=35 and <=39.9). Obesity-related health conditions include the following: hypertension, diabetes mellitus, and dyslipidemias. Obesity is unchanged. BMI is is above average; BMI management plan is completed. We discussed low calorie, low carb based diet program, portion control, and increasing exercise.           Does the patient have evidence of cognitive impairment? No                                                                                                Health  Risk Assessment    Smoking Status:  Social History     Tobacco Use   Smoking Status Former    Average packs/day: 1 pack/day for 7.3 years (7.3 ttl pk-yrs)    Types: Cigarettes    Start date: 2015    Quit date: 10/1/2022    Years since quittin.8   Smokeless Tobacco Never     Alcohol Consumption:  Social History     Substance and Sexual Activity   Alcohol Use Not Currently       Fall Risk Screen  STEADI Fall Risk Assessment was completed, and patient is at LOW risk for falls.Assessment completed on:2025    Depression Screening   Little interest or pleasure in doing things? Not at all   Feeling down, depressed, or " hopeless? Not at all   PHQ-2 Total Score 0      Health Habits and Functional and Cognitive Screenin/25/2025     3:40 PM   Functional & Cognitive Status   Do you have difficulty preparing food and eating? No   Do you have difficulty bathing yourself, getting dressed or grooming yourself? Yes   Do you have difficulty using the toilet? Yes   Do you have difficulty moving around from place to place? Yes   Do you have trouble with steps or getting out of a bed or a chair? Yes   Current Diet Well Balanced Diet   Dental Exam Up to date   Eye Exam Up to date   Exercise (times per week) 2 times per week   Current Exercises Include Walking   Do you need help using the phone?  No   Are you deaf or do you have serious difficulty hearing?  Yes   Do you need help to go to places out of walking distance? No   Do you need help shopping? No   Do you need help preparing meals?  No   Do you need help with housework?  Yes   Do you need help with laundry? Yes   Do you need help taking your medications? No   Do you need help managing money? No   Do you ever drive or ride in a car without wearing a seat belt? No   Have you felt unusual fatigue (could be tiredness), stress, anger or loneliness in the last month? No   Who do you live with? Other   If you need help, do you have trouble finding someone available to you? No   Have you been bothered in the last four weeks by sexual problems? No   Do you have difficulty concentrating, remembering or making decisions? Yes           Age-appropriate Screening Schedule:  Refer to the list below for future screening recommendations based on patient's age, sex and/or medical conditions. Orders for these recommended tests are listed in the plan section. The patient has been provided with a written plan.    Health Maintenance List  Health Maintenance   Topic Date Due    HEMOGLOBIN A1C  2025    URINE MICROALBUMIN-CREATININE RATIO (uACR)  2025    ZOSTER VACCINE (1 of 2) 2025  (Originally 10/30/2020)    TDAP/TD VACCINES (1 - Tdap) 01/21/2026 (Originally 10/30/1989)    COVID-19 Vaccine (1 - 2024-25 season) 01/25/2026 (Originally 9/1/2024)    Pneumococcal Vaccine 50+ (1 of 2 - PCV) 02/11/2026 (Originally 10/30/1989)    COLORECTAL CANCER SCREENING  07/25/2026 (Originally 10/30/2015)    INFLUENZA VACCINE  10/01/2025    DIABETIC EYE EXAM  10/16/2025    LIPID PANEL  02/11/2026    ANNUAL WELLNESS VISIT  07/25/2026    DIABETIC FOOT EXAM  07/25/2026    HEPATITIS C SCREENING  Completed    Hepatitis B  Discontinued                                                                                                                                                CMS Preventative Services Quick Reference  Risk Factors Identified During Encounter  Immunizations Discussed/Encouraged: Tdap    The above risks/problems have been discussed with the patient.  Pertinent information has been shared with the patient in the After Visit Summary.  An After Visit Summary and PPPS were made available to the patient.    Follow Up:   Next Medicare Wellness visit to be scheduled in 1 year.          Additional E&M Note during same encounter follows:  Patient has multiple medical problems which are significant and separately identifiable that require additional work above and beyond the Medicare Wellness Visit.      Chief Complaint  Medicare Wellness-subsequent    Ilya Colon is a 54 y.o. male who presents to [facility]     Type 2 diabetes-he does check his blood sugars on a regular basis.  He states they range anywhere from 150-260.  He does attempt to moderate his carbohydrates but he states is good only about 50% of the time.    Hypertension-he currently does not check his blood pressure at this time.  It is a little elevated here today at 145/81.    Hyperlipidemia-he does take his rosuvastatin on a daily basis.    CAD-he denies any chest pain tightness or heaviness.    Review of  "Systems   Constitutional:  Positive for fatigue.   Eyes:  Negative for visual disturbance.   Respiratory:  Negative for cough, chest tightness, shortness of breath and wheezing.    Cardiovascular:  Negative for chest pain and palpitations.   Endocrine: Negative for polydipsia and polyuria.   Musculoskeletal:  Positive for back pain.   Neurological:  Positive for headaches.   Psychiatric/Behavioral:  The patient is not nervous/anxious.        Objective   Vital Signs:   Vitals:    07/25/25 1538 07/25/25 1603   BP: 145/81 140/72   BP Location: Left arm Left arm   Patient Position: Sitting Sitting   Cuff Size:  Adult   Pulse: 74    Temp: 97 °F (36.1 °C)    SpO2: 98%    Weight: 99.8 kg (220 lb)    Height: 167.6 cm (66\")    PainSc: 8         Wt Readings from Last 3 Encounters:   07/25/25 99.8 kg (220 lb)   02/11/25 101 kg (222 lb)   10/25/24 98.4 kg (217 lb)     BP Readings from Last 3 Encounters:   07/25/25 140/72   02/11/25 148/90   10/25/24 (!) 191/94       Physical Exam  Vitals and nursing note reviewed.   Constitutional:       General: He is not in acute distress.     Appearance: Normal appearance. He is obese.   HENT:      Head: Normocephalic.      Right Ear: Tympanic membrane, ear canal and external ear normal.      Left Ear: Tympanic membrane, ear canal and external ear normal.      Nose: Nose normal.      Mouth/Throat:      Mouth: Mucous membranes are moist.      Dentition: Has dentures.      Pharynx: Oropharynx is clear.   Eyes:      General: No scleral icterus.     Conjunctiva/sclera: Conjunctivae normal.      Pupils: Pupils are equal, round, and reactive to light.   Cardiovascular:      Rate and Rhythm: Normal rate and regular rhythm.      Pulses: Normal pulses.      Heart sounds: Normal heart sounds. No murmur heard.  Pulmonary:      Effort: Pulmonary effort is normal.      Breath sounds: Normal breath sounds. No wheezing, rhonchi or rales.   Musculoskeletal:      Cervical back: Neck supple. No rigidity or " tenderness.   Lymphadenopathy:      Cervical: No cervical adenopathy.   Skin:     General: Skin is warm and dry.      Coloration: Skin is not jaundiced.      Findings: No rash.   Neurological:      General: No focal deficit present.      Mental Status: He is alert and oriented to person, place, and time.   Psychiatric:         Mood and Affect: Mood normal.         Thought Content: Thought content normal.         Judgment: Judgment normal.         The following data was reviewed by Ernie White DO on 07/25/2025  CMP   CMP          8/21/2024    15:18 2/11/2025    15:55   CMP   Glucose 131  207    BUN 6  9    Creatinine 1.15  1.36    EGFR 76.1  61.8    Sodium 133  137    Potassium 3.3  4.9    Chloride 96  99    Calcium 9.8  10.6    Total Protein 7.6  7.7    Albumin 4.6  4.6    Globulin 3.0  3.1    Total Bilirubin 1.0  1.0    Alkaline Phosphatase 117  163    AST (SGOT) 35  54    ALT (SGPT) 39  62    Albumin/Globulin Ratio 1.5  1.5    BUN/Creatinine Ratio 5.2  6.6    Anion Gap 13.5  9.0      LIPID   Lipid Panel          8/21/2024    15:18 2/11/2025    15:55   Lipid Panel   Total Cholesterol 189  151    Triglycerides 160  232    HDL Cholesterol 41  39    VLDL Cholesterol 28  38    LDL Cholesterol  120  74    LDL/HDL Ratio 2.83  1.68      A1C   Most Recent A1C          2/11/2025    15:55   HGBA1C Most Recent   Hemoglobin A1C 7.20          Assessment & Plan   Diagnoses and all orders for this visit:    1. Mixed hyperlipidemia (Primary)  Assessment & Plan:   Will update his lipid profile with his routine labs.    Orders:  -     TSH Rfx On Abnormal To Free T4    2. Hypertension, essential  Assessment & Plan:  His blood pressure is mildly elevated here today.  Will recheck it 1 more time.  Up on recheck his blood pressure was slightly improved but still higher than it should be.  Will increase up his carvedilol for better control.      3. Coronary artery disease involving native coronary artery of native heart  without angina pectoris  Assessment & Plan:  Current Amari he denies any anginal-like complaints.  He needs to work on improved blood pressure diabetes and weight loss.  Also encouraged him he needs to quit smoking at 100%.      4. Type 2 diabetes mellitus with hyperglycemia, with long-term current use of insulin  Assessment & Plan:  His home blood sugar readings sound somewhat elevated.  Will see what his A1c is.    Orders:  -     Comprehensive Metabolic Panel  -     Lipid Panel  -     Hemoglobin A1c  -     Microalbumin / Creatinine Urine Ratio - Urine, Clean Catch    5. Medicare annual wellness visit, subsequent  Assessment & Plan:  Overall he feels physically he is somewhat worse than it was a year ago.  He contributes this to his diagnosis of lupus and increased pain overall.  He is due for a booster on his tetanus but he declined today.      Other orders  -     carvedilol (COREG) 25 MG tablet; Take 1 tablet by mouth 2 (Two) Times a Day With Meals.  Dispense: 180 tablet; Refill: 3  -     irbesartan (Avapro) 300 MG tablet; Take 1 tablet by mouth Every Night.  Dispense: 15 tablet; Refill: 0          Class 2 Severe Obesity (BMI >=35 and <=39.9). Obesity-related health conditions include the following: hypertension, coronary heart disease, diabetes mellitus, and dyslipidemias. Obesity is unchanged. BMI is is above average; BMI management plan is completed. We discussed low calorie, low carb based diet program, portion control, and increasing exercise.       FOLLOW UP  Return in about 4 months (around 11/25/2025) for Recheck.  Patient was given instructions and counseling regarding his condition or for health maintenance advice. Please see specific information pulled into the AVS if appropriate.     Ernie White,   07/25/25  16:08 EDT

## 2025-07-25 NOTE — ASSESSMENT & PLAN NOTE
Current Amari he denies any anginal-like complaints.  He needs to work on improved blood pressure diabetes and weight loss.  Also encouraged him he needs to quit smoking at 100%.

## 2025-07-25 NOTE — ASSESSMENT & PLAN NOTE
His blood pressure is mildly elevated here today.  Will recheck it 1 more time.  Up on recheck his blood pressure was slightly improved but still higher than it should be.  Will increase up his carvedilol for better control.

## 2025-07-25 NOTE — ASSESSMENT & PLAN NOTE
Overall he feels physically he is somewhat worse than it was a year ago.  He contributes this to his diagnosis of lupus and increased pain overall.  He is due for a booster on his tetanus but he declined today.

## (undated) DEVICE — GLV SURG BIOGEL LTX PF 7

## (undated) DEVICE — Device

## (undated) DEVICE — SUT SILK 0 CT1 CR8 18IN C021D

## (undated) DEVICE — BNDG ELAS ELITE V/CLOSE 6IN 5YD LF STRL

## (undated) DEVICE — ELECTRD NDL MEGADYNE EZCLEAN MEGAFINE 2IN

## (undated) DEVICE — PK ATS CUST W CARDIOTOMY RESEVOIR

## (undated) DEVICE — PK SUT OPN HEART POLLOCK CUST

## (undated) DEVICE — HARMONIC SYNERGY DISSECTING HOOK WITH TORQUE WRENCH. FOR USE WITH BLUE HAND PIECE ONLY: Brand: HARMONIC SYNERGY

## (undated) DEVICE — STANDARD HYPODERMIC NEEDLE,POLYPROPYLENE HUB: Brand: MONOJECT

## (undated) DEVICE — SYS PERFUS SEP PLATLT W TIPS CUST

## (undated) DEVICE — DRSNG WND GEL FIBR OPTICELL AG PLS W/SLV LF 4X5IN  STRL

## (undated) DEVICE — SPNG LAP 18X18IN LF STRL PK/5

## (undated) DEVICE — SUT VIC 4/0 SH 27IN DYED J315H

## (undated) DEVICE — SUT VIC 0 CT1 CR8 27IN JJ41G

## (undated) DEVICE — SYS VASOVIEW HEMOPRO ENDOSCOPIC HARVST VESL

## (undated) DEVICE — DRESSING, SURESITE SELECT, 2.8'X3.50': Brand: MEDLINE

## (undated) DEVICE — SOL ISO/ALC RUB 70PCT 4OZ

## (undated) DEVICE — CORONARY ARTERY BYPASS GRAFT MARKERS, STAINLESS STEEL, DISTAL, WITHOUT HOLDER: Brand: ANASTOMARK CORONARY ARTERY BYPASS GRAFT MARKERS, STAINLESS STEEL, DISTAL

## (undated) DEVICE — APPL CHLORAPREP HI/LITE 26ML ORNG

## (undated) DEVICE — CANN ART SOFTFLOW EXT W/SUT/RNG 7MM

## (undated) DEVICE — GLV SURG SIGNATURE ESSENTIAL PF LTX SZ8.5

## (undated) DEVICE — SUT PROLN 3/0 8842H

## (undated) DEVICE — JACKSON-PRATT 100CC BULB RESERVOIR: Brand: CARDINAL HEALTH

## (undated) DEVICE — BG TRANSF W/COUPLER SPK 600ML

## (undated) DEVICE — DRN WND JP RND W TROC SIL 19F 1/4IN

## (undated) DEVICE — BIOPATCH™ ANTIMICROBIAL DRESSING WITH CHLORHEXIDINE GLUCONATE IS A HYDROPHILLIC POLYURETHANE ABSORPTIVE FOAM WITH CHLORHEXIDINE GLUCONATE (CHG) WHICH INHIBITS BACTERIAL GROWTH UNDER THE DRESSING. THE DRESSING IS INTENDED TO BE USED TO ABSORB EXUDATE, COVER A WOUND CAUSED BY VASCULAR AND NONVASCULAR PERCUTANEOUS MEDICAL DEVICES DURING SURGERY, AS WELL AS REDUCE LOCAL INFECTION AND COLONIZATION OF MICROORGANISMS.: Brand: BIOPATCH

## (undated) DEVICE — SENSR CERBRL O2 PK/2

## (undated) DEVICE — PK HEART OPN 40

## (undated) DEVICE — LOU OPEN HEART DR POLLOCK: Brand: MEDLINE INDUSTRIES, INC.

## (undated) DEVICE — CVR PROB 96IN LF STRL

## (undated) DEVICE — IRRIGATOR BULB ASEPTO 60CC STRL

## (undated) DEVICE — SUT PROLN MO.5 7/0 DBLARM BV175 6 2X30 BX/12

## (undated) DEVICE — KT DRP SPY/PHI W/ICG 25MG STRL

## (undated) DEVICE — TBG INSUFFLATION LUER LOCK: Brand: MEDLINE INDUSTRIES, INC.

## (undated) DEVICE — HEMOCONCENTRATOR PERFUS LPS06

## (undated) DEVICE — TOWEL,OR,DSP,ST,BLUE,STD,4/PK,20PK/CS: Brand: MEDLINE

## (undated) DEVICE — SOL IRR NACL 0.9PCT BT 1000ML

## (undated) DEVICE — PK PERFUS CUST W/CARDIOPLEGIA

## (undated) DEVICE — GLIDESHEATH SLENDER STAINLESS STEEL KIT: Brand: GLIDESHEATH SLENDER

## (undated) DEVICE — SPNG GZ WOVN 4X4IN 12PLY 10/BX STRL

## (undated) DEVICE — RADIFOCUS OPTITORQUE ANGIOGRAPHIC CATHETER: Brand: OPTITORQUE

## (undated) DEVICE — INTENDED FOR TISSUE SEPARATION, AND OTHER PROCEDURES THAT REQUIRE A SHARP SURGICAL BLADE TO PUNCTURE OR CUT.: Brand: BARD-PARKER ® CARBON RIB-BACK BLADES

## (undated) DEVICE — LABEL SHEET CUSTOM 2X2 YELLOW: Brand: MEDLINE INDUSTRIES, INC.

## (undated) DEVICE — DRAPE,REIN 53X77,STERILE: Brand: MEDLINE

## (undated) DEVICE — SYR LUERLOK 20CC BX/50

## (undated) DEVICE — RL DENTL WO/ STRNG LF STRL

## (undated) DEVICE — PK PROC MAJ 40

## (undated) DEVICE — SOL IRR H2O BTL 1000ML STRL

## (undated) DEVICE — NEEDLE, QUINCKE, 20GX3.5": Brand: MEDLINE

## (undated) DEVICE — GLV SURG BIOGEL SENSR LTX PF SZ7.5

## (undated) DEVICE — MEDICINE CUP, GRADUATED, STER: Brand: MEDLINE

## (undated) DEVICE — 3M™ IOBAN™ 2 ANTIMICROBIAL INCISE DRAPE 6650EZ: Brand: IOBAN™ 2

## (undated) DEVICE — ANTIBACTERIAL UNDYED BRAIDED (POLYGLACTIN 910), SYNTHETIC ABSORBABLE SUTURE: Brand: COATED VICRYL

## (undated) DEVICE — STRIP CLS WND CURAD MEDI/STRIP HYPOALLERG 1X5IN STRL EA/PK/4

## (undated) DEVICE — DISPOSABLE BIPOLAR FORCEPS 4" (10.2CM) JEWELERS, STRAIGHT 0.4MM TIP AND 12 FT. (3.6M) CABLE: Brand: KIRWAN

## (undated) DEVICE — ST. SORBAVIEW ULTIMATE IJ SYSTEM A,C: Brand: CENTURION

## (undated) DEVICE — GLV SURG BIOGEL M LTX PF 7 1/2

## (undated) DEVICE — GLV SURG SIGNATURE ESSENTIAL PF LTX SZ7.5

## (undated) DEVICE — BLOWER/MISTER AXIOUS OPCAB W/TBG

## (undated) DEVICE — STPLR SKIN SUBCUTICULAR INSORB 2030

## (undated) DEVICE — TR BAND RADIAL ARTERY COMPRESSION DEVICE: Brand: TR BAND

## (undated) DEVICE — SUT GUT CHRM 3/0 RB1 27IN U204H

## (undated) DEVICE — 12 FOOT DISPOSABLE EXTENSION CABLE WITH SAFE CONNECT / SCREW-DOWN

## (undated) DEVICE — Device: Brand: LEVEL 1

## (undated) DEVICE — MARKR SKIN W/RULR ULTRAFINETP

## (undated) DEVICE — PROXIMATE RH ROTATING HEAD SKIN STAPLERS (35 WIDE) CONTAINS 35 STAINLESS STEEL STAPLES: Brand: PROXIMATE

## (undated) DEVICE — TOWEL,OR,DSP,ST,WHITE,DLX,4/PK,20PK/CS: Brand: MEDLINE

## (undated) DEVICE — ADHS SKIN SURG TISS VISC PREMIERPRO EXOFIN HI/VISC FAST/DRY

## (undated) DEVICE — GW FC FLOP/TP .035 260CM 3MM

## (undated) DEVICE — SYS CLS SKIN PREMIERPRO EXOFINFUSION 22CM

## (undated) DEVICE — DRP SLUSH WARMR MACH CIR 44X44IN

## (undated) DEVICE — SUT SILK 2/0 FS BLK 18IN 685G